# Patient Record
Sex: MALE | Race: WHITE | NOT HISPANIC OR LATINO | Employment: OTHER | URBAN - METROPOLITAN AREA
[De-identification: names, ages, dates, MRNs, and addresses within clinical notes are randomized per-mention and may not be internally consistent; named-entity substitution may affect disease eponyms.]

---

## 2017-01-09 ENCOUNTER — PATIENT MESSAGE (OUTPATIENT)
Dept: HEMATOLOGY/ONCOLOGY | Facility: CLINIC | Age: 52
End: 2017-01-09

## 2017-01-09 ENCOUNTER — INFUSION (OUTPATIENT)
Dept: INFUSION THERAPY | Facility: HOSPITAL | Age: 52
End: 2017-01-09
Attending: INTERNAL MEDICINE
Payer: COMMERCIAL

## 2017-01-09 VITALS
DIASTOLIC BLOOD PRESSURE: 75 MMHG | TEMPERATURE: 99 F | RESPIRATION RATE: 18 BRPM | SYSTOLIC BLOOD PRESSURE: 148 MMHG | HEART RATE: 83 BPM

## 2017-01-09 DIAGNOSIS — C85.89 OTHER MALIGNANT LYMPHOMAS, UNSPECIFIED SITE, EXTRANODAL AND SOLID ORGAN SITES: ICD-10-CM

## 2017-01-09 DIAGNOSIS — C83.00 SMALL CELL B-CELL LYMPHOMA: ICD-10-CM

## 2017-01-09 DIAGNOSIS — C85.90 LYMPHOMA: ICD-10-CM

## 2017-01-09 DIAGNOSIS — D49.2 NEOPLASM OF UNSPECIFIED NATURE OF BONE, SOFT TISSUE, AND SKIN: Primary | ICD-10-CM

## 2017-01-09 DIAGNOSIS — C83.08 SMALL B-CELL LYMPHOMA OF LYMPH NODES OF MULTIPLE REGIONS: Primary | ICD-10-CM

## 2017-01-09 LAB
ALBUMIN SERPL BCP-MCNC: 3.9 G/DL
ALP SERPL-CCNC: 78 U/L
ALT SERPL W/O P-5'-P-CCNC: 33 U/L
ANION GAP SERPL CALC-SCNC: 7 MMOL/L
AST SERPL-CCNC: 22 U/L
BILIRUB SERPL-MCNC: 0.5 MG/DL
BUN SERPL-MCNC: 15 MG/DL
CALCIUM SERPL-MCNC: 9.7 MG/DL
CHLORIDE SERPL-SCNC: 101 MMOL/L
CO2 SERPL-SCNC: 27 MMOL/L
CREAT SERPL-MCNC: 0.8 MG/DL
ERYTHROCYTE [DISTWIDTH] IN BLOOD BY AUTOMATED COUNT: 14.4 %
EST. GFR  (AFRICAN AMERICAN): >60 ML/MIN/1.73 M^2
EST. GFR  (NON AFRICAN AMERICAN): >60 ML/MIN/1.73 M^2
GLUCOSE SERPL-MCNC: 159 MG/DL
HCT VFR BLD AUTO: 40.6 %
HGB BLD-MCNC: 14.1 G/DL
MCH RBC QN AUTO: 29.2 PG
MCHC RBC AUTO-ENTMCNC: 34.7 %
MCV RBC AUTO: 84 FL
NEUTROPHILS # BLD AUTO: 6.5 K/UL
PLATELET # BLD AUTO: 243 K/UL
PMV BLD AUTO: 9.9 FL
POTASSIUM SERPL-SCNC: 4.5 MMOL/L
PROT SERPL-MCNC: 6.9 G/DL
RBC # BLD AUTO: 4.83 M/UL
SODIUM SERPL-SCNC: 135 MMOL/L
WBC # BLD AUTO: 8.44 K/UL

## 2017-01-09 PROCEDURE — 25000003 PHARM REV CODE 250: Performed by: INTERNAL MEDICINE

## 2017-01-09 PROCEDURE — 96417 CHEMO IV INFUS EACH ADDL SEQ: CPT

## 2017-01-09 PROCEDURE — 63600175 PHARM REV CODE 636 W HCPCS: Performed by: INTERNAL MEDICINE

## 2017-01-09 PROCEDURE — 96415 CHEMO IV INFUSION ADDL HR: CPT

## 2017-01-09 PROCEDURE — 96375 TX/PRO/DX INJ NEW DRUG ADDON: CPT

## 2017-01-09 PROCEDURE — 85027 COMPLETE CBC AUTOMATED: CPT

## 2017-01-09 PROCEDURE — 96413 CHEMO IV INFUSION 1 HR: CPT

## 2017-01-09 PROCEDURE — 80053 COMPREHEN METABOLIC PANEL: CPT

## 2017-01-09 PROCEDURE — 36591 DRAW BLOOD OFF VENOUS DEVICE: CPT

## 2017-01-09 PROCEDURE — 96367 TX/PROPH/DG ADDL SEQ IV INF: CPT

## 2017-01-09 RX ORDER — HEPARIN 100 UNIT/ML
500 SYRINGE INTRAVENOUS
Status: DISCONTINUED | OUTPATIENT
Start: 2017-01-09 | End: 2017-01-09 | Stop reason: HOSPADM

## 2017-01-09 RX ORDER — ACETAMINOPHEN 325 MG/1
650 TABLET ORAL
Status: COMPLETED | OUTPATIENT
Start: 2017-01-09 | End: 2017-01-09

## 2017-01-09 RX ORDER — SULFAMETHOXAZOLE AND TRIMETHOPRIM 400; 80 MG/1; MG/1
1 TABLET ORAL DAILY
COMMUNITY
End: 2017-07-19

## 2017-01-09 RX ORDER — MEPERIDINE HYDROCHLORIDE 50 MG/ML
25 INJECTION INTRAMUSCULAR; INTRAVENOUS; SUBCUTANEOUS
Status: DISCONTINUED | OUTPATIENT
Start: 2017-01-09 | End: 2017-01-09 | Stop reason: HOSPADM

## 2017-01-09 RX ORDER — HEPARIN 100 UNIT/ML
500 SYRINGE INTRAVENOUS
Status: CANCELLED | OUTPATIENT
Start: 2017-01-09

## 2017-01-09 RX ORDER — FAMOTIDINE 10 MG/ML
20 INJECTION INTRAVENOUS
Status: COMPLETED | OUTPATIENT
Start: 2017-01-09 | End: 2017-01-09

## 2017-01-09 RX ORDER — SODIUM CHLORIDE 0.9 % (FLUSH) 0.9 %
10 SYRINGE (ML) INJECTION
Status: COMPLETED | OUTPATIENT
Start: 2017-01-09 | End: 2017-01-09

## 2017-01-09 RX ORDER — SODIUM CHLORIDE 0.9 % (FLUSH) 0.9 %
10 SYRINGE (ML) INJECTION
Status: DISCONTINUED | OUTPATIENT
Start: 2017-01-09 | End: 2017-01-09 | Stop reason: HOSPADM

## 2017-01-09 RX ORDER — SODIUM CHLORIDE 0.9 % (FLUSH) 0.9 %
10 SYRINGE (ML) INJECTION
Status: CANCELLED | OUTPATIENT
Start: 2017-01-09

## 2017-01-09 RX ORDER — HEPARIN 100 UNIT/ML
500 SYRINGE INTRAVENOUS
Status: COMPLETED | OUTPATIENT
Start: 2017-01-09 | End: 2017-01-09

## 2017-01-09 RX ORDER — ONDANSETRON HCL IN 0.9 % NACL 8 MG/50 ML
8 INTRAVENOUS SOLUTION, PIGGYBACK (ML) INTRAVENOUS
Status: COMPLETED | OUTPATIENT
Start: 2017-01-09 | End: 2017-01-09

## 2017-01-09 RX ADMIN — ACETAMINOPHEN 650 MG: 325 TABLET ORAL at 10:01

## 2017-01-09 RX ADMIN — DIPHENHYDRAMINE HYDROCHLORIDE 50 MG: 50 INJECTION, SOLUTION INTRAMUSCULAR; INTRAVENOUS at 11:01

## 2017-01-09 RX ADMIN — RITUXIMAB 960 MG: 10 INJECTION, SOLUTION INTRAVENOUS at 11:01

## 2017-01-09 RX ADMIN — HEPARIN 500 UNITS: 100 SYRINGE at 03:01

## 2017-01-09 RX ADMIN — SODIUM CHLORIDE, PRESERVATIVE FREE 10 ML: 5 INJECTION INTRAVENOUS at 09:01

## 2017-01-09 RX ADMIN — FAMOTIDINE 20 MG: 10 INJECTION INTRAVENOUS at 11:01

## 2017-01-09 RX ADMIN — HEPARIN 500 UNITS: 100 SYRINGE at 09:01

## 2017-01-09 RX ADMIN — SODIUM CHLORIDE 8 MG: 9 INJECTION, SOLUTION INTRAVENOUS at 10:01

## 2017-01-09 RX ADMIN — DEXAMETHASONE SODIUM PHOSPHATE 10 MG: 4 INJECTION, SOLUTION INTRAMUSCULAR; INTRAVENOUS at 10:01

## 2017-01-09 RX ADMIN — SODIUM CHLORIDE: 0.9 INJECTION, SOLUTION INTRAVENOUS at 10:01

## 2017-01-09 RX ADMIN — CYCLOPHOSPHAMIDE 640 MG: 1 INJECTION, POWDER, FOR SOLUTION INTRAVENOUS; ORAL at 03:01

## 2017-01-09 RX ADMIN — SODIUM CHLORIDE, PRESERVATIVE FREE 10 ML: 5 INJECTION INTRAVENOUS at 03:01

## 2017-01-09 NOTE — PLAN OF CARE
Problem: Patient Care Overview (Adult)  Goal: Plan of Care Review  Outcome: Ongoing (interventions implemented as appropriate)  1547-Rituxan started at rate of 100 cc/hr and increased by 100 cc/hr every 30 minutes to a max rate of 400 cc/hr. Patient tolerated treatment well.  Per Pharmacy- national shortage of Fludara- Dr. Jara aware and patient will not receive.  Also, no new meds added to treatment plan.  Patient discharged without complaints or S/S of adverse event. AVS given.  Instructed to call provider for any questions or concerns.

## 2017-01-09 NOTE — MR AVS SNAPSHOT
"Patient Information     Patient Name Sex     Wild Holliday Male 1965      Visit Information        Provider Department Dept Phone Center    2017 9:00 AM NOM, CHEMO [facility] [phone] Manuel Gill      Patient Instructions     None      Your Current Medications Are     sulfamethoxazole-trimethoprim 400-80mg (BACTRIM,SEPTRA) 400-80 mg per tablet    ACETAMINOPHEN ORAL    BD ULTRA-FINE BERT PEN NEEDLES 32 gauge x 5/32" Ndle    CONTOUR NEXT STRIPS Strp    docusate sodium (COLACE) 100 MG capsule    fluticasone (FLONASE) 50 mcg/actuation nasal spray    hydrocodone-acetaminophen 10-325mg (NORCO)  mg Tab    JUBLIA 10 % Nataliya    lidocaine-prilocaine (EMLA) cream    metformin (GLUCOPHAGE-XR) 500 MG 24 hr tablet    multivitamin (ONE DAILY MULTIVITAMIN) per tablet    ondansetron (ZOFRAN, AS HYDROCHLORIDE,) 4 MG tablet    pantoprazole (PROTONIX) 40 MG tablet    sildenafil (VIAGRA) 50 MG tablet      Facility-Administered Medications     acetaminophen tablet 650 mg    cyclophosphamide (CYTOXAN) 250 mg/m2 = 640 mg in sodium chloride 0.9% 250 mL chemo infusion    dexamethasone IVPB 10 mg    diphenhydramine IVPB 50 mg    famotidine (PF) 20 mg/2 mL injection 20 mg    heparin, porcine (PF) 100 unit/mL injection flush 500 Units    heparin, porcine (PF) 100 unit/mL injection flush 500 Units    meperidine 50 mg/mL injection 25 mg    ondansetron IVPB 8 mg    rituximab (RITUXAN) 375 mg/m2 = 960 mg in sodium chloride 0.9% 960 mL infusion    sodium chloride 0.9% 500 mL flush bag    sodium chloride 0.9% flush 10 mL    sodium chloride 0.9% flush 10 mL    fludarabine (FLUDARA) 65 mg in sodium chloride 0.9% 100 mL chemo infusion (Discontinued)      Appointments for Next Year     1/10/2017  9:00 AM INFUSION 480 MIN (480 min.) [facility], CHEMO    Arrive at check-in approximately 15 minutes before your scheduled appointment time. Bring all outside medical records and imaging, along " with a list of your current medications and insurance card.    [facility], 5th Floor    1/11/2017  9:00 AM INFUSION 480 MIN (480 min.) [facility], CHEMO    Arrive at check-in approximately 15 minutes before your scheduled appointment time. Bring all outside medical records and imaging, along with a list of your current medications and insurance card.    [facility], 5th Floor    1/12/2017  9:30 AM INJECTION (15 min.) [facility] IN[facility], [facility]    Arrive at check-in approximately 15 minutes before your scheduled appointment time. Bring all outside medical records and imaging, along with a list of your current medications and insurance card.    [facility], 5th Floor    1/18/2017 10:15 AM ESTABLISHED PATIENT (15 min.) [facility] Yue Jara MD    Arrive at check-in approximately 15 minutes before your scheduled appointment time. Bring all outside medical records and imaging, along with a list of your current medications and insurance card.    [facility]         Default Flowsheet Data (last 24 hours)      Amb Complex Vitals Randy        01/09/17 1455 01/09/17 1347 01/09/17 1317 01/09/17 1245 01/09/17 1214    Measurements    BP (!)  148/75   rituxan stop 133/63   Rituxan remains at 400ml/hr until completion. (!)  159/75   rate increased to 400 ml/hr 132/63   rituxan increased to 300 ml/hr (!)  146/75   rituxan rate increased to 200ml/hr    Temp 98.8 °F (37.1 °C) 98.7 °F (37.1 °C)       Pulse 83 77 73 74 78    Resp 18 18 18 18 18    Pain Assessment    Pain Score  Zero          01/09/17 1141 01/09/17 1015             Measurements    BP (!)  150/73   rituxan start at 100 ml/hr (!)  148/87       Temp 98.9 °F (37.2 °C)        Pulse 71 71       Resp 18 18               Allergies     No Known Allergies      Medications You Received from 01/08/2017 1525 to 01/09/2017 1525        Date/Time Order Dose Route Action      01/09/2017 1029 acetaminophen tablet 650 mg 650 mg Oral Given     01/09/2017 1500 cyclophosphamide (CYTOXAN) 250 mg/m2 = 640 mg in sodium chloride 0.9% 250 mL chemo infusion 640 mg Intravenous New Bag     01/09/2017 1026 dexamethasone IVPB 10 mg 10 mg Intravenous New Bag     01/09/2017 1107 diphenhydramine IVPB 50 mg 50 mg Intravenous New Bag     01/09/2017 1107 famotidine (PF) 20 mg/2 mL injection 20 mg 20 mg Intravenous Given     01/09/2017 1046 ondansetron IVPB 8 mg 8 mg Intravenous New Bag     01/09/2017 1139 rituximab (RITUXAN) 375 mg/m2 = 960 mg in sodium chloride 0.9% 960 mL infusion 960 mg Intravenous New Bag     01/09/2017 1022 sodium chloride 0.9% 500 mL flush bag   Intravenous New Bag      Current Discharge Medication List     Cannot display discharge medications since this is not an admission.

## 2017-01-09 NOTE — PLAN OF CARE
Problem: Patient Care Overview (Adult)  Goal: Individualization & Mutuality  Outcome: Ongoing (interventions implemented as appropriate)  1015-Labs , hx, and medications reviewed, labs look goof for treatment today and orders are signed. Assessment completed. Discussed plan of care with patient. Patient in agreement. Chair reclined and warm blanket and snack offered.

## 2017-01-10 ENCOUNTER — INFUSION (OUTPATIENT)
Dept: INFUSION THERAPY | Facility: HOSPITAL | Age: 52
End: 2017-01-10
Attending: INTERNAL MEDICINE
Payer: COMMERCIAL

## 2017-01-10 VITALS
SYSTOLIC BLOOD PRESSURE: 182 MMHG | DIASTOLIC BLOOD PRESSURE: 94 MMHG | RESPIRATION RATE: 72 BRPM | TEMPERATURE: 98 F | HEART RATE: 94 BPM

## 2017-01-10 DIAGNOSIS — C83.08 SMALL B-CELL LYMPHOMA OF LYMPH NODES OF MULTIPLE REGIONS: Primary | ICD-10-CM

## 2017-01-10 DIAGNOSIS — C83.00 SMALL CELL B-CELL LYMPHOMA: ICD-10-CM

## 2017-01-10 DIAGNOSIS — C85.90 LYMPHOMA: ICD-10-CM

## 2017-01-10 DIAGNOSIS — C85.89 OTHER MALIGNANT LYMPHOMAS, UNSPECIFIED SITE, EXTRANODAL AND SOLID ORGAN SITES: ICD-10-CM

## 2017-01-10 PROCEDURE — 96413 CHEMO IV INFUSION 1 HR: CPT

## 2017-01-10 PROCEDURE — 25000003 PHARM REV CODE 250: Performed by: INTERNAL MEDICINE

## 2017-01-10 PROCEDURE — 96367 TX/PROPH/DG ADDL SEQ IV INF: CPT

## 2017-01-10 PROCEDURE — 63600175 PHARM REV CODE 636 W HCPCS: Performed by: INTERNAL MEDICINE

## 2017-01-10 RX ORDER — ONDANSETRON HCL IN 0.9 % NACL 8 MG/50 ML
8 INTRAVENOUS SOLUTION, PIGGYBACK (ML) INTRAVENOUS
Status: COMPLETED | OUTPATIENT
Start: 2017-01-10 | End: 2017-01-10

## 2017-01-10 RX ORDER — SODIUM CHLORIDE 0.9 % (FLUSH) 0.9 %
10 SYRINGE (ML) INJECTION
Status: DISCONTINUED | OUTPATIENT
Start: 2017-01-10 | End: 2017-01-10 | Stop reason: HOSPADM

## 2017-01-10 RX ORDER — HEPARIN 100 UNIT/ML
500 SYRINGE INTRAVENOUS
Status: CANCELLED | OUTPATIENT
Start: 2017-01-10

## 2017-01-10 RX ORDER — SODIUM CHLORIDE 0.9 % (FLUSH) 0.9 %
10 SYRINGE (ML) INJECTION
Status: CANCELLED | OUTPATIENT
Start: 2017-01-10

## 2017-01-10 RX ORDER — HEPARIN 100 UNIT/ML
500 SYRINGE INTRAVENOUS
Status: COMPLETED | OUTPATIENT
Start: 2017-01-10 | End: 2017-01-10

## 2017-01-10 RX ADMIN — CYCLOPHOSPHAMIDE 640 MG: 1 INJECTION, POWDER, FOR SOLUTION INTRAVENOUS; ORAL at 09:01

## 2017-01-10 RX ADMIN — HEPARIN 500 UNITS: 100 SYRINGE at 10:01

## 2017-01-10 RX ADMIN — SODIUM CHLORIDE 8 MG: 9 INJECTION, SOLUTION INTRAVENOUS at 09:01

## 2017-01-10 RX ADMIN — DEXAMETHASONE SODIUM PHOSPHATE 10 MG: 4 INJECTION, SOLUTION INTRAMUSCULAR; INTRAVENOUS at 09:01

## 2017-01-10 NOTE — PLAN OF CARE
Problem: Patient Care Overview (Adult)  Goal: Discharge Needs Assessment  Outcome: Ongoing (interventions implemented as appropriate)  Tolerated well. No complaints. RTC tomorrow for Day 3.

## 2017-01-11 ENCOUNTER — INFUSION (OUTPATIENT)
Dept: INFUSION THERAPY | Facility: HOSPITAL | Age: 52
End: 2017-01-11
Attending: INTERNAL MEDICINE
Payer: COMMERCIAL

## 2017-01-11 VITALS
HEART RATE: 62 BPM | RESPIRATION RATE: 18 BRPM | TEMPERATURE: 98 F | DIASTOLIC BLOOD PRESSURE: 80 MMHG | SYSTOLIC BLOOD PRESSURE: 162 MMHG

## 2017-01-11 DIAGNOSIS — C83.08 SMALL B-CELL LYMPHOMA OF LYMPH NODES OF MULTIPLE REGIONS: Primary | ICD-10-CM

## 2017-01-11 DIAGNOSIS — C85.89 OTHER MALIGNANT LYMPHOMAS, UNSPECIFIED SITE, EXTRANODAL AND SOLID ORGAN SITES: ICD-10-CM

## 2017-01-11 PROCEDURE — 96413 CHEMO IV INFUSION 1 HR: CPT

## 2017-01-11 PROCEDURE — 63600175 PHARM REV CODE 636 W HCPCS: Performed by: INTERNAL MEDICINE

## 2017-01-11 PROCEDURE — 96367 TX/PROPH/DG ADDL SEQ IV INF: CPT

## 2017-01-11 PROCEDURE — 25000003 PHARM REV CODE 250: Performed by: INTERNAL MEDICINE

## 2017-01-11 RX ORDER — SODIUM CHLORIDE 0.9 % (FLUSH) 0.9 %
10 SYRINGE (ML) INJECTION
Status: DISCONTINUED | OUTPATIENT
Start: 2017-01-11 | End: 2017-01-11 | Stop reason: HOSPADM

## 2017-01-11 RX ORDER — HEPARIN 100 UNIT/ML
500 SYRINGE INTRAVENOUS
Status: DISCONTINUED | OUTPATIENT
Start: 2017-01-11 | End: 2017-01-11 | Stop reason: HOSPADM

## 2017-01-11 RX ADMIN — SODIUM CHLORIDE, PRESERVATIVE FREE 10 ML: 5 INJECTION INTRAVENOUS at 10:01

## 2017-01-11 RX ADMIN — CYCLOPHOSPHAMIDE 640 MG: 1 INJECTION, POWDER, FOR SOLUTION INTRAVENOUS; ORAL at 10:01

## 2017-01-11 RX ADMIN — HEPARIN 500 UNITS: 100 SYRINGE at 10:01

## 2017-01-11 RX ADMIN — DEXAMETHASONE SODIUM PHOSPHATE 0.25 MG: 4 INJECTION, SOLUTION INTRAMUSCULAR; INTRAVENOUS at 09:01

## 2017-01-11 NOTE — PLAN OF CARE
Problem: Patient Care Overview (Adult)  Goal: Individualization & Mutuality  Outcome: Ongoing (interventions implemented as appropriate)  0905-Labs , hx, and medications reviewed, patient returns today for last day of infusions. Assessment completed and no changes noted overnight. Discussed plan of care with patient. Patient in agreement. Chair reclined and warm blanket and snack offered.

## 2017-01-11 NOTE — PLAN OF CARE
Problem: Patient Care Overview (Adult)  Goal: Plan of Care Review  Outcome: Ongoing (interventions implemented as appropriate)  1050-Patient tolerated cytoxan well. Port was flushed and heparin locked and patient was de-accessed today.  Discharged without complaints or S/S of adverse event. AVS given.  Instructed to call provider for any questions or concerns.  Patient verbalized will return tomorrow for injection.

## 2017-01-12 ENCOUNTER — INFUSION (OUTPATIENT)
Dept: INFUSION THERAPY | Facility: HOSPITAL | Age: 52
End: 2017-01-12
Attending: INTERNAL MEDICINE
Payer: COMMERCIAL

## 2017-01-12 DIAGNOSIS — C85.89 OTHER MALIGNANT LYMPHOMAS, UNSPECIFIED SITE, EXTRANODAL AND SOLID ORGAN SITES: ICD-10-CM

## 2017-01-12 DIAGNOSIS — C83.08 SMALL B-CELL LYMPHOMA OF LYMPH NODES OF MULTIPLE REGIONS: Primary | ICD-10-CM

## 2017-01-12 PROCEDURE — 96372 THER/PROPH/DIAG INJ SC/IM: CPT

## 2017-01-12 PROCEDURE — 63600175 PHARM REV CODE 636 W HCPCS: Performed by: INTERNAL MEDICINE

## 2017-01-12 RX ADMIN — PEGFILGRASTIM 6 MG: 6 INJECTION SUBCUTANEOUS at 10:01

## 2017-01-12 NOTE — NURSING
Pt arrived for neulasta following chemo yesterday.  Pt tolerated injection to left arm.  Discharged to home.

## 2017-01-18 ENCOUNTER — INFUSION (OUTPATIENT)
Dept: INFUSION THERAPY | Facility: HOSPITAL | Age: 52
End: 2017-01-18
Attending: INTERNAL MEDICINE
Payer: COMMERCIAL

## 2017-01-18 ENCOUNTER — OFFICE VISIT (OUTPATIENT)
Dept: HEMATOLOGY/ONCOLOGY | Facility: CLINIC | Age: 52
End: 2017-01-18
Payer: COMMERCIAL

## 2017-01-18 VITALS
TEMPERATURE: 99 F | RESPIRATION RATE: 18 BRPM | WEIGHT: 134 LBS | HEART RATE: 66 BPM | BODY MASS INDEX: 21.53 KG/M2 | HEIGHT: 66 IN | DIASTOLIC BLOOD PRESSURE: 90 MMHG | SYSTOLIC BLOOD PRESSURE: 147 MMHG

## 2017-01-18 DIAGNOSIS — C85.89 OTHER MALIGNANT LYMPHOMAS, UNSPECIFIED SITE, EXTRANODAL AND SOLID ORGAN SITES: ICD-10-CM

## 2017-01-18 DIAGNOSIS — C83.00 SMALL CELL B-CELL LYMPHOMA: Primary | ICD-10-CM

## 2017-01-18 DIAGNOSIS — D49.2 NEOPLASM OF UNSPECIFIED NATURE OF BONE, SOFT TISSUE, AND SKIN: ICD-10-CM

## 2017-01-18 DIAGNOSIS — E08.00 DIABETES MELLITUS DUE TO UNDERLYING CONDITION WITH HYPEROSMOLARITY WITHOUT COMA, WITHOUT LONG-TERM CURRENT USE OF INSULIN: ICD-10-CM

## 2017-01-18 DIAGNOSIS — K21.9 GASTROESOPHAGEAL REFLUX DISEASE, ESOPHAGITIS PRESENCE NOT SPECIFIED: ICD-10-CM

## 2017-01-18 DIAGNOSIS — C85.90 LYMPHOMA: ICD-10-CM

## 2017-01-18 DIAGNOSIS — D49.2 THORACIC SPINE TUMOR: Primary | ICD-10-CM

## 2017-01-18 LAB
ALBUMIN SERPL BCP-MCNC: 3.8 G/DL
ALP SERPL-CCNC: 178 U/L
ALT SERPL W/O P-5'-P-CCNC: 32 U/L
ANION GAP SERPL CALC-SCNC: 8 MMOL/L
AST SERPL-CCNC: 19 U/L
BILIRUB SERPL-MCNC: 0.5 MG/DL
BUN SERPL-MCNC: 15 MG/DL
CALCIUM SERPL-MCNC: 9.2 MG/DL
CHLORIDE SERPL-SCNC: 106 MMOL/L
CO2 SERPL-SCNC: 23 MMOL/L
CREAT SERPL-MCNC: 0.8 MG/DL
ERYTHROCYTE [DISTWIDTH] IN BLOOD BY AUTOMATED COUNT: 14.6 %
EST. GFR  (AFRICAN AMERICAN): >60 ML/MIN/1.73 M^2
EST. GFR  (NON AFRICAN AMERICAN): >60 ML/MIN/1.73 M^2
GLUCOSE SERPL-MCNC: 155 MG/DL
HCT VFR BLD AUTO: 41.3 %
HGB BLD-MCNC: 14.1 G/DL
MCH RBC QN AUTO: 29.7 PG
MCHC RBC AUTO-ENTMCNC: 34.1 %
MCV RBC AUTO: 87 FL
NEUTROPHILS # BLD AUTO: 14.3 K/UL
PLATELET # BLD AUTO: 131 K/UL
PMV BLD AUTO: 9.8 FL
POTASSIUM SERPL-SCNC: 4.5 MMOL/L
PROT SERPL-MCNC: 6.6 G/DL
RBC # BLD AUTO: 4.75 M/UL
SODIUM SERPL-SCNC: 137 MMOL/L
WBC # BLD AUTO: 17.39 K/UL

## 2017-01-18 PROCEDURE — 99999 PR PBB SHADOW E&M-EST. PATIENT-LVL IV: CPT | Mod: PBBFAC,,, | Performed by: INTERNAL MEDICINE

## 2017-01-18 PROCEDURE — 36591 DRAW BLOOD OFF VENOUS DEVICE: CPT

## 2017-01-18 PROCEDURE — 80053 COMPREHEN METABOLIC PANEL: CPT

## 2017-01-18 PROCEDURE — 99214 OFFICE O/P EST MOD 30 MIN: CPT | Mod: S$GLB,,, | Performed by: INTERNAL MEDICINE

## 2017-01-18 PROCEDURE — 25000003 PHARM REV CODE 250: Performed by: INTERNAL MEDICINE

## 2017-01-18 PROCEDURE — 85027 COMPLETE CBC AUTOMATED: CPT

## 2017-01-18 PROCEDURE — 1159F MED LIST DOCD IN RCRD: CPT | Mod: S$GLB,,, | Performed by: INTERNAL MEDICINE

## 2017-01-18 RX ORDER — HEPARIN 100 UNIT/ML
500 SYRINGE INTRAVENOUS
Status: CANCELLED | OUTPATIENT
Start: 2017-02-06

## 2017-01-18 RX ORDER — FAMOTIDINE 10 MG/ML
20 INJECTION INTRAVENOUS
Status: CANCELLED | OUTPATIENT
Start: 2017-02-06

## 2017-01-18 RX ORDER — SODIUM CHLORIDE 0.9 % (FLUSH) 0.9 %
10 SYRINGE (ML) INJECTION
Status: CANCELLED | OUTPATIENT
Start: 2017-02-06

## 2017-01-18 RX ORDER — SODIUM CHLORIDE 0.9 % (FLUSH) 0.9 %
10 SYRINGE (ML) INJECTION
Status: CANCELLED | OUTPATIENT
Start: 2017-02-08

## 2017-01-18 RX ORDER — ACETAMINOPHEN 325 MG/1
650 TABLET ORAL
Status: CANCELLED | OUTPATIENT
Start: 2017-02-06

## 2017-01-18 RX ORDER — MEPERIDINE HYDROCHLORIDE 50 MG/ML
25 INJECTION INTRAMUSCULAR; INTRAVENOUS; SUBCUTANEOUS
Status: CANCELLED | OUTPATIENT
Start: 2017-02-06

## 2017-01-18 RX ORDER — HEPARIN 100 UNIT/ML
500 SYRINGE INTRAVENOUS
Status: CANCELLED | OUTPATIENT
Start: 2017-02-07

## 2017-01-18 RX ORDER — SODIUM CHLORIDE 0.9 % (FLUSH) 0.9 %
10 SYRINGE (ML) INJECTION
Status: CANCELLED | OUTPATIENT
Start: 2017-01-18

## 2017-01-18 RX ORDER — HEPARIN 100 UNIT/ML
500 SYRINGE INTRAVENOUS
Status: COMPLETED | OUTPATIENT
Start: 2017-01-18 | End: 2017-01-18

## 2017-01-18 RX ORDER — HEPARIN 100 UNIT/ML
500 SYRINGE INTRAVENOUS
Status: CANCELLED | OUTPATIENT
Start: 2017-01-18

## 2017-01-18 RX ORDER — SODIUM CHLORIDE 0.9 % (FLUSH) 0.9 %
10 SYRINGE (ML) INJECTION
Status: CANCELLED | OUTPATIENT
Start: 2017-02-07

## 2017-01-18 RX ORDER — HEPARIN 100 UNIT/ML
500 SYRINGE INTRAVENOUS
Status: CANCELLED | OUTPATIENT
Start: 2017-02-08

## 2017-01-18 RX ORDER — SODIUM CHLORIDE 0.9 % (FLUSH) 0.9 %
10 SYRINGE (ML) INJECTION
Status: COMPLETED | OUTPATIENT
Start: 2017-01-18 | End: 2017-01-18

## 2017-01-18 RX ADMIN — SODIUM CHLORIDE, PRESERVATIVE FREE 10 ML: 5 INJECTION INTRAVENOUS at 09:01

## 2017-01-18 RX ADMIN — HEPARIN 500 UNITS: 100 SYRINGE at 09:01
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
## 2017-01-18 NOTE — PATIENT INSTRUCTIONS
No change in meds    Get labs done 2/5 in [place]    Come in for chemo when scheduled    Get PET / CT done when scheduled    See me in 1st week of march

## 2017-01-18 NOTE — Clinical Note
No change in meds  Get labs done 2/5 in [place]--cbc/cmp  Come in for chemo when scheduled--2/6  Get PET / CT done when scheduled--in 5 weeks  See me in 1st week of march  Ruth--Can you find out if we will have Fludara available by 2/6  Otherwise can yo send me orders for David/Rituxin

## 2017-01-18 NOTE — MR AVS SNAPSHOT
"    [facility]  [address]  [place] [address]  Phone: [phone]                  Wild Holliday   2017 10:15 AM   Office Visit    Description:  Male : 1965   Provider:  Yue Jara MD   Department:  [facility]           Reason for Visit     thoracic spine tumor           Diagnoses this Visit        Comments    Thoracic spine tumor    -  Primary     Other malignant lymphomas, unspecified site, extranodal and solid organ sites         Gastroesophageal reflux disease, esophagitis presence not specified         Diabetes mellitus due to underlying condition with hyperosmolarity without coma, without long-term current use of insulin                To Do List           Future Appointments        Provider Department Dept Phone    2017 1:00 PM LAB, [facility] [phone]    2017 10:30 AM [facility] PET CT LIMIT 400 LBS [facility] [phone]      Goals (5 Years of Data)     None      [facility] On Call     [facility] On [facility] Nurse Care Line -  Assistance  Registered nurses in the [facility] provide clinical advisement, health education, appointment booking, and other advisory services.  Call for this free service at [phone].             Medications                Verify that the below list of medications is an accurate representation of the medications you are currently taking.  If none reported, the list may be blank. If incorrect, please contact your healthcare provider. Carry this list with you in case of emergency.           Current Medications     ACETAMINOPHEN ORAL Take by mouth.    BD ULTRA-FINE BERT PEN NEEDLES 32 gauge x 5/32" Ndle USE DAILY WITH VICTOZA PEN UTD    CONTOUR NEXT STRIPS Strp     docusate sodium (COLACE) 100 MG capsule Take 1 capsule (100 mg total) by mouth 2 (two) times daily.    fluticasone (FLONASE) 50 mcg/actuation nasal spray 1 spray by Each Nare " "route once daily.    hydrocodone-acetaminophen 10-325mg (NORCO)  mg Tab Take 1 tablet by mouth every 6 (six) hours as needed.    JUBLIA 10 % Nataliya APPLY TO NAILS EVERY DAY    lidocaine-prilocaine (EMLA) cream Apply topically as needed.    metformin (GLUCOPHAGE-XR) 500 MG 24 hr tablet TK 1 T PO BID    multivitamin (ONE DAILY MULTIVITAMIN) per tablet Take 1 tablet by mouth once daily.    ondansetron (ZOFRAN, AS HYDROCHLORIDE,) 4 MG tablet Take 1 tablet (4 mg total) by mouth every 6 (six) hours as needed for Nausea.    pantoprazole (PROTONIX) 40 MG tablet Take 1 tablet (40 mg total) by mouth once daily.    sildenafil (VIAGRA) 50 MG tablet Take 1 tablet (50 mg total) by mouth as needed for Erectile Dysfunction.    sulfamethoxazole-trimethoprim 400-80mg (BACTRIM,SEPTRA) 400-80 mg per tablet Take 1 tablet by mouth once daily. Monday Wednesday friday           Clinical Reference Information           Vital Signs - Last Recorded  Most recent update: 1/18/2017 10:23 AM by Blane Mckeon, MA    BP Pulse Temp Resp Ht Wt    (!) 147/90 66 99.1 °F (37.3 °C) 18 5' 6" (1.676 m) 60.8 kg (134 lb)    BMI                21.63 kg/m2          Blood Pressure          Most Recent Value    BP  (!)  147/90      Allergies as of 1/18/2017     No Known Allergies      Immunizations Administered on Date of Encounter - 1/18/2017     None      Orders Placed During Today's Visit     Future Labs/Procedures Expected by Expires    CT Abdomen Without Contrast  1/18/2017 1/18/2018    CT Chest With Contrast  1/18/2017 1/18/2018    CT Soft Tissue Neck With Contrast  1/18/2017 1/19/2018    NM PET CT Routine Skull to Mid Thigh  1/18/2017 1/18/2018    CBC Oncology  As directed 1/18/2018    Comprehensive metabolic panel  As directed 1/18/2018    Lactate dehydrogenase  As directed 1/18/2018      Instructions    No change in meds    Get labs done 2/5 in [place]    Come in for chemo when scheduled    Get PET / CT done when scheduled    See me in 1st " week of march

## 2017-01-18 NOTE — NURSING
Patient here for blood draw from left chest port-port accesses easily with blood return after much flushing and changing positions-blood to lab-line flushed-needle removed-patient tolerated well.

## 2017-01-19 RX ORDER — ACETAMINOPHEN 325 MG/1
650 TABLET ORAL
Status: CANCELLED | OUTPATIENT
Start: 2017-02-06

## 2017-01-19 RX ORDER — HEPARIN 100 UNIT/ML
500 SYRINGE INTRAVENOUS
Status: CANCELLED | OUTPATIENT
Start: 2017-02-07

## 2017-01-19 RX ORDER — SODIUM CHLORIDE 0.9 % (FLUSH) 0.9 %
10 SYRINGE (ML) INJECTION
Status: CANCELLED | OUTPATIENT
Start: 2017-02-07

## 2017-01-19 RX ORDER — FAMOTIDINE 10 MG/ML
20 INJECTION INTRAVENOUS
Status: CANCELLED | OUTPATIENT
Start: 2017-02-06

## 2017-01-19 RX ORDER — MEPERIDINE HYDROCHLORIDE 50 MG/ML
25 INJECTION INTRAMUSCULAR; INTRAVENOUS; SUBCUTANEOUS
Status: CANCELLED | OUTPATIENT
Start: 2017-02-06

## 2017-01-19 RX ORDER — SODIUM CHLORIDE 0.9 % (FLUSH) 0.9 %
10 SYRINGE (ML) INJECTION
Status: CANCELLED | OUTPATIENT
Start: 2017-02-06

## 2017-01-19 RX ORDER — HEPARIN 100 UNIT/ML
500 SYRINGE INTRAVENOUS
Status: CANCELLED | OUTPATIENT
Start: 2017-02-06

## 2017-01-23 ENCOUNTER — PATIENT MESSAGE (OUTPATIENT)
Dept: HEMATOLOGY/ONCOLOGY | Facility: CLINIC | Age: 52
End: 2017-01-23

## 2017-01-25 ENCOUNTER — PATIENT MESSAGE (OUTPATIENT)
Dept: HEMATOLOGY/ONCOLOGY | Facility: CLINIC | Age: 52
End: 2017-01-25

## 2017-01-26 ENCOUNTER — TELEPHONE (OUTPATIENT)
Dept: HEMATOLOGY/ONCOLOGY | Facility: CLINIC | Age: 52
End: 2017-01-26

## 2017-01-26 ENCOUNTER — PATIENT MESSAGE (OUTPATIENT)
Dept: HEMATOLOGY/ONCOLOGY | Facility: CLINIC | Age: 52
End: 2017-01-26

## 2017-01-26 NOTE — TELEPHONE ENCOUNTER
Called and spoke with Mr Holliday this morning. Both ELIZABETH and Mariza, our , spoke with Mr. Holliday. All appointments scheduled and the information discussed with patient.

## 2017-01-26 NOTE — TELEPHONE ENCOUNTER
Returned call and spoke with . Patient calling inquiring about his future appointments. Patient currently prescribed Fludara which has not been available, due to  a shortage on the drug supply. Dr Jara has ordered Rituximab  Bendamustine. Informed patient the authorization was still pending. After reviewing the auth request today, patient has been approved. We will still have to verify with Dr Jara the date he is requesting patient to have his labs drawn. In his orders, it list a Sunday labs are to be drawn. The  is asking if we can have his labs drawn on Friday 1/3 or patient to have his blood work drawn on the morning of his chemotherapy. Please advise.

## 2017-01-26 NOTE — TELEPHONE ENCOUNTER
----- Message from Lazaro Lara sent at 1/25/2017  1:55 PM CST -----  Contact: self  Pt states at last visit  mention starting chemo on 02/06/17, pt doesn't have any appointments scheduled, pt has some concerns and would like to speak with nurse ASAP, pt mention leaving messages but no response from nurse.  Contact number [phone]

## 2017-01-27 DIAGNOSIS — C85.89 OTHER MALIGNANT LYMPHOMAS, UNSPECIFIED SITE, EXTRANODAL AND SOLID ORGAN SITES: ICD-10-CM

## 2017-01-27 DIAGNOSIS — E08.00 DIABETES MELLITUS DUE TO UNDERLYING CONDITION WITH HYPEROSMOLARITY WITHOUT COMA, WITHOUT LONG-TERM CURRENT USE OF INSULIN: ICD-10-CM

## 2017-01-27 DIAGNOSIS — K21.9 GASTROESOPHAGEAL REFLUX DISEASE, ESOPHAGITIS PRESENCE NOT SPECIFIED: ICD-10-CM

## 2017-01-27 DIAGNOSIS — D49.2 THORACIC SPINE TUMOR: ICD-10-CM

## 2017-01-29 RX ORDER — PANTOPRAZOLE SODIUM 40 MG/1
TABLET, DELAYED RELEASE ORAL
Qty: 30 TABLET | Refills: 0 | Status: SHIPPED | OUTPATIENT
Start: 2017-01-29 | End: 2017-07-19

## 2017-02-06 ENCOUNTER — INFUSION (OUTPATIENT)
Dept: INFUSION THERAPY | Facility: HOSPITAL | Age: 52
End: 2017-02-06
Attending: INTERNAL MEDICINE
Payer: COMMERCIAL

## 2017-02-06 ENCOUNTER — LAB VISIT (OUTPATIENT)
Dept: LAB | Facility: HOSPITAL | Age: 52
End: 2017-02-06
Attending: INTERNAL MEDICINE
Payer: COMMERCIAL

## 2017-02-06 VITALS
DIASTOLIC BLOOD PRESSURE: 63 MMHG | HEIGHT: 66 IN | SYSTOLIC BLOOD PRESSURE: 133 MMHG | TEMPERATURE: 98 F | RESPIRATION RATE: 20 BRPM | BODY MASS INDEX: 48.15 KG/M2 | WEIGHT: 299.63 LBS | HEART RATE: 73 BPM

## 2017-02-06 DIAGNOSIS — K21.9 GASTROESOPHAGEAL REFLUX DISEASE, ESOPHAGITIS PRESENCE NOT SPECIFIED: ICD-10-CM

## 2017-02-06 DIAGNOSIS — C85.89 OTHER MALIGNANT LYMPHOMAS, UNSPECIFIED SITE, EXTRANODAL AND SOLID ORGAN SITES: ICD-10-CM

## 2017-02-06 DIAGNOSIS — C83.00 SMALL B-CELL LYMPHOMA, UNSPECIFIED BODY REGION: Primary | ICD-10-CM

## 2017-02-06 DIAGNOSIS — D49.2 THORACIC SPINE TUMOR: ICD-10-CM

## 2017-02-06 DIAGNOSIS — E08.00 DIABETES MELLITUS DUE TO UNDERLYING CONDITION WITH HYPEROSMOLARITY WITHOUT COMA, WITHOUT LONG-TERM CURRENT USE OF INSULIN: ICD-10-CM

## 2017-02-06 LAB
ALBUMIN SERPL BCP-MCNC: 3.9 G/DL
ALP SERPL-CCNC: 77 U/L
ALT SERPL W/O P-5'-P-CCNC: 39 U/L
ANION GAP SERPL CALC-SCNC: 10 MMOL/L
AST SERPL-CCNC: 25 U/L
BILIRUB SERPL-MCNC: 0.6 MG/DL
BUN SERPL-MCNC: 18 MG/DL
CALCIUM SERPL-MCNC: 9.4 MG/DL
CHLORIDE SERPL-SCNC: 101 MMOL/L
CO2 SERPL-SCNC: 26 MMOL/L
CREAT SERPL-MCNC: 0.8 MG/DL
ERYTHROCYTE [DISTWIDTH] IN BLOOD BY AUTOMATED COUNT: 15 %
EST. GFR  (AFRICAN AMERICAN): >60 ML/MIN/1.73 M^2
EST. GFR  (NON AFRICAN AMERICAN): >60 ML/MIN/1.73 M^2
GLUCOSE SERPL-MCNC: 189 MG/DL
HCT VFR BLD AUTO: 41 %
HGB BLD-MCNC: 14.2 G/DL
LDH SERPL L TO P-CCNC: 174 U/L
MCH RBC QN AUTO: 30.7 PG
MCHC RBC AUTO-ENTMCNC: 34.6 %
MCV RBC AUTO: 89 FL
NEUTROPHILS # BLD AUTO: 4.6 K/UL
PLATELET # BLD AUTO: 256 K/UL
PMV BLD AUTO: 9.4 FL
POTASSIUM SERPL-SCNC: 4.6 MMOL/L
PROT SERPL-MCNC: 6.8 G/DL
RBC # BLD AUTO: 4.63 M/UL
SODIUM SERPL-SCNC: 137 MMOL/L
WBC # BLD AUTO: 6.41 K/UL

## 2017-02-06 PROCEDURE — 96415 CHEMO IV INFUSION ADDL HR: CPT

## 2017-02-06 PROCEDURE — 63600175 PHARM REV CODE 636 W HCPCS: Performed by: INTERNAL MEDICINE

## 2017-02-06 PROCEDURE — 83615 LACTATE (LD) (LDH) ENZYME: CPT

## 2017-02-06 PROCEDURE — 96413 CHEMO IV INFUSION 1 HR: CPT

## 2017-02-06 PROCEDURE — 96411 CHEMO IV PUSH ADDL DRUG: CPT

## 2017-02-06 PROCEDURE — 36593 DECLOT VASCULAR DEVICE: CPT

## 2017-02-06 PROCEDURE — 36415 COLL VENOUS BLD VENIPUNCTURE: CPT

## 2017-02-06 PROCEDURE — 85027 COMPLETE CBC AUTOMATED: CPT

## 2017-02-06 PROCEDURE — 25000003 PHARM REV CODE 250: Performed by: INTERNAL MEDICINE

## 2017-02-06 PROCEDURE — 96375 TX/PRO/DX INJ NEW DRUG ADDON: CPT

## 2017-02-06 PROCEDURE — 80053 COMPREHEN METABOLIC PANEL: CPT

## 2017-02-06 PROCEDURE — 96367 TX/PROPH/DG ADDL SEQ IV INF: CPT

## 2017-02-06 RX ORDER — HEPARIN 100 UNIT/ML
500 SYRINGE INTRAVENOUS
Status: DISCONTINUED | OUTPATIENT
Start: 2017-02-06 | End: 2017-02-06 | Stop reason: HOSPADM

## 2017-02-06 RX ORDER — SODIUM CHLORIDE 0.9 % (FLUSH) 0.9 %
10 SYRINGE (ML) INJECTION
Status: DISCONTINUED | OUTPATIENT
Start: 2017-02-06 | End: 2017-02-06 | Stop reason: HOSPADM

## 2017-02-06 RX ORDER — FAMOTIDINE 10 MG/ML
20 INJECTION INTRAVENOUS
Status: COMPLETED | OUTPATIENT
Start: 2017-02-06 | End: 2017-02-06

## 2017-02-06 RX ORDER — MEPERIDINE HYDROCHLORIDE 50 MG/ML
25 INJECTION INTRAMUSCULAR; INTRAVENOUS; SUBCUTANEOUS
Status: DISCONTINUED | OUTPATIENT
Start: 2017-02-06 | End: 2017-02-06 | Stop reason: HOSPADM

## 2017-02-06 RX ORDER — ACETAMINOPHEN 325 MG/1
650 TABLET ORAL
Status: COMPLETED | OUTPATIENT
Start: 2017-02-06 | End: 2017-02-06

## 2017-02-06 RX ADMIN — RITUXIMAB 945 MG: 10 INJECTION, SOLUTION INTRAVENOUS at 11:02

## 2017-02-06 RX ADMIN — SODIUM CHLORIDE, PRESERVATIVE FREE 10 ML: 5 INJECTION INTRAVENOUS at 02:02

## 2017-02-06 RX ADMIN — HEPARIN 500 UNITS: 100 SYRINGE at 02:02

## 2017-02-06 RX ADMIN — FAMOTIDINE 20 MG: 10 INJECTION INTRAVENOUS at 10:02

## 2017-02-06 RX ADMIN — BENDAMUSTINE HYDROCHLORIDE 225 MG: 25 INJECTION, SOLUTION INTRAVENOUS at 02:02

## 2017-02-06 RX ADMIN — ALTEPLASE 2 MG: 2.2 INJECTION, POWDER, LYOPHILIZED, FOR SOLUTION INTRAVENOUS at 09:02

## 2017-02-06 RX ADMIN — PALONOSETRON HYDROCHLORIDE 0.25 MG: 0.25 INJECTION INTRAVENOUS at 10:02

## 2017-02-06 RX ADMIN — ACETAMINOPHEN 650 MG: 325 TABLET ORAL at 10:02

## 2017-02-06 RX ADMIN — DIPHENHYDRAMINE HYDROCHLORIDE 50 MG: 50 INJECTION, SOLUTION INTRAMUSCULAR; INTRAVENOUS at 10:02

## 2017-02-06 RX ADMIN — SODIUM CHLORIDE: 0.9 INJECTION, SOLUTION INTRAVENOUS at 10:02

## 2017-02-06 NOTE — MR AVS SNAPSHOT
"Patient Information     Patient Name Sex Wild Montes Male 1965      Visit Information        Provider Department Dept Phone Center    2017 8:30 AM [facility], CHEMO [facility] Chemotherapy Infusion [phone] Manuel Gill      Patient Instructions     None      Your Current Medications Are     ACETAMINOPHEN ORAL    BD ULTRA-FINE BERT PEN NEEDLES 32 gauge x 5/32" Ndle    CONTOUR NEXT STRIPS Strp    docusate sodium (COLACE) 100 MG capsule    fluticasone (FLONASE) 50 mcg/actuation nasal spray    hydrocodone-acetaminophen 10-325mg (NORCO)  mg Tab    JUBLIA 10 % Nataliya    lidocaine-prilocaine (EMLA) cream    metformin (GLUCOPHAGE-XR) 500 MG 24 hr tablet    multivitamin (ONE DAILY MULTIVITAMIN) per tablet    ondansetron (ZOFRAN, AS HYDROCHLORIDE,) 4 MG tablet    pantoprazole (PROTONIX) 40 MG tablet    sildenafil (VIAGRA) 50 MG tablet    sulfamethoxazole-trimethoprim 400-80mg (BACTRIM,SEPTRA) 400-80 mg per tablet      Facility-Administered Medications     acetaminophen tablet 650 mg    alteplase injection 2 mg    bendamustine (BENDEKA) 225 mg in sodium chloride 0.9% 50 mL chemo infusion    diphenhydramine IVPB 50 mg    famotidine (PF) 20 mg/2 mL injection 20 mg    heparin, porcine (PF) 100 unit/mL injection flush 500 Units    meperidine 50 mg/mL injection 25 mg    palonosetron 0.25mg/dexamethasone 20mg IVPB    rituximab (RITUXAN) 375 mg/m2 = 945 mg in sodium chloride 0.9% 945 mL infusion    sodium chloride 0.9% 100 mL flush bag    sodium chloride 0.9% flush 10 mL    rituximab (RITUXAN) 375 mg/m2 = 630 mg in sodium chloride 0.9% 630 mL infusion (Discontinued)      Appointments for Next Year     2017  1:30 PM INFUSION 060 MIN (60 min.) [facility], CHEMO    Arrive at check-in approximately 15 minutes before your scheduled appointment time. Bring all outside medical records and imaging, along with a list of your current medications and insurance card.    [facility], 5th " Floor    2/8/2017  9:30 AM INJECTION (15 min.) [facility] INJECTION, [facility] INFUSION    Arrive at check-in approximately 15 minutes before your scheduled appointment time. Bring all outside medical records and imaging, along with a list of your current medications and insurance card.    [facility], 5th Floor    2/22/2017  9:00 AM CT CHEST W CONTRAST (15 min.) [facility] CT1 64- LIMIT 450 LBS    Please fast for 4 hours prior to appointment. Arrive at check-in approximately 30 minutes before your scheduled appointment time. Bring all outside medical records and imaging, along with a list of your current medications and insurance card.    2nd floor    2/22/2017  9:15 AM CT ABD NON CONTRAST (15 min.) [facility] CT1 64- LIMIT 450 LBS    Arrive 1 hour prior to appointmnet to drink exam prep.    2nd floor    2/22/2017  9:30 AM CT IV CONTRAST (15 min.) [facility] CT2 64- LIMIT 600 LBS    Please fast for 4 hours prior to appointment. Arrive at check-in approximately 30 minutes before your scheduled appointment time. Bring all outside medical records and imaging, along with a list of your current medications and insurance card.    2nd floor    2/22/2017 10:30 AM PET CT  (30 min.) [facility] PET CT LIMIT 400 LBS    You have been scheduled for a PET scan. Do Not eat or drink anything 6 hours before your test, except for water. If you are scheduled at [facility], report to the Radiology / Laboratory check in on the second floor.  Upon arrival to the PET department we will have to do a finger stick to check your glucose level. An intravenous line (IV) will be started, and you will be given a PET tracer. This injection needs to circulate for about 60 minutes while you are sitting in a quiet area. You will lie on a table and the camera will take pictures,  "this takes about 30-45 minutes.  A special radiopharmaceutical must be ordered for your test so if you have any questions or need to reschedule your appointment please call nuclear medicine ([phone]).    Report to Radiology/Laboratory  - 2nd Floor         Default Flowsheet Data (last 24 hours)      Amb Complex Vitals Randy        02/06/17 1425 02/06/17 1315 02/06/17 1245 02/06/17 1215 02/06/17 1145    Measurements    /63   Rituxan infusion completed (!)  151/73   Rituxan infusion maintained at 400 mg/hr (!)  143/67   Rituxan infusion increased to 400 mg/hr 130/69   Rituxan infusion increased to 300 mg/hr (!)  144/67   Rituxan infusion increased to 200 mg/hr    Pulse 73 69 66 61 70       02/06/17 1115 02/06/17 0847             Measurements    Weight  135.9 kg (299 lb 9.7 oz)       Height  5' 5.98" (1.676 m)       BSA (Calculated - sq m)  2.52 sq meters       BMI (Calculated)  48.5       BP --   Rituxan infusion started at 100 mg/hr (!)  158/88       Temp  98.2 °F (36.8 °C)       Pulse  65       Resp  20       Pain Assessment    Pain Score  Four       Pain Loc  SHOULDER               Allergies     No Known Allergies      Medications You Received from 02/05/2017 1451 to 02/06/2017 1451        Date/Time Order Dose Route Action     02/06/2017 1002 acetaminophen tablet 650 mg 650 mg Oral Given     02/06/2017 0930 alteplase injection 2 mg 2 mg Intra-Catheter Given     02/06/2017 1432 bendamustine (BENDEKA) 225 mg in sodium chloride 0.9% 50 mL chemo infusion 225 mg Intravenous New Bag     02/06/2017 1003 diphenhydramine IVPB 50 mg 50 mg Intravenous New Bag     02/06/2017 1003 famotidine (PF) 20 mg/2 mL injection 20 mg 20 mg Intravenous Given     02/06/2017 1451 heparin, porcine (PF) 100 unit/mL injection flush 500 Units 500 Units Intravenous Given     02/06/2017 1051 palonosetron 0.25mg/dexamethasone 20mg IVPB 0.25 mg Intravenous New Bag     02/06/2017 1112 rituximab (RITUXAN) 375 mg/m2 = 945 mg in sodium " chloride 0.9% 945 mL infusion 945 mg Intravenous New Bag     02/06/2017 1003 sodium chloride 0.9% 100 mL flush bag   Intravenous New Bag     02/06/2017 1451 sodium chloride 0.9% flush 10 mL 10 mL Intravenous Given      Current Discharge Medication List     Cannot display discharge medications since this is not an admission.

## 2017-02-06 NOTE — PLAN OF CARE
Problem: Patient Care Overview (Adult)  Goal: Plan of Care Review  Outcome: Ongoing (interventions implemented as appropriate)  Pt here for Rituxan and first time Bendeka infusion. Pt stated that he was told that Fludarabine is on back order and his chemo regimen might be changed. He stated that he was not informed of the name of the new chemo and did not sign a consent for the new chemo. Dr. Jara notified. Pt informed of possible drug side effects and chemo consent obtained. Pt tolerated Rituxan and Bendeka treatment with VSS. Pt instructed to RTC tomorrow and call MD with any problems. Pt AVS given and Pt discharged home.

## 2017-02-07 ENCOUNTER — INFUSION (OUTPATIENT)
Dept: INFUSION THERAPY | Facility: HOSPITAL | Age: 52
End: 2017-02-07
Attending: INTERNAL MEDICINE
Payer: COMMERCIAL

## 2017-02-07 VITALS — DIASTOLIC BLOOD PRESSURE: 81 MMHG | SYSTOLIC BLOOD PRESSURE: 170 MMHG | RESPIRATION RATE: 18 BRPM | HEART RATE: 71 BPM

## 2017-02-07 DIAGNOSIS — C83.00 SMALL B-CELL LYMPHOMA, UNSPECIFIED BODY REGION: Primary | ICD-10-CM

## 2017-02-07 DIAGNOSIS — C85.89 OTHER MALIGNANT LYMPHOMAS, UNSPECIFIED SITE, EXTRANODAL AND SOLID ORGAN SITES: ICD-10-CM

## 2017-02-07 PROCEDURE — 25000003 PHARM REV CODE 250: Performed by: INTERNAL MEDICINE

## 2017-02-07 PROCEDURE — 63600175 PHARM REV CODE 636 W HCPCS: Performed by: INTERNAL MEDICINE

## 2017-02-07 PROCEDURE — 96409 CHEMO IV PUSH SNGL DRUG: CPT

## 2017-02-07 RX ORDER — HEPARIN 100 UNIT/ML
500 SYRINGE INTRAVENOUS
Status: DISCONTINUED | OUTPATIENT
Start: 2017-02-07 | End: 2017-02-07 | Stop reason: HOSPADM

## 2017-02-07 RX ORDER — SODIUM CHLORIDE 0.9 % (FLUSH) 0.9 %
10 SYRINGE (ML) INJECTION
Status: DISCONTINUED | OUTPATIENT
Start: 2017-02-07 | End: 2017-02-07 | Stop reason: HOSPADM

## 2017-02-07 RX ADMIN — HEPARIN 500 UNITS: 100 SYRINGE at 02:02

## 2017-02-07 RX ADMIN — BENDAMUSTINE HYDROCHLORIDE 225 MG: 25 INJECTION, SOLUTION INTRAVENOUS at 02:02

## 2017-02-07 NOTE — PLAN OF CARE
Problem: Patient Care Overview (Adult)  Goal: Plan of Care Review  Outcome: Ongoing (interventions implemented as appropriate)  Pt tolerated treatment well.  No s/s of reaction. Vitals stable, NAD.

## 2017-02-07 NOTE — MR AVS SNAPSHOT
"Patient Information     Patient Name Sex Wild Montes Male 1965      Visit Information        Provider Department Dept Phone Center    2017 1:30 PM [facility], [facility]MO [facility] [phone] Manuel Gill      Patient Instructions     None      Your Current Medications Are     ACETAMINOPHEN ORAL    BD ULTRA-FINE BERT PEN NEEDLES 32 gauge x 5/32" Ndle    CONTOUR NEXT STRIPS Strp    docusate sodium (COLACE) 100 MG capsule    fluticasone (FLONASE) 50 mcg/actuation nasal spray    hydrocodone-acetaminophen 10-325mg (NORCO)  mg Tab    JUBLIA 10 % Nataliya    lidocaine-prilocaine (EMLA) cream    metformin (GLUCOPHAGE-XR) 500 MG 24 hr tablet    multivitamin (ONE DAILY MULTIVITAMIN) per tablet    ondansetron (ZOFRAN, AS HYDROCHLORIDE,) 4 MG tablet    pantoprazole (PROTONIX) 40 MG tablet    sildenafil (VIAGRA) 50 MG tablet    sulfamethoxazole-trimethoprim 400-80mg (BACTRIM,SEPTRA) 400-80 mg per tablet      Facility-Administered Medications     alteplase injection 2 mg    bendamustine (BENDEKA) 225 mg in sodium chloride 0.9% 50 mL chemo infusion    bendamustine (BENDEKA) 225 mg in sodium chloride 0.9% 50 mL chemo infusion    heparin, porcine (PF) 100 unit/mL injection flush 500 Units    sodium chloride 0.9% 100 mL flush bag    sodium chloride 0.9% flush 10 mL    alteplase injection 2 mg (Discontinued)    heparin, porcine (PF) 100 unit/mL injection flush 500 Units (Discontinued)    meperidine 50 mg/mL injection 25 mg (Discontinued)    sodium chloride 0.9% flush 10 mL (Discontinued)      Appointments for Next Year     2017  9:30 AM INJECTION (15 min.) [facility] INJECTION, [facility] IN[facility]USION    Arrive at check-in approximately 15 minutes before your scheduled appointment time. Bring all outside medical records and imaging, along with a list of your current medications and insurance card.    [facility], 5th Floor    2017  9:00 AM CT CHEST W CONTRAST (15 min.) [facility] CT1 64- LIMIT 450 LBS    Please fast for 4 hours prior to appointment. Arrive at check-in approximately 30 minutes before your scheduled appointment time. Bring all outside medical records and imaging, along with a list of your current medications and insurance card.    [address] [place] [address]    2/22/2017  9:15 AM CT ABD NON CONTRAST (15 min.) [facility] CT1 64- LIMIT 450 LBS    Arrive 1 hour prior to appointmnet to drink exam prep.    [address] [place] [address]    2/22/2017  9:30 AM CT IV CONTRAST (15 min.) [facility] CT2 64- LIMIT 600 LBS    Please fast for 4 hours prior to appointment. Arrive at check-in approximately 30 minutes before your scheduled appointment time. Bring all outside medical records and imaging, along with a list of your current medications and insurance card.    [address] [place] [address]    2/22/2017 10:30 AM PET CT  (30 min.) [facility] PET CT LIMIT 400 LBS    You have been scheduled for a PET scan. Do Not eat or drink anything 6 hours before your test, except for water. If you are scheduled at [facility], report to the Radiology / Laboratory check in on the second floor.  Upon arrival to the PET department we will have to do a finger stick to check your glucose level. An intravenous line (IV) will be started, and you will be given a PET tracer. This injection needs to circulate for about 60 minutes while you are sitting in a quiet area. You will lie on a table and the camera will take pictures, this takes about 30-45 minutes.  A special radiopharmaceutical must be ordered for your test so if you have any questions or need to reschedule your appointment please call nuclear medicine ([phone]).    Report to Radiology/Laboratory  - [address] [place] [address]    3/1/2017  9:30 AM MYCHART FOLLOWUP/OFFICE VISIT (30 min.) [facility] Yue Jara MD    Arrive at check-in  approximately 15 minutes before your scheduled appointment time. Bring all outside medical records and imaging, along with a list of your current medications and insurance card.    [facility]         Default Flowsheet Data (last 24 hours)      Amb Complex Vitals Randy        02/07/17 1404                Measurements    BP (!)  170/81        Pulse 71        Resp 18                Allergies     No Known Allergies      Medications You Received from 02/06/2017 1433 to 02/07/2017 1433        Date/Time Order Dose Route Action     02/07/2017 1412 bendamustine (BENDEKA) 225 mg in sodium chloride 0.9% 50 mL chemo infusion 225 mg Intravenous New Bag     02/07/2017 1430 heparin, porcine (PF) 100 unit/mL injection flush 500 Units 500 Units Intravenous Given      Current Discharge Medication List     Cannot display discharge medications since this is not an admission.

## 2017-02-08 ENCOUNTER — INFUSION (OUTPATIENT)
Dept: INFUSION THERAPY | Facility: HOSPITAL | Age: 52
End: 2017-02-08
Attending: INTERNAL MEDICINE
Payer: COMMERCIAL

## 2017-02-08 DIAGNOSIS — C83.00 SMALL B-CELL LYMPHOMA, UNSPECIFIED BODY REGION: Primary | ICD-10-CM

## 2017-02-08 DIAGNOSIS — C85.89 OTHER MALIGNANT LYMPHOMAS, UNSPECIFIED SITE, EXTRANODAL AND SOLID ORGAN SITES: ICD-10-CM

## 2017-02-08 PROCEDURE — 96372 THER/PROPH/DIAG INJ SC/IM: CPT

## 2017-02-08 PROCEDURE — 63600175 PHARM REV CODE 636 W HCPCS: Performed by: INTERNAL MEDICINE

## 2017-02-08 RX ADMIN — PEGFILGRASTIM 6 MG: 6 INJECTION SUBCUTANEOUS at 01:02

## 2017-02-08 NOTE — NURSING
Patient here for injection-complains of nausea-taking medication with good results-reviewed side effects of neulasta-tolerated injection well.

## 2017-02-22 ENCOUNTER — HOSPITAL ENCOUNTER (OUTPATIENT)
Dept: RADIOLOGY | Facility: HOSPITAL | Age: 52
Discharge: HOME | End: 2017-02-22
Attending: INTERNAL MEDICINE
Payer: COMMERCIAL

## 2017-02-22 ENCOUNTER — TELEPHONE (OUTPATIENT)
Dept: HEMATOLOGY/ONCOLOGY | Facility: CLINIC | Age: 52
End: 2017-02-22

## 2017-02-22 DIAGNOSIS — E08.00 DIABETES MELLITUS DUE TO UNDERLYING CONDITION WITH HYPEROSMOLARITY WITHOUT COMA, WITHOUT LONG-TERM CURRENT USE OF INSULIN: ICD-10-CM

## 2017-02-22 DIAGNOSIS — K21.9 GASTROESOPHAGEAL REFLUX DISEASE, ESOPHAGITIS PRESENCE NOT SPECIFIED: ICD-10-CM

## 2017-02-22 DIAGNOSIS — D49.2 THORACIC SPINE TUMOR: ICD-10-CM

## 2017-02-22 DIAGNOSIS — C85.89 OTHER MALIGNANT LYMPHOMAS, UNSPECIFIED SITE, EXTRANODAL AND SOLID ORGAN SITES: ICD-10-CM

## 2017-02-22 PROCEDURE — A9552 F18 FDG: HCPCS

## 2017-02-22 PROCEDURE — 25500020 PHARM REV CODE 255: Performed by: INTERNAL MEDICINE

## 2017-02-22 PROCEDURE — 71260 CT THORAX DX C+: CPT | Mod: 26,,, | Performed by: RADIOLOGY

## 2017-02-22 PROCEDURE — 71260 CT THORAX DX C+: CPT | Mod: TC

## 2017-02-22 PROCEDURE — 78815 PET IMAGE W/CT SKULL-THIGH: CPT | Mod: 26,PS,, | Performed by: RADIOLOGY

## 2017-02-22 PROCEDURE — 74177 CT ABD & PELVIS W/CONTRAST: CPT | Mod: 26,,, | Performed by: RADIOLOGY

## 2017-02-22 PROCEDURE — 70491 CT SOFT TISSUE NECK W/DYE: CPT | Mod: TC

## 2017-02-22 PROCEDURE — 70491 CT SOFT TISSUE NECK W/DYE: CPT | Mod: 26,,, | Performed by: RADIOLOGY

## 2017-02-22 PROCEDURE — 74177 CT ABD & PELVIS W/CONTRAST: CPT | Mod: TC

## 2017-02-22 RX ADMIN — IOHEXOL 15 ML: 350 INJECTION, SOLUTION INTRAVENOUS at 11:02

## 2017-02-22 RX ADMIN — IOHEXOL 15 ML: 350 INJECTION, SOLUTION INTRAVENOUS at 10:02

## 2017-02-22 RX ADMIN — IOHEXOL 100 ML: 350 INJECTION, SOLUTION INTRAVENOUS at 12:02

## 2017-02-22 NOTE — TELEPHONE ENCOUNTER
----- Message from Samanta Samuels sent at 2/22/2017  3:29 PM CST -----  Contact: Self  Pt has a CT and PET Scans today and would like to know the results.    He can be reached at [phone], or [phone].    Thank you.

## 2017-02-23 ENCOUNTER — PATIENT MESSAGE (OUTPATIENT)
Dept: HEMATOLOGY/ONCOLOGY | Facility: CLINIC | Age: 52
End: 2017-02-23

## 2017-02-24 NOTE — TELEPHONE ENCOUNTER
Called and spoke with Mr Holliday. Per Dr Jara, Ct Scan results reviewed and patient has had great response and he will discuss further when he come into the clinic for his clinic visit. Patient received the released results via [facility].

## 2017-03-01 ENCOUNTER — OFFICE VISIT (OUTPATIENT)
Dept: HEMATOLOGY/ONCOLOGY | Facility: CLINIC | Age: 52
End: 2017-03-01
Payer: COMMERCIAL

## 2017-03-01 VITALS
SYSTOLIC BLOOD PRESSURE: 159 MMHG | WEIGHT: 298.06 LBS | RESPIRATION RATE: 18 BRPM | BODY MASS INDEX: 44.15 KG/M2 | DIASTOLIC BLOOD PRESSURE: 87 MMHG | HEIGHT: 69 IN | TEMPERATURE: 100 F | HEART RATE: 67 BPM

## 2017-03-01 DIAGNOSIS — C82.93 NODULAR LYMPHOMA OF INTRA-ABDOMINAL LYMPH NODES: Primary | ICD-10-CM

## 2017-03-01 PROCEDURE — 1160F RVW MEDS BY RX/DR IN RCRD: CPT | Mod: S$GLB,,, | Performed by: INTERNAL MEDICINE

## 2017-03-01 PROCEDURE — 99215 OFFICE O/P EST HI 40 MIN: CPT | Mod: S$GLB,,, | Performed by: INTERNAL MEDICINE

## 2017-03-01 PROCEDURE — 99999 PR PBB SHADOW E&M-EST. PATIENT-LVL III: CPT | Mod: PBBFAC,,, | Performed by: INTERNAL MEDICINE

## 2017-03-01 RX ORDER — HEPARIN 100 UNIT/ML
500 SYRINGE INTRAVENOUS
Status: CANCELLED | OUTPATIENT
Start: 2017-03-07

## 2017-03-01 RX ORDER — FAMOTIDINE 10 MG/ML
20 INJECTION INTRAVENOUS
Status: CANCELLED | OUTPATIENT
Start: 2017-03-06

## 2017-03-01 RX ORDER — SODIUM CHLORIDE 0.9 % (FLUSH) 0.9 %
10 SYRINGE (ML) INJECTION
Status: CANCELLED | OUTPATIENT
Start: 2017-03-06

## 2017-03-01 RX ORDER — MEPERIDINE HYDROCHLORIDE 50 MG/ML
25 INJECTION INTRAMUSCULAR; INTRAVENOUS; SUBCUTANEOUS
Status: CANCELLED | OUTPATIENT
Start: 2017-03-06

## 2017-03-01 RX ORDER — HEPARIN 100 UNIT/ML
500 SYRINGE INTRAVENOUS
Status: CANCELLED | OUTPATIENT
Start: 2017-03-06

## 2017-03-01 RX ORDER — ACETAMINOPHEN 325 MG/1
650 TABLET ORAL
Status: CANCELLED | OUTPATIENT
Start: 2017-03-06

## 2017-03-01 RX ORDER — SODIUM CHLORIDE 0.9 % (FLUSH) 0.9 %
10 SYRINGE (ML) INJECTION
Status: CANCELLED | OUTPATIENT
Start: 2017-03-07

## 2017-03-01 NOTE — PROGRESS NOTES
"PATIENT: Wild Holliday  MRN: [id number]  DATE: 3/1/2017    Subjective:     Chief complaint:  Chief Complaint   Patient presents with    Thoracic Spine tumor       Oncologic History:  Chemo changed to BR if Fludara shortage persists  Interval History: Mr. Holliday returns for follow up ***  She denies any nausea, vomiting, diarrhea, constipation, abdominal pain, weight loss or loss of appetite, chest pain, shortness of breath, leg swelling, fatigue, pain, headache, dizziness, or mood changes. She is accompanied by her     ECOG Performance Status:   ECOG SCORE           PMFSH: all information reviewed and updated as relevant to today's visit    Review of Systems:   Review of Systems      Objective:      Vitals:   Vitals:    03/01/17 1002   BP: (!) 159/87   BP Location: Right arm   Patient Position: Sitting   BP Method: Automatic   Pulse: 67   Resp: 18   Temp: 100.3 °F (37.9 °C)   Weight: 135.2 kg (298 lb 1 oz)   Height: 5' 9" (1.753 m)     BMI: Body mass index is 44.02 kg/(m^2).      Physical Exam:   Physical Exam      Laboratory Data:  WBC   Date Value Ref Range Status   02/06/2017 6.41 3.90 - 12.70 K/uL Final     Hemoglobin   Date Value Ref Range Status   02/06/2017 14.2 14.0 - 18.0 g/dL Final     Hematocrit   Date Value Ref Range Status   02/06/2017 41.0 40.0 - 54.0 % Final     Platelets   Date Value Ref Range Status   02/06/2017 256 150 - 350 K/uL Final     Gran #   Date Value Ref Range Status   02/06/2017 4.6 1.8 - 7.7 K/uL Final     Comment:     The ANC is based on a white cell differential from an   automated cell counter. It has not been microscopically   reviewed for the presence of abnormal cells. Clinical   correlation is required.         Chemistry        Component Value Date/Time     02/06/2017 0800    K 4.6 02/06/2017 0800     02/06/2017 0800    CO2 26 02/06/2017 0800    BUN 18 02/06/2017 0800    CREATININE 0.8 02/06/2017 0800     (H) 02/06/2017 0800     (H) 10/19/2016 1158      "   Component Value Date/Time    CALCIUM 9.4 02/06/2017 0800    ALKPHOS 77 02/06/2017 0800    AST 25 02/06/2017 0800    ALT 39 02/06/2017 0800    BILITOT 0.6 02/06/2017 0800              Assessment/Plan:     No diagnosis found.    Plan:      Med and Orders:       Follow Up:  No Follow-up on file.    Patient instructions:   There are no Patient Instructions on file for this visit.      More than 25 mins were spent during this encounter, greater than 50% was spent in direct counseling and/or coordination of care.   Patient was also seen and examined by Dr. Ruggiero who agrees with above plan. Patient is in agreement with the proposed treatment plan. All questions were answered to the patient's satisfaction. Pt knows to call clinic if anything is needed before the next clinic visit.    ABRAM Holder  Hematology and Medical Oncology

## 2017-03-01 NOTE — MR AVS SNAPSHOT
"    [facility]  [address]  [place] [address]  Phone: [phone]                  Wild Holliday   3/1/2017 9:30 AM   Office Visit    Description:  Male : 1965   Provider:  Yue Jara MD   Department:  [facility]           Reason for Visit     Thoracic Spine tumor           Diagnoses this Visit        Comments    Nodular lymphoma of intra-abdominal lymph nodes    -  Primary            To Do List           Goals (5 Years of Data)     None      Follow-Up and Disposition     Return in about 4 weeks (around 3/29/2017).      [facility]     [facility] On Call Nurse Care Line -  Assistance  Registered nurses in the [facility] On Call Center provide clinical advisement, health education, appointment booking, and other advisory services.  Call for this free service at [phone].             Medications                Verify that the below list of medications is an accurate representation of the medications you are currently taking.  If none reported, the list may be blank. If incorrect, please contact your healthcare provider. Carry this list with you in case of emergency.           Current Medications     ACETAMINOPHEN ORAL Take by mouth.    BD ULTRA-FINE BERT PEN NEEDLES 32 gauge x 5/32" Ndle USE DAILY WITH VICTOZA PEN UTD    CONTOUR NEXT STRIPS Strp     fluticasone (FLONASE) 50 mcg/actuation nasal spray 1 spray by Each Nare route once daily.    lidocaine-prilocaine (EMLA) cream Apply topically as needed.    metformin (GLUCOPHAGE-XR) 500 MG 24 hr tablet TK 1 T PO BID    multivitamin (ONE DAILY MULTIVITAMIN) per tablet Take 1 tablet by mouth once daily.    ondansetron (ZOFRAN, AS HYDROCHLORIDE,) 4 MG tablet Take 1 tablet (4 mg total) by mouth every 6 (six) hours as needed for Nausea.    pantoprazole (PROTONIX) 40 MG tablet TAKE 1 TABLET(40 MG) BY MOUTH EVERY DAY    sildenafil (VIAGRA) 50 MG tablet Take 1 tablet (50 mg total) by mouth as needed for " Erectile Dysfunction.    sulfamethoxazole-trimethoprim 400-80mg (BACTRIM,SEPTRA) 400-80 mg per tablet Take 1 tablet by mouth once daily. Monday Wednesday friday    docusate sodium (COLACE) 100 MG capsule Take 1 capsule (100 mg total) by mouth 2 (two) times daily.    hydrocodone-acetaminophen 10-325mg (NORCO)  mg Tab Take 1 tablet by mouth every 6 (six) hours as needed.    JUBLIA 10 % Nataliya APPLY TO NAILS EVERY DAY           Clinical Reference Information           Your Vitals Were     BP                   159/87 (BP Location: Right arm, Patient Position: Sitting, BP Method: Automatic)           Blood Pressure          Most Recent Value    BP  (!)  159/87      Allergies as of 3/1/2017     No Known Allergies      Immunizations Administered on Date of Encounter - 3/1/2017     None      Orders Placed During Today's Visit     Future Labs/Procedures Expected by Expires    CBC Oncology  As directed 3/1/2018    Comprehensive metabolic panel  As directed 3/1/2018    Lactate dehydrogenase  As directed 3/1/2018      Instructions    No change in meds    Get chemo in 1 week    Get labs in 3 weeks    See me in 4 weeks       Language Assistance Services     ATTENTION: Language assistance services are available, free of charge. Please call [phone].      ATENCIÓN: Si eliseola lena, tiene a tovar disposición servicios gratuitos de asistencia lingüística. Llame al [phone].     [facility] Ý: N?u b?n nói Ti?ng Vi?t, có các d?ch v? h? tr? ngôn ng? mi?n phí dành cho b?n. G?i s? [phone].         [facility] complies with applicable Federal civil rights laws and does not discriminate on the basis of race, color, national origin, age, disability, or sex.
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
## 2017-03-06 ENCOUNTER — INFUSION (OUTPATIENT)
Dept: INFUSION THERAPY | Facility: HOSPITAL | Age: 52
End: 2017-03-06
Attending: INTERNAL MEDICINE
Payer: COMMERCIAL

## 2017-03-06 VITALS
HEART RATE: 73 BPM | DIASTOLIC BLOOD PRESSURE: 67 MMHG | RESPIRATION RATE: 20 BRPM | TEMPERATURE: 99 F | SYSTOLIC BLOOD PRESSURE: 154 MMHG

## 2017-03-06 DIAGNOSIS — C85.89 OTHER MALIGNANT LYMPHOMAS, UNSPECIFIED SITE, EXTRANODAL AND SOLID ORGAN SITES: ICD-10-CM

## 2017-03-06 DIAGNOSIS — C83.00 SMALL B-CELL LYMPHOMA, UNSPECIFIED BODY REGION: Primary | ICD-10-CM

## 2017-03-06 LAB
ALBUMIN SERPL BCP-MCNC: 4.1 G/DL
ALP SERPL-CCNC: 84 U/L
ALT SERPL W/O P-5'-P-CCNC: 36 U/L
ANION GAP SERPL CALC-SCNC: 7 MMOL/L
AST SERPL-CCNC: 25 U/L
BILIRUB SERPL-MCNC: 0.4 MG/DL
BUN SERPL-MCNC: 20 MG/DL
CALCIUM SERPL-MCNC: 9.5 MG/DL
CHLORIDE SERPL-SCNC: 104 MMOL/L
CO2 SERPL-SCNC: 26 MMOL/L
CREAT SERPL-MCNC: 0.8 MG/DL
ERYTHROCYTE [DISTWIDTH] IN BLOOD BY AUTOMATED COUNT: 13.7 %
EST. GFR  (AFRICAN AMERICAN): >60 ML/MIN/1.73 M^2
EST. GFR  (NON AFRICAN AMERICAN): >60 ML/MIN/1.73 M^2
GLUCOSE SERPL-MCNC: 180 MG/DL
HCT VFR BLD AUTO: 40 %
HGB BLD-MCNC: 13.9 G/DL
MCH RBC QN AUTO: 30 PG
MCHC RBC AUTO-ENTMCNC: 34.8 %
MCV RBC AUTO: 86 FL
NEUTROPHILS # BLD AUTO: 3.6 K/UL
PLATELET # BLD AUTO: 208 K/UL
PMV BLD AUTO: 9.3 FL
POTASSIUM SERPL-SCNC: 4.4 MMOL/L
PROT SERPL-MCNC: 7 G/DL
RBC # BLD AUTO: 4.63 M/UL
SODIUM SERPL-SCNC: 137 MMOL/L
WBC # BLD AUTO: 4.94 K/UL

## 2017-03-06 PROCEDURE — 63600175 PHARM REV CODE 636 W HCPCS: Performed by: INTERNAL MEDICINE

## 2017-03-06 PROCEDURE — 96415 CHEMO IV INFUSION ADDL HR: CPT

## 2017-03-06 PROCEDURE — 96411 CHEMO IV PUSH ADDL DRUG: CPT

## 2017-03-06 PROCEDURE — 25000003 PHARM REV CODE 250: Performed by: INTERNAL MEDICINE

## 2017-03-06 PROCEDURE — 80053 COMPREHEN METABOLIC PANEL: CPT

## 2017-03-06 PROCEDURE — 96413 CHEMO IV INFUSION 1 HR: CPT

## 2017-03-06 PROCEDURE — 96375 TX/PRO/DX INJ NEW DRUG ADDON: CPT

## 2017-03-06 PROCEDURE — 96367 TX/PROPH/DG ADDL SEQ IV INF: CPT

## 2017-03-06 PROCEDURE — 85027 COMPLETE CBC AUTOMATED: CPT

## 2017-03-06 RX ORDER — ACETAMINOPHEN 325 MG/1
650 TABLET ORAL
Status: COMPLETED | OUTPATIENT
Start: 2017-03-06 | End: 2017-03-06

## 2017-03-06 RX ORDER — HEPARIN 100 UNIT/ML
500 SYRINGE INTRAVENOUS
Status: DISCONTINUED | OUTPATIENT
Start: 2017-03-06 | End: 2017-03-06 | Stop reason: HOSPADM

## 2017-03-06 RX ORDER — MEPERIDINE HYDROCHLORIDE 50 MG/ML
25 INJECTION INTRAMUSCULAR; INTRAVENOUS; SUBCUTANEOUS
Status: DISCONTINUED | OUTPATIENT
Start: 2017-03-06 | End: 2017-03-06 | Stop reason: HOSPADM

## 2017-03-06 RX ORDER — SODIUM CHLORIDE 0.9 % (FLUSH) 0.9 %
10 SYRINGE (ML) INJECTION
Status: DISCONTINUED | OUTPATIENT
Start: 2017-03-06 | End: 2017-03-06 | Stop reason: HOSPADM

## 2017-03-06 RX ORDER — FAMOTIDINE 10 MG/ML
20 INJECTION INTRAVENOUS
Status: COMPLETED | OUTPATIENT
Start: 2017-03-06 | End: 2017-03-06

## 2017-03-06 RX ADMIN — DIPHENHYDRAMINE HYDROCHLORIDE 50 MG: 50 INJECTION, SOLUTION INTRAMUSCULAR; INTRAVENOUS at 09:03

## 2017-03-06 RX ADMIN — SODIUM CHLORIDE, PRESERVATIVE FREE 10 ML: 5 INJECTION INTRAVENOUS at 02:03

## 2017-03-06 RX ADMIN — PALONOSETRON HYDROCHLORIDE 0.25 MG: 0.25 INJECTION INTRAVENOUS at 09:03

## 2017-03-06 RX ADMIN — HEPARIN 500 UNITS: 100 SYRINGE at 02:03

## 2017-03-06 RX ADMIN — FAMOTIDINE 20 MG: 10 INJECTION INTRAVENOUS at 10:03

## 2017-03-06 RX ADMIN — ACETAMINOPHEN 650 MG: 325 TABLET ORAL at 09:03

## 2017-03-06 RX ADMIN — BENDAMUSTINE HYDROCHLORIDE 225 MG: 25 INJECTION, SOLUTION INTRAVENOUS at 02:03

## 2017-03-06 RX ADMIN — RITUXIMAB 945 MG: 10 INJECTION, SOLUTION INTRAVENOUS at 10:03

## 2017-03-06 NOTE — PLAN OF CARE
Problem: Patient Care Overview (Adult)  Goal: Plan of Care Review  Outcome: Ongoing (interventions implemented as appropriate)  Tolerated chemo infusions without difficulty. NO complaints voiced. AVS given to pt. Instructed to notify MD with any concerns or problems. Pt verbalized understanding.

## 2017-03-06 NOTE — MR AVS SNAPSHOT
"Patient Information     Patient Name Sex Wild Thibodeaux Male 1965      Visit Information        Provider Department Dept Phone Center    3/6/2017 8:30 AM [facility], CHEMO [facility] [phone] Manuel Gill      Patient Instructions     None      Your Current Medications Are     ACETAMINOPHEN ORAL    BD ULTRA-FINE BERT PEN NEEDLES 32 gauge x 5/32" Ndle    CONTOUR NEXT STRIPS Strp    docusate sodium (COLACE) 100 MG capsule    fluticasone (FLONASE) 50 mcg/actuation nasal spray    hydrocodone-acetaminophen 10-325mg (NORCO)  mg Tab    JUBLIA 10 % Nataliya    lidocaine-prilocaine (EMLA) cream    metformin (GLUCOPHAGE-XR) 500 MG 24 hr tablet    multivitamin (ONE DAILY MULTIVITAMIN) per tablet    ondansetron (ZOFRAN, AS HYDROCHLORIDE,) 4 MG tablet    pantoprazole (PROTONIX) 40 MG tablet    sildenafil (VIAGRA) 50 MG tablet    sulfamethoxazole-trimethoprim 400-80mg (BACTRIM,SEPTRA) 400-80 mg per tablet      Facility-Administered Medications     acetaminophen tablet 650 mg    bendamustine (BENDEKA) 225 mg in sodium chloride 0.9% 50 mL chemo infusion    diphenhydramine IVPB 50 mg    famotidine (PF) 20 mg/2 mL injection 20 mg    heparin, porcine (PF) 100 unit/mL injection flush 500 Units    meperidine 50 mg/mL injection 25 mg    palonosetron 0.25mg/dexamethasone 20mg IVPB    rituximab (RITUXAN) 375 mg/m2 = 945 mg in sodium chloride 0.9% 945 mL infusion    sodium chloride 0.9% 250 mL flush bag    sodium chloride 0.9% flush 10 mL      Appointments for Next Year     3/7/2017  1:00 PM INFUSION 060 MIN (60 min.) [facility], CHE[facility]    Arrive at check-in approximately 15 minutes before your scheduled appointment time. Bring all outside medical records and imaging, along with a list of your current medications and insurance card.    [facility], 5th Floor    3/8/2017 10:00 AM INJECTION (15 min.) [facility] INJECTION, [facility] IN[facility]USION    Arrive at check-in " approximately 15 minutes before your scheduled appointment time. Bring all outside medical records and imaging, along with a list of your current medications and insurance card.    [facility], 5th Floor    3/22/2017 11:30 AM NON FASTING LAB (10 min.) [facility]    Arrive at check-in approximately 15 minutes before your scheduled appointment time. Bring all outside medical records and imaging, along with a list of your current medications and insurance card.    [facility] 3rd Floor    3/29/2017 10:30 AM ESTABLISHED PATIENT (15 min.) [facility] Yue Jara MD    Arrive at check-in approximately 15 minutes before your scheduled appointment time. Bring all outside medical records and imaging, along with a list of your current medications and insurance card.    [facility]         Default Flowsheet Data (last 24 hours)      Amb Complex Vitals Randy        03/06/17 1330 03/06/17 1145 03/06/17 1115 03/06/17 1045 03/06/17 1014    Measurements    BP (!)  154/67   Rituxan complete (!)  153/73   Titrated Rituxan to 400cchr 138/65   Titrated Rituxan to 300cchr (!)  133/58   Titrated Rituxan to 200cchr (!)  153/80   start Rituxan at 100cchr    Temp 98.5 °F (36.9 °C)        Pulse 73 73 64 66 62    Resp 20 18 20 20 20       03/06/17 0936 03/06/17 0935 03/06/17 0923          Measurements    BP  (!)  149/77       Temp 98 °F (36.7 °C) 98 °F (36.7 °C)       Pulse  64       Resp  16       Pain Assessment    Pain Score  Zero Four      Pain Loc   BACK              Allergies     No Known Allergies      Medications You Received from 03/05/2017 1502 to 03/06/2017 1502        Date/Time Order Dose Route Action     03/06/2017 0925 acetaminophen tablet 650 mg 650 mg Oral Given     03/06/2017 1422 bendamustine (BENDEKA) 225 mg in sodium chloride 0.9% 50 mL chemo infusion 225 mg Intravenous New Bag     03/06/2017 0948 diphenhydramine IVPB 50 mg 50 mg  Intravenous New Bag     03/06/2017 1009 famotidine (PF) 20 mg/2 mL injection 20 mg 20 mg Intravenous Given     03/06/2017 1459 heparin, porcine (PF) 100 unit/mL injection flush 500 Units 500 Units Intravenous Given     03/06/2017 0927 palonosetron 0.25mg/dexamethasone 20mg IVPB 0.25 mg Intravenous New Bag     03/06/2017 1011 rituximab (RITUXAN) 375 mg/m2 = 945 mg in sodium chloride 0.9% 945 mL infusion 945 mg Intravenous New Bag     03/06/2017 1459 sodium chloride 0.9% flush 10 mL 10 mL Intravenous Given      Current Discharge Medication List     Cannot display discharge medications since this is not an admission.

## 2017-03-07 ENCOUNTER — INFUSION (OUTPATIENT)
Dept: INFUSION THERAPY | Facility: HOSPITAL | Age: 52
End: 2017-03-07
Attending: INTERNAL MEDICINE
Payer: COMMERCIAL

## 2017-03-07 VITALS
RESPIRATION RATE: 16 BRPM | TEMPERATURE: 99 F | DIASTOLIC BLOOD PRESSURE: 71 MMHG | HEART RATE: 68 BPM | SYSTOLIC BLOOD PRESSURE: 146 MMHG

## 2017-03-07 DIAGNOSIS — C83.00 SMALL B-CELL LYMPHOMA, UNSPECIFIED BODY REGION: Primary | ICD-10-CM

## 2017-03-07 DIAGNOSIS — C85.89 OTHER MALIGNANT LYMPHOMAS, UNSPECIFIED SITE, EXTRANODAL AND SOLID ORGAN SITES: ICD-10-CM

## 2017-03-07 PROCEDURE — 96409 CHEMO IV PUSH SNGL DRUG: CPT

## 2017-03-07 PROCEDURE — 63600175 PHARM REV CODE 636 W HCPCS: Performed by: INTERNAL MEDICINE

## 2017-03-07 PROCEDURE — 25000003 PHARM REV CODE 250: Performed by: INTERNAL MEDICINE

## 2017-03-07 RX ORDER — SODIUM CHLORIDE 0.9 % (FLUSH) 0.9 %
10 SYRINGE (ML) INJECTION
Status: DISCONTINUED | OUTPATIENT
Start: 2017-03-07 | End: 2017-03-07 | Stop reason: HOSPADM

## 2017-03-07 RX ORDER — HEPARIN 100 UNIT/ML
500 SYRINGE INTRAVENOUS
Status: DISCONTINUED | OUTPATIENT
Start: 2017-03-07 | End: 2017-03-07 | Stop reason: HOSPADM

## 2017-03-07 RX ADMIN — BENDAMUSTINE HYDROCHLORIDE 225 MG: 25 INJECTION, SOLUTION INTRAVENOUS at 01:03

## 2017-03-07 RX ADMIN — SODIUM CHLORIDE, PRESERVATIVE FREE 10 ML: 5 INJECTION INTRAVENOUS at 02:03

## 2017-03-07 RX ADMIN — HEPARIN 500 UNITS: 100 SYRINGE at 02:03

## 2017-03-07 NOTE — PLAN OF CARE
Problem: Patient Care Overview (Adult)  Goal: Plan of Care Review  Outcome: Ongoing (interventions implemented as appropriate)  Patient received Bendamustin without any issues. Notified to call MD with any further concerns . Vss. No apparent distress noted.

## 2017-03-08 ENCOUNTER — INFUSION (OUTPATIENT)
Dept: INFUSION THERAPY | Facility: HOSPITAL | Age: 52
End: 2017-03-08
Attending: INTERNAL MEDICINE
Payer: COMMERCIAL

## 2017-03-08 DIAGNOSIS — C85.89 OTHER MALIGNANT LYMPHOMAS, UNSPECIFIED SITE, EXTRANODAL AND SOLID ORGAN SITES: ICD-10-CM

## 2017-03-08 DIAGNOSIS — C83.00 SMALL B-CELL LYMPHOMA, UNSPECIFIED BODY REGION: Primary | ICD-10-CM

## 2017-03-08 PROCEDURE — 96372 THER/PROPH/DIAG INJ SC/IM: CPT

## 2017-03-08 PROCEDURE — 63600175 PHARM REV CODE 636 W HCPCS: Performed by: INTERNAL MEDICINE

## 2017-03-08 RX ADMIN — PEGFILGRASTIM 6 MG: 6 INJECTION SUBCUTANEOUS at 01:03

## 2017-03-08 NOTE — NURSING
Patient here for injection-complains of nausea-taking Zofran with good results-reviewed side effects of neulasta-tolerated injection well.

## 2017-03-24 ENCOUNTER — TELEPHONE (OUTPATIENT)
Dept: HEMATOLOGY/ONCOLOGY | Facility: CLINIC | Age: 52
End: 2017-03-24

## 2017-03-28 ENCOUNTER — OFFICE VISIT (OUTPATIENT)
Dept: HEMATOLOGY/ONCOLOGY | Facility: CLINIC | Age: 52
End: 2017-03-28
Payer: COMMERCIAL

## 2017-03-28 ENCOUNTER — LAB VISIT (OUTPATIENT)
Dept: LAB | Facility: HOSPITAL | Age: 52
End: 2017-03-28
Attending: INTERNAL MEDICINE
Payer: COMMERCIAL

## 2017-03-28 VITALS
HEART RATE: 74 BPM | HEIGHT: 69 IN | BODY MASS INDEX: 44.51 KG/M2 | TEMPERATURE: 99 F | DIASTOLIC BLOOD PRESSURE: 81 MMHG | RESPIRATION RATE: 15 BRPM | SYSTOLIC BLOOD PRESSURE: 167 MMHG | WEIGHT: 300.5 LBS

## 2017-03-28 DIAGNOSIS — E08.00 DIABETES MELLITUS DUE TO UNDERLYING CONDITION WITH HYPEROSMOLARITY WITHOUT COMA, WITHOUT LONG-TERM CURRENT USE OF INSULIN: ICD-10-CM

## 2017-03-28 DIAGNOSIS — C85.89 OTHER MALIGNANT LYMPHOMAS, UNSPECIFIED SITE, EXTRANODAL AND SOLID ORGAN SITES: ICD-10-CM

## 2017-03-28 DIAGNOSIS — C83.00 SMALL B-CELL LYMPHOMA, UNSPECIFIED BODY REGION: Primary | ICD-10-CM

## 2017-03-28 DIAGNOSIS — F43.22 ADJUSTMENT DISORDER WITH ANXIETY: ICD-10-CM

## 2017-03-28 DIAGNOSIS — F51.01 PRIMARY INSOMNIA: ICD-10-CM

## 2017-03-28 DIAGNOSIS — C82.93 NODULAR LYMPHOMA OF INTRA-ABDOMINAL LYMPH NODES: ICD-10-CM

## 2017-03-28 DIAGNOSIS — K21.9 GASTROESOPHAGEAL REFLUX DISEASE WITHOUT ESOPHAGITIS: ICD-10-CM

## 2017-03-28 LAB
ALBUMIN SERPL BCP-MCNC: 3.7 G/DL
ALP SERPL-CCNC: 83 U/L
ALT SERPL W/O P-5'-P-CCNC: 41 U/L
ANION GAP SERPL CALC-SCNC: 9 MMOL/L
AST SERPL-CCNC: 30 U/L
BILIRUB SERPL-MCNC: 0.5 MG/DL
BUN SERPL-MCNC: 16 MG/DL
CALCIUM SERPL-MCNC: 9.1 MG/DL
CHLORIDE SERPL-SCNC: 105 MMOL/L
CO2 SERPL-SCNC: 23 MMOL/L
CREAT SERPL-MCNC: 0.9 MG/DL
ERYTHROCYTE [DISTWIDTH] IN BLOOD BY AUTOMATED COUNT: 13.3 %
EST. GFR  (AFRICAN AMERICAN): >60 ML/MIN/1.73 M^2
EST. GFR  (NON AFRICAN AMERICAN): >60 ML/MIN/1.73 M^2
GLUCOSE SERPL-MCNC: 292 MG/DL
HCT VFR BLD AUTO: 38.5 %
HGB BLD-MCNC: 13.3 G/DL
LDH SERPL L TO P-CCNC: 175 U/L
MCH RBC QN AUTO: 30.4 PG
MCHC RBC AUTO-ENTMCNC: 34.5 %
MCV RBC AUTO: 88 FL
NEUTROPHILS # BLD AUTO: 4.9 K/UL
PLATELET # BLD AUTO: 216 K/UL
PMV BLD AUTO: 8.8 FL
POTASSIUM SERPL-SCNC: 4.2 MMOL/L
PROT SERPL-MCNC: 6.7 G/DL
RBC # BLD AUTO: 4.38 M/UL
SODIUM SERPL-SCNC: 137 MMOL/L
WBC # BLD AUTO: 6.27 K/UL

## 2017-03-28 PROCEDURE — 99999 PR PBB SHADOW E&M-EST. PATIENT-LVL III: CPT | Mod: PBBFAC,,, | Performed by: INTERNAL MEDICINE

## 2017-03-28 PROCEDURE — 36415 COLL VENOUS BLD VENIPUNCTURE: CPT

## 2017-03-28 PROCEDURE — 83615 LACTATE (LD) (LDH) ENZYME: CPT

## 2017-03-28 PROCEDURE — 1160F RVW MEDS BY RX/DR IN RCRD: CPT | Mod: S$GLB,,, | Performed by: INTERNAL MEDICINE

## 2017-03-28 PROCEDURE — 99215 OFFICE O/P EST HI 40 MIN: CPT | Mod: S$GLB,,, | Performed by: INTERNAL MEDICINE

## 2017-03-28 PROCEDURE — 85027 COMPLETE CBC AUTOMATED: CPT

## 2017-03-28 PROCEDURE — 80053 COMPREHEN METABOLIC PANEL: CPT

## 2017-03-28 RX ORDER — SODIUM CHLORIDE 0.9 % (FLUSH) 0.9 %
10 SYRINGE (ML) INJECTION
Status: CANCELLED | OUTPATIENT
Start: 2017-04-04

## 2017-03-28 RX ORDER — HEPARIN 100 UNIT/ML
500 SYRINGE INTRAVENOUS
Status: CANCELLED | OUTPATIENT
Start: 2017-04-03

## 2017-03-28 RX ORDER — SODIUM CHLORIDE 0.9 % (FLUSH) 0.9 %
10 SYRINGE (ML) INJECTION
Status: CANCELLED | OUTPATIENT
Start: 2017-04-03

## 2017-03-28 RX ORDER — ACETAMINOPHEN 325 MG/1
650 TABLET ORAL
Status: CANCELLED | OUTPATIENT
Start: 2017-04-03

## 2017-03-28 RX ORDER — MEPERIDINE HYDROCHLORIDE 50 MG/ML
25 INJECTION INTRAMUSCULAR; INTRAVENOUS; SUBCUTANEOUS
Status: CANCELLED | OUTPATIENT
Start: 2017-04-03

## 2017-03-28 RX ORDER — FAMOTIDINE 10 MG/ML
20 INJECTION INTRAVENOUS
Status: CANCELLED | OUTPATIENT
Start: 2017-04-03

## 2017-03-28 RX ORDER — HEPARIN 100 UNIT/ML
500 SYRINGE INTRAVENOUS
Status: CANCELLED | OUTPATIENT
Start: 2017-04-04

## 2017-03-28 NOTE — MR AVS SNAPSHOT
"    [facility]  [address]  [facility] [address]  Phone: [phone]                  Wild Holliday   3/28/2017 3:15 PM   Office Visit    Description:  Male : 1965   Provider:  Yue Jara MD   Department:  [facility]           Diagnoses this Visit        Comments    Small B-cell lymphoma, unspecified body region    -  Primary     Diabetes mellitus due to underlying condition with hyperosmolarity without coma, without long-term current use of insulin         Gastroesophageal reflux disease without esophagitis         Adjustment disorder with anxiety         Primary insomnia                To Do List           Goals (5 Years of Data)     None      [facility]     [facility] Care [facility] -  Assistance  Registered nurses in the [facility] provide clinical advisement, health education, appointment booking, and other advisory services.  Call for this free service at [phone].             Medications                Verify that the below list of medications is an accurate representation of the medications you are currently taking.  If none reported, the list may be blank. If incorrect, please contact your healthcare provider. Carry this list with you in case of emergency.           Current Medications     ACETAMINOPHEN ORAL Take by mouth.    BD ULTRA-FINE BERT PEN NEEDLES 32 gauge x 5/32" Ndle USE DAILY WITH VICTOZA PEN UTD    CONTOUR NEXT STRIPS Strp     docusate sodium (COLACE) 100 MG capsule Take 1 capsule (100 mg total) by mouth 2 (two) times daily.    fluticasone (FLONASE) 50 mcg/actuation nasal spray 1 spray by Each Nare route once daily.    hydrocodone-acetaminophen 10-325mg (NORCO)  mg Tab Take 1 tablet by mouth every 6 (six) hours as needed.    JUBLIA 10 % Nataliya APPLY TO NAILS EVERY DAY    lidocaine-prilocaine (EMLA) cream Apply topically as needed.    metformin (GLUCOPHAGE-XR) 500 MG 24 hr tablet TK 1 T PO BID    " "multivitamin (ONE DAILY MULTIVITAMIN) per tablet Take 1 tablet by mouth once daily.    ondansetron (ZOFRAN, AS HYDROCHLORIDE,) 4 MG tablet Take 1 tablet (4 mg total) by mouth every 6 (six) hours as needed for Nausea.    pantoprazole (PROTONIX) 40 MG tablet TAKE 1 TABLET(40 MG) BY MOUTH EVERY DAY    sildenafil (VIAGRA) 50 MG tablet Take 1 tablet (50 mg total) by mouth as needed for Erectile Dysfunction.    sulfamethoxazole-trimethoprim 400-80mg (BACTRIM,SEPTRA) 400-80 mg per tablet Take 1 tablet by mouth once daily. Monday Wednesday friday           Clinical Reference Information           Your Vitals Were     BP Pulse Temp Resp Height Weight    167/81 (BP Location: Right arm, Patient Position: Sitting, BP Method: Automatic) 74 98.8 °F (37.1 °C) 15 5' 9" (1.753 m) 136.3 kg (300 lb 7.8 oz)    BMI                44.37 kg/m2          Blood Pressure          Most Recent Value    BP  (!)  167/81      Allergies as of 3/28/2017     No Known Allergies      Immunizations Administered on Date of Encounter - 3/28/2017     None      Orders Placed During Today's Visit     Future Labs/Procedures Expected by Expires    CBC Oncology  As directed 3/28/2018    Comprehensive metabolic panel  As directed 3/28/2018    Lactate dehydrogenase  As directed 3/28/2018      Instructions    No change in meds    Chemo on 4/3    Get neulasta at  [facility]    See me in 4 weeks with cbc/cmp/ldh       Language Assistance Services     ATTENTION: Language assistance services are available, free of charge. Please call [phone].      ATENCIÓN: Si deon lena, tiene a tovar disposición servicios gratuitos de asistencia lingüística. Llame al [phone].     [facility] Ý: N?u b?n nói Ti?ng Vi?t, có các d?ch v? h? tr? ngôn ng? mi?n phí dành cho b?n. G?i s? [phone].         [facility] complies with applicable Federal civil rights laws and does not discriminate on the basis of race, color, national origin, age, disability, or sex.      "

## 2017-04-02 ENCOUNTER — PATIENT MESSAGE (OUTPATIENT)
Dept: HEMATOLOGY/ONCOLOGY | Facility: CLINIC | Age: 52
End: 2017-04-02

## 2017-04-03 ENCOUNTER — INFUSION (OUTPATIENT)
Dept: INFUSION THERAPY | Facility: HOSPITAL | Age: 52
End: 2017-04-03
Attending: INTERNAL MEDICINE
Payer: COMMERCIAL

## 2017-04-03 VITALS — DIASTOLIC BLOOD PRESSURE: 65 MMHG | RESPIRATION RATE: 18 BRPM | SYSTOLIC BLOOD PRESSURE: 142 MMHG | HEART RATE: 60 BPM

## 2017-04-03 DIAGNOSIS — C83.00 SMALL B-CELL LYMPHOMA, UNSPECIFIED BODY REGION: Primary | ICD-10-CM

## 2017-04-03 DIAGNOSIS — C85.89 OTHER MALIGNANT LYMPHOMAS, UNSPECIFIED SITE, EXTRANODAL AND SOLID ORGAN SITES: ICD-10-CM

## 2017-04-03 PROCEDURE — 96375 TX/PRO/DX INJ NEW DRUG ADDON: CPT

## 2017-04-03 PROCEDURE — 96415 CHEMO IV INFUSION ADDL HR: CPT

## 2017-04-03 PROCEDURE — 63600175 PHARM REV CODE 636 W HCPCS: Performed by: INTERNAL MEDICINE

## 2017-04-03 PROCEDURE — 25000003 PHARM REV CODE 250: Performed by: INTERNAL MEDICINE

## 2017-04-03 PROCEDURE — 96411 CHEMO IV PUSH ADDL DRUG: CPT

## 2017-04-03 PROCEDURE — 96367 TX/PROPH/DG ADDL SEQ IV INF: CPT

## 2017-04-03 PROCEDURE — 96413 CHEMO IV INFUSION 1 HR: CPT

## 2017-04-03 RX ORDER — FAMOTIDINE 10 MG/ML
20 INJECTION INTRAVENOUS
Status: COMPLETED | OUTPATIENT
Start: 2017-04-03 | End: 2017-04-03

## 2017-04-03 RX ORDER — ACETAMINOPHEN 325 MG/1
650 TABLET ORAL
Status: COMPLETED | OUTPATIENT
Start: 2017-04-03 | End: 2017-04-03

## 2017-04-03 RX ORDER — SODIUM CHLORIDE 0.9 % (FLUSH) 0.9 %
10 SYRINGE (ML) INJECTION
Status: DISCONTINUED | OUTPATIENT
Start: 2017-04-03 | End: 2017-04-03 | Stop reason: HOSPADM

## 2017-04-03 RX ORDER — MEPERIDINE HYDROCHLORIDE 50 MG/ML
25 INJECTION INTRAMUSCULAR; INTRAVENOUS; SUBCUTANEOUS
Status: DISCONTINUED | OUTPATIENT
Start: 2017-04-03 | End: 2017-04-03 | Stop reason: HOSPADM

## 2017-04-03 RX ORDER — HEPARIN 100 UNIT/ML
500 SYRINGE INTRAVENOUS
Status: DISCONTINUED | OUTPATIENT
Start: 2017-04-03 | End: 2017-04-03 | Stop reason: HOSPADM

## 2017-04-03 RX ADMIN — RITUXIMAB 945 MG: 10 INJECTION, SOLUTION INTRAVENOUS at 10:04

## 2017-04-03 RX ADMIN — BENDAMUSTINE HYDROCHLORIDE 225 MG: 25 INJECTION, SOLUTION INTRAVENOUS at 01:04

## 2017-04-03 RX ADMIN — SODIUM CHLORIDE, PRESERVATIVE FREE 500 UNITS: 5 INJECTION INTRAVENOUS at 01:04

## 2017-04-03 RX ADMIN — DEXAMETHASONE SODIUM PHOSPHATE 0.25 MG: 4 INJECTION, SOLUTION INTRAMUSCULAR; INTRAVENOUS at 10:04

## 2017-04-03 RX ADMIN — ACETAMINOPHEN 650 MG: 325 TABLET ORAL at 09:04

## 2017-04-03 RX ADMIN — FAMOTIDINE 20 MG: 10 INJECTION INTRAVENOUS at 09:04

## 2017-04-03 RX ADMIN — DIPHENHYDRAMINE HYDROCHLORIDE 50 MG: 50 INJECTION, SOLUTION INTRAMUSCULAR; INTRAVENOUS at 09:04
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
## 2017-04-04 ENCOUNTER — INFUSION (OUTPATIENT)
Dept: INFUSION THERAPY | Facility: HOSPITAL | Age: 52
End: 2017-04-04
Attending: INTERNAL MEDICINE
Payer: COMMERCIAL

## 2017-04-04 VITALS
RESPIRATION RATE: 18 BRPM | HEART RATE: 68 BPM | TEMPERATURE: 98 F | DIASTOLIC BLOOD PRESSURE: 83 MMHG | SYSTOLIC BLOOD PRESSURE: 157 MMHG

## 2017-04-04 DIAGNOSIS — C85.89 OTHER MALIGNANT LYMPHOMAS, UNSPECIFIED SITE, EXTRANODAL AND SOLID ORGAN SITES: ICD-10-CM

## 2017-04-04 DIAGNOSIS — C83.00 SMALL B-CELL LYMPHOMA, UNSPECIFIED BODY REGION: Primary | ICD-10-CM

## 2017-04-04 PROCEDURE — 63600175 PHARM REV CODE 636 W HCPCS: Performed by: INTERNAL MEDICINE

## 2017-04-04 PROCEDURE — 96409 CHEMO IV PUSH SNGL DRUG: CPT

## 2017-04-04 PROCEDURE — 25000003 PHARM REV CODE 250: Performed by: INTERNAL MEDICINE

## 2017-04-04 RX ORDER — SODIUM CHLORIDE 0.9 % (FLUSH) 0.9 %
10 SYRINGE (ML) INJECTION
Status: DISCONTINUED | OUTPATIENT
Start: 2017-04-04 | End: 2017-04-04 | Stop reason: HOSPADM

## 2017-04-04 RX ORDER — HEPARIN 100 UNIT/ML
500 SYRINGE INTRAVENOUS
Status: DISCONTINUED | OUTPATIENT
Start: 2017-04-04 | End: 2017-04-04 | Stop reason: HOSPADM

## 2017-04-04 RX ADMIN — SODIUM CHLORIDE, PRESERVATIVE FREE 10 ML: 5 INJECTION INTRAVENOUS at 02:04

## 2017-04-04 RX ADMIN — BENDAMUSTINE HYDROCHLORIDE 225 MG: 25 INJECTION, SOLUTION INTRAVENOUS at 01:04

## 2017-04-04 RX ADMIN — SODIUM CHLORIDE, PRESERVATIVE FREE 500 UNITS: 5 INJECTION INTRAVENOUS at 02:04

## 2017-04-04 NOTE — PLAN OF CARE
Problem: Patient Care Overview (Adult)  Goal: Plan of Care Review  Outcome: Ongoing (interventions implemented as appropriate)  1410-Patient tolerated treatment well. Discharged without complaints or S/S of adverse event. AVS given.  Instructed to call provider for any questions or concerns. Patient to receive injection at [place] tomorrow.

## 2017-04-04 NOTE — MR AVS SNAPSHOT
"Patient Information     Patient Name Sex     Wild Holliday Male 1965      Visit Information        Provider Department Dept Phone Center    2017 1:00 PM [facility] [phone] Manuel Gill      Patient Instructions     None      Your Current Medications Are     ACETAMINOPHEN ORAL    BD ULTRA-FINE BERT PEN NEEDLES 32 gauge x 5/32" Ndle    CONTOUR NEXT STRIPS Strp    docusate sodium (COLACE) 100 MG capsule    fluticasone (FLONASE) 50 mcg/actuation nasal spray    hydrocodone-acetaminophen 10-325mg (NORCO)  mg Tab    JUBLIA 10 % Nataliya    lidocaine-prilocaine (EMLA) cream    metformin (GLUCOPHAGE-XR) 500 MG 24 hr tablet    multivitamin (ONE DAILY MULTIVITAMIN) per tablet    ondansetron (ZOFRAN, AS HYDROCHLORIDE,) 4 MG tablet    pantoprazole (PROTONIX) 40 MG tablet    sildenafil (VIAGRA) 50 MG tablet    sulfamethoxazole-trimethoprim 400-80mg (BACTRIM,SEPTRA) 400-80 mg per tablet      Facility-Administered Medications     bendamustine (BENDEKA) 225 mg in sodium chloride 0.9% 50 mL chemo infusion    heparin, porcine (PF) 100 unit/mL injection flush 500 Units    sodium chloride 0.9% flush 10 mL    alteplase injection 2 mg (Discontinued)    heparin, porcine (PF) 100 unit/mL injection flush 500 Units (Discontinued)    meperidine 50 mg/mL injection 25 mg (Discontinued)    sodium chloride 0.9% flush 10 mL (Discontinued)      Appointments for Next Year     2017  9:00 AM INJECTION (15 min.) [facility]    Arrive at check-in approximately 15 minutes before your scheduled appointment time. Bring all outside medical records and imaging, along with a list of your current medications and insurance card.    2017 11:50 AM NON FASTING LAB (10 min.) [facility], [facility] CANCER DG    Arrive at check-in approximately 15 minutes before your scheduled appointment time. Bring all outside medical records and imaging, along with a " list of your current medications and insurance card.    [facility] 3rd Floor    4/26/2017  1:00 PM ESTABLISHED PATIENT (15 min.) [facility] Yue Jara MD    Arrive at check-in approximately 15 minutes before your scheduled appointment time. Bring all outside medical records and imaging, along with a list of your current medications and insurance card.    [facility]      Allergies     No Known Allergies      Current Discharge Medication List     Cannot display discharge medications since this is not an admission.

## 2017-04-05 ENCOUNTER — INFUSION (OUTPATIENT)
Dept: INFUSION THERAPY | Facility: HOSPITAL | Age: 52
End: 2017-04-05
Attending: INTERNAL MEDICINE
Payer: COMMERCIAL

## 2017-04-05 VITALS
TEMPERATURE: 96 F | RESPIRATION RATE: 18 BRPM | SYSTOLIC BLOOD PRESSURE: 165 MMHG | HEART RATE: 59 BPM | DIASTOLIC BLOOD PRESSURE: 83 MMHG

## 2017-04-05 DIAGNOSIS — C83.00 SMALL B-CELL LYMPHOMA, UNSPECIFIED BODY REGION: Primary | ICD-10-CM

## 2017-04-05 DIAGNOSIS — C85.89 OTHER MALIGNANT LYMPHOMAS, UNSPECIFIED SITE, EXTRANODAL AND SOLID ORGAN SITES: ICD-10-CM

## 2017-04-05 PROCEDURE — 96372 THER/PROPH/DIAG INJ SC/IM: CPT

## 2017-04-05 PROCEDURE — 63600175 PHARM REV CODE 636 W HCPCS: Performed by: INTERNAL MEDICINE

## 2017-04-05 RX ADMIN — PEGFILGRASTIM 6 MG: 6 INJECTION SUBCUTANEOUS at 09:04

## 2017-04-05 NOTE — PATIENT INSTRUCTIONS
Pegfilgrastim injection  What is this medicine?  PEGFILGRASTIM (PEG gian gra stim) is a long-acting granulocyte colony-stimulating factor that stimulates the growth of neutrophils, a type of white blood cell important in the body's fight against infection. It is used to reduce the incidence of fever and infection in patients with certain types of cancer who are receiving chemotherapy that affects the bone marrow, and to increase survival after being exposed to high doses of radiation.  How should I use this medicine?  This medicine is for injection under the skin. If you get this medicine at home, you will be taught how to prepare and give the pre-filled syringe or how to use the On-body Injector. Refer to the patient Instructions for Use for detailed instructions. Use exactly as directed. Take your medicine at regular intervals. Do not take your medicine more often than directed.  It is important that you put your used needles and syringes in a special sharps container. Do not put them in a trash can. If you do not have a sharps container, call your pharmacist or healthcare provider to get one.  Talk to your pediatrician regarding the use of this medicine in children. While this drug may be prescribed for selected conditions, precautions do apply.  What side effects may I notice from receiving this medicine?  Side effects that you should report to your doctor or health care professional as soon as possible:  · allergic reactions like skin rash, itching or hives, swelling of the face, lips, or tongue  · dizziness  · fever  · pain, redness, or irritation at site where injected  · pinpoint red spots on the skin  · red or dark-brown urine  · shortness of breath or breathing problems  · stomach or side pain, or pain at the shoulder  · swelling  · tiredness  · trouble passing urine or change in the amount of urine  Side effects that usually do not require medical attention (report to your doctor or health care  professional if they continue or are bothersome):  · bone pain  · muscle pain  What may interact with this medicine?  Interactions have not been studied.  Give your health care provider a list of all the medicines, herbs, non-prescription drugs, or dietary supplements you use. Also tell them if you smoke, drink alcohol, or use illegal drugs. Some items may interact with your medicine.  What if I miss a dose?  It is important not to miss your dose. Call your doctor or health care professional if you miss your dose. If you miss a dose due to an On-body Injector failure or leakage, a new dose should be administered as soon as possible using a single prefilled syringe for manual use.  Where should I keep my medicine?  Keep out of the reach of children.  Store pre-filled syringes in a refrigerator between 2 and 8 degrees C (36 and 46 degrees F). Do not freeze. Keep in carton to protect from light. Throw away this medicine if it is left out of the refrigerator for more than 48 hours. Throw away any unused medicine after the expiration date.  What should I tell my health care provider before I take this medicine?  They need to know if you have any of these conditions:  · kidney disease  · latex allergy  · ongoing radiation therapy  · sickle cell disease  · skin reactions to acrylic adhesives (On-Body Injector only)  · an unusual or allergic reaction to pegfilgrastim, filgrastim, other medicines, foods, dyes, or preservatives  · pregnant or trying to get pregnant  · breast-feeding  What should I watch for while using this medicine?  You may need blood work done while you are taking this medicine.  If you are going to need a MRI, CT scan, or other procedure, tell your doctor that you are using this medicine (On-Body Injector only).  Date Last Reviewed:   NOTE:This sheet is a summary. It may not cover all possible information. If you have questions about this medicine, talk to your doctor, pharmacist, or health care provider.  Copyright© 2016 [employer]

## 2017-04-05 NOTE — MR AVS SNAPSHOT
"    [facility]  [address] [address]  Phone: [phone]                  Wild Holliday   2017 9:00 AM   Infusion    Description:  Male : 1965   Provider:  CHAIR Deborah LOBO   Department:  [facility]           Diagnoses this Visit        Comments    Small B-cell lymphoma, unspecified body region    -  Primary     Other malignant lymphomas, unspecified site, extranodal and solid organ sites                To Do List           Future Appointments        Provider Department Dept Phone    2017 11:50 AM LAB, HEMONC CANCER BLDG [facility] [phone]    2017 1:00 PM MD [facility] [phone]      Goals (5 Years of Data)     None      [facility]     [facility] Care [facility] -  Assistance  Unless otherwise directed by your provider, please contact [facility], our nurse care line that is available for  assistance.     Registered nurses in the [facility] provide: appointment scheduling, clinical advisement, health education, and other advisory services.  Call: [phone] (toll free)               Medications           These medications were administered today        Dose Freq    pegfilgrastim injection 6 mg 6 mg Clinic/HOD 1 time    Sig: Inject 0.6 mLs (6 mg total) into the skin one time.    Class: Normal    Route: Subcutaneous           Verify that the below list of medications is an accurate representation of the medications you are currently taking.  If none reported, the list may be blank. If incorrect, please contact your healthcare provider. Carry this list with you in case of emergency.           Current Medications     ACETAMINOPHEN ORAL Take by mouth.    BD ULTRA-FINE BERT PEN NEEDLES 32 gauge x 5/32" Ndle USE DAILY WITH VICTOZA PEN UTD    CONTOUR NEXT STRIPS Strp     docusate sodium (COLACE) 100 MG capsule Take 1 capsule (100 mg total) by " mouth 2 (two) times daily.    fluticasone (FLONASE) 50 mcg/actuation nasal spray 1 spray by Each Nare route once daily.    hydrocodone-acetaminophen 10-325mg (NORCO)  mg Tab Take 1 tablet by mouth every 6 (six) hours as needed.    JUBLIA 10 % Nataliya APPLY TO NAILS EVERY DAY    lidocaine-prilocaine (EMLA) cream Apply topically as needed.    metformin (GLUCOPHAGE-XR) 500 MG 24 hr tablet TK 1 T PO BID    multivitamin (ONE DAILY MULTIVITAMIN) per tablet Take 1 tablet by mouth once daily.    ondansetron (ZOFRAN, AS HYDROCHLORIDE,) 4 MG tablet Take 1 tablet (4 mg total) by mouth every 6 (six) hours as needed for Nausea.    pantoprazole (PROTONIX) 40 MG tablet TAKE 1 TABLET(40 MG) BY MOUTH EVERY DAY    sildenafil (VIAGRA) 50 MG tablet Take 1 tablet (50 mg total) by mouth as needed for Erectile Dysfunction.    sulfamethoxazole-trimethoprim 400-80mg (BACTRIM,SEPTRA) 400-80 mg per tablet Take 1 tablet by mouth once daily. Monday Wednesday friday           Clinical Reference Information           Your Vitals Were     BP Pulse Temp Resp          165/83 (BP Location: Left arm, Patient Position: Sitting, BP Method: Automatic) 59 96.3 °F (35.7 °C) (Oral) 18        Allergies as of 4/5/2017     No Known Allergies      Immunizations Administered on Date of Encounter - 4/5/2017     None      Administrations This Visit     pegfilgrastim injection 6 mg     Admin Date Action Dose Route Administered By             04/05/2017 Given 6 mg Subcutaneous Claudette D Baye, RN                      Instructions      Pegfilgrastim injection  What is this medicine?  PEGFILGRASTIM (PEG gian gra stim) is a long-acting granulocyte colony-stimulating factor that stimulates the growth of neutrophils, a type of white blood cell important in the body's fight against infection. It is used to reduce the incidence of fever and infection in patients with certain types of cancer who are receiving chemotherapy that affects the bone marrow, and to increase  survival after being exposed to high doses of radiation.  How should I use this medicine?  This medicine is for injection under the skin. If you get this medicine at home, you will be taught how to prepare and give the pre-filled syringe or how to use the On-body Injector. Refer to the patient Instructions for Use for detailed instructions. Use exactly as directed. Take your medicine at regular intervals. Do not take your medicine more often than directed.  It is important that you put your used needles and syringes in a special sharps container. Do not put them in a trash can. If you do not have a sharps container, call your pharmacist or healthcare provider to get one.  Talk to your pediatrician regarding the use of this medicine in children. While this drug may be prescribed for selected conditions, precautions do apply.  What side effects may I notice from receiving this medicine?  Side effects that you should report to your doctor or health care professional as soon as possible:  · allergic reactions like skin rash, itching or hives, swelling of the face, lips, or tongue  · dizziness  · fever  · pain, redness, or irritation at site where injected  · pinpoint red spots on the skin  · red or dark-brown urine  · shortness of breath or breathing problems  · stomach or side pain, or pain at the shoulder  · swelling  · tiredness  · trouble passing urine or change in the amount of urine  Side effects that usually do not require medical attention (report to your doctor or health care professional if they continue or are bothersome):  · bone pain  · muscle pain  What may interact with this medicine?  Interactions have not been studied.  Give your health care provider a list of all the medicines, herbs, non-prescription drugs, or dietary supplements you use. Also tell them if you smoke, drink alcohol, or use illegal drugs. Some items may interact with your medicine.  What if I miss a dose?  It is important not to miss  your dose. Call your doctor or health care professional if you miss your dose. If you miss a dose due to an On-body Injector failure or leakage, a new dose should be administered as soon as possible using a single prefilled syringe for manual use.  Where should I keep my medicine?  Keep out of the reach of children.  Store pre-filled syringes in a refrigerator between 2 and 8 degrees C (36 and 46 degrees F). Do not freeze. Keep in carton to protect from light. Throw away this medicine if it is left out of the refrigerator for more than 48 hours. Throw away any unused medicine after the expiration date.  What should I tell my health care provider before I take this medicine?  They need to know if you have any of these conditions:  · kidney disease  · latex allergy  · ongoing radiation therapy  · sickle cell disease  · skin reactions to acrylic adhesives (On-Body Injector only)  · an unusual or allergic reaction to pegfilgrastim, filgrastim, other medicines, foods, dyes, or preservatives  · pregnant or trying to get pregnant  · breast-feeding  What should I watch for while using this medicine?  You may need blood work done while you are taking this medicine.  If you are going to need a MRI, CT scan, or other procedure, tell your doctor that you are using this medicine (On-Body Injector only).  Date Last Reviewed:   NOTE:This sheet is a summary. It may not cover all possible information. If you have questions about this medicine, talk to your doctor, pharmacist, or health care provider. Copyright© 2016 [employer]             Language Assistance Services     ATTENTION: Language assistance services are available, free of charge. Please call [phone].      ATENCIÓN: Si habla español, tiene a tovar disposición servicios gratuitos de asistencia lingüística. Llame al [phone].     [facility] Ý: N?u b?n nói Ti?ng Vi?t, có các d?ch v? h? tr? ngôn ng? mi?n phí dành cho b?n. G?i s? [phone].         [facility] complies with applicable Federal civil rights laws and does not discriminate on the basis of race, color, national origin, age, disability, or sex.

## 2017-04-13 DIAGNOSIS — D49.2 THORACIC SPINE TUMOR: ICD-10-CM

## 2017-04-13 DIAGNOSIS — E08.00 DIABETES MELLITUS DUE TO UNDERLYING CONDITION WITH HYPEROSMOLARITY WITHOUT COMA, WITHOUT LONG-TERM CURRENT USE OF INSULIN: ICD-10-CM

## 2017-04-13 DIAGNOSIS — K21.9 GASTROESOPHAGEAL REFLUX DISEASE, ESOPHAGITIS PRESENCE NOT SPECIFIED: ICD-10-CM

## 2017-04-13 RX ORDER — PANTOPRAZOLE SODIUM 40 MG/1
TABLET, DELAYED RELEASE ORAL
Qty: 30 TABLET | Refills: 0 | Status: SHIPPED | OUTPATIENT
Start: 2017-04-13 | End: 2017-07-19

## 2017-04-26 ENCOUNTER — OFFICE VISIT (OUTPATIENT)
Dept: HEMATOLOGY/ONCOLOGY | Facility: CLINIC | Age: 52
End: 2017-04-26
Payer: COMMERCIAL

## 2017-04-26 ENCOUNTER — LAB VISIT (OUTPATIENT)
Dept: LAB | Facility: HOSPITAL | Age: 52
End: 2017-04-26
Attending: INTERNAL MEDICINE
Payer: COMMERCIAL

## 2017-04-26 VITALS
BODY MASS INDEX: 43.96 KG/M2 | SYSTOLIC BLOOD PRESSURE: 134 MMHG | HEIGHT: 69 IN | DIASTOLIC BLOOD PRESSURE: 70 MMHG | WEIGHT: 296.81 LBS | RESPIRATION RATE: 16 BRPM | HEART RATE: 79 BPM

## 2017-04-26 DIAGNOSIS — C83.00 SMALL B-CELL LYMPHOMA, UNSPECIFIED BODY REGION: Primary | ICD-10-CM

## 2017-04-26 DIAGNOSIS — F51.01 PRIMARY INSOMNIA: ICD-10-CM

## 2017-04-26 DIAGNOSIS — C83.00 SMALL B-CELL LYMPHOMA, UNSPECIFIED BODY REGION: ICD-10-CM

## 2017-04-26 DIAGNOSIS — E08.00 DIABETES MELLITUS DUE TO UNDERLYING CONDITION WITH HYPEROSMOLARITY WITHOUT COMA, WITHOUT LONG-TERM CURRENT USE OF INSULIN: ICD-10-CM

## 2017-04-26 DIAGNOSIS — K21.9 GASTROESOPHAGEAL REFLUX DISEASE WITHOUT ESOPHAGITIS: ICD-10-CM

## 2017-04-26 DIAGNOSIS — F43.22 ADJUSTMENT DISORDER WITH ANXIETY: ICD-10-CM

## 2017-04-26 DIAGNOSIS — C85.89 OTHER MALIGNANT LYMPHOMAS, UNSPECIFIED SITE, EXTRANODAL AND SOLID ORGAN SITES: ICD-10-CM

## 2017-04-26 LAB
ALBUMIN SERPL BCP-MCNC: 4 G/DL
ALP SERPL-CCNC: 83 U/L
ALT SERPL W/O P-5'-P-CCNC: 46 U/L
ANION GAP SERPL CALC-SCNC: 11 MMOL/L
AST SERPL-CCNC: 37 U/L
BILIRUB SERPL-MCNC: 0.5 MG/DL
BUN SERPL-MCNC: 14 MG/DL
CALCIUM SERPL-MCNC: 9.8 MG/DL
CHLORIDE SERPL-SCNC: 105 MMOL/L
CO2 SERPL-SCNC: 23 MMOL/L
CREAT SERPL-MCNC: 0.8 MG/DL
ERYTHROCYTE [DISTWIDTH] IN BLOOD BY AUTOMATED COUNT: 12.6 %
EST. GFR  (AFRICAN AMERICAN): >60 ML/MIN/1.73 M^2
EST. GFR  (NON AFRICAN AMERICAN): >60 ML/MIN/1.73 M^2
GLUCOSE SERPL-MCNC: 169 MG/DL
HCT VFR BLD AUTO: 39.1 %
HGB BLD-MCNC: 13.6 G/DL
LDH SERPL L TO P-CCNC: 177 U/L
MCH RBC QN AUTO: 30.2 PG
MCHC RBC AUTO-ENTMCNC: 34.8 %
MCV RBC AUTO: 87 FL
NEUTROPHILS # BLD AUTO: 3.8 K/UL
PLATELET # BLD AUTO: 216 K/UL
PMV BLD AUTO: 8.9 FL
POTASSIUM SERPL-SCNC: 4.5 MMOL/L
PROT SERPL-MCNC: 6.9 G/DL
RBC # BLD AUTO: 4.51 M/UL
SODIUM SERPL-SCNC: 139 MMOL/L
WBC # BLD AUTO: 5.08 K/UL

## 2017-04-26 PROCEDURE — 80053 COMPREHEN METABOLIC PANEL: CPT

## 2017-04-26 PROCEDURE — 99999 PR PBB SHADOW E&M-EST. PATIENT-LVL III: CPT | Mod: PBBFAC,,, | Performed by: INTERNAL MEDICINE

## 2017-04-26 PROCEDURE — 83615 LACTATE (LD) (LDH) ENZYME: CPT

## 2017-04-26 PROCEDURE — 99215 OFFICE O/P EST HI 40 MIN: CPT | Mod: S$GLB,,, | Performed by: INTERNAL MEDICINE

## 2017-04-26 PROCEDURE — 85027 COMPLETE CBC AUTOMATED: CPT

## 2017-04-26 PROCEDURE — 36415 COLL VENOUS BLD VENIPUNCTURE: CPT

## 2017-04-26 RX ORDER — HEPARIN 100 UNIT/ML
500 SYRINGE INTRAVENOUS
Status: CANCELLED | OUTPATIENT
Start: 2017-05-02

## 2017-04-26 RX ORDER — MEPERIDINE HYDROCHLORIDE 50 MG/ML
25 INJECTION INTRAMUSCULAR; INTRAVENOUS; SUBCUTANEOUS
Status: CANCELLED | OUTPATIENT
Start: 2017-05-01

## 2017-04-26 RX ORDER — HEPARIN 100 UNIT/ML
500 SYRINGE INTRAVENOUS
Status: CANCELLED | OUTPATIENT
Start: 2017-05-01

## 2017-04-26 RX ORDER — SODIUM CHLORIDE 0.9 % (FLUSH) 0.9 %
10 SYRINGE (ML) INJECTION
Status: CANCELLED | OUTPATIENT
Start: 2017-05-01

## 2017-04-26 RX ORDER — SODIUM CHLORIDE 0.9 % (FLUSH) 0.9 %
10 SYRINGE (ML) INJECTION
Status: CANCELLED | OUTPATIENT
Start: 2017-05-02

## 2017-04-26 RX ORDER — ACETAMINOPHEN 325 MG/1
650 TABLET ORAL
Status: CANCELLED | OUTPATIENT
Start: 2017-05-01

## 2017-04-26 RX ORDER — FAMOTIDINE 10 MG/ML
20 INJECTION INTRAVENOUS
Status: CANCELLED | OUTPATIENT
Start: 2017-05-01

## 2017-04-26 NOTE — MR AVS SNAPSHOT
"    [facility]  [address]  [place] [address]  Phone: [phone]                  Wild Holliday   2017 1:00 PM   Office Visit    Description:  Male : 1965   Provider:  Yue Jara MD   Department:  [facility]                To Do List           Goals (5 Years of Data)     None      [facility]     [facility] On Call Nurse Care Line -  Assistance  Unless otherwise directed by your provider, please contact [facility], our nurse care line that is available for  assistance.     Registered nurses in the [facility] Call Center provide: appointment scheduling, clinical advisement, health education, and other advisory services.  Call: [phone] (toll free)               Medications                Verify that the below list of medications is an accurate representation of the medications you are currently taking.  If none reported, the list may be blank. If incorrect, please contact your healthcare provider. Carry this list with you in case of emergency.           Current Medications     ACETAMINOPHEN ORAL Take by mouth.    BD ULTRA-FINE BERT PEN NEEDLES 32 gauge x 5/32" Ndle USE DAILY WITH VICTOZA PEN UTD    CONTOUR NEXT STRIPS Strp     docusate sodium (COLACE) 100 MG capsule Take 1 capsule (100 mg total) by mouth 2 (two) times daily.    fluticasone (FLONASE) 50 mcg/actuation nasal spray 1 spray by Each Nare route once daily.    hydrocodone-acetaminophen 10-325mg (NORCO)  mg Tab Take 1 tablet by mouth every 6 (six) hours as needed.    JUBLIA 10 % Nataliya APPLY TO NAILS EVERY DAY    lidocaine-prilocaine (EMLA) cream Apply topically as needed.    metformin (GLUCOPHAGE-XR) 500 MG 24 hr tablet TK 1 T PO BID    multivitamin (ONE DAILY MULTIVITAMIN) per tablet Take 1 tablet by mouth once daily.    ondansetron (ZOFRAN, AS HYDROCHLORIDE,) 4 MG tablet Take 1 tablet (4 mg total) by mouth every 6 (six) hours as needed for Nausea.    " "pantoprazole (PROTONIX) 40 MG tablet TAKE 1 TABLET(40 MG) BY MOUTH EVERY DAY    pantoprazole (PROTONIX) 40 MG tablet TAKE 1 TABLET(40 MG) BY MOUTH EVERY DAY    sildenafil (VIAGRA) 50 MG tablet Take 1 tablet (50 mg total) by mouth as needed for Erectile Dysfunction.    sulfamethoxazole-trimethoprim 400-80mg (BACTRIM,SEPTRA) 400-80 mg per tablet Take 1 tablet by mouth once daily. Monday Wednesday friday           Clinical Reference Information           Your Vitals Were     BP Pulse Resp Height Weight BMI    134/70 (BP Location: Right arm, Patient Position: Sitting, BP Method: Automatic) 79 16 5' 9" (1.753 m) 134.6 kg (296 lb 12.9 oz) 43.83 kg/m2      Blood Pressure          Most Recent Value    BP  134/70      Allergies as of 4/26/2017     No Known Allergies      Immunizations Administered on Date of Encounter - 4/26/2017     None      Instructions    No change in meds    Come in for chemo 5/1    See me in 4 weeks with PET and CT N/C/A/P with IV       Language Assistance Services     ATTENTION: Language assistance services are available, free of charge. Please call [phone].      ATENCIÓN: Si eliseola lena, tiene a tovar disposición servicios gratuitos de asistencia lingüística. Llame al [phone].     [facility] Ý: N?u b?n nói Ti?ng Vi?t, có các d?ch v? h? tr? ngôn ng? mi?n phí dành cho b?n. G?i s? [phone].         [facility] complies with applicable Federal civil rights laws and does not discriminate on the basis of race, color, national origin, age, disability, or sex.        "

## 2017-04-26 NOTE — Clinical Note
No change in meds  Come in for chemo 5/1  See me in 4 weeks with PET and CT N/C/A/P with IV  Lamar--Please orders

## 2017-05-01 ENCOUNTER — INFUSION (OUTPATIENT)
Dept: INFUSION THERAPY | Facility: HOSPITAL | Age: 52
End: 2017-05-01
Attending: INTERNAL MEDICINE
Payer: COMMERCIAL

## 2017-05-01 VITALS
HEART RATE: 65 BPM | TEMPERATURE: 97 F | SYSTOLIC BLOOD PRESSURE: 156 MMHG | RESPIRATION RATE: 18 BRPM | DIASTOLIC BLOOD PRESSURE: 86 MMHG

## 2017-05-01 DIAGNOSIS — C83.00 SMALL B-CELL LYMPHOMA, UNSPECIFIED BODY REGION: Primary | ICD-10-CM

## 2017-05-01 DIAGNOSIS — C85.89 OTHER MALIGNANT LYMPHOMAS, UNSPECIFIED SITE, EXTRANODAL AND SOLID ORGAN SITES: ICD-10-CM

## 2017-05-01 PROCEDURE — 25000003 PHARM REV CODE 250: Performed by: INTERNAL MEDICINE

## 2017-05-01 PROCEDURE — 63600175 PHARM REV CODE 636 W HCPCS: Performed by: INTERNAL MEDICINE

## 2017-05-01 PROCEDURE — 96411 CHEMO IV PUSH ADDL DRUG: CPT

## 2017-05-01 PROCEDURE — 96375 TX/PRO/DX INJ NEW DRUG ADDON: CPT

## 2017-05-01 PROCEDURE — 96413 CHEMO IV INFUSION 1 HR: CPT

## 2017-05-01 PROCEDURE — 96367 TX/PROPH/DG ADDL SEQ IV INF: CPT

## 2017-05-01 PROCEDURE — 96415 CHEMO IV INFUSION ADDL HR: CPT

## 2017-05-01 RX ORDER — SODIUM CHLORIDE 0.9 % (FLUSH) 0.9 %
10 SYRINGE (ML) INJECTION
Status: DISCONTINUED | OUTPATIENT
Start: 2017-05-01 | End: 2017-05-01 | Stop reason: HOSPADM

## 2017-05-01 RX ORDER — FAMOTIDINE 10 MG/ML
20 INJECTION INTRAVENOUS
Status: COMPLETED | OUTPATIENT
Start: 2017-05-01 | End: 2017-05-01

## 2017-05-01 RX ORDER — MEPERIDINE HYDROCHLORIDE 50 MG/ML
25 INJECTION INTRAMUSCULAR; INTRAVENOUS; SUBCUTANEOUS
Status: DISCONTINUED | OUTPATIENT
Start: 2017-05-01 | End: 2017-05-01 | Stop reason: HOSPADM

## 2017-05-01 RX ORDER — HEPARIN 100 UNIT/ML
500 SYRINGE INTRAVENOUS
Status: DISCONTINUED | OUTPATIENT
Start: 2017-05-01 | End: 2017-05-01 | Stop reason: HOSPADM

## 2017-05-01 RX ORDER — ACETAMINOPHEN 325 MG/1
650 TABLET ORAL
Status: COMPLETED | OUTPATIENT
Start: 2017-05-01 | End: 2017-05-01

## 2017-05-01 RX ADMIN — HEPARIN 500 UNITS: 100 SYRINGE at 12:05

## 2017-05-01 RX ADMIN — BENDAMUSTINE HYDROCHLORIDE 225 MG: 25 INJECTION, SOLUTION INTRAVENOUS at 01:05

## 2017-05-01 RX ADMIN — DIPHENHYDRAMINE HYDROCHLORIDE 50 MG: 50 INJECTION, SOLUTION INTRAMUSCULAR; INTRAVENOUS at 08:05

## 2017-05-01 RX ADMIN — SODIUM CHLORIDE, PRESERVATIVE FREE 10 ML: 5 INJECTION INTRAVENOUS at 12:05

## 2017-05-01 RX ADMIN — DEXAMETHASONE SODIUM PHOSPHATE 0.25 MG: 4 INJECTION, SOLUTION INTRAMUSCULAR; INTRAVENOUS at 08:05

## 2017-05-01 RX ADMIN — RITUXIMAB 945 MG: 10 INJECTION, SOLUTION INTRAVENOUS at 09:05

## 2017-05-01 RX ADMIN — ACETAMINOPHEN 650 MG: 325 TABLET ORAL at 08:05

## 2017-05-01 RX ADMIN — FAMOTIDINE 20 MG: 10 INJECTION INTRAVENOUS at 08:05

## 2017-05-01 NOTE — PLAN OF CARE
Problem: Patient Care Overview (Adult)  Goal: Individualization & Mutuality  Outcome: Ongoing (interventions implemented as appropriate)  7472 --  Patient's labs, history, allergies, and medication reviewed.  Patient to receive Rituxan and Bendeka.  Discussed plan of care with patient.  Patient in agreement.  PAC accessed. Good blood return noted.  Will monitor.

## 2017-05-01 NOTE — PLAN OF CARE
Problem: Patient Care Overview (Adult)  Goal: Plan of Care Review  Outcome: Ongoing (interventions implemented as appropriate)  1340 -- Patient tolerated treatment well.  Discharged without S/S of adverse effects.  AVS given.  Patient instructed to call provider with any questions or concerns.  RTC tomorrow for Bendeka D2.
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
## 2017-05-01 NOTE — MR AVS SNAPSHOT
"Patient Information     Patient Name Sex Wild Thibodeaux Male 1965      Visit Information        Provider Department Dept Phone Center    2017 8:00 AM [facility], [facility] [phone] Manuel Gill      Patient Instructions     None      Your Current Medications Are     ACETAMINOPHEN ORAL    BD ULTRA-FINE BERT PEN NEEDLES 32 gauge x 5/32" Ndle    CONTOUR NEXT STRIPS Strp    docusate sodium (COLACE) 100 MG capsule    fluticasone (FLONASE) 50 mcg/actuation nasal spray    hydrocodone-acetaminophen 10-325mg (NORCO)  mg Tab    JUBLIA 10 % Nataliya    lidocaine-prilocaine (EMLA) cream    metformin (GLUCOPHAGE-XR) 500 MG 24 hr tablet    multivitamin (ONE DAILY MULTIVITAMIN) per tablet    ondansetron (ZOFRAN, AS HYDROCHLORIDE,) 4 MG tablet    pantoprazole (PROTONIX) 40 MG tablet    pantoprazole (PROTONIX) 40 MG tablet    sildenafil (VIAGRA) 50 MG tablet    sulfamethoxazole-trimethoprim 400-80mg (BACTRIM,SEPTRA) 400-80 mg per tablet      Facility-Administered Medications     acetaminophen tablet 650 mg    bendamustine (BENDEKA) 225 mg in sodium chloride 0.9% 50 mL chemo infusion    diphenhydramine IVPB 50 mg    famotidine (PF) 20 mg/2 mL injection 20 mg    heparin, porcine (PF) 100 unit/mL injection flush 500 Units    meperidine 50 mg/mL injection 25 mg    palonosetron 0.25mg/dexamethasone 20mg IVPB    rituximab (RITUXAN) 375 mg/m2 = 945 mg in sodium chloride 0.9% 945 mL infusion    sodium chloride 0.9% flush 10 mL      Appointments for Next Year     2017  2:30 PM INFUSION 060 MIN (60 min.) [facility], [facility]    Arrive at check-in approximately 15 minutes before your scheduled appointment time. Bring all outside medical records and imaging, along with a list of your current medications and insurance card.    [facility], 5th Floor    5/3/2017  8:30 AM INJECTION (15 min.) [facility]    Arrive at check-in approximately 15 " minutes before your scheduled appointment time. Bring all outside medical records and imaging, along with a list of your current medications and insurance card.    5/18/2017  9:00 AM PET CT  (30 min.) [facility] PET CT LIMIT 500 LBS    You have been scheduled for a PET scan. Do Not eat or drink anything 6 hours before your test, except for water. If you are scheduled at [facility], report to the Radiology / Laboratory check in on the second floor.  Upon arrival to the PET department we will have to do a finger stick to check your glucose level. An intravenous line (IV) will be started, and you will be given a PET tracer. This injection needs to circulate for about 60 minutes while you are sitting in a quiet area. You will lie on a table and the camera will take pictures, this takes about 30-45 minutes.  A special radiopharmaceutical must be ordered for your test so if you have any questions or need to reschedule your appointment please call nuclear medicine ([phone]).    Report to Radiology/Laboratory  - [facility] Floor    5/18/2017 10:00 AM CT IV CONTRAST (15 min.) [facility] CT2 64- LIMIT 600 LBS    Please fast for 4 hours prior to appointment. Arrive at check-in approximately 30 minutes before your scheduled appointment time. Bring all outside medical records and imaging, along with a list of your current medications and insurance card.    [facility] floor    5/18/2017 10:15 AM CT CHEST W CONTRAST (15 min.) [facility] CT1 64- LIMIT 450 LBS    Please fast for 4 hours prior to appointment. Arrive at check-in approximately 30 minutes before your scheduled appointment time. Bring all outside medical records and imaging, along with a list of your current medications and insurance card.    [facility] floor    5/18/2017 10:30 AM CT ABD PEL W CONTRAST (15 min.) [facility] CT1 64-  LIMIT 450 LBS    You must  fast four (4) hours prior to your appointment. Arrive 2 hours early for your appointment to drink the exam prep.    2nd floor    5/24/2017 11:45 AM ESTABLISHED PATIENT (15 min.) [facility] Yue Jara MD    Arrive at check-in approximately 15 minutes before your scheduled appointment time. Bring all outside medical records and imaging, along with a list of your current medications and insurance card.    [facility]         Default Flowsheet Data (last 24 hours)      Amb Complex Vitals Randy        05/01/17 1101 05/01/17 1031 05/01/17 1000 05/01/17 0928 05/01/17 0814    Measurements    /64   rate 400cc/hr (!)  165/81   rate 300cc/hr 136/73   rate 200cc/hr (!)  149/74   Rituxan initiated 100cc/hr (!)  150/85    Temp     97 °F (36.1 °C)    Pulse 62 61 61 (!)  56 60    Resp 18 18 18 18 18    Pain Assessment    Pain Score     Zero            Allergies     No Known Allergies      Medications You Received from 04/30/2017 1258 to 05/01/2017 1258        Date/Time Order Dose Route Action     05/01/2017 0835 acetaminophen tablet 650 mg 650 mg Oral Given     05/01/2017 0854 diphenhydramine IVPB 50 mg 50 mg Intravenous New Bag     05/01/2017 0854 famotidine (PF) 20 mg/2 mL injection 20 mg 20 mg Intravenous Given     05/01/2017 0835 palonosetron 0.25mg/dexamethasone 20mg IVPB 0.25 mg Intravenous New Bag     05/01/2017 0926 rituximab (RITUXAN) 375 mg/m2 = 945 mg in sodium chloride 0.9% 945 mL infusion 945 mg Intravenous New Bag      Current Discharge Medication List     Cannot display discharge medications since this is not an admission.

## 2017-05-02 ENCOUNTER — INFUSION (OUTPATIENT)
Dept: INFUSION THERAPY | Facility: HOSPITAL | Age: 52
End: 2017-05-02
Attending: INTERNAL MEDICINE
Payer: COMMERCIAL

## 2017-05-02 VITALS
SYSTOLIC BLOOD PRESSURE: 174 MMHG | DIASTOLIC BLOOD PRESSURE: 94 MMHG | HEART RATE: 65 BPM | RESPIRATION RATE: 16 BRPM | TEMPERATURE: 98 F

## 2017-05-02 DIAGNOSIS — C85.89 OTHER MALIGNANT LYMPHOMAS, UNSPECIFIED SITE, EXTRANODAL AND SOLID ORGAN SITES: ICD-10-CM

## 2017-05-02 DIAGNOSIS — C83.00 SMALL B-CELL LYMPHOMA, UNSPECIFIED BODY REGION: Primary | ICD-10-CM

## 2017-05-02 PROCEDURE — 25000003 PHARM REV CODE 250: Performed by: INTERNAL MEDICINE

## 2017-05-02 PROCEDURE — 63600175 PHARM REV CODE 636 W HCPCS: Performed by: INTERNAL MEDICINE

## 2017-05-02 PROCEDURE — 96409 CHEMO IV PUSH SNGL DRUG: CPT

## 2017-05-02 RX ORDER — SODIUM CHLORIDE 0.9 % (FLUSH) 0.9 %
10 SYRINGE (ML) INJECTION
Status: DISCONTINUED | OUTPATIENT
Start: 2017-05-02 | End: 2017-05-02 | Stop reason: HOSPADM

## 2017-05-02 RX ORDER — HEPARIN 100 UNIT/ML
500 SYRINGE INTRAVENOUS
Status: DISCONTINUED | OUTPATIENT
Start: 2017-05-02 | End: 2017-05-02 | Stop reason: HOSPADM

## 2017-05-02 RX ADMIN — HEPARIN 500 UNITS: 100 SYRINGE at 04:05

## 2017-05-02 RX ADMIN — SODIUM CHLORIDE, PRESERVATIVE FREE 10 ML: 5 INJECTION INTRAVENOUS at 04:05

## 2017-05-02 RX ADMIN — BENDAMUSTINE HYDROCHLORIDE 225 MG: 25 INJECTION, SOLUTION INTRAVENOUS at 03:05

## 2017-05-02 NOTE — PLAN OF CARE
Problem: Patient Care Overview (Adult)  Goal: Plan of Care Review  Outcome: Ongoing (interventions implemented as appropriate)  Patient received Bendeka  without any issues. Notified to call MD with any further concerns .  No apparent distress noted.

## 2017-05-03 ENCOUNTER — TELEPHONE (OUTPATIENT)
Dept: HEMATOLOGY/ONCOLOGY | Facility: CLINIC | Age: 52
End: 2017-05-03

## 2017-05-03 ENCOUNTER — INFUSION (OUTPATIENT)
Dept: INFUSION THERAPY | Facility: HOSPITAL | Age: 52
End: 2017-05-03
Attending: INTERNAL MEDICINE
Payer: COMMERCIAL

## 2017-05-03 VITALS
RESPIRATION RATE: 20 BRPM | DIASTOLIC BLOOD PRESSURE: 80 MMHG | TEMPERATURE: 97 F | SYSTOLIC BLOOD PRESSURE: 161 MMHG | HEART RATE: 54 BPM

## 2017-05-03 DIAGNOSIS — C83.00 SMALL B-CELL LYMPHOMA, UNSPECIFIED BODY REGION: Primary | ICD-10-CM

## 2017-05-03 DIAGNOSIS — C85.89 OTHER MALIGNANT LYMPHOMAS, UNSPECIFIED SITE, EXTRANODAL AND SOLID ORGAN SITES: ICD-10-CM

## 2017-05-03 PROCEDURE — 96372 THER/PROPH/DIAG INJ SC/IM: CPT

## 2017-05-03 PROCEDURE — 63600175 PHARM REV CODE 636 W HCPCS: Performed by: INTERNAL MEDICINE

## 2017-05-03 RX ADMIN — PEGFILGRASTIM 6 MG: 6 INJECTION SUBCUTANEOUS at 08:05

## 2017-05-03 NOTE — TELEPHONE ENCOUNTER
----- Message from Daniel Brand sent at 5/3/2017  8:08 AM CDT -----  Contact: Spouse  Has a few questions regarding chemo, call: [phone]

## 2017-05-03 NOTE — MR AVS SNAPSHOT
[facility]  [address] [address]  Phone: [phone]                  Wild Holliday   5/3/2017 8:30 AM   Infusion    Description:  Male : 1965   Provider:  CHAIR Deborah LOBO   Department:  [facility]           Diagnoses this Visit        Comments    Small B-cell lymphoma, unspecified body region    -  Primary     Other malignant lymphomas, unspecified site, extranodal and solid organ sites                To Do List           Future Appointments        Provider Department Dept Phone    2017 9:00 AM [facility] PET CT LIMIT 500 LBS [facility] [phone]    2017 10:00 AM [facility] CT2 64- LIMIT 600 LBS [facility] [phone]    2017 10:15 AM [facility] CT1 64- LIMIT 450 LBS [facility] [phone]    2017 10:30 AM [facility] CT1 64- LIMIT 450 LBS [facility] [phone]    2017 11:45 AM Yue Jara MD [facility] [phone]      Goals (5 Years of Data)     None      [facility]     [facility] Nurse Care Line -  Assistance  Unless otherwise directed by your provider, please contact [facility], our nurse care line that is available for  assistance.     Registered nurses in the [facility] provide: appointment scheduling, clinical advisement, health education, and other advisory services.  Call: [phone] (toll free)               Medications           These medications were administered today        Dose Freq    pegfilgrastim injection 6 mg 6 mg Clinic/HOD 1 time    Sig: Inject 0.6 mLs (6 mg total) into the skin one time.    Class: Normal    Route: Subcutaneous           Verify that the below list of medications is an accurate representation of the medications you are currently taking.  If none reported, the list may be blank. If incorrect, please contact your healthcare provider. Carry this  "list with you in case of emergency.           Current Medications     ACETAMINOPHEN ORAL Take by mouth.    BD ULTRA-FINE BERT PEN NEEDLES 32 gauge x 5/32" Ndle USE DAILY WITH VICTOZA PEN UTD    CONTOUR NEXT STRIPS Strp     docusate sodium (COLACE) 100 MG capsule Take 1 capsule (100 mg total) by mouth 2 (two) times daily.    fluticasone (FLONASE) 50 mcg/actuation nasal spray 1 spray by Each Nare route once daily.    hydrocodone-acetaminophen 10-325mg (NORCO)  mg Tab Take 1 tablet by mouth every 6 (six) hours as needed.    JUBLIA 10 % Nataliya APPLY TO NAILS EVERY DAY    lidocaine-prilocaine (EMLA) cream Apply topically as needed.    metformin (GLUCOPHAGE-XR) 500 MG 24 hr tablet TK 1 T PO BID    multivitamin (ONE DAILY MULTIVITAMIN) per tablet Take 1 tablet by mouth once daily.    ondansetron (ZOFRAN, AS HYDROCHLORIDE,) 4 MG tablet Take 1 tablet (4 mg total) by mouth every 6 (six) hours as needed for Nausea.    pantoprazole (PROTONIX) 40 MG tablet TAKE 1 TABLET(40 MG) BY MOUTH EVERY DAY    pantoprazole (PROTONIX) 40 MG tablet TAKE 1 TABLET(40 MG) BY MOUTH EVERY DAY    sildenafil (VIAGRA) 50 MG tablet Take 1 tablet (50 mg total) by mouth as needed for Erectile Dysfunction.    sulfamethoxazole-trimethoprim 400-80mg (BACTRIM,SEPTRA) 400-80 mg per tablet Take 1 tablet by mouth once daily. Monday Wednesday friday           Clinical Reference Information           Your Vitals Were     BP Pulse Temp Resp          161/80 (BP Location: Left arm, Patient Position: Sitting, BP Method: Automatic) 54 96.5 °F (35.8 °C) (Oral) 20        Allergies as of 5/3/2017     No Known Allergies      Immunizations Administered on Date of Encounter - 5/3/2017     None      Administrations This Visit     pegfilgrastim injection 6 mg     Admin Date Action Dose Route Administered By             05/03/2017 Given 6 mg Subcutaneous Claudette D Baye, RN                      Instructions      Pegfilgrastim injection  What is this " medicine?  PEGFILGRASTIM (PEG gian gra stim) is a long-acting granulocyte colony-stimulating factor that stimulates the growth of neutrophils, a type of white blood cell important in the body's fight against infection. It is used to reduce the incidence of fever and infection in patients with certain types of cancer who are receiving chemotherapy that affects the bone marrow, and to increase survival after being exposed to high doses of radiation.  How should I use this medicine?  This medicine is for injection under the skin. If you get this medicine at home, you will be taught how to prepare and give the pre-filled syringe or how to use the On-body Injector. Refer to the patient Instructions for Use for detailed instructions. Use exactly as directed. Take your medicine at regular intervals. Do not take your medicine more often than directed.  It is important that you put your used needles and syringes in a special sharps container. Do not put them in a trash can. If you do not have a sharps container, call your pharmacist or healthcare provider to get one.  Talk to your pediatrician regarding the use of this medicine in children. While this drug may be prescribed for selected conditions, precautions do apply.  What side effects may I notice from receiving this medicine?  Side effects that you should report to your doctor or health care professional as soon as possible:  · allergic reactions like skin rash, itching or hives, swelling of the face, lips, or tongue  · dizziness  · fever  · pain, redness, or irritation at site where injected  · pinpoint red spots on the skin  · red or dark-brown urine  · shortness of breath or breathing problems  · stomach or side pain, or pain at the shoulder  · swelling  · tiredness  · trouble passing urine or change in the amount of urine  Side effects that usually do not require medical attention (report to your doctor or health care professional if they continue or are  bothersome):  · bone pain  · muscle pain  What may interact with this medicine?  Interactions have not been studied.  Give your health care provider a list of all the medicines, herbs, non-prescription drugs, or dietary supplements you use. Also tell them if you smoke, drink alcohol, or use illegal drugs. Some items may interact with your medicine.  What if I miss a dose?  It is important not to miss your dose. Call your doctor or health care professional if you miss your dose. If you miss a dose due to an On-body Injector failure or leakage, a new dose should be administered as soon as possible using a single prefilled syringe for manual use.  Where should I keep my medicine?  Keep out of the reach of children.  Store pre-filled syringes in a refrigerator between 2 and 8 degrees C (36 and 46 degrees F). Do not freeze. Keep in carton to protect from light. Throw away this medicine if it is left out of the refrigerator for more than 48 hours. Throw away any unused medicine after the expiration date.  What should I tell my health care provider before I take this medicine?  They need to know if you have any of these conditions:  · kidney disease  · latex allergy  · ongoing radiation therapy  · sickle cell disease  · skin reactions to acrylic adhesives (On-Body Injector only)  · an unusual or allergic reaction to pegfilgrastim, filgrastim, other medicines, foods, dyes, or preservatives  · pregnant or trying to get pregnant  · breast-feeding  What should I watch for while using this medicine?  You may need blood work done while you are taking this medicine.  If you are going to need a MRI, CT scan, or other procedure, tell your doctor that you are using this medicine (On-Body Injector only).  Date Last Reviewed:   NOTE:This sheet is a summary. It may not cover all possible information. If you have questions about this medicine, talk to your doctor, pharmacist, or health care provider. Copyright© 2016 [employer]             Language Assistance Services     ATTENTION: Language assistance services are available, free of charge. Please call [phone].      ATENCIÓN: Si habla lena, tiene a tovar disposición servicios gratuitos de asistencia lingüística. Llame al [phone].     CHÚ Ý: N?u b?n nói Ti?ng Vi?t, có các d?ch v? h? tr? ngôn ng? mi?n phí dành cho b?n. G?i s? [phone].         [facility] complies with applicable Federal civil rights laws and does not discriminate on the basis of race, color, national origin, age, disability, or sex.

## 2017-05-16 ENCOUNTER — TELEPHONE (OUTPATIENT)
Dept: HEMATOLOGY/ONCOLOGY | Facility: CLINIC | Age: 52
End: 2017-05-16

## 2017-05-16 DIAGNOSIS — K21.9 GASTROESOPHAGEAL REFLUX DISEASE, ESOPHAGITIS PRESENCE NOT SPECIFIED: ICD-10-CM

## 2017-05-16 DIAGNOSIS — E08.00 DIABETES MELLITUS DUE TO UNDERLYING CONDITION WITH HYPEROSMOLARITY WITHOUT COMA, WITHOUT LONG-TERM CURRENT USE OF INSULIN: ICD-10-CM

## 2017-05-16 DIAGNOSIS — D49.2 THORACIC SPINE TUMOR: ICD-10-CM

## 2017-05-16 RX ORDER — PANTOPRAZOLE SODIUM 40 MG/1
TABLET, DELAYED RELEASE ORAL
Qty: 30 TABLET | Refills: 0 | Status: SHIPPED | OUTPATIENT
Start: 2017-05-16 | End: 2017-07-19

## 2017-05-17 ENCOUNTER — TELEPHONE (OUTPATIENT)
Dept: RADIOLOGY | Facility: HOSPITAL | Age: 52
End: 2017-05-17

## 2017-05-17 ENCOUNTER — PATIENT MESSAGE (OUTPATIENT)
Dept: HEMATOLOGY/ONCOLOGY | Facility: CLINIC | Age: 52
End: 2017-05-17

## 2017-05-17 NOTE — TELEPHONE ENCOUNTER
----- Message from Jill Purdy sent at 5/17/2017  1:58 PM CDT -----  Contact: self  Patient states received a call that he will need  to reschedule Pet scan , machine is down.   Patient need to reschedule all appointments.   Please call pt at [phone]

## 2017-05-18 ENCOUNTER — PATIENT MESSAGE (OUTPATIENT)
Dept: HEMATOLOGY/ONCOLOGY | Facility: CLINIC | Age: 52
End: 2017-05-18

## 2017-05-24 ENCOUNTER — TELEPHONE (OUTPATIENT)
Dept: HEMATOLOGY/ONCOLOGY | Facility: CLINIC | Age: 52
End: 2017-05-24

## 2017-05-24 ENCOUNTER — PATIENT MESSAGE (OUTPATIENT)
Dept: HEMATOLOGY/ONCOLOGY | Facility: CLINIC | Age: 52
End: 2017-05-24

## 2017-05-24 NOTE — TELEPHONE ENCOUNTER
Returned call and spoke with Mr Holliday. Patient having a persistent (+) productive cough and nasal congestion. Patient states he has had this cough x 2-3 weeks. Mucus is clear. Patient voiced no fever or any other sx. Patient has taken Musulex and nasal spray. Patient has received previous sinus treatment from his local MD in [place]. Patient not sure if he should discuss further with Dr Jara or contact his PCP to discuss further. Please advise.

## 2017-05-24 NOTE — TELEPHONE ENCOUNTER
----- Message from Lazaro Lara sent at 5/24/2017 11:49 AM CDT -----  Contact: self  Pt would like to speak with nurse to see if a medication can be called in for a sinus infection and a constant cough.  Contact number [phone]

## 2017-05-25 DIAGNOSIS — J01.40 ACUTE NON-RECURRENT PANSINUSITIS: ICD-10-CM

## 2017-05-25 RX ORDER — AZITHROMYCIN 250 MG/1
TABLET, FILM COATED ORAL
Qty: 2 TABLET | Refills: 0 | Status: SHIPPED | OUTPATIENT
Start: 2017-05-25 | End: 2017-05-30

## 2017-05-26 ENCOUNTER — TELEPHONE (OUTPATIENT)
Dept: RADIOLOGY | Facility: HOSPITAL | Age: 52
End: 2017-05-26

## 2017-05-29 ENCOUNTER — HOSPITAL ENCOUNTER (OUTPATIENT)
Dept: RADIOLOGY | Facility: HOSPITAL | Age: 52
Discharge: HOME | End: 2017-05-29
Attending: NURSE PRACTITIONER
Payer: COMMERCIAL

## 2017-05-29 DIAGNOSIS — C83.00 SMALL B-CELL LYMPHOMA, UNSPECIFIED BODY REGION: ICD-10-CM

## 2017-05-29 LAB
CREAT SERPL-MCNC: 0.8 MG/DL (ref 0.5–1.4)
SAMPLE: NORMAL

## 2017-05-29 PROCEDURE — 78815 PET IMAGE W/CT SKULL-THIGH: CPT | Mod: 26,PS,, | Performed by: RADIOLOGY

## 2017-05-29 PROCEDURE — 74177 CT ABD & PELVIS W/CONTRAST: CPT | Mod: TC

## 2017-05-29 PROCEDURE — A9552 F18 FDG: HCPCS

## 2017-05-29 PROCEDURE — 71260 CT THORAX DX C+: CPT | Mod: TC

## 2017-05-29 PROCEDURE — 25500020 PHARM REV CODE 255: Performed by: NURSE PRACTITIONER

## 2017-05-29 PROCEDURE — 70491 CT SOFT TISSUE NECK W/DYE: CPT | Mod: TC

## 2017-05-29 PROCEDURE — 74177 CT ABD & PELVIS W/CONTRAST: CPT | Mod: 26,,, | Performed by: RADIOLOGY

## 2017-05-29 RX ADMIN — IOHEXOL 15 ML: 350 INJECTION, SOLUTION INTRAVENOUS at 02:05

## 2017-05-29 RX ADMIN — IOHEXOL 100 ML: 350 INJECTION, SOLUTION INTRAVENOUS at 02:05

## 2017-05-31 ENCOUNTER — OFFICE VISIT (OUTPATIENT)
Dept: HEMATOLOGY/ONCOLOGY | Facility: CLINIC | Age: 52
End: 2017-05-31
Payer: COMMERCIAL

## 2017-05-31 VITALS
BODY MASS INDEX: 42.67 KG/M2 | DIASTOLIC BLOOD PRESSURE: 72 MMHG | WEIGHT: 288.13 LBS | HEART RATE: 74 BPM | TEMPERATURE: 99 F | OXYGEN SATURATION: 97 % | HEIGHT: 69 IN | RESPIRATION RATE: 12 BRPM | SYSTOLIC BLOOD PRESSURE: 143 MMHG

## 2017-05-31 DIAGNOSIS — D49.2 THORACIC SPINE TUMOR: ICD-10-CM

## 2017-05-31 DIAGNOSIS — K21.9 GASTROESOPHAGEAL REFLUX DISEASE WITHOUT ESOPHAGITIS: ICD-10-CM

## 2017-05-31 DIAGNOSIS — E08.00 DIABETES MELLITUS DUE TO UNDERLYING CONDITION WITH HYPEROSMOLARITY WITHOUT COMA, WITHOUT LONG-TERM CURRENT USE OF INSULIN: ICD-10-CM

## 2017-05-31 DIAGNOSIS — C82.28 GRADE 3 FOLLICULAR LYMPHOMA OF LYMPH NODES OF MULTIPLE REGIONS: Primary | ICD-10-CM

## 2017-05-31 DIAGNOSIS — G47.00 INSOMNIA, UNSPECIFIED TYPE: ICD-10-CM

## 2017-05-31 DIAGNOSIS — F43.22 ADJUSTMENT DISORDER WITH ANXIETY: ICD-10-CM

## 2017-05-31 PROCEDURE — 99215 OFFICE O/P EST HI 40 MIN: CPT | Mod: S$GLB,,, | Performed by: INTERNAL MEDICINE

## 2017-05-31 PROCEDURE — 99999 PR PBB SHADOW E&M-EST. PATIENT-LVL IV: CPT | Mod: PBBFAC,,, | Performed by: INTERNAL MEDICINE

## 2017-05-31 RX ORDER — BENZONATATE 100 MG/1
100 CAPSULE ORAL EVERY 6 HOURS PRN
Qty: 30 CAPSULE | Refills: 1 | Status: SHIPPED | OUTPATIENT
Start: 2017-05-31 | End: 2017-07-19

## 2017-05-31 NOTE — PATIENT INSTRUCTIONS
No change in meds    Start Maintenance Rituxin on 8/1    See me in 3 months with cbc/cmp/ldh and PET

## 2017-05-31 NOTE — Clinical Note
No change in meds  Start Maintenance Rituxin on 8/1  See me in 3 months with cbc/cmp/ldh and PET  Ruth, can you fix the rituxin order

## 2017-06-01 RX ORDER — FAMOTIDINE 10 MG/ML
20 INJECTION INTRAVENOUS
Status: CANCELLED | OUTPATIENT
Start: 2017-09-06

## 2017-06-01 RX ORDER — FAMOTIDINE 10 MG/ML
20 INJECTION INTRAVENOUS
Status: CANCELLED | OUTPATIENT
Start: 2017-08-01

## 2017-06-01 RX ORDER — HEPARIN 100 UNIT/ML
500 SYRINGE INTRAVENOUS
Status: CANCELLED | OUTPATIENT
Start: 2017-08-01

## 2017-06-01 RX ORDER — HEPARIN 100 UNIT/ML
500 SYRINGE INTRAVENOUS
Status: CANCELLED | OUTPATIENT
Start: 2017-09-13

## 2017-06-01 RX ORDER — ACETAMINOPHEN 325 MG/1
650 TABLET ORAL
Status: CANCELLED | OUTPATIENT
Start: 2017-09-06

## 2017-06-01 RX ORDER — SODIUM CHLORIDE 0.9 % (FLUSH) 0.9 %
10 SYRINGE (ML) INJECTION
Status: CANCELLED | OUTPATIENT
Start: 2017-09-06

## 2017-06-01 RX ORDER — HEPARIN 100 UNIT/ML
500 SYRINGE INTRAVENOUS
Status: CANCELLED | OUTPATIENT
Start: 2017-09-06

## 2017-06-01 RX ORDER — FAMOTIDINE 10 MG/ML
20 INJECTION INTRAVENOUS
Status: CANCELLED | OUTPATIENT
Start: 2017-09-20

## 2017-06-01 RX ORDER — SODIUM CHLORIDE 0.9 % (FLUSH) 0.9 %
10 SYRINGE (ML) INJECTION
Status: CANCELLED | OUTPATIENT
Start: 2017-09-20

## 2017-06-01 RX ORDER — HEPARIN 100 UNIT/ML
500 SYRINGE INTRAVENOUS
Status: CANCELLED | OUTPATIENT
Start: 2017-09-20

## 2017-06-01 RX ORDER — SODIUM CHLORIDE 0.9 % (FLUSH) 0.9 %
10 SYRINGE (ML) INJECTION
Status: CANCELLED | OUTPATIENT
Start: 2017-09-13

## 2017-06-01 RX ORDER — SODIUM CHLORIDE 0.9 % (FLUSH) 0.9 %
10 SYRINGE (ML) INJECTION
Status: CANCELLED | OUTPATIENT
Start: 2017-08-01

## 2017-06-01 RX ORDER — ACETAMINOPHEN 325 MG/1
650 TABLET ORAL
Status: CANCELLED | OUTPATIENT
Start: 2017-09-13

## 2017-06-01 RX ORDER — ACETAMINOPHEN 325 MG/1
650 TABLET ORAL
Status: CANCELLED | OUTPATIENT
Start: 2017-08-01

## 2017-06-01 RX ORDER — FAMOTIDINE 10 MG/ML
20 INJECTION INTRAVENOUS
Status: CANCELLED | OUTPATIENT
Start: 2017-09-13

## 2017-06-01 RX ORDER — ACETAMINOPHEN 325 MG/1
650 TABLET ORAL
Status: CANCELLED | OUTPATIENT
Start: 2017-09-20
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
## 2017-06-27 ENCOUNTER — PATIENT MESSAGE (OUTPATIENT)
Dept: HEMATOLOGY/ONCOLOGY | Facility: CLINIC | Age: 52
End: 2017-06-27

## 2017-07-19 ENCOUNTER — OFFICE VISIT (OUTPATIENT)
Dept: INTERNAL MEDICINE | Facility: CLINIC | Age: 52
End: 2017-07-19
Payer: COMMERCIAL

## 2017-07-19 VITALS
HEART RATE: 62 BPM | TEMPERATURE: 98 F | DIASTOLIC BLOOD PRESSURE: 86 MMHG | SYSTOLIC BLOOD PRESSURE: 140 MMHG | WEIGHT: 283.06 LBS | BODY MASS INDEX: 41.92 KG/M2 | RESPIRATION RATE: 18 BRPM | HEIGHT: 69 IN

## 2017-07-19 DIAGNOSIS — E11.9 CONTROLLED TYPE 2 DIABETES MELLITUS WITHOUT COMPLICATION, WITHOUT LONG-TERM CURRENT USE OF INSULIN: ICD-10-CM

## 2017-07-19 DIAGNOSIS — R07.9 CHEST PAIN, UNSPECIFIED: ICD-10-CM

## 2017-07-19 DIAGNOSIS — Z00.00 WELLNESS EXAMINATION: Primary | ICD-10-CM

## 2017-07-19 DIAGNOSIS — Z11.59 NEED FOR HEPATITIS C SCREENING TEST: ICD-10-CM

## 2017-07-19 DIAGNOSIS — Z12.11 SPECIAL SCREENING FOR MALIGNANT NEOPLASMS, COLON: ICD-10-CM

## 2017-07-19 DIAGNOSIS — Z12.5 SCREENING PSA (PROSTATE SPECIFIC ANTIGEN): ICD-10-CM

## 2017-07-19 PROCEDURE — 99204 OFFICE O/P NEW MOD 45 MIN: CPT | Mod: S$GLB,,, | Performed by: INTERNAL MEDICINE

## 2017-07-19 PROCEDURE — 99999 PR PBB SHADOW E&M-EST. PATIENT-LVL III: CPT | Mod: PBBFAC,,, | Performed by: INTERNAL MEDICINE

## 2017-07-19 NOTE — PROGRESS NOTES
Subjective:       Patient ID: Wild Holliday is a 51 y.o. male.    Chief Complaint: Establish Care; Annual Exam; Chest Pain; and Diabetes    Wild Holliday is a 51 y.o. male  Here to establish as a new patient .  His main issues are lymphoma, DM-2  And GERD .          Chest Pain    This is a new problem. The current episode started more than 1 month ago. The onset quality is gradual. The problem has been waxing and waning. The pain is present in the substernal region. The pain is at a severity of 2/10. The pain does not radiate. Pertinent negatives include no abdominal pain, back pain, dizziness, fever, headaches, numbness, palpitations, shortness of breath, vomiting or weakness. Associated symptoms comments: Chest pain after eating . The pain is aggravated by nothing. He has tried antacids for the symptoms.   Pertinent negatives for past medical history include no CAD and no seizures.   His family medical history is significant for CAD.   Diabetes   He presents for his follow-up diabetic visit. He has type 2 diabetes mellitus. His disease course has been stable. Pertinent negatives for hypoglycemia include no confusion, dizziness, headaches, nervousness/anxiousness, pallor, seizures or speech difficulty. Associated symptoms include chest pain. Pertinent negatives for diabetes include no polydipsia, no polyuria and no weakness. Pertinent negatives for hypoglycemia complications include no blackouts. Current diabetic treatment includes oral agent (monotherapy).     Review of Systems   Constitutional: Negative for activity change, chills, fever and unexpected weight change.   HENT: Negative for congestion, hearing loss, postnasal drip, rhinorrhea, sore throat and trouble swallowing.    Eyes: Negative for photophobia, discharge and visual disturbance.   Respiratory: Negative for chest tightness, shortness of breath and wheezing.    Cardiovascular: Positive for chest pain. Negative for palpitations.   Gastrointestinal:  Negative for abdominal distention, abdominal pain, blood in stool, constipation, diarrhea and vomiting.   Endocrine: Negative for polydipsia and polyuria.   Genitourinary: Negative for difficulty urinating, dysuria, flank pain, hematuria and urgency.   Musculoskeletal: Positive for arthralgias and joint swelling. Negative for back pain and neck pain.   Skin: Negative for pallor.   Neurological: Negative for dizziness, seizures, facial asymmetry, speech difficulty, weakness, numbness and headaches.   Hematological: Does not bruise/bleed easily.   Psychiatric/Behavioral: Negative for agitation, confusion, dysphoric mood and suicidal ideas. The patient is not nervous/anxious.        Objective:      Physical Exam   Constitutional: He is oriented to person, place, and time. He appears well-developed and well-nourished.   HENT:   Head: Normocephalic and atraumatic.   Neck: No JVD present.   Cardiovascular: Normal rate, regular rhythm and normal heart sounds.    Pulmonary/Chest: Effort normal and breath sounds normal.   Abdominal: Soft. Bowel sounds are normal. There is no tenderness.   Musculoskeletal: He exhibits no edema.   Neurological: He is alert and oriented to person, place, and time.   Skin: Skin is warm and dry.   Psychiatric: He has a normal mood and affect. His behavior is normal. Judgment and thought content normal.   Nursing note and vitals reviewed.      Assessment:       1. Wellness examination    2. Controlled type 2 diabetes mellitus without complication, without long-term current use of insulin    3. Chest pain, unspecified    4. Need for hepatitis C screening test    5. Screening PSA (prostate specific antigen)    6. Special screening for malignant neoplasms, colon        Plan:   Wild was seen today for establish care, annual exam, chest pain and diabetes.    Diagnoses and all orders for this visit:    Wellness examination  -     CBC auto differential; Future  -     Comprehensive metabolic panel;  Future  -     TSH; Future  -     Lipid panel; Future    Controlled type 2 diabetes mellitus without complication, without long-term current use of insulin  -     Lipid panel; Future  -     Microalbumin/creatinine urine ratio; Future  -     Hemoglobin A1c; Future    Chest pain, unspecified  -     EKG 12-lead; Future  -     X-Ray Chest PA And Lateral; Future  -     Ambulatory Referral to Cardiology  Very likely non cardiac   Need for hepatitis C screening test  -     Hepatitis C antibody; Future    Screening PSA (prostate specific antigen)  -     PSA, Screening; Future    Special screening for malignant neoplasms, colon  -     Case request GI: COLONOSCOPY

## 2017-07-20 ENCOUNTER — HOSPITAL ENCOUNTER (OUTPATIENT)
Dept: PULMONOLOGY | Facility: HOSPITAL | Age: 52
Discharge: HOME | End: 2017-07-20
Attending: INTERNAL MEDICINE
Payer: COMMERCIAL

## 2017-07-20 ENCOUNTER — PATIENT MESSAGE (OUTPATIENT)
Dept: INTERNAL MEDICINE | Facility: CLINIC | Age: 52
End: 2017-07-20

## 2017-07-20 ENCOUNTER — HOSPITAL ENCOUNTER (OUTPATIENT)
Dept: RADIOLOGY | Facility: HOSPITAL | Age: 52
Discharge: HOME | End: 2017-07-20
Attending: INTERNAL MEDICINE
Payer: COMMERCIAL

## 2017-07-20 DIAGNOSIS — R07.9 CHEST PAIN, UNSPECIFIED: ICD-10-CM

## 2017-07-20 PROCEDURE — 93010 ELECTROCARDIOGRAM REPORT: CPT | Mod: ,,, | Performed by: INTERNAL MEDICINE

## 2017-07-20 PROCEDURE — 93005 ELECTROCARDIOGRAM TRACING: CPT

## 2017-07-20 PROCEDURE — 71020 XR CHEST PA AND LATERAL: CPT | Mod: 26,,, | Performed by: RADIOLOGY

## 2017-07-20 PROCEDURE — 71020 XR CHEST PA AND LATERAL: CPT | Mod: TC

## 2017-07-25 ENCOUNTER — OFFICE VISIT (OUTPATIENT)
Dept: INTERNAL MEDICINE | Facility: CLINIC | Age: 52
End: 2017-07-25
Payer: COMMERCIAL

## 2017-07-25 VITALS
HEIGHT: 69 IN | HEART RATE: 68 BPM | WEIGHT: 277.56 LBS | SYSTOLIC BLOOD PRESSURE: 132 MMHG | BODY MASS INDEX: 41.11 KG/M2 | DIASTOLIC BLOOD PRESSURE: 78 MMHG | OXYGEN SATURATION: 98 % | RESPIRATION RATE: 16 BRPM

## 2017-07-25 DIAGNOSIS — K21.9 GASTROESOPHAGEAL REFLUX DISEASE WITHOUT ESOPHAGITIS: ICD-10-CM

## 2017-07-25 DIAGNOSIS — E78.1 HYPERTRIGLYCERIDEMIA: ICD-10-CM

## 2017-07-25 DIAGNOSIS — E11.9 CONTROLLED TYPE 2 DIABETES MELLITUS WITHOUT COMPLICATION, WITHOUT LONG-TERM CURRENT USE OF INSULIN: Primary | ICD-10-CM

## 2017-07-25 PROCEDURE — 99999 PR PBB SHADOW E&M-EST. PATIENT-LVL III: CPT | Mod: PBBFAC,,, | Performed by: INTERNAL MEDICINE

## 2017-07-25 PROCEDURE — 99213 OFFICE O/P EST LOW 20 MIN: CPT | Mod: S$GLB,,, | Performed by: INTERNAL MEDICINE

## 2017-07-25 PROCEDURE — 3044F HG A1C LEVEL LT 7.0%: CPT | Mod: S$GLB,,, | Performed by: INTERNAL MEDICINE

## 2017-07-25 RX ORDER — PANTOPRAZOLE SODIUM 40 MG/1
40 TABLET, DELAYED RELEASE ORAL DAILY
Qty: 30 TABLET | Refills: 11 | Status: SHIPPED | OUTPATIENT
Start: 2017-07-25 | End: 2017-11-30

## 2017-07-25 RX ORDER — FENOFIBRATE 160 MG/1
160 TABLET ORAL DAILY
Qty: 90 TABLET | Refills: 3 | Status: SHIPPED | OUTPATIENT
Start: 2017-07-25 | End: 2018-07-02 | Stop reason: SDUPTHER

## 2017-07-25 NOTE — PROGRESS NOTES
Subjective:       Patient ID: Wild Holliday is a 51 y.o. male.    Chief Complaint: Chest Pain (1 week follow up with lab review) and Diabetes    Wild Holliday is a 51 y.o. male  Here with follow up .  Labs are reviewed .  He will be seeing cardiology on 30th.  His CP seems likely reflux related.  a1c 6.9 %.        Chest Pain    Pertinent negatives include no abdominal pain, back pain, dizziness, fever, headaches, numbness, palpitations, shortness of breath, vomiting or weakness.   Pertinent negatives for past medical history include no seizures.   Diabetes   Pertinent negatives for hypoglycemia include no confusion, dizziness, headaches, nervousness/anxiousness, pallor, seizures or speech difficulty. Associated symptoms include chest pain. Pertinent negatives for diabetes include no polydipsia, no polyuria and no weakness.     Review of Systems   Constitutional: Negative for activity change, chills, fever and unexpected weight change.   HENT: Negative for congestion, hearing loss, postnasal drip, rhinorrhea, sore throat and trouble swallowing.    Eyes: Negative for photophobia, discharge and visual disturbance.   Respiratory: Negative for chest tightness, shortness of breath and wheezing.    Cardiovascular: Positive for chest pain. Negative for palpitations.   Gastrointestinal: Negative for abdominal distention, abdominal pain, blood in stool, constipation, diarrhea and vomiting.   Endocrine: Negative for polydipsia and polyuria.   Genitourinary: Negative for difficulty urinating, dysuria, flank pain, hematuria and urgency.   Musculoskeletal: Positive for arthralgias. Negative for back pain, joint swelling and neck pain.   Skin: Negative for pallor.   Neurological: Negative for dizziness, seizures, facial asymmetry, speech difficulty, weakness, numbness and headaches.   Hematological: Does not bruise/bleed easily.   Psychiatric/Behavioral: Negative for agitation, confusion, dysphoric mood and suicidal ideas. The  patient is not nervous/anxious.        Objective:      Physical Exam   Constitutional: He is oriented to person, place, and time. He appears well-developed and well-nourished.   HENT:   Head: Normocephalic and atraumatic.   Neck: No JVD present.   Cardiovascular: Normal rate, regular rhythm and normal heart sounds.    Pulmonary/Chest: Effort normal and breath sounds normal.   Abdominal: Soft. Bowel sounds are normal. There is no tenderness.   Musculoskeletal: He exhibits no edema.   Neurological: He is alert and oriented to person, place, and time.   Skin: Skin is warm and dry.   Psychiatric: He has a normal mood and affect. His behavior is normal. Judgment and thought content normal.   Nursing note and vitals reviewed.      Assessment:       1. Controlled type 2 diabetes mellitus without complication, without long-term current use of insulin    2. Hypertriglyceridemia    3. Gastroesophageal reflux disease without esophagitis        Plan:   Wild was seen today for chest pain and diabetes.    Diagnoses and all orders for this visit:    Controlled type 2 diabetes mellitus without complication, without long-term current use of insulin  Patient has controlled Diabetes .  We discussed about diet ;low in calories. Avoid sweats, sodas.  Also increasing activity;walking 2-3 miles a day.   gave patient  instructions about adherence to plan.  Goal of  A1c  less than 7 % stressed.  Also goal of LDL less than 70 highlighted to patient.  Hypertriglyceridemia  -     fenofibrate 160 MG Tab; Take 1 tablet (160 mg total) by mouth once daily.  Limit the cholesterol in your diet to less than 300 mg per day.   Fats should contribute no more than 20 to 35% of your daily calories.   Less than 7 to 10% of your calories should come from saturated fat.   Avoid saturated fat products e.g., Butter, some oils, meat, and poultry fat contain a lot of saturated fat.   Check food labels for fat and cholesterol content. Choose the foods with  "less fat per serving.   Limit the amount of butter and margarine you eat.   Use salad dressings and margarine made with polyunsaturated and monounsaturated fats.   Use egg whites or egg substitutes rather than whole eggs.   Replace whole-milk dairy products with nonfat or low-fat milk, cheese, spreads, and yogurt.   Eat skinless chicken, turkey, fish, and meatless entrees more often than red meat.   Choose lean cuts of meat and trim off all visible fat. Keep portion sizes moderate.   Avoid fatty desserts such as ice cream, cream-filled cakes, and cheesecakes. Choose fresh fruits, nonfat frozen yogurt, Popsicles, etc.   Reduce the amount of fried foods, vending machine food, and fast food you eat.   Eat fruits and vegetables (especially fresh fruits and leafy vegetables), beans, and whole grains daily. The fiber in these foods helps lower cholesterol.   Look for low-fat or nonfat varieties of the foods you like to eat, or look for substitutes.   You may need to exercise 60 minutes a day to prevent weight gain and 90 minutes a day to lose weight.  Gastroesophageal reflux disease without esophagitis  -     pantoprazole (PROTONIX) 40 MG tablet; Take 1 tablet (40 mg total) by mouth once daily.  Tips to Control Acid Reflux  To control acid reflux, youll need to make some basic diet and lifestyle changes. The simple steps outlined below may be all youll need to relieve discomfort.  · Avoid fatty foods and spicy foods.  · Eat fewer acidic foods, such as citrus and tomato-based foods. These can increase symptoms.  · Limit drinking alcohol, caffeine, and fizzy beverages. All increase acid reflux.  · Try limiting chocolate, peppermint, and spearmint. These can worsen acid reflux in some people.  Watch When You Eat  · Avoid lying down for 3 hours after eating.  · Do not snack before going to bed.  Raise Your Head    Raising your head and upper body by 4" to 6" helps limit reflux when youre lying down. Put blocks under the " head of the bed frame to raise it.    Check labs in 3 m

## 2017-08-01 ENCOUNTER — INFUSION (OUTPATIENT)
Dept: INFUSION THERAPY | Facility: HOSPITAL | Age: 52
End: 2017-08-01
Attending: INTERNAL MEDICINE
Payer: COMMERCIAL

## 2017-08-01 VITALS
HEIGHT: 69 IN | DIASTOLIC BLOOD PRESSURE: 73 MMHG | BODY MASS INDEX: 41.11 KG/M2 | WEIGHT: 277.56 LBS | RESPIRATION RATE: 18 BRPM | HEART RATE: 58 BPM | TEMPERATURE: 98 F | SYSTOLIC BLOOD PRESSURE: 147 MMHG

## 2017-08-01 DIAGNOSIS — C83.00 SMALL B-CELL LYMPHOMA, UNSPECIFIED BODY REGION: Primary | ICD-10-CM

## 2017-08-01 PROCEDURE — 63600175 PHARM REV CODE 636 W HCPCS: Performed by: INTERNAL MEDICINE

## 2017-08-01 PROCEDURE — 96375 TX/PRO/DX INJ NEW DRUG ADDON: CPT

## 2017-08-01 PROCEDURE — 96415 CHEMO IV INFUSION ADDL HR: CPT

## 2017-08-01 PROCEDURE — A4216 STERILE WATER/SALINE, 10 ML: HCPCS | Performed by: INTERNAL MEDICINE

## 2017-08-01 PROCEDURE — 96367 TX/PROPH/DG ADDL SEQ IV INF: CPT

## 2017-08-01 PROCEDURE — 96413 CHEMO IV INFUSION 1 HR: CPT

## 2017-08-01 PROCEDURE — S0028 INJECTION, FAMOTIDINE, 20 MG: HCPCS | Performed by: INTERNAL MEDICINE

## 2017-08-01 PROCEDURE — 25000003 PHARM REV CODE 250: Performed by: INTERNAL MEDICINE

## 2017-08-01 RX ORDER — SODIUM CHLORIDE 0.9 % (FLUSH) 0.9 %
10 SYRINGE (ML) INJECTION
Status: DISCONTINUED | OUTPATIENT
Start: 2017-08-01 | End: 2017-08-01 | Stop reason: HOSPADM

## 2017-08-01 RX ORDER — HEPARIN 100 UNIT/ML
500 SYRINGE INTRAVENOUS
Status: DISCONTINUED | OUTPATIENT
Start: 2017-08-01 | End: 2017-08-01 | Stop reason: HOSPADM

## 2017-08-01 RX ORDER — FAMOTIDINE 10 MG/ML
20 INJECTION INTRAVENOUS
Status: COMPLETED | OUTPATIENT
Start: 2017-08-01 | End: 2017-08-01

## 2017-08-01 RX ORDER — ACETAMINOPHEN 325 MG/1
650 TABLET ORAL
Status: COMPLETED | OUTPATIENT
Start: 2017-08-01 | End: 2017-08-01

## 2017-08-01 RX ADMIN — SODIUM CHLORIDE, PRESERVATIVE FREE 10 ML: 5 INJECTION INTRAVENOUS at 01:08

## 2017-08-01 RX ADMIN — ACETAMINOPHEN 650 MG: 325 TABLET ORAL at 09:08

## 2017-08-01 RX ADMIN — FAMOTIDINE 20 MG: 10 INJECTION INTRAVENOUS at 09:08

## 2017-08-01 RX ADMIN — HEPARIN 500 UNITS: 100 SYRINGE at 01:08

## 2017-08-01 RX ADMIN — DIPHENHYDRAMINE HYDROCHLORIDE 50 MG: 50 INJECTION, SOLUTION INTRAMUSCULAR; INTRAVENOUS at 09:08

## 2017-08-01 RX ADMIN — RITUXIMAB 930 MG: 10 INJECTION, SOLUTION INTRAVENOUS at 09:08

## 2017-08-01 RX ADMIN — SODIUM CHLORIDE: 0.9 INJECTION, SOLUTION INTRAVENOUS at 09:08

## 2017-08-01 NOTE — PLAN OF CARE
Problem: Patient Care Overview  Goal: Plan of Care Review  Outcome: Ongoing (interventions implemented as appropriate)  1404 -- Patient tolerated treatment well.  Rituxan started at 100 CC/ HR and increased by rate of 100CC/ HR to a max rate of 400 CC/ HR.  IV had excellent blood return throughout and post treatment. Discharged without S/S of adverse effects.  AVS given. Patient instructed to call provider with any questions or concerns.

## 2017-08-01 NOTE — PLAN OF CARE
Problem: Patient Care Overview  Goal: Individualization & Mutuality  Outcome: Ongoing (interventions implemented as appropriate)  0912 --  Patient's labs, history, allergies, and medication reviewed.  Patient to receive Rituxan.  Discussed plan of care with patient.  Patient in agreement.

## 2017-08-15 ENCOUNTER — OFFICE VISIT (OUTPATIENT)
Dept: PSYCHIATRY | Facility: CLINIC | Age: 52
End: 2017-08-15
Payer: COMMERCIAL

## 2017-08-15 DIAGNOSIS — F43.23 ADJUSTMENT DISORDER WITH MIXED ANXIETY AND DEPRESSED MOOD: Primary | ICD-10-CM

## 2017-08-15 PROCEDURE — 99999 PR PBB SHADOW E&M-EST. PATIENT-LVL II: CPT | Mod: PBBFAC,,, | Performed by: PSYCHOLOGIST

## 2017-08-15 PROCEDURE — 90834 PSYTX W PT 45 MINUTES: CPT | Mod: S$GLB,,, | Performed by: PSYCHOLOGIST

## 2017-08-28 PROBLEM — J01.40 ACUTE NON-RECURRENT PANSINUSITIS: Status: RESOLVED | Noted: 2017-05-25 | Resolved: 2017-08-28

## 2017-08-30 ENCOUNTER — HOSPITAL ENCOUNTER (OUTPATIENT)
Dept: RADIOLOGY | Facility: HOSPITAL | Age: 52
Discharge: HOME | End: 2017-08-30
Attending: INTERNAL MEDICINE
Payer: COMMERCIAL

## 2017-08-30 ENCOUNTER — OFFICE VISIT (OUTPATIENT)
Dept: HEMATOLOGY/ONCOLOGY | Facility: CLINIC | Age: 52
End: 2017-08-30
Payer: COMMERCIAL

## 2017-08-30 ENCOUNTER — TELEPHONE (OUTPATIENT)
Dept: HEMATOLOGY/ONCOLOGY | Facility: CLINIC | Age: 52
End: 2017-08-30

## 2017-08-30 ENCOUNTER — OFFICE VISIT (OUTPATIENT)
Dept: CARDIOLOGY | Facility: CLINIC | Age: 52
End: 2017-08-30
Payer: COMMERCIAL

## 2017-08-30 ENCOUNTER — PATIENT MESSAGE (OUTPATIENT)
Dept: HEMATOLOGY/ONCOLOGY | Facility: CLINIC | Age: 52
End: 2017-08-30

## 2017-08-30 VITALS
TEMPERATURE: 98 F | HEIGHT: 69 IN | DIASTOLIC BLOOD PRESSURE: 82 MMHG | SYSTOLIC BLOOD PRESSURE: 136 MMHG | BODY MASS INDEX: 41.54 KG/M2 | WEIGHT: 280.44 LBS | HEART RATE: 67 BPM

## 2017-08-30 VITALS
WEIGHT: 284.38 LBS | HEIGHT: 69 IN | HEART RATE: 71 BPM | BODY MASS INDEX: 42.12 KG/M2 | SYSTOLIC BLOOD PRESSURE: 129 MMHG | DIASTOLIC BLOOD PRESSURE: 76 MMHG

## 2017-08-30 DIAGNOSIS — I25.10 ASCVD (ARTERIOSCLEROTIC CARDIOVASCULAR DISEASE): Primary | ICD-10-CM

## 2017-08-30 DIAGNOSIS — C83.00 SMALL B-CELL LYMPHOMA, UNSPECIFIED BODY REGION: Primary | ICD-10-CM

## 2017-08-30 DIAGNOSIS — E78.1 HYPERTRIGLYCERIDEMIA: ICD-10-CM

## 2017-08-30 DIAGNOSIS — E08.00 DIABETES MELLITUS DUE TO UNDERLYING CONDITION WITH HYPEROSMOLARITY WITHOUT COMA, WITHOUT LONG-TERM CURRENT USE OF INSULIN: ICD-10-CM

## 2017-08-30 DIAGNOSIS — C82.28 GRADE 3 FOLLICULAR LYMPHOMA OF LYMPH NODES OF MULTIPLE REGIONS: ICD-10-CM

## 2017-08-30 DIAGNOSIS — K21.9 GASTROESOPHAGEAL REFLUX DISEASE WITHOUT ESOPHAGITIS: ICD-10-CM

## 2017-08-30 DIAGNOSIS — F43.23 ADJUSTMENT DISORDER WITH MIXED ANXIETY AND DEPRESSED MOOD: ICD-10-CM

## 2017-08-30 DIAGNOSIS — E11.9 CONTROLLED TYPE 2 DIABETES MELLITUS WITHOUT COMPLICATION, WITHOUT LONG-TERM CURRENT USE OF INSULIN: ICD-10-CM

## 2017-08-30 PROCEDURE — 99999 PR PBB SHADOW E&M-EST. PATIENT-LVL III: CPT | Mod: PBBFAC,,, | Performed by: INTERNAL MEDICINE

## 2017-08-30 PROCEDURE — 99215 OFFICE O/P EST HI 40 MIN: CPT | Mod: S$GLB,,, | Performed by: INTERNAL MEDICINE

## 2017-08-30 PROCEDURE — A9552 F18 FDG: HCPCS

## 2017-08-30 PROCEDURE — 99244 OFF/OP CNSLTJ NEW/EST MOD 40: CPT | Mod: S$GLB,,, | Performed by: INTERNAL MEDICINE

## 2017-08-30 PROCEDURE — 78815 PET IMAGE W/CT SKULL-THIGH: CPT | Mod: 26,PS,, | Performed by: RADIOLOGY

## 2017-08-30 PROCEDURE — 3008F BODY MASS INDEX DOCD: CPT | Mod: S$GLB,,, | Performed by: INTERNAL MEDICINE

## 2017-08-30 RX ORDER — ATORVASTATIN CALCIUM 40 MG/1
40 TABLET, FILM COATED ORAL DAILY
Qty: 90 TABLET | Refills: 3 | Status: SHIPPED | OUTPATIENT
Start: 2017-08-30 | End: 2017-10-18 | Stop reason: SDUPTHER

## 2017-08-30 NOTE — LETTER
August 30, 2017      Joshua Noonan MD  [address] [address]           [facility] - Cardiology  [address]  [place] [address]  Phone: [phone]          Patient: Wild Holliday   MR Number: [id number]   YOB: 1965   Date of Visit: 8/30/2017       Dear Dr. Joshua Noonan:    Thank you for referring Wild Holliday to me for evaluation. Attached you will find relevant portions of my assessment and plan of care.    If you have questions, please do not hesitate to call me. I look forward to following Wild Holliday along with you.    Sincerely,    Gregorio Car MD    Enclosure  CC:  No Recipients    If you would like to receive this communication electronically, please contact [email or web address] or [phone] to request more information on [facility] Link access.    For providers and/or their staff who would like to refer a patient to [facility], please contact us through our one-stop-shop provider referral line, [facility], at [phone].    If you feel you have received this communication in error or would no longer like to receive these types of communications, please e-mail [email or web address]

## 2017-08-30 NOTE — PATIENT INSTRUCTIONS
No change in meds    We will call you about your Rituxin infusion    See me in 3 months with cbc/cmp/ldh

## 2017-08-30 NOTE — PROGRESS NOTES
Subjective:    Patient ID:  Wild Holliday is a 51 y.o. male who presents for evaluation of Chest pain, unspecified      HPI   The patient is a 51 year old male referred by Dr Sampson for evaluation chest pain. He is also under the care of Dr Jara for grade 3 follicular lymphoma. He reported left parasternal pain after eating. An EKG was done which was normal. He was placed on prilosec and has had no recurrence. He has no exertional chest pain or FISHMAN. He is active at work but no exercise. He is a non smoker, His bother  of heart disease at age 52. Prior chest CT reported major vessel atherosclerosis   Lab Results   Component Value Date     2017    K 4.5 2017     2017    CO2 22 (L) 2017    BUN 20 2017    CREATININE 0.9 2017     (H) 2017     (H) 10/19/2016    HGBA1C 6.9 (H) 2017    AST 20 2017    ALT 33 2017    ALBUMIN 4.0 2017    ALBUMIN 4.7 10/19/2016    PROT 7.1 2017    BILITOT 0.5 2017    WBC 6.10 2017    HGB 14.6 2017    HCT 40.9 2017    MCV 83 2017     2017    INR 1.1 11/15/2016    PSA 0.74 2017    TSH 1.777 2017         Lab Results   Component Value Date    CHOL 160 2017    HDL 25 (L) 2017    TRIG 450 (H) 2017       Lab Results   Component Value Date    LDLCALC Invalid, Trig>400.0 2017       Past Medical History:   Diagnosis Date    Arthritis      (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 , Disp: , Rfl:     EFINACONAZOLE (JUBLIA TOP), Apply topically., Disp: , Rfl:     fenofibrate 160 MG Tab, Take 1 tablet (160 mg total) by mouth once daily., Disp: 90 tablet, Rfl: 3     lidocaine-prilocaine (EMLA) cream, Apply topically as needed., Disp: 1 g, Rfl: 3    metformin (GLUCOPHAGE-XR) 500 MG 24 hr tablet, TK 1 T PO BID, Disp: , Rfl: 5    multivitamin (ONE DAILY MULTIVITAMIN) per tablet, Take 1 tablet by mouth once daily., Disp: , Rfl:     pantoprazole (PROTONIX) 40 MG tablet, Take 1 tablet (40 mg total) by mouth once daily. (Patient taking differently: Take 40 mg by mouth every other day. ), Disp: 30 tablet, Rfl: 11    atorvastatin (LIPITOR) 40 MG tablet, Take 1 tablet (40 mg total) by mouth once daily., Disp: 90 tablet, Rfl: 3        Review of Systems   Constitution: Negative for decreased appetite, diaphoresis, fever, weakness, malaise/fatigue, weight gain and weight loss.   HENT: Negative for congestion, ear discharge, ear pain, headaches and nosebleeds.    Eyes: Negative for blurred vision, double vision and visual disturbance.   Cardiovascular: Negative for chest pain, claudication, cyanosis, dyspnea on exertion, irregular heartbeat, leg swelling, near-syncope, orthopnea, palpitations, paroxysmal nocturnal dyspnea and syncope.   Respiratory: Negative for cough, hemoptysis, shortness of breath, sleep disturbances due to breathing, snoring, sputum production and wheezing.    Endocrine: Negative for polydipsia, polyphagia and polyuria.   Hematologic/Lymphatic: Negative for adenopathy and bleeding problem. Does not bruise/bleed easily.   Skin: Negative for color change, nail changes, poor wound healing and rash.   Musculoskeletal: Negative for muscle cramps and muscle weakness.   Gastrointestinal: Negative for abdominal pain, anorexia, change in bowel habit, hematochezia, nausea and vomiting.   Genitourinary: Negative for dysuria, frequency and hematuria.   Neurological: Negative for brief paralysis, difficulty with concentration, excessive daytime sleepiness, dizziness, focal weakness, light-headedness, seizures and vertigo.   Psychiatric/Behavioral: Negative for altered mental  "status and depression.   Allergic/Immunologic: Negative for persistent infections.        Objective:/76 (BP Location: Left arm, Patient Position: Sitting, BP Method: X-Large (Automatic))   Pulse 71   Ht 5' 9" (1.753 m)   Wt 129 kg (284 lb 6.3 oz)   BMI 42.00 kg/m²           Physical Exam   Constitutional: He is oriented to person, place, and time. He appears well-developed and well-nourished.   Morbid obese   HENT:   Head: Normocephalic.   Right Ear: External ear normal.   Left Ear: External ear normal.   Nose: Nose normal.   Inspection of lips, teeth and gums normal   Eyes: EOM are normal. Pupils are equal, round, and reactive to light. No scleral icterus.   Neck: Normal range of motion. Neck supple. No JVD present. No tracheal deviation present. No thyromegaly present.   Cardiovascular: Normal rate, regular rhythm and intact distal pulses.  Exam reveals no gallop and no friction rub.    No murmur heard.  Pulses:       Carotid pulses are 2+ on the right side, and 2+ on the left side.       Dorsalis pedis pulses are 2+ on the right side, and 2+ on the left side.        Posterior tibial pulses are 2+ on the right side, and 2+ on the left side.   Pulmonary/Chest: Effort normal and breath sounds normal.   Abdominal: Bowel sounds are normal. He exhibits no distension. There is no hepatosplenomegaly. There is no tenderness. There is no guarding.   Musculoskeletal: Normal range of motion. He exhibits no edema or tenderness.   Lymphadenopathy:   Palpation of neck and groin lymph nodes normal   Neurological: He is alert and oriented to person, place, and time. No cranial nerve deficit. He exhibits normal muscle tone. Coordination normal.   Skin: Skin is dry.   Palpation of skin normal   Psychiatric: His behavior is normal. Judgment and thought content normal.         Assessment:       1. ASCVD (arteriosclerotic cardiovascular disease)    2. Hypertriglyceridemia    3. Controlled type 2 diabetes mellitus without " complication, without long-term current use of insulin    4. Gastroesophageal reflux disease without esophagitis    5. Grade 3 follicular lymphoma of lymph nodes of multiple regions         Plan:       Wild was seen today for chest pain, unspecified.    Diagnoses and all orders for this visit:    ASCVD (arteriosclerotic cardiovascular disease)  -     atorvastatin (LIPITOR) 40 MG tablet; Take 1 tablet (40 mg total) by mouth once daily.  -     CAR CT Cardiac Scoring; Future; Expected date: 08/30/2017  -     Lipid panel; Future; Expected date: 11/28/2017    Hypertriglyceridemia  -     Lipid panel; Future; Expected date: 11/28/2017    Controlled type 2 diabetes mellitus without complication, without long-term current use of insulin    Gastroesophageal reflux disease without esophagitis    Grade 3 follicular lymphoma of lymph nodes of multiple regions

## 2017-08-30 NOTE — PROGRESS NOTES
"Subjective:    Patient ID:  Wild Holliday is a 51 y.o. male who presents for {Misc; evaluation/follow-up:36552::"follow-up"} of Chest pain, unspecified      HPI  Lab Results   Component Value Date     08/30/2017    K 4.5 08/30/2017     08/30/2017    CO2 22 (L) 08/30/2017    BUN 20 08/30/2017    CREATININE 0.9 08/30/2017     (H) 08/30/2017     (H) 10/19/2016    HGBA1C 6.9 (H) 07/20/2017    AST 20 08/30/2017    ALT 33 08/30/2017    ALBUMIN 4.0 08/30/2017    ALBUMIN 4.7 10/19/2016    PROT 7.1 08/30/2017    BILITOT 0.5 08/30/2017    WBC 6.10 08/30/2017    HGB 14.6 08/30/2017    HCT 40.9 08/30/2017    MCV 83 08/30/2017     08/30/2017    INR 1.1 11/15/2016    PSA 0.74 07/20/2017    TSH 1.777 07/20/2017         Lab Results   Component Value Date    CHOL 160 07/20/2017    HDL 25 (L) 07/20/2017    TRIG 450 (H) 07/20/2017       Lab Results   Component Value Date    LDLCALC Invalid, Trig>400.0 07/20/2017       Past Medical History:   Diagnosis Date    Arthritis      (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 , Disp: , Rfl:     EFINACONAZOLE (JUBLIA TOP), Apply topically., Disp: , Rfl:     fenofibrate 160 MG Tab, Take 1 tablet (160 mg total) by mouth once daily., Disp: 90 tablet, Rfl: 3    lidocaine-prilocaine (EMLA) cream, Apply topically as needed., Disp: 1 g, Rfl: 3    metformin (GLUCOPHAGE-XR) 500 MG 24 hr tablet, TK 1 T PO BID, Disp: , Rfl: 5    multivitamin (ONE DAILY MULTIVITAMIN) per tablet, Take 1 tablet by mouth once daily., Disp: , Rfl:     pantoprazole (PROTONIX) 40 MG tablet, Take 1 tablet (40 mg total) by mouth once daily. (Patient taking differently: Take 40 mg by mouth every other day. ), Disp: 30 tablet, Rfl: " "11        ROS     Objective:/76 (BP Location: Left arm, Patient Position: Sitting, BP Method: X-Large (Automatic))   Pulse 71   Ht 5' 9" (1.753 m)   Wt 129 kg (284 lb 6.3 oz)   BMI 42.00 kg/m²           Physical Exam      Assessment:       1. Hypertriglyceridemia         Plan:       Wild was seen today for chest pain, unspecified.    Diagnoses and all orders for this visit:    Hypertriglyceridemia                "

## 2017-08-30 NOTE — Clinical Note
No change in meds  We will call you about your Rituxin infusion--Ruth, Can we review. It appears he only got 1 out his 4 doses of Rituxin? That is how it is ordered.  See me in 3 months with cbc/cmp/ldh

## 2017-09-04 NOTE — PROGRESS NOTES
PATIENT: Wild Holliday  MRN: [id number]  DATE: 9/4/2017    Subjective:     Oncologic History:    Interval History: Mr. Holliday returns for follow up    Past Medical History:   Diagnosis Date    Arthritis      (central serous retinopathy)     right eye on cornia    Depression     Diabetes mellitus 2015    Farsightedness     wears glasses    Psychiatric problem     Therapy        Past Surgical History:   Procedure Laterality Date    lipoma removal      right rib; below right navel    PORTACATH PLACEMENT      left upper chest    TONSILLECTOMY      tricep tendon repair Right 2014       Family History   Problem Relation Age of Onset    Valvular heart disease Mother     Hypertension Mother     Cancer Father     Diabetes Father     Hypertension Father     Drug abuse Brother     Heart attack Brother     ALS Maternal Aunt     Cancer Maternal Uncle     Cancer Maternal Grandfather     Breast cancer Paternal Grandmother     Diabetes Paternal Grandfather     Cancer Maternal Uncle         Social History:  reports that he has never smoked. He has never used smokeless tobacco. He reports that he drinks alcohol. He reports that he does not use drugs.    Allergies:  Review of patient's allergies indicates:  No Known Allergies    Medications:  Current Outpatient Prescriptions   Medication Sig Dispense Refill    CONTOUR NEXT STRIPS Strp 1 strip by Misc.(Non-Drug; Combo Route) route once daily at 6am.       EFINACONAZOLE (JUBLIA TOP) Apply topically.      fenofibrate 160 MG Tab Take 1 tablet (160 mg total) by mouth once daily. 90 tablet 3    lidocaine-prilocaine (EMLA) cream Apply topically as needed. 1 g 3    metformin (GLUCOPHAGE-XR) 500 MG 24 hr tablet TK 1 T PO BID  5    multivitamin (ONE DAILY MULTIVITAMIN) per tablet Take 1 tablet by mouth once daily.      pantoprazole (PROTONIX) 40 MG tablet Take 1 tablet (40 mg total) by mouth once daily. (Patient taking differently: Take 40 mg by mouth every other  "day. ) 30 tablet 11    atorvastatin (LIPITOR) 40 MG tablet Take 1 tablet (40 mg total) by mouth once daily. 90 tablet 3     No current facility-administered medications for this visit.          Review of Systems   HENT: Negative.    Eyes: Negative.    Respiratory: Negative.    Cardiovascular: Negative.    Gastrointestinal: Negative.    Endocrine: Negative.    Genitourinary: Negative.    Musculoskeletal: Negative.    Skin: Negative.    Neurological: Negative.    Hematological: Negative.    Psychiatric/Behavioral: Negative.        ECOG Performance Status: 0  Objective:      Vitals:   Vitals:    08/30/17 1136   BP: 136/82   BP Location: Left arm   Patient Position: Sitting   BP Method: Large (Automatic)   Pulse: 67   Temp: 98.3 °F (36.8 °C)   Weight: 127.2 kg (280 lb 6.8 oz)   Height: 5' 9" (1.753 m)     BMI: Body mass index is 41.41 kg/m².      Physical Exam   Constitutional: he is oriented to person, place, and time. He appears well-developed and well-nourished.   HENT: NC AT   Head: Normocephalic.   Right Ear: External ear normal.   Left Ear: External ear normal.   Nose: Nose normal.   Mouth/Throat: Oropharynx is clear and moist.   Eyes: Conjunctivae and EOM are normal. Pupils are equal, round, and reactive to light.   Neck: Normal range of motion. Neck supple.   Cardiovascular: Normal rate, regular rhythm, normal heart sounds and intact distal pulses.    Pulmonary/Chest: Effort normal and breath sounds normal.   Abdominal: Soft. Bowel sounds are normal.   Musculoskeletal: Normal range of motion.   Neurological: he is alert and oriented to person, place, and time. He has normal reflexes.   Skin: Skin is warm and dry.   Psychiatric: he has a normal mood and affect. His behavior is normal. Judgment and thought content normal.       Laboratory Data:  Lab Visit on 08/30/2017   Component Date Value Ref Range Status    WBC 08/30/2017 6.10  3.90 - 12.70 K/uL Final    RBC 08/30/2017 4.91  4.60 - 6.20 M/uL Final    " Hemoglobin 08/30/2017 14.6  14.0 - 18.0 g/dL Final    Hematocrit 08/30/2017 40.9  40.0 - 54.0 % Final    MCV 08/30/2017 83  82 - 98 fL Final    MCH 08/30/2017 29.7  27.0 - 31.0 pg Final    MCHC 08/30/2017 35.7  32.0 - 36.0 g/dL Final    RDW 08/30/2017 12.3  11.5 - 14.5 % Final    Platelets 08/30/2017 247  150 - 350 K/uL Final    MPV 08/30/2017 8.9* 9.2 - 12.9 fL Final    Gran # 08/30/2017 4.6  1.8 - 7.7 K/uL Final    Comment: The ANC is based on a white cell differential from an   automated cell counter. It has not been microscopically   reviewed for the presence of abnormal cells. Clinical   correlation is required.      Sodium 08/30/2017 136  136 - 145 mmol/L Final    Potassium 08/30/2017 4.5  3.5 - 5.1 mmol/L Final    Chloride 08/30/2017 105  95 - 110 mmol/L Final    CO2 08/30/2017 22* 23 - 29 mmol/L Final    Glucose 08/30/2017 143* 70 - 110 mg/dL Final    BUN, Bld 08/30/2017 20  6 - 20 mg/dL Final    Creatinine 08/30/2017 0.9  0.5 - 1.4 mg/dL Final    Calcium 08/30/2017 9.8  8.7 - 10.5 mg/dL Final    Total Protein 08/30/2017 7.1  6.0 - 8.4 g/dL Final    Albumin 08/30/2017 4.0  3.5 - 5.2 g/dL Final    Total Bilirubin 08/30/2017 0.5  0.1 - 1.0 mg/dL Final    Comment: For infants and newborns, interpretation of results should be based  on gestational age, weight and in agreement with clinical  observations.  Premature Infant recommended reference ranges:  Up to 24 hours.............<8.0 mg/dL  Up to 48 hours............<12.0 mg/dL  3-5 days..................<15.0 mg/dL  6-29 days.................<15.0 mg/dL      Alkaline Phosphatase 08/30/2017 60  55 - 135 U/L Final    AST 08/30/2017 20  10 - 40 U/L Final    ALT 08/30/2017 33  10 - 44 U/L Final    Anion Gap 08/30/2017 9  8 - 16 mmol/L Final    eGFR if African American 08/30/2017 >60.0  >60 mL/min/1.73 m^2 Final    eGFR if non African American 08/30/2017 >60.0  >60 mL/min/1.73 m^2 Final    Comment: Calculation used to obtain the estimated  glomerular filtration  rate (eGFR) is the CKD-EPI equation. Since race is unknown   in our information system, the eGFR values for   -American and Non--American patients are given   for each creatinine result.      LD 08/30/2017 158  110 - 260 U/L Final       Assessment/Plan:     1. Small B-cell lymphoma, unspecified body region    2. Hypertriglyceridemia    3. Diabetes mellitus due to underlying condition with hyperosmolarity without coma, without long-term current use of insulin    4. Gastroesophageal reflux disease without esophagitis    5. Adjustment disorder with mixed anxiety and depressed mood      No change in meds    We will call you about your Rituxin infusion    See me in 3 months with cbc/cmp/ldh  Med and Order  Orders Placed This Encounter    CBC Oncology    Comprehensive metabolic panel    Lactate dehydrogenase       Follow Up  Return in about 3 months (around 11/30/2017).

## 2017-09-05 ENCOUNTER — TELEPHONE (OUTPATIENT)
Dept: HEMATOLOGY/ONCOLOGY | Facility: CLINIC | Age: 52
End: 2017-09-05

## 2017-09-05 DIAGNOSIS — B35.1 FUNGAL NAIL INFECTION: Primary | ICD-10-CM

## 2017-09-05 NOTE — TELEPHONE ENCOUNTER
Called and spoke with patient and informed him that Dr Jara is no longer with our organization and that he was partnering with the providers in our department to transition the care of his patients. Discussed with him that Dr Jara recommended he be seen by Dr. Cami Bradley and he would be due to see her in November with labs prior  Reviewed upcoming appts for weekly Rituxan , for 4 weeks, starting tomorrow. Patient informed he was to only receive 3 weeks as he received 1 week in august(August 1) . Informed him I would discuss his schedule with Dr Bradley and come see him in infusion tomorrow. Pt agreeable to the above.  Message routed to Dr Bradley.

## 2017-09-06 ENCOUNTER — PATIENT MESSAGE (OUTPATIENT)
Dept: HEMATOLOGY/ONCOLOGY | Facility: CLINIC | Age: 52
End: 2017-09-06

## 2017-09-06 ENCOUNTER — HOSPITAL ENCOUNTER (OUTPATIENT)
Dept: RADIOLOGY | Facility: HOSPITAL | Age: 52
Discharge: HOME | End: 2017-09-06
Attending: INTERNAL MEDICINE
Payer: COMMERCIAL

## 2017-09-06 ENCOUNTER — INFUSION (OUTPATIENT)
Dept: INFUSION THERAPY | Facility: HOSPITAL | Age: 52
End: 2017-09-06
Attending: INTERNAL MEDICINE
Payer: COMMERCIAL

## 2017-09-06 VITALS
BODY MASS INDEX: 41.9 KG/M2 | DIASTOLIC BLOOD PRESSURE: 68 MMHG | HEART RATE: 60 BPM | RESPIRATION RATE: 16 BRPM | TEMPERATURE: 99 F | HEIGHT: 69 IN | WEIGHT: 282.88 LBS | SYSTOLIC BLOOD PRESSURE: 139 MMHG

## 2017-09-06 DIAGNOSIS — C83.00 SMALL B-CELL LYMPHOMA, UNSPECIFIED BODY REGION: Primary | ICD-10-CM

## 2017-09-06 DIAGNOSIS — I25.10 ASCVD (ARTERIOSCLEROTIC CARDIOVASCULAR DISEASE): ICD-10-CM

## 2017-09-06 PROCEDURE — A4216 STERILE WATER/SALINE, 10 ML: HCPCS | Performed by: INTERNAL MEDICINE

## 2017-09-06 PROCEDURE — 75571 CT HRT W/O DYE W/CA TEST: CPT | Mod: TC

## 2017-09-06 PROCEDURE — 25000003 PHARM REV CODE 250: Performed by: INTERNAL MEDICINE

## 2017-09-06 PROCEDURE — 96413 CHEMO IV INFUSION 1 HR: CPT

## 2017-09-06 PROCEDURE — S0028 INJECTION, FAMOTIDINE, 20 MG: HCPCS | Performed by: INTERNAL MEDICINE

## 2017-09-06 PROCEDURE — 96367 TX/PROPH/DG ADDL SEQ IV INF: CPT

## 2017-09-06 PROCEDURE — 63600175 PHARM REV CODE 636 W HCPCS: Performed by: INTERNAL MEDICINE

## 2017-09-06 PROCEDURE — 96415 CHEMO IV INFUSION ADDL HR: CPT

## 2017-09-06 PROCEDURE — 96375 TX/PRO/DX INJ NEW DRUG ADDON: CPT

## 2017-09-06 PROCEDURE — 75571 CT HRT W/O DYE W/CA TEST: CPT | Mod: 26,,, | Performed by: RADIOLOGY

## 2017-09-06 RX ORDER — HEPARIN 100 UNIT/ML
500 SYRINGE INTRAVENOUS
Status: DISCONTINUED | OUTPATIENT
Start: 2017-09-06 | End: 2017-09-06 | Stop reason: HOSPADM

## 2017-09-06 RX ORDER — FAMOTIDINE 10 MG/ML
20 INJECTION INTRAVENOUS
Status: COMPLETED | OUTPATIENT
Start: 2017-09-06 | End: 2017-09-06

## 2017-09-06 RX ORDER — SODIUM CHLORIDE 0.9 % (FLUSH) 0.9 %
10 SYRINGE (ML) INJECTION
Status: DISCONTINUED | OUTPATIENT
Start: 2017-09-06 | End: 2017-09-06 | Stop reason: HOSPADM

## 2017-09-06 RX ORDER — ACETAMINOPHEN 325 MG/1
650 TABLET ORAL
Status: COMPLETED | OUTPATIENT
Start: 2017-09-06 | End: 2017-09-06

## 2017-09-06 RX ADMIN — DIPHENHYDRAMINE HYDROCHLORIDE 50 MG: 50 INJECTION, SOLUTION INTRAMUSCULAR; INTRAVENOUS at 08:09

## 2017-09-06 RX ADMIN — SODIUM CHLORIDE, PRESERVATIVE FREE 10 ML: 5 INJECTION INTRAVENOUS at 12:09

## 2017-09-06 RX ADMIN — FAMOTIDINE 20 MG: 10 INJECTION INTRAVENOUS at 08:09

## 2017-09-06 RX ADMIN — RITUXIMAB 938 MG: 10 INJECTION, SOLUTION INTRAVENOUS at 09:09

## 2017-09-06 RX ADMIN — ACETAMINOPHEN 650 MG: 325 TABLET ORAL at 08:09

## 2017-09-06 RX ADMIN — HEPARIN 500 UNITS: 100 SYRINGE at 12:09

## 2017-09-06 RX ADMIN — SODIUM CHLORIDE: 0.9 INJECTION, SOLUTION INTRAVENOUS at 08:09

## 2017-09-06 NOTE — PLAN OF CARE
Problem: Chemotherapy Effects (Adult)  Goal: Signs and Symptoms of Listed Potential Problems Will be Absent, Minimized or Managed (Chemotherapy Effects)  Signs and symptoms of listed potential problems will be absent, minimized or managed by discharge/transition of care (reference Chemotherapy Effects (Adult) CPG).   Outcome: Ongoing (interventions implemented as appropriate)  Pt arrived via ambulatory with mother at side.  Reviewed labs, hx, medication and allergies, treatment plan discussed.   All questions asked were answered. Will monitor while in unit.

## 2017-09-06 NOTE — PLAN OF CARE
Problem: Patient Care Overview  Goal: Plan of Care Review  Outcome: Ongoing (interventions implemented as appropriate)  Patient received Rituxan without any issues. Notified to call MD with any further concerns . Vss. No apparent distress noted.

## 2017-09-13 ENCOUNTER — INFUSION (OUTPATIENT)
Dept: INFUSION THERAPY | Facility: HOSPITAL | Age: 52
End: 2017-09-13
Attending: INTERNAL MEDICINE
Payer: COMMERCIAL

## 2017-09-13 VITALS
HEIGHT: 69 IN | SYSTOLIC BLOOD PRESSURE: 134 MMHG | HEART RATE: 54 BPM | TEMPERATURE: 98 F | DIASTOLIC BLOOD PRESSURE: 67 MMHG | WEIGHT: 282 LBS | BODY MASS INDEX: 41.77 KG/M2 | RESPIRATION RATE: 17 BRPM

## 2017-09-13 DIAGNOSIS — C83.00 SMALL B-CELL LYMPHOMA, UNSPECIFIED BODY REGION: Primary | ICD-10-CM

## 2017-09-13 PROCEDURE — 63600175 PHARM REV CODE 636 W HCPCS: Performed by: INTERNAL MEDICINE

## 2017-09-13 PROCEDURE — S0028 INJECTION, FAMOTIDINE, 20 MG: HCPCS | Performed by: INTERNAL MEDICINE

## 2017-09-13 PROCEDURE — 96367 TX/PROPH/DG ADDL SEQ IV INF: CPT

## 2017-09-13 PROCEDURE — 25000003 PHARM REV CODE 250: Performed by: INTERNAL MEDICINE

## 2017-09-13 PROCEDURE — A4216 STERILE WATER/SALINE, 10 ML: HCPCS | Performed by: INTERNAL MEDICINE

## 2017-09-13 PROCEDURE — 96413 CHEMO IV INFUSION 1 HR: CPT

## 2017-09-13 PROCEDURE — 96415 CHEMO IV INFUSION ADDL HR: CPT

## 2017-09-13 PROCEDURE — 96375 TX/PRO/DX INJ NEW DRUG ADDON: CPT

## 2017-09-13 RX ORDER — FAMOTIDINE 10 MG/ML
20 INJECTION INTRAVENOUS
Status: COMPLETED | OUTPATIENT
Start: 2017-09-13 | End: 2017-09-13

## 2017-09-13 RX ORDER — SODIUM CHLORIDE 0.9 % (FLUSH) 0.9 %
10 SYRINGE (ML) INJECTION
Status: DISCONTINUED | OUTPATIENT
Start: 2017-09-13 | End: 2017-09-13 | Stop reason: HOSPADM

## 2017-09-13 RX ORDER — HEPARIN 100 UNIT/ML
500 SYRINGE INTRAVENOUS
Status: DISCONTINUED | OUTPATIENT
Start: 2017-09-13 | End: 2017-09-13 | Stop reason: HOSPADM

## 2017-09-13 RX ORDER — MEPERIDINE HYDROCHLORIDE 50 MG/ML
25 INJECTION INTRAMUSCULAR; INTRAVENOUS; SUBCUTANEOUS
Status: DISCONTINUED | OUTPATIENT
Start: 2017-09-13 | End: 2017-09-13 | Stop reason: HOSPADM

## 2017-09-13 RX ORDER — ACETAMINOPHEN 325 MG/1
650 TABLET ORAL
Status: COMPLETED | OUTPATIENT
Start: 2017-09-13 | End: 2017-09-13

## 2017-09-13 RX ADMIN — HEPARIN 500 UNITS: 100 SYRINGE at 12:09

## 2017-09-13 RX ADMIN — DIPHENHYDRAMINE HYDROCHLORIDE 50 MG: 50 INJECTION, SOLUTION INTRAMUSCULAR; INTRAVENOUS at 09:09

## 2017-09-13 RX ADMIN — SODIUM CHLORIDE, PRESERVATIVE FREE 10 ML: 5 INJECTION INTRAVENOUS at 12:09

## 2017-09-13 RX ADMIN — RITUXIMAB 938 MG: 10 INJECTION, SOLUTION INTRAVENOUS at 09:09

## 2017-09-13 RX ADMIN — ACETAMINOPHEN 650 MG: 325 TABLET ORAL at 08:09

## 2017-09-13 RX ADMIN — SODIUM CHLORIDE: 0.9 INJECTION, SOLUTION INTRAVENOUS at 08:09

## 2017-09-13 RX ADMIN — FAMOTIDINE 20 MG: 10 INJECTION INTRAVENOUS at 09:09

## 2017-09-13 NOTE — PLAN OF CARE
Problem: Patient Care Overview  Goal: Plan of Care Review  Outcome: Ongoing (interventions implemented as appropriate)  Pt tolerated rituxan infusion without issue, avs given, rtc 9/20/17, no distress noted upon d/c to home with spouse

## 2017-09-13 NOTE — PLAN OF CARE
Problem: Chemotherapy Effects (Adult)  Goal: Signs and Symptoms of Listed Potential Problems Will be Absent, Minimized or Managed (Chemotherapy Effects)  Signs and symptoms of listed potential problems will be absent, minimized or managed by discharge/transition of care (reference Chemotherapy Effects (Adult) CPG).   Outcome: Ongoing (interventions implemented as appropriate)  Pt here for maintenance rituxan infusion, labs, hx, meds, allergies reviewed, pt with spouse, reclined in chair, continue to monitor

## 2017-09-15 ENCOUNTER — PATIENT MESSAGE (OUTPATIENT)
Dept: HEMATOLOGY/ONCOLOGY | Facility: CLINIC | Age: 52
End: 2017-09-15

## 2017-09-20 ENCOUNTER — INFUSION (OUTPATIENT)
Dept: INFUSION THERAPY | Facility: HOSPITAL | Age: 52
End: 2017-09-20
Attending: INTERNAL MEDICINE
Payer: COMMERCIAL

## 2017-09-20 ENCOUNTER — PATIENT MESSAGE (OUTPATIENT)
Dept: HEMATOLOGY/ONCOLOGY | Facility: CLINIC | Age: 52
End: 2017-09-20

## 2017-09-20 VITALS — HEART RATE: 52 BPM | SYSTOLIC BLOOD PRESSURE: 136 MMHG | DIASTOLIC BLOOD PRESSURE: 64 MMHG | RESPIRATION RATE: 18 BRPM

## 2017-09-20 DIAGNOSIS — C83.00 SMALL B-CELL LYMPHOMA, UNSPECIFIED BODY REGION: Primary | ICD-10-CM

## 2017-09-20 PROCEDURE — 63600175 PHARM REV CODE 636 W HCPCS: Performed by: INTERNAL MEDICINE

## 2017-09-20 PROCEDURE — 96413 CHEMO IV INFUSION 1 HR: CPT

## 2017-09-20 PROCEDURE — 96375 TX/PRO/DX INJ NEW DRUG ADDON: CPT

## 2017-09-20 PROCEDURE — 96415 CHEMO IV INFUSION ADDL HR: CPT

## 2017-09-20 PROCEDURE — S0028 INJECTION, FAMOTIDINE, 20 MG: HCPCS | Performed by: INTERNAL MEDICINE

## 2017-09-20 PROCEDURE — 25000003 PHARM REV CODE 250: Performed by: INTERNAL MEDICINE

## 2017-09-20 PROCEDURE — 96367 TX/PROPH/DG ADDL SEQ IV INF: CPT

## 2017-09-20 RX ORDER — SODIUM CHLORIDE 0.9 % (FLUSH) 0.9 %
10 SYRINGE (ML) INJECTION
Status: CANCELLED | OUTPATIENT
Start: 2017-10-11

## 2017-09-20 RX ORDER — SODIUM CHLORIDE 0.9 % (FLUSH) 0.9 %
10 SYRINGE (ML) INJECTION
Status: DISCONTINUED | OUTPATIENT
Start: 2017-09-20 | End: 2017-09-20 | Stop reason: HOSPADM

## 2017-09-20 RX ORDER — FAMOTIDINE 10 MG/ML
20 INJECTION INTRAVENOUS
Status: CANCELLED | OUTPATIENT
Start: 2017-10-04

## 2017-09-20 RX ORDER — ACETAMINOPHEN 325 MG/1
650 TABLET ORAL
Status: COMPLETED | OUTPATIENT
Start: 2017-09-20 | End: 2017-09-20

## 2017-09-20 RX ORDER — HEPARIN 100 UNIT/ML
500 SYRINGE INTRAVENOUS
Status: CANCELLED | OUTPATIENT
Start: 2017-10-11

## 2017-09-20 RX ORDER — FAMOTIDINE 10 MG/ML
20 INJECTION INTRAVENOUS
Status: CANCELLED | OUTPATIENT
Start: 2017-09-20

## 2017-09-20 RX ORDER — SODIUM CHLORIDE 0.9 % (FLUSH) 0.9 %
10 SYRINGE (ML) INJECTION
Status: CANCELLED | OUTPATIENT
Start: 2017-09-27

## 2017-09-20 RX ORDER — ACETAMINOPHEN 325 MG/1
650 TABLET ORAL
Status: CANCELLED | OUTPATIENT
Start: 2017-10-04

## 2017-09-20 RX ORDER — HEPARIN 100 UNIT/ML
500 SYRINGE INTRAVENOUS
Status: CANCELLED | OUTPATIENT
Start: 2017-09-27

## 2017-09-20 RX ORDER — FAMOTIDINE 10 MG/ML
20 INJECTION INTRAVENOUS
Status: COMPLETED | OUTPATIENT
Start: 2017-09-20 | End: 2017-09-20

## 2017-09-20 RX ORDER — SODIUM CHLORIDE 0.9 % (FLUSH) 0.9 %
10 SYRINGE (ML) INJECTION
Status: CANCELLED | OUTPATIENT
Start: 2017-09-20

## 2017-09-20 RX ORDER — HEPARIN 100 UNIT/ML
500 SYRINGE INTRAVENOUS
Status: DISCONTINUED | OUTPATIENT
Start: 2017-09-20 | End: 2017-09-20 | Stop reason: HOSPADM

## 2017-09-20 RX ORDER — ACETAMINOPHEN 325 MG/1
650 TABLET ORAL
Status: CANCELLED | OUTPATIENT
Start: 2017-09-27

## 2017-09-20 RX ORDER — ACETAMINOPHEN 325 MG/1
650 TABLET ORAL
Status: CANCELLED | OUTPATIENT
Start: 2017-09-20

## 2017-09-20 RX ORDER — FAMOTIDINE 10 MG/ML
20 INJECTION INTRAVENOUS
Status: CANCELLED | OUTPATIENT
Start: 2017-09-27

## 2017-09-20 RX ORDER — ACETAMINOPHEN 325 MG/1
650 TABLET ORAL
Status: CANCELLED | OUTPATIENT
Start: 2017-10-11

## 2017-09-20 RX ORDER — FAMOTIDINE 10 MG/ML
20 INJECTION INTRAVENOUS
Status: CANCELLED | OUTPATIENT
Start: 2017-10-11

## 2017-09-20 RX ORDER — HEPARIN 100 UNIT/ML
500 SYRINGE INTRAVENOUS
Status: CANCELLED | OUTPATIENT
Start: 2017-10-04

## 2017-09-20 RX ORDER — HEPARIN 100 UNIT/ML
500 SYRINGE INTRAVENOUS
Status: CANCELLED | OUTPATIENT
Start: 2017-09-20

## 2017-09-20 RX ORDER — SODIUM CHLORIDE 0.9 % (FLUSH) 0.9 %
10 SYRINGE (ML) INJECTION
Status: CANCELLED | OUTPATIENT
Start: 2017-10-04

## 2017-09-20 RX ADMIN — HEPARIN SODIUM (PORCINE) LOCK FLUSH IV SOLN 100 UNIT/ML 500 UNITS: 100 SOLUTION at 01:09

## 2017-09-20 RX ADMIN — DIPHENHYDRAMINE HYDROCHLORIDE 50 MG: 50 INJECTION, SOLUTION INTRAMUSCULAR; INTRAVENOUS at 09:09

## 2017-09-20 RX ADMIN — ACETAMINOPHEN 650 MG: 325 TABLET ORAL at 09:09

## 2017-09-20 RX ADMIN — RITUXIMAB 938 MG: 10 INJECTION, SOLUTION INTRAVENOUS at 10:09

## 2017-09-20 RX ADMIN — FAMOTIDINE 20 MG: 10 INJECTION INTRAVENOUS at 09:09

## 2017-09-20 RX ADMIN — SODIUM CHLORIDE: 0.9 INJECTION, SOLUTION INTRAVENOUS at 09:09

## 2017-09-20 NOTE — PLAN OF CARE
Problem: Patient Care Overview  Goal: Plan of Care Review  Outcome: Ongoing (interventions implemented as appropriate)  Pt tolerated Rituxan treatment well.  No s/s of reaction. Vitals stable, NAD.

## 2017-09-26 ENCOUNTER — PATIENT MESSAGE (OUTPATIENT)
Dept: HEMATOLOGY/ONCOLOGY | Facility: CLINIC | Age: 52
End: 2017-09-26

## 2017-09-28 ENCOUNTER — PATIENT MESSAGE (OUTPATIENT)
Dept: HEMATOLOGY/ONCOLOGY | Facility: CLINIC | Age: 52
End: 2017-09-28

## 2017-10-05 ENCOUNTER — OFFICE VISIT (OUTPATIENT)
Dept: HEMATOLOGY/ONCOLOGY | Facility: CLINIC | Age: 52
End: 2017-10-05
Payer: COMMERCIAL

## 2017-10-05 ENCOUNTER — LAB VISIT (OUTPATIENT)
Dept: LAB | Facility: HOSPITAL | Age: 52
End: 2017-10-05
Attending: INTERNAL MEDICINE
Payer: COMMERCIAL

## 2017-10-05 ENCOUNTER — PATIENT MESSAGE (OUTPATIENT)
Dept: HEMATOLOGY/ONCOLOGY | Facility: CLINIC | Age: 52
End: 2017-10-05

## 2017-10-05 VITALS
WEIGHT: 280.88 LBS | SYSTOLIC BLOOD PRESSURE: 143 MMHG | BODY MASS INDEX: 41.6 KG/M2 | DIASTOLIC BLOOD PRESSURE: 84 MMHG | HEIGHT: 69 IN | HEART RATE: 66 BPM | TEMPERATURE: 98 F

## 2017-10-05 DIAGNOSIS — C83.00 SMALL B-CELL LYMPHOMA, UNSPECIFIED BODY REGION: Primary | ICD-10-CM

## 2017-10-05 DIAGNOSIS — E08.00 DIABETES MELLITUS DUE TO UNDERLYING CONDITION WITH HYPEROSMOLARITY WITHOUT COMA, WITHOUT LONG-TERM CURRENT USE OF INSULIN: ICD-10-CM

## 2017-10-05 DIAGNOSIS — C83.00 SMALL B-CELL LYMPHOMA, UNSPECIFIED BODY REGION: ICD-10-CM

## 2017-10-05 LAB
ALBUMIN SERPL BCP-MCNC: 4 G/DL
ALP SERPL-CCNC: 65 U/L
ALT SERPL W/O P-5'-P-CCNC: 36 U/L
ANION GAP SERPL CALC-SCNC: 7 MMOL/L
AST SERPL-CCNC: 24 U/L
BILIRUB SERPL-MCNC: 0.6 MG/DL
BUN SERPL-MCNC: 18 MG/DL
CALCIUM SERPL-MCNC: 9.8 MG/DL
CHLORIDE SERPL-SCNC: 107 MMOL/L
CO2 SERPL-SCNC: 25 MMOL/L
CREAT SERPL-MCNC: 0.9 MG/DL
ERYTHROCYTE [DISTWIDTH] IN BLOOD BY AUTOMATED COUNT: 12.5 %
EST. GFR  (AFRICAN AMERICAN): >60 ML/MIN/1.73 M^2
EST. GFR  (NON AFRICAN AMERICAN): >60 ML/MIN/1.73 M^2
GLUCOSE SERPL-MCNC: 156 MG/DL
HCT VFR BLD AUTO: 41.7 %
HGB BLD-MCNC: 14.3 G/DL
LDH SERPL L TO P-CCNC: 183 U/L
MCH RBC QN AUTO: 29.9 PG
MCHC RBC AUTO-ENTMCNC: 34.3 G/DL
MCV RBC AUTO: 87 FL
NEUTROPHILS # BLD AUTO: 4.8 K/UL
PLATELET # BLD AUTO: 226 K/UL
PMV BLD AUTO: 8.7 FL
POTASSIUM SERPL-SCNC: 4.6 MMOL/L
PROT SERPL-MCNC: 7.2 G/DL
RBC # BLD AUTO: 4.78 M/UL
SODIUM SERPL-SCNC: 139 MMOL/L
WBC # BLD AUTO: 6.55 K/UL

## 2017-10-05 PROCEDURE — 83615 LACTATE (LD) (LDH) ENZYME: CPT

## 2017-10-05 PROCEDURE — 99215 OFFICE O/P EST HI 40 MIN: CPT | Mod: S$GLB,,, | Performed by: INTERNAL MEDICINE

## 2017-10-05 PROCEDURE — 85027 COMPLETE CBC AUTOMATED: CPT

## 2017-10-05 PROCEDURE — 99999 PR PBB SHADOW E&M-EST. PATIENT-LVL III: CPT | Mod: PBBFAC,,, | Performed by: INTERNAL MEDICINE

## 2017-10-05 PROCEDURE — 36415 COLL VENOUS BLD VENIPUNCTURE: CPT

## 2017-10-05 PROCEDURE — 80053 COMPREHEN METABOLIC PANEL: CPT
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
## 2017-10-05 NOTE — Clinical Note
Patient needs return visit with general surgery for port removal Return visit to me in 3 months with CBC, CMP, and LDH

## 2017-10-12 ENCOUNTER — PATIENT MESSAGE (OUTPATIENT)
Dept: SURGERY | Facility: CLINIC | Age: 52
End: 2017-10-12

## 2017-10-13 DIAGNOSIS — C83.00 SMALL B-CELL LYMPHOMA, UNSPECIFIED BODY REGION: Primary | ICD-10-CM

## 2017-10-16 ENCOUNTER — PATIENT MESSAGE (OUTPATIENT)
Dept: SURGERY | Facility: HOSPITAL | Age: 52
End: 2017-10-16

## 2017-10-16 ENCOUNTER — LAB VISIT (OUTPATIENT)
Dept: LAB | Facility: HOSPITAL | Age: 52
End: 2017-10-16
Attending: INTERNAL MEDICINE
Payer: COMMERCIAL

## 2017-10-16 ENCOUNTER — PATIENT MESSAGE (OUTPATIENT)
Dept: HEMATOLOGY/ONCOLOGY | Facility: CLINIC | Age: 52
End: 2017-10-16

## 2017-10-16 DIAGNOSIS — E78.1 HYPERTRIGLYCERIDEMIA: ICD-10-CM

## 2017-10-16 LAB
ALBUMIN SERPL BCP-MCNC: 3.9 G/DL
ALP SERPL-CCNC: 57 U/L
ALT SERPL W/O P-5'-P-CCNC: 33 U/L
AST SERPL-CCNC: 19 U/L
BILIRUB DIRECT SERPL-MCNC: 0.3 MG/DL
BILIRUB SERPL-MCNC: 0.5 MG/DL
CHOLEST SERPL-MCNC: 84 MG/DL
CHOLEST/HDLC SERPL: 3 {RATIO}
HDLC SERPL-MCNC: 28 MG/DL
HDLC SERPL: 33.3 %
LDLC SERPL CALC-MCNC: 26.2 MG/DL
NONHDLC SERPL-MCNC: 56 MG/DL
PROT SERPL-MCNC: 6.9 G/DL
TRIGL SERPL-MCNC: 149 MG/DL

## 2017-10-16 PROCEDURE — 36415 COLL VENOUS BLD VENIPUNCTURE: CPT

## 2017-10-16 PROCEDURE — 80076 HEPATIC FUNCTION PANEL: CPT

## 2017-10-16 PROCEDURE — 80061 LIPID PANEL: CPT

## 2017-10-18 ENCOUNTER — PATIENT MESSAGE (OUTPATIENT)
Dept: CARDIOLOGY | Facility: CLINIC | Age: 52
End: 2017-10-18

## 2017-10-18 ENCOUNTER — OFFICE VISIT (OUTPATIENT)
Dept: INTERNAL MEDICINE | Facility: CLINIC | Age: 52
End: 2017-10-18
Payer: COMMERCIAL

## 2017-10-18 VITALS
OXYGEN SATURATION: 98 % | RESPIRATION RATE: 14 BRPM | HEART RATE: 58 BPM | SYSTOLIC BLOOD PRESSURE: 128 MMHG | HEIGHT: 69 IN | DIASTOLIC BLOOD PRESSURE: 76 MMHG | WEIGHT: 284.63 LBS | BODY MASS INDEX: 42.16 KG/M2

## 2017-10-18 DIAGNOSIS — C83.00 SMALL B-CELL LYMPHOMA, UNSPECIFIED BODY REGION: ICD-10-CM

## 2017-10-18 DIAGNOSIS — Z01.818 PREOP GENERAL PHYSICAL EXAM: ICD-10-CM

## 2017-10-18 DIAGNOSIS — E11.9 CONTROLLED TYPE 2 DIABETES MELLITUS WITHOUT COMPLICATION, WITHOUT LONG-TERM CURRENT USE OF INSULIN: Primary | ICD-10-CM

## 2017-10-18 DIAGNOSIS — E78.5 HYPERLIPIDEMIA, UNSPECIFIED HYPERLIPIDEMIA TYPE: ICD-10-CM

## 2017-10-18 DIAGNOSIS — I25.10 ASCVD (ARTERIOSCLEROTIC CARDIOVASCULAR DISEASE): ICD-10-CM

## 2017-10-18 PROCEDURE — 99999 PR PBB SHADOW E&M-EST. PATIENT-LVL III: CPT | Mod: PBBFAC,,, | Performed by: INTERNAL MEDICINE

## 2017-10-18 PROCEDURE — 90688 IIV4 VACCINE SPLT 0.5 ML IM: CPT | Mod: S$GLB,,, | Performed by: INTERNAL MEDICINE

## 2017-10-18 PROCEDURE — 99214 OFFICE O/P EST MOD 30 MIN: CPT | Mod: 25,S$GLB,, | Performed by: INTERNAL MEDICINE

## 2017-10-18 PROCEDURE — 90471 IMMUNIZATION ADMIN: CPT | Mod: S$GLB,,, | Performed by: INTERNAL MEDICINE

## 2017-10-18 RX ORDER — METFORMIN HYDROCHLORIDE 500 MG/1
TABLET, EXTENDED RELEASE ORAL
Qty: 180 TABLET | Refills: 1 | Status: SHIPPED | OUTPATIENT
Start: 2017-10-18 | End: 2018-04-12 | Stop reason: SDUPTHER

## 2017-10-18 RX ORDER — ATORVASTATIN CALCIUM 40 MG/1
20 TABLET, FILM COATED ORAL DAILY
Qty: 90 TABLET | Refills: 3 | Status: SHIPPED | OUTPATIENT
Start: 2017-10-18 | End: 2018-07-25 | Stop reason: CLARIF

## 2017-10-18 RX ORDER — AMOXICILLIN 500 MG
2 CAPSULE ORAL DAILY
Status: ON HOLD | COMMUNITY
End: 2022-11-16 | Stop reason: HOSPADM

## 2017-10-18 NOTE — PROGRESS NOTES
Subjective:       Patient ID: Wild Holliday is a 52 y.o. male.    Chief Complaint: Hyperlipidemia (3 month follow up with lab review) and Pre-op Exam (Right eye cataract surgery on 11/8/2017 by Dr. David Medrano)    Wild Holliday is a 51 y.o. male  Here with follow up .  Labs are reviewed .  a1c 6.9 %.    Lipids are down and lfts   Are going great .      Wild Holliday is a 52 y.o. male here for preoperative clearance for R eye cataract  surgery under MAC anesthesia by Dr. Medrano     .          Hyperlipidemia   This is a chronic problem. The current episode started more than 1 year ago. The problem is controlled. Recent lipid tests were reviewed and are low. Exacerbating diseases include obesity. Pertinent negatives include no shortness of breath. Current antihyperlipidemic treatment includes statins. The current treatment provides significant improvement of lipids.   Diabetes   He has type 2 diabetes mellitus. His disease course has been improving. Pertinent negatives for hypoglycemia include no confusion, dizziness, headaches, nervousness/anxiousness, pallor, seizures or speech difficulty. Pertinent negatives for diabetes include no polydipsia, no polyuria and no weakness. Risk factors for coronary artery disease include sedentary lifestyle and stress. Current diabetic treatment includes oral agent (monotherapy).     Review of Systems   Constitutional: Negative for activity change, chills, fever and unexpected weight change.   HENT: Negative for congestion, hearing loss, postnasal drip, rhinorrhea, sore throat and trouble swallowing.    Eyes: Positive for visual disturbance. Negative for photophobia and discharge.   Respiratory: Negative for chest tightness, shortness of breath and wheezing.    Cardiovascular: Negative for palpitations.   Gastrointestinal: Negative for abdominal distention, abdominal pain, blood in stool, constipation, diarrhea and vomiting.   Endocrine: Negative for polydipsia and polyuria.    Genitourinary: Negative for difficulty urinating, dysuria, flank pain, hematuria and urgency.   Musculoskeletal: Positive for arthralgias. Negative for back pain, joint swelling and neck pain.   Skin: Negative for pallor.   Neurological: Negative for dizziness, seizures, facial asymmetry, speech difficulty, weakness, numbness and headaches.   Hematological: Does not bruise/bleed easily.   Psychiatric/Behavioral: Negative for agitation, confusion, dysphoric mood and suicidal ideas. The patient is not nervous/anxious.        Objective:      Physical Exam   Constitutional: He is oriented to person, place, and time. He appears well-developed and well-nourished.   HENT:   Head: Normocephalic and atraumatic.   Eyes: Right eye exhibits no chemosis. Left eye exhibits no chemosis. Right conjunctiva is not injected. Left conjunctiva is not injected.   R eye cataract changes in lens.     Neck: No JVD present.   Cardiovascular: Normal rate, regular rhythm and normal heart sounds.    Pulses:       Dorsalis pedis pulses are 2+ on the right side, and 2+ on the left side.        Posterior tibial pulses are 2+ on the right side, and 2+ on the left side.   Pulmonary/Chest: Effort normal and breath sounds normal.   Abdominal: Soft. Bowel sounds are normal. There is no tenderness.   Musculoskeletal: He exhibits no edema.   Feet:   Right Foot:   Protective Sensation: 7 sites tested. 7 sites sensed.   Skin Integrity: Negative for ulcer, erythema or dry skin.   Left Foot:   Protective Sensation: 7 sites tested. 7 sites sensed.   Skin Integrity: Negative for ulcer, erythema or dry skin.   Neurological: He is alert and oriented to person, place, and time.   Skin: Skin is warm and dry.   Psychiatric: He has a normal mood and affect. His behavior is normal. Judgment and thought content normal.   Nursing note and vitals reviewed.      Assessment:       1. Controlled type 2 diabetes mellitus without complication, without long-term current use  of insulin    2. ASCVD (arteriosclerotic cardiovascular disease)    3. Small B-cell lymphoma, unspecified body region    4. Hyperlipidemia, unspecified hyperlipidemia type    5. Preop general physical exam        Plan:   Wild was seen today for hyperlipidemia and pre-op exam.    Diagnoses and all orders for this visit:    Controlled type 2 diabetes mellitus without complication, without long-term current use of insulin  -     metformin (GLUCOPHAGE-XR) 500 MG 24 hr tablet; TK 2 T PO  In am  -     Hemoglobin A1c; Future  -     TSH; Future  -     Comprehensive metabolic panel; Future    ASCVD (arteriosclerotic cardiovascular disease)  -     atorvastatin (LIPITOR) 40 MG tablet; Take 0.5 tablets (20 mg total) by mouth once daily.  -     Lipid panel; Future    Small B-cell lymphoma, unspecified body region  -     CBC auto differential; Future    Hyperlipidemia, unspecified hyperlipidemia type  -     Lipid panel; Future  -     CBC auto differential; Future    Preop general physical exam      Dear Dr. Jimenez    I reviewed his history/labs    Please proceed with surgery.    Patient appears in stable Cardiovascular  Status    Patient needs to stop taking aspirin or any NSAIDS 5 days before the surgery.     Other recommendations include:  Telemetry     Patient to hold the oral hypoglycemic dose  in am of surgery      Thank you for allowing me to participate in this patient's care. Please consult me if post-operative medical care assistance is needed.           Other orders  -     Influenza - Quadrivalent

## 2017-10-19 ENCOUNTER — ANESTHESIA EVENT (OUTPATIENT)
Dept: SURGERY | Facility: HOSPITAL | Age: 52
End: 2017-10-19
Payer: COMMERCIAL

## 2017-10-19 ENCOUNTER — TELEPHONE (OUTPATIENT)
Dept: SURGERY | Facility: CLINIC | Age: 52
End: 2017-10-19

## 2017-10-19 DIAGNOSIS — C83.00 SMALL B-CELL LYMPHOMA, UNSPECIFIED BODY REGION: Primary | ICD-10-CM

## 2017-10-19 DIAGNOSIS — C83.00 SMALL CELL B-CELL LYMPHOMA: ICD-10-CM

## 2017-10-19 RX ORDER — SODIUM CHLORIDE 9 MG/ML
INJECTION, SOLUTION INTRAVENOUS CONTINUOUS
Status: CANCELLED | OUTPATIENT
Start: 2017-10-19

## 2017-10-19 NOTE — TELEPHONE ENCOUNTER
Pt returns call. States that Dr Johnson is removing his port tomorrow. Was unaware that this was taking place as message was sent last week for general surgery to set up for port removal. Pt to proceed with port removal tomorrow w/Dr Johnson.

## 2017-10-19 NOTE — TELEPHONE ENCOUNTER
Call to pt to set up a date for port removal in the [facility]. Pt did not answer, message left for pt to call back.

## 2017-10-19 NOTE — PRE-PROCEDURE INSTRUCTIONS
PreOp Instructions given:     - Verbal medication information (what to hold and what to take)   - NPO guidelines   - Arrival place directions given;  - Bathing with antibacterial soap   - Don't wear any jewelry or bring any valuables AM of surgery   - No makeup or moisturizer to face   - No perfume/cologne, powder, lotions or aftershave     Pt. verbalized understanding.    Denies any family history of side effects or issues with anesthesia or sedation.

## 2017-10-19 NOTE — TELEPHONE ENCOUNTER
----- Message from Hawa Guaman RN sent at 10/10/2017  9:20 AM CDT -----  Needs port removal.  Patient has an appt in [place] on 10/16 and 10/18 so those two days he is not available. Also explained to pat that Dr. Pierre broke his wrist.    His hem/onc doc is Dr. Bradley who replaced Lumaloan.    Hawa HARRINGTON

## 2017-10-19 NOTE — ANESTHESIA PREPROCEDURE EVALUATION
Pre-operative evaluation for Procedure(s) (LRB):  REMOVAL-PORT-A-CATH (N/A)    Wild Holliday is a 52 y.o. male PMH DM2, HLD, GERD, Arthritis and lymphoma who underwent chemo via portacath but has now had several clear CT scans and no longer needs chemo. He presents for the above procedure.    LDA:   L chest Port a cath    Prev airway:   Present Prior to Hospital Arrival?: No; Placement Date: 12/01/16; Placement Time: 0837; Inserted by: CRNA; Airway Device: LMA; Mask Ventilation: Not Attempted; Intubated: Postinduction; Airway Device Size: 4.0; Style: Cuffed; Cuff Inflation: Minimal occlusive pressure; Placement Verified By: Capnometry, ETT Condensation; Complicating Factors: None; Intubation Findings: Positive EtCO2, Bilateral breath sounds, Atraumatic/Condition of teeth unchanged; Securment: Lips; Complications: None; Breath Sounds: Equal Bilateral; Insertion Attempts: 1; Removal Date: 12/01/16;  Removal Time: 0945    Drips: none    Patient Active Problem List   Diagnosis    Thoracic spine tumor    Other malignant lymphomas, unspecified site, extranodal and solid organ sites    Diabetes    Lymphoma    Small cell B-cell lymphoma    GERD (gastroesophageal reflux disease)    Insomnia    Adjustment disorder with mixed anxiety and depressed mood    Controlled type 2 diabetes mellitus without complication, without long-term current use of insulin    Hypertriglyceridemia    ASCVD (arteriosclerotic cardiovascular disease)       Review of patient's allergies indicates:  No Known Allergies     No current facility-administered medications on file prior to encounter.      Current Outpatient Prescriptions on File Prior to Encounter   Medication Sig Dispense Refill    CONTOUR NEXT STRIPS Strp 1 strip by Misc.(Non-Drug; Combo Route) route once daily at 6am.       fenofibrate 160 MG Tab Take 1 tablet (160 mg total) by mouth once daily. 90 tablet 3    lidocaine-prilocaine (EMLA) cream Apply topically as needed. 1 g 3     multivitamin (ONE DAILY MULTIVITAMIN) per tablet Take 1 tablet by mouth once daily.      pantoprazole (PROTONIX) 40 MG tablet Take 1 tablet (40 mg total) by mouth once daily. (Patient taking differently: Take 40 mg by mouth every other day. ) 30 tablet 11       Past Surgical History:   Procedure Laterality Date    lipoma removal      right rib; below right navel    PORTACATH PLACEMENT      left upper chest    TONSILLECTOMY      tricep tendon repair Right 2014       Social History     Social History    Marital status:      Spouse name: N/A    Number of children: 3    Years of education: N/A     Occupational History    Not on file.     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    CMP: No results for input(s): NA, K, CL, CO2, BUN, CREATININE, GLU, MG, PHOS, CALCIUM, ALBUMIN, PROT, ALKPHOS, ALT, AST, BILITOT in the last 72 hours.    INR  No results for input(s): INR, PROTIME, APTT in the last 72 hours.    Invalid input(s): PT        Diagnostic Studies: CXR 17  Findings: A left subclavian Port-A-Cath has its tip at the cavoatrial junction.  The heart size is normal.  Lung lungs are moderately low.  There is no consolidation or pleural effusion.  Mild atelectasis is seen at the right lung base.    EK17  Sinus bradycardia  Otherwise normal ECG  No previous ECGs available    2D Echo: none          Anesthesia Evaluation    I have reviewed the Patient Summary Reports.    I have reviewed the Nursing Notes.   I have reviewed the Medications.      Review of Systems  Anesthesia Hx:  No problems with previous Anesthesia  History of prior surgery of interest to airway management or planning: Previous anesthesia: General 12/1/16 Port a cath with general anesthesia.  Procedure performed at an [facility]. Airway issues documented on chart review include laryngeal mask airway used  Denies Family Hx of Anesthesia complications.   Denies Personal Hx of Anesthesia complications.   Social:  Non-Smoker    Hematology/Oncology:  Hematology Normal   Oncology Normal     EENT/Dental:EENT/Dental Normal   Cardiovascular:   hyperlipidemia ECG has been reviewed.    Pulmonary:  Pulmonary Normal    Renal/:  Renal/ Normal     Hepatic/GI:   GERD    Musculoskeletal:   Arthritis     Neurological:  Neurology Normal    Endocrine:   Diabetes, type 2    Psych:   Psychiatric History          Physical Exam  General:  Well nourished    Airway/Jaw/Neck:  Airway Findings: Mouth Opening: Normal Tongue: Normal  General Airway Assessment: Adult, Average  Mallampati: III  Improves to II with phonation.  TM Distance: Normal, at least 6 cm      Dental:  Dental Findings: In tact   Chest/Lungs:  Chest/Lungs Findings: Clear to auscultation, Normal Respiratory Rate     Heart/Vascular:  Heart Findings: Rate: Normal  Rhythm: Regular Rhythm  Vascular Findings:  Existing Vascular Access  Vascular Access: Port-a-Cath     Abdomen:  Abdomen Findings: Normal    Musculoskeletal:  Musculoskeletal Findings: Normal   Skin:  Skin Findings: Normal    Mental Status:  Mental Status Findings:  Cooperative, Alert and Oriented         Anesthesia Plan  Type of Anesthesia, risks & benefits discussed:  Anesthesia Type:  general, MAC  Patient's Preference:   Intra-op Monitoring Plan: standard ASA monitors  Intra-op Monitoring Plan Comments:   Post Op Pain Control Plan: per primary service following discharge from PACU, IV/PO Opioids PRN and multimodal analgesia  Post Op Pain Control Plan Comments:   Induction:    IV  Beta Blocker:  Patient is not currently on a Beta-Blocker (No further documentation required).       Informed Consent: Patient understands risks and agrees with Anesthesia plan.  Questions answered. Anesthesia consent signed with patient.  ASA Score: 2     Day of Surgery Review of History & Physical:  There are no significant changes.          Ready For Surgery From Anesthesia Perspective.

## 2017-10-19 NOTE — TELEPHONE ENCOUNTER
----- Message from Hawa Guaman RN sent at 10/10/2017  9:20 AM CDT -----  Needs port removal.  Patient has an appt in [place] on 10/16 and 10/18 so those two days he is not available. Also explained to pat that Dr. Pierre broke his wrist.    His hem/onc doc is Dr. Bradley who replaced Lumaloan.    Hawa HARRINGTON

## 2017-10-20 ENCOUNTER — SURGERY (OUTPATIENT)
Age: 52
End: 2017-10-20

## 2017-10-20 ENCOUNTER — HOSPITAL ENCOUNTER (OUTPATIENT)
Facility: HOSPITAL | Age: 52
Discharge: HOME | End: 2017-10-20
Attending: SURGERY | Admitting: SURGERY
Payer: COMMERCIAL

## 2017-10-20 ENCOUNTER — ANESTHESIA (OUTPATIENT)
Dept: SURGERY | Facility: HOSPITAL | Age: 52
End: 2017-10-20
Payer: COMMERCIAL

## 2017-10-20 VITALS
HEART RATE: 51 BPM | DIASTOLIC BLOOD PRESSURE: 76 MMHG | BODY MASS INDEX: 41.18 KG/M2 | WEIGHT: 278 LBS | HEIGHT: 69 IN | RESPIRATION RATE: 16 BRPM | OXYGEN SATURATION: 99 % | SYSTOLIC BLOOD PRESSURE: 143 MMHG | TEMPERATURE: 98 F

## 2017-10-20 DIAGNOSIS — C83.00 SMALL B-CELL LYMPHOMA, UNSPECIFIED BODY REGION: ICD-10-CM

## 2017-10-20 DIAGNOSIS — C83.00 SMALL CELL B-CELL LYMPHOMA: ICD-10-CM

## 2017-10-20 LAB
POCT GLUCOSE: 174 MG/DL (ref 70–110)
POCT GLUCOSE: 179 MG/DL (ref 70–110)

## 2017-10-20 PROCEDURE — D9220A PRA ANESTHESIA: Mod: ,,, | Performed by: ANESTHESIOLOGY

## 2017-10-20 PROCEDURE — 36000706: Performed by: SURGERY

## 2017-10-20 PROCEDURE — 36000707: Performed by: SURGERY

## 2017-10-20 PROCEDURE — 25000003 PHARM REV CODE 250: Performed by: SURGERY

## 2017-10-20 PROCEDURE — 82962 GLUCOSE BLOOD TEST: CPT | Performed by: SURGERY

## 2017-10-20 PROCEDURE — 36590 REMOVAL TUNNELED CV CATH: CPT | Mod: ,,, | Performed by: SURGERY

## 2017-10-20 PROCEDURE — 37000009 HC ANESTHESIA EA ADD 15 MINS: Performed by: SURGERY

## 2017-10-20 PROCEDURE — 63600175 PHARM REV CODE 636 W HCPCS: Performed by: STUDENT IN AN ORGANIZED HEALTH CARE EDUCATION/TRAINING PROGRAM

## 2017-10-20 PROCEDURE — 88300 SURGICAL PATH GROSS: CPT | Performed by: PATHOLOGY

## 2017-10-20 PROCEDURE — 37000008 HC ANESTHESIA 1ST 15 MINUTES: Performed by: SURGERY

## 2017-10-20 PROCEDURE — 88300 SURGICAL PATH GROSS: CPT | Mod: 26,,, | Performed by: PATHOLOGY

## 2017-10-20 PROCEDURE — 71000015 HC POSTOP RECOV 1ST HR: Performed by: SURGERY

## 2017-10-20 PROCEDURE — 25000003 PHARM REV CODE 250: Performed by: STUDENT IN AN ORGANIZED HEALTH CARE EDUCATION/TRAINING PROGRAM

## 2017-10-20 RX ORDER — LIDOCAINE HYDROCHLORIDE 10 MG/ML
INJECTION, SOLUTION EPIDURAL; INFILTRATION; INTRACAUDAL; PERINEURAL
Status: DISCONTINUED | OUTPATIENT
Start: 2017-10-20 | End: 2017-10-20 | Stop reason: HOSPADM

## 2017-10-20 RX ORDER — SODIUM CHLORIDE 0.9 % (FLUSH) 0.9 %
3 SYRINGE (ML) INJECTION
Status: DISCONTINUED | OUTPATIENT
Start: 2017-10-20 | End: 2017-10-20 | Stop reason: HOSPADM

## 2017-10-20 RX ORDER — MIDAZOLAM HYDROCHLORIDE 1 MG/ML
INJECTION, SOLUTION INTRAMUSCULAR; INTRAVENOUS
Status: DISCONTINUED | OUTPATIENT
Start: 2017-10-20 | End: 2017-10-20

## 2017-10-20 RX ORDER — PROPOFOL 10 MG/ML
VIAL (ML) INTRAVENOUS
Status: DISCONTINUED | OUTPATIENT
Start: 2017-10-20 | End: 2017-10-20

## 2017-10-20 RX ORDER — ONDANSETRON 2 MG/ML
INJECTION INTRAMUSCULAR; INTRAVENOUS
Status: DISCONTINUED | OUTPATIENT
Start: 2017-10-20 | End: 2017-10-20

## 2017-10-20 RX ORDER — SODIUM CHLORIDE 9 MG/ML
INJECTION, SOLUTION INTRAVENOUS CONTINUOUS
Status: DISCONTINUED | OUTPATIENT
Start: 2017-10-20 | End: 2017-10-20 | Stop reason: HOSPADM

## 2017-10-20 RX ORDER — PROPOFOL 10 MG/ML
VIAL (ML) INTRAVENOUS CONTINUOUS PRN
Status: DISCONTINUED | OUTPATIENT
Start: 2017-10-20 | End: 2017-10-20

## 2017-10-20 RX ORDER — CEFAZOLIN SODIUM 1 G/3ML
INJECTION, POWDER, FOR SOLUTION INTRAMUSCULAR; INTRAVENOUS
Status: DISCONTINUED | OUTPATIENT
Start: 2017-10-20 | End: 2017-10-20

## 2017-10-20 RX ORDER — LIDOCAINE HCL/PF 100 MG/5ML
SYRINGE (ML) INTRAVENOUS
Status: DISCONTINUED | OUTPATIENT
Start: 2017-10-20 | End: 2017-10-20

## 2017-10-20 RX ORDER — FENTANYL CITRATE 50 UG/ML
INJECTION, SOLUTION INTRAMUSCULAR; INTRAVENOUS
Status: DISCONTINUED | OUTPATIENT
Start: 2017-10-20 | End: 2017-10-20

## 2017-10-20 RX ORDER — ONDANSETRON 2 MG/ML
4 INJECTION INTRAMUSCULAR; INTRAVENOUS DAILY PRN
Status: DISCONTINUED | OUTPATIENT
Start: 2017-10-20 | End: 2017-10-20 | Stop reason: HOSPADM

## 2017-10-20 RX ADMIN — LIDOCAINE HYDROCHLORIDE 2 ML: 10 INJECTION, SOLUTION EPIDURAL; INFILTRATION; INTRACAUDAL; PERINEURAL at 07:10

## 2017-10-20 RX ADMIN — PROPOFOL 75 MCG/KG/MIN: 10 INJECTION, EMULSION INTRAVENOUS at 07:10

## 2017-10-20 RX ADMIN — FENTANYL CITRATE 25 MCG: 50 INJECTION, SOLUTION INTRAMUSCULAR; INTRAVENOUS at 07:10

## 2017-10-20 RX ADMIN — LIDOCAINE HYDROCHLORIDE 50 MG: 20 INJECTION, SOLUTION INTRAVENOUS at 07:10

## 2017-10-20 RX ADMIN — PROPOFOL 50 MCG/KG/MIN: 10 INJECTION, EMULSION INTRAVENOUS at 07:10

## 2017-10-20 RX ADMIN — PROPOFOL 10 MG: 10 INJECTION, EMULSION INTRAVENOUS at 07:10

## 2017-10-20 RX ADMIN — ONDANSETRON 4 MG: 2 INJECTION INTRAMUSCULAR; INTRAVENOUS at 07:10

## 2017-10-20 RX ADMIN — PROPOFOL 20 MG: 10 INJECTION, EMULSION INTRAVENOUS at 07:10

## 2017-10-20 RX ADMIN — SODIUM CHLORIDE: 0.9 INJECTION, SOLUTION INTRAVENOUS at 06:10

## 2017-10-20 RX ADMIN — MIDAZOLAM HYDROCHLORIDE 2 MG: 1 INJECTION, SOLUTION INTRAMUSCULAR; INTRAVENOUS at 07:10

## 2017-10-20 RX ADMIN — CEFAZOLIN 2 G: 1 INJECTION, POWDER, FOR SOLUTION INTRAVENOUS at 07:10

## 2017-10-20 RX ADMIN — SODIUM CHLORIDE, SODIUM GLUCONATE, SODIUM ACETATE, POTASSIUM CHLORIDE, MAGNESIUM CHLORIDE, SODIUM PHOSPHATE, DIBASIC, AND POTASSIUM PHOSPHATE: .53; .5; .37; .037; .03; .012; .00082 INJECTION, SOLUTION INTRAVENOUS at 07:10

## 2017-10-20 NOTE — OP NOTE
[facility]  Surgery Department  Operative Note    SUMMARY     Date of Procedure: 10/20/2017     Procedure: Procedure(s) (LRB):  REMOVAL-PORT-A-CATH (Left)     Surgeon(s) and Role:     * Trey Johnson MD - Primary     * Guille Saeed MD - Resident - Assisting    Pre-Operative Diagnosis: Small B-cell lymphoma, unspecified body region [C83.00]    Post-Operative Diagnosis: Post-Op Diagnosis Codes:     * Small B-cell lymphoma, unspecified body region [C83.00]    Anesthesia: General/MAC    Technical Procedures Used: Removal of left subclavian port - a - cath    Description of the Findings of the Procedure:   The patient was placed in the supine position on the   operating table.  The left anterior chest was prepped and draped in sterile fashion, 1% Xylocaine plain was used as local anesthesia supplemented with intravenous sedation under the direction of the anesthesiologist. The old port   pocket incision was reopened.  The port was delivered and released from the pocket.  The port and catheter are removed intact.  The catheter tract was closed with 3-0 Vicryl.  Subcutaneous tissue and skin are closed in the usual fashion and a sterile dressing was applied.The patient tolerates the procedure well and was brought to the Recovery Room in stable condition.    Complications: No    Estimated Blood Loss (EBL):  <5cc    Implants: * No implants in log *    Specimens:   Specimen (12h ago through future)    Start     Ordered    10/20/17 8546  Specimen to Pathology - Surgery  Once     Comments:  Specimen to Pathology: 1)  Port a cath - GROSS , no preservatives , to refrigerator      10/20/17 4123                  Condition: Good    Disposition: PACU - hemodynamically stable.     Followup: prn    Attestation: I was present and scrubbed for the entire procedure.

## 2017-10-20 NOTE — H&P
[facility]  Surgical Oncology  History & Physical    Patient Name: Wild Holilday  MRN: [id number]  Admission Date: 10/20/2017  Attending Physician: Trey Johnson MD   Primary Care Provider: WALTER Price MD    Patient information was obtained from patient and past medical records.     Subjective:     Chief Complaint/Reason for Admission: port removal    History of Present Illness:  Patient is a 52 y.o. male with a history of chronic lymphocytic lymphoma who is s/p chemotherapy. He presents today for L subclavian port-a-cath removal. He denies any interval changes in his history since he was last seen in clinic by his oncologist.    No current facility-administered medications on file prior to encounter.      Current Outpatient Prescriptions on File Prior to Encounter   Medication Sig    fenofibrate 160 MG Tab Take 1 tablet (160 mg total) by mouth once daily.    multivitamin (ONE DAILY MULTIVITAMIN) per tablet Take 1 tablet by mouth once daily.    pantoprazole (PROTONIX) 40 MG tablet Take 1 tablet (40 mg total) by mouth once daily. (Patient taking differently: Take 40 mg by mouth every other day. )    CONTOUR NEXT STRIPS Strp 1 strip by Misc.(Non-Drug; Combo Route) route once daily at 6am.     lidocaine-prilocaine (EMLA) cream Apply topically as needed.       Review of patient's allergies indicates:  No Known Allergies    Past Medical History:   Diagnosis Date    Arthritis      (central serous retinopathy)     right eye on cornia    Depression     Diabetes mellitus 2015    Farsightedness     wears glasses    Psychiatric problem     Therapy      Past Surgical History:   Procedure Laterality Date    lipoma removal      right rib; below right navel    PORTACATH PLACEMENT      left upper chest    TONSILLECTOMY      tricep tendon repair Right 2014     Family History     Problem Relation (Age of Onset)    ALS Maternal Aunt    Breast cancer Paternal Grandmother    Cancer Father,  Maternal Uncle, Maternal Grandfather, Maternal Uncle    Diabetes Father, Paternal Grandfather    Drug abuse Brother    Heart attack Brother    Hypertension Mother, Father    Valvular heart disease Mother        Social History Main Topics    Smoking status: Never Smoker    Smokeless tobacco: Never Used    Alcohol use Yes      Comment: socially    Drug use: No    Sexual activity: Yes     Review of Systems   Constitutional: Negative for activity change, appetite change, fever and unexpected weight change.   HENT: Negative for congestion and dental problem.    Eyes: Negative for discharge and itching.   Respiratory: Negative.    Cardiovascular: Negative.    Gastrointestinal: Negative.    Genitourinary: Negative.    Musculoskeletal: Negative.    Neurological: Negative.    Hematological: Negative.  Negative for adenopathy. Does not bruise/bleed easily.     Objective:     Vital Signs (Most Recent):  Temp: 98.1 °F (36.7 °C) (10/20/17 0552)  Pulse: (!) 59 (10/20/17 0552)  Resp: 18 (10/20/17 0552)  BP: (!) 145/83 (10/20/17 0552)  SpO2: 97 % (10/20/17 0552) Vital Signs (24h Range):  Temp:  [98.1 °F (36.7 °C)] 98.1 °F (36.7 °C)  Pulse:  [59] 59  Resp:  [18] 18  SpO2:  [97 %] 97 %  BP: (145)/(83) 145/83     Weight: 126.1 kg (278 lb)  Body mass index is 41.05 kg/m².    Physical Exam   Constitutional: He is oriented to person, place, and time. He appears well-developed and well-nourished. No distress.   HENT:   Head: Normocephalic and atraumatic.   Eyes: EOM are normal.   Neck: Normal range of motion. Neck supple.   Cardiovascular: Normal rate and regular rhythm.    Pulmonary/Chest: Effort normal and breath sounds normal. No respiratory distress. He has no wheezes.       Abdominal: Soft. Bowel sounds are normal. He exhibits no distension. There is no tenderness.   Musculoskeletal: Normal range of motion.   Neurological: He is alert and oriented to person, place, and time.   Skin: Skin is warm and dry.   Psychiatric: He has a  normal mood and affect. His behavior is normal.       Significant Labs:  None    Significant Diagnostics:  Reviewed    Assessment/Plan:     Active Diagnoses:    Diagnosis Date Noted POA    Small cell B-cell lymphoma [C83.00] 12/12/2016 Yes      Problems Resolved During this Admission:    Diagnosis Date Noted Date Resolved POA     VTE Risk Mitigation         Ordered     Medium Risk of VTE  Once      10/20/17 0540     Place sequential compression device  Until discontinued      10/20/17 0540        - To OR for port placement  - Consent obtained this morning  - All risks and benefits have been reviewed.    Tigist Petersen MD  General Surgery  [facility]

## 2017-10-20 NOTE — TRANSFER OF CARE
"Anesthesia Transfer of Care Note    Patient: Wild Holliday    Procedure(s) Performed: Procedure(s) (LRB):  REMOVAL-PORT-A-CATH (Left)    Patient location: [facility]    Anesthesia Type: MAC and general    Transport from OR: Transported from OR on 6-10 L/min O2 by face mask with adequate spontaneous ventilation    Post pain: adequate analgesia    Post assessment: no apparent anesthetic complications    Post vital signs: stable    Level of consciousness: awake and alert    Nausea/Vomiting: no nausea/vomiting    Complications: none    Transfer of care protocol was followed      Last vitals:   Visit Vitals  BP (!) 145/83 (BP Location: Left arm, Patient Position: Lying)   Pulse (!) 59   Temp 36.7 °C (98.1 °F) (Temporal)   Resp 18   Ht 5' 9" (1.753 m)   Wt 126.1 kg (278 lb)   SpO2 97%   BMI 41.05 kg/m²     "

## 2017-10-20 NOTE — PLAN OF CARE
Discharge instructions given to patient and spouse. Educated pt on diagnosis, medications and when to follow up within designated time frame. Pt verbalized understanding. Pt denies pain and nausea at this time.  Pt tolerated PO fluids.

## 2017-10-21 NOTE — ANESTHESIA POSTPROCEDURE EVALUATION
"Anesthesia Post Evaluation    Patient: Wild Holliday    Procedure(s) Performed: Procedure(s) (LRB):  REMOVAL-PORT-A-CATH (Left)    Final Anesthesia Type: general  Patient location during evaluation: PACU  Patient participation: Yes- Able to Participate  Level of consciousness: awake and alert  Post-procedure vital signs: reviewed and stable  Pain management: adequate  Airway patency: patent  PONV status at discharge: No PONV  Anesthetic complications: no      Cardiovascular status: blood pressure returned to baseline and hemodynamically stable  Respiratory status: unassisted, spontaneous ventilation and room air  Hydration status: euvolemic  Follow-up not needed.        Visit Vitals  BP (!) 143/76 (BP Location: Right arm)   Pulse (!) 51   Temp 36.7 °C (98.1 °F) (Temporal)   Resp 16   Ht 5' 9" (1.753 m)   Wt 126.1 kg (278 lb)   SpO2 99%   BMI 41.05 kg/m²       Pain/Roger Score: Pain Assessment Performed: Yes (10/20/2017  8:50 AM)  Presence of Pain: denies (10/20/2017  8:50 AM)  Pain Rating Post Med Admin: 0 (10/20/2017  8:50 AM)  Roger Score: 9 (10/20/2017  8:10 AM)      "

## 2017-11-14 ENCOUNTER — PATIENT MESSAGE (OUTPATIENT)
Dept: HEMATOLOGY/ONCOLOGY | Facility: CLINIC | Age: 52
End: 2017-11-14

## 2017-11-30 ENCOUNTER — LAB VISIT (OUTPATIENT)
Dept: LAB | Facility: HOSPITAL | Age: 52
End: 2017-11-30
Attending: INTERNAL MEDICINE
Payer: COMMERCIAL

## 2017-11-30 ENCOUNTER — OFFICE VISIT (OUTPATIENT)
Dept: HEMATOLOGY/ONCOLOGY | Facility: CLINIC | Age: 52
End: 2017-11-30
Payer: COMMERCIAL

## 2017-11-30 VITALS
WEIGHT: 282.63 LBS | RESPIRATION RATE: 18 BRPM | HEART RATE: 60 BPM | TEMPERATURE: 99 F | HEIGHT: 69 IN | DIASTOLIC BLOOD PRESSURE: 88 MMHG | SYSTOLIC BLOOD PRESSURE: 169 MMHG | BODY MASS INDEX: 41.86 KG/M2 | OXYGEN SATURATION: 96 %

## 2017-11-30 DIAGNOSIS — C83.00 SMALL B-CELL LYMPHOMA, UNSPECIFIED BODY REGION: ICD-10-CM

## 2017-11-30 DIAGNOSIS — C83.00 SMALL B-CELL LYMPHOMA, UNSPECIFIED BODY REGION: Primary | ICD-10-CM

## 2017-11-30 DIAGNOSIS — E08.00 DIABETES MELLITUS DUE TO UNDERLYING CONDITION WITH HYPEROSMOLARITY WITHOUT COMA, WITHOUT LONG-TERM CURRENT USE OF INSULIN: ICD-10-CM

## 2017-11-30 LAB
ALBUMIN SERPL BCP-MCNC: 3.9 G/DL
ALP SERPL-CCNC: 66 U/L
ALT SERPL W/O P-5'-P-CCNC: 33 U/L
ANION GAP SERPL CALC-SCNC: 7 MMOL/L
AST SERPL-CCNC: 22 U/L
BASOPHILS # BLD AUTO: 0.03 K/UL
BASOPHILS NFR BLD: 0.5 %
BILIRUB SERPL-MCNC: 0.3 MG/DL
BUN SERPL-MCNC: 20 MG/DL
CALCIUM SERPL-MCNC: 9.7 MG/DL
CHLORIDE SERPL-SCNC: 105 MMOL/L
CO2 SERPL-SCNC: 26 MMOL/L
CREAT SERPL-MCNC: 1 MG/DL
DIFFERENTIAL METHOD: ABNORMAL
EOSINOPHIL # BLD AUTO: 0.2 K/UL
EOSINOPHIL NFR BLD: 2.9 %
ERYTHROCYTE [DISTWIDTH] IN BLOOD BY AUTOMATED COUNT: 12.3 %
EST. GFR  (AFRICAN AMERICAN): >60 ML/MIN/1.73 M^2
EST. GFR  (NON AFRICAN AMERICAN): >60 ML/MIN/1.73 M^2
GLUCOSE SERPL-MCNC: 172 MG/DL
HCT VFR BLD AUTO: 40.2 %
HGB BLD-MCNC: 13.8 G/DL
IMM GRANULOCYTES # BLD AUTO: 0.03 K/UL
IMM GRANULOCYTES NFR BLD AUTO: 0.5 %
LDH SERPL L TO P-CCNC: 156 U/L
LYMPHOCYTES # BLD AUTO: 0.7 K/UL
LYMPHOCYTES NFR BLD: 11 %
MCH RBC QN AUTO: 29.4 PG
MCHC RBC AUTO-ENTMCNC: 34.3 G/DL
MCV RBC AUTO: 86 FL
MONOCYTES # BLD AUTO: 0.7 K/UL
MONOCYTES NFR BLD: 10.3 %
NEUTROPHILS # BLD AUTO: 4.7 K/UL
NEUTROPHILS NFR BLD: 74.8 %
NRBC BLD-RTO: 0 /100 WBC
PLATELET # BLD AUTO: 260 K/UL
PMV BLD AUTO: 9.1 FL
POTASSIUM SERPL-SCNC: 4.9 MMOL/L
PROT SERPL-MCNC: 7.1 G/DL
RBC # BLD AUTO: 4.7 M/UL
SODIUM SERPL-SCNC: 138 MMOL/L
WBC # BLD AUTO: 6.3 K/UL

## 2017-11-30 PROCEDURE — 85025 COMPLETE CBC W/AUTO DIFF WBC: CPT

## 2017-11-30 PROCEDURE — 99999 PR PBB SHADOW E&M-EST. PATIENT-LVL III: CPT | Mod: PBBFAC,,, | Performed by: INTERNAL MEDICINE

## 2017-11-30 PROCEDURE — 36415 COLL VENOUS BLD VENIPUNCTURE: CPT

## 2017-11-30 PROCEDURE — 99215 OFFICE O/P EST HI 40 MIN: CPT | Mod: S$GLB,,, | Performed by: INTERNAL MEDICINE

## 2017-11-30 PROCEDURE — 83615 LACTATE (LD) (LDH) ENZYME: CPT

## 2017-11-30 PROCEDURE — 80053 COMPREHEN METABOLIC PANEL: CPT

## 2017-11-30 RX ORDER — NEPAFENAC 3 MG/ML
SUSPENSION/ DROPS OPHTHALMIC
COMMUNITY
Start: 2017-10-18 | End: 2017-11-30

## 2017-11-30 RX ORDER — DUREZOL 0.5 MG/ML
EMULSION OPHTHALMIC
COMMUNITY
Start: 2017-10-18 | End: 2017-11-30

## 2017-11-30 RX ORDER — BESIFLOXACIN 6 MG/ML
SUSPENSION OPHTHALMIC
COMMUNITY
Start: 2017-10-18 | End: 2017-11-30
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
## 2017-12-21 ENCOUNTER — OFFICE VISIT (OUTPATIENT)
Dept: ORTHOPEDICS | Facility: CLINIC | Age: 52
End: 2017-12-21
Payer: COMMERCIAL

## 2017-12-21 ENCOUNTER — HOSPITAL ENCOUNTER (OUTPATIENT)
Dept: RADIOLOGY | Facility: HOSPITAL | Age: 52
Discharge: HOME | End: 2017-12-21
Attending: PHYSICIAN ASSISTANT
Payer: COMMERCIAL

## 2017-12-21 VITALS — BODY MASS INDEX: 42.31 KG/M2 | HEIGHT: 69 IN | WEIGHT: 285.63 LBS

## 2017-12-21 DIAGNOSIS — M25.562 PAIN IN BOTH KNEES, UNSPECIFIED CHRONICITY: Primary | ICD-10-CM

## 2017-12-21 DIAGNOSIS — M25.562 PAIN IN BOTH KNEES, UNSPECIFIED CHRONICITY: ICD-10-CM

## 2017-12-21 DIAGNOSIS — R22.32 MASS OF FINGER OF LEFT HAND: ICD-10-CM

## 2017-12-21 DIAGNOSIS — M17.0 PRIMARY OSTEOARTHRITIS OF BOTH KNEES: Primary | ICD-10-CM

## 2017-12-21 DIAGNOSIS — M25.561 PAIN IN BOTH KNEES, UNSPECIFIED CHRONICITY: ICD-10-CM

## 2017-12-21 DIAGNOSIS — M25.561 PAIN IN BOTH KNEES, UNSPECIFIED CHRONICITY: Primary | ICD-10-CM

## 2017-12-21 PROCEDURE — 73562 X-RAY EXAM OF KNEE 3: CPT | Mod: 26,50,, | Performed by: RADIOLOGY

## 2017-12-21 PROCEDURE — 99203 OFFICE O/P NEW LOW 30 MIN: CPT | Mod: S$GLB,,, | Performed by: PHYSICIAN ASSISTANT

## 2017-12-21 PROCEDURE — 99999 PR PBB SHADOW E&M-EST. PATIENT-LVL III: CPT | Mod: PBBFAC,,, | Performed by: PHYSICIAN ASSISTANT

## 2017-12-21 PROCEDURE — 73562 X-RAY EXAM OF KNEE 3: CPT | Mod: TC,50

## 2017-12-21 RX ORDER — HYALURONATE SODIUM 20 MG/2 ML
40 SYRINGE (ML) INTRAARTICULAR WEEKLY
Status: COMPLETED | OUTPATIENT
Start: 2017-12-21 | End: 2018-01-11

## 2017-12-21 RX ORDER — PANTOPRAZOLE SODIUM 40 MG/1
TABLET, DELAYED RELEASE ORAL
COMMUNITY
Start: 2017-12-02 | End: 2018-01-18

## 2017-12-21 NOTE — PROGRESS NOTES
SUBJECTIVE:     Chief Complaint & History of Present Illness:  Wild Holliday is a New patient 52 y.o. male who is seen here today with a complaint of    Chief Complaint   Patient presents with    Left Hand - Pain    Left Knee - Pain    Right Knee - Pain    .  Since here for evaluation of a mass on the dorsal aspect of his left hand and bilateral knee pain for several years he has a long history of a arthritic problems in both knees and has been treated in the past with the NSAIDs physical therapy activity modification and cortisone injections.  All these treatments and we discontinued secondary to his cancer treatments which she has now completed and been cleared to resume active treatment for his knee problems.  The mass on his hand appeared several months ago after grabbing a falling piece of machinery on it is fluctuant in nature, increases with increased activity and goes down with rest it is mildly painful  On a scale of 1-10, with 10 being worst pain imaginable, he rates this pain as 4 on good days and 7 on bad days.  he describes the pain as tender and sore.    Review of patient's allergies indicates:  No Known Allergies      Current Outpatient Prescriptions   Medication Sig Dispense Refill    atorvastatin (LIPITOR) 40 MG tablet Take 0.5 tablets (20 mg total) by mouth once daily. 90 tablet 3    fenofibrate 160 MG Tab Take 1 tablet (160 mg total) by mouth once daily. 90 tablet 3    fish oil-omega-3 fatty acids 300-1,000 mg capsule Take 2 g by mouth once daily.      metformin (GLUCOPHAGE-XR) 500 MG 24 hr tablet TK 2 T PO  In am 180 tablet 1    multivitamin (ONE DAILY MULTIVITAMIN) per tablet Take 1 tablet by mouth once daily.      pantoprazole (PROTONIX) 40 MG tablet        Current Facility-Administered Medications   Medication Dose Route Frequency Provider Last Rate Last Dose    EUFLEXXA 10 mg/mL(mw 2.4 -3.6 million) injection Syrg 40 mg  40 mg Intra-articular Weekly Usama Snowden PA-C      "      Past Medical History:   Diagnosis Date    Arthritis      (central serous retinopathy)     right eye on cornia    Depression     Diabetes mellitus 2015    Farsightedness     wears glasses    Psychiatric problem     Therapy        Past Surgical History:   Procedure Laterality Date    lipoma removal      right rib; below right navel    PORTACATH PLACEMENT      left upper chest    TONSILLECTOMY      tricep tendon repair Right 2014       Vital Signs (Most Recent)  There were no vitals filed for this visit.        Review of Systems:  ROS:  Constitutional: no fever or chills  Eyes: no visual changes  ENT: no nasal congestion or sore throat  Respiratory: no cough or shortness of breath  Cardiovascular: no chest pain or palpitations, ASCVD  Gastrointestinal: no nausea or vomiting, tolerating diet  Genitourinary: no hematuria or dysuria  Integument/Breast: no rash or pruritis  Hematologic/Lymphatic: no easy bruising or lymphadenopathy  Musculoskeletal: no arthralgias or myalgias, As of history of thoracic spinal tumor  Neurological: no seizures or tremors  Behavioral/Psych: no auditory or visual hallucinations, Adjustment disorder with mixed anxiety and depressive mood  Endocrine: no heat or cold intolerance, Mckinley for diabetes type 2                OBJECTIVE:     PHYSICAL EXAM:  Height: 5' 9" (175.3 cm) Weight: 129.5 kg (285 lb 9.7 oz), General Appearance: Well nourished, well developed, in no acute distress.  Neurological: Mood & affect are normal.  left  Knee Exam:  Knee Range of Motion:0-100 degrees flexion   Effusion: Mild  Condition of skin:intact  Location of tenderness:Medial joint line   Strength:limited by pain and 5 of 5  Stability:  Lachman: stable, LCL: stable, MCL: stable, PCL: stable and posteromedial (dial): stable  Varus /Valgus stress:   Mild varus  Geoff:   negative/negative    right  Knee Exam:  Knee Range of Motion:0-120 degrees flexion   Effusion:none  Condition of " skin:intact  Location of tenderness:Medial joint line   Strength:limited by pain and 5 of 5  Stability:  Lachman: stable, LCL: stable, MCL: stable, PCL: stable and posteromedial (dial): stable  Varus /Valgus stress:   Mild varus  Geoff:   negative/negative      Hip Examination:  normal    left hand   History of injury: direct trauma  Pain: Description: mild  Night pain: no  Rest pain: no  Quality: Tender  Location: dorsal hand  Exacerbating factors: gripping, lifting and repetitive activity  Condition of skin:intact  Hand Exam: soft tissue tenderness and swelling at the Extensor tendon of the fourth digit on the dorsal surface, sensation normal, negative Phalen, negative Tinel and negative Cozen  ROM:fingers flex to palm and thumb flexes to palm    Wrist Exam: palmar flexion 90/90, dorsiflexion 90/90, pronation 90/90, supination 90/90, flexion contracture 0/0, extension contracture 0/0, radial deviation 25/25, ulnar deviation 25/25      RADIOGRAPHS:  X-rays the knees taken today films reviewed by demonstrate moderate arthritic changes about both knees with him as complete loss of medial joint space bilaterally left more so than right early sclerotic changes noted no marked osteophytic spurring fracture dislocation or other bony abnormalities    ASSESSMENT/PLAN:     Plan: We discussed with the patient at length all the different treatment options available for  the knee including anti-inflammatories, acetaminophen, rest, ice, knee strengthening exercise, occasional cortisone injections for temporary relief, Viscosupplimentation injections, arthroscopic debridement osteotomy, and finally knee arthroplasty.   With regards to knees we'll scheduled to begin bilateral Euflexxa injections  With regards to the hand MRI of the left hand follow-up after MRI to discuss treatment options

## 2017-12-28 ENCOUNTER — HOSPITAL ENCOUNTER (OUTPATIENT)
Dept: RADIOLOGY | Facility: HOSPITAL | Age: 52
Discharge: HOME | End: 2017-12-28
Attending: PHYSICIAN ASSISTANT
Payer: COMMERCIAL

## 2017-12-28 ENCOUNTER — OFFICE VISIT (OUTPATIENT)
Dept: ORTHOPEDICS | Facility: CLINIC | Age: 52
End: 2017-12-28
Payer: COMMERCIAL

## 2017-12-28 VITALS
BODY MASS INDEX: 42.29 KG/M2 | HEIGHT: 69 IN | SYSTOLIC BLOOD PRESSURE: 120 MMHG | DIASTOLIC BLOOD PRESSURE: 80 MMHG | HEART RATE: 76 BPM | WEIGHT: 285.5 LBS

## 2017-12-28 DIAGNOSIS — M17.0 PRIMARY OSTEOARTHRITIS OF BOTH KNEES: Primary | ICD-10-CM

## 2017-12-28 DIAGNOSIS — R22.32 MASS OF FINGER OF LEFT HAND: ICD-10-CM

## 2017-12-28 PROCEDURE — 25500020 PHARM REV CODE 255: Performed by: PHYSICIAN ASSISTANT

## 2017-12-28 PROCEDURE — 99499 UNLISTED E&M SERVICE: CPT | Mod: S$GLB,,, | Performed by: PHYSICIAN ASSISTANT

## 2017-12-28 PROCEDURE — A9585 GADOBUTROL INJECTION: HCPCS | Performed by: PHYSICIAN ASSISTANT

## 2017-12-28 PROCEDURE — 73220 MRI UPPR EXTREMITY W/O&W/DYE: CPT | Mod: TC,LT

## 2017-12-28 PROCEDURE — 99999 PR PBB SHADOW E&M-EST. PATIENT-LVL III: CPT | Mod: PBBFAC,,, | Performed by: PHYSICIAN ASSISTANT

## 2017-12-28 PROCEDURE — 73220 MRI UPPR EXTREMITY W/O&W/DYE: CPT | Mod: 26,LT,, | Performed by: RADIOLOGY

## 2017-12-28 PROCEDURE — 20610 DRAIN/INJ JOINT/BURSA W/O US: CPT | Mod: 50,S$GLB,, | Performed by: PHYSICIAN ASSISTANT

## 2017-12-28 RX ORDER — GADOBUTROL 604.72 MG/ML
10 INJECTION INTRAVENOUS
Status: COMPLETED | OUTPATIENT
Start: 2017-12-28 | End: 2017-12-28

## 2017-12-28 RX ADMIN — Medication 40 MG: at 04:12

## 2017-12-28 RX ADMIN — GADOBUTROL 10 ML: 604.72 INJECTION INTRAVENOUS at 04:12

## 2017-12-28 NOTE — PROGRESS NOTES
Wild Holliday is a 52 y.o. year old his here today for his 1st Euflexxa injection for degenerative changes of his bilateral knee . he was last seen and treated in the clinic on 12/21/2017. There has been no significant change in his medical status since his last visit. No Fever, chills, malaise, or unexplained weight change.      Allergies, Medications, past medical and surgical history were reviewed .    Examination of the knee demonstrates  No evidence of edema, erythema , echymosis strength and range of motion are unchanged from previous visit.    The injection site was identified and the skin was prepared with a betadine solution. The  bilateral knee  joint was injected with 2 ml of Euflexxa solution under sterile technique. Wild Holliday tolerated the procedure well, he was advised to rest the knee today, ice and elevation. I may take 3 -6 weeks following the last injection to get the full benefit of the medication.  I will see him back in 1 week. Sooner if he has any problems or concerns.           .

## 2018-01-04 ENCOUNTER — OFFICE VISIT (OUTPATIENT)
Dept: ORTHOPEDICS | Facility: CLINIC | Age: 53
End: 2018-01-04
Payer: COMMERCIAL

## 2018-01-04 DIAGNOSIS — M17.0 PRIMARY OSTEOARTHRITIS OF BOTH KNEES: Primary | ICD-10-CM

## 2018-01-04 PROCEDURE — 99499 UNLISTED E&M SERVICE: CPT | Mod: S$GLB,,, | Performed by: PHYSICIAN ASSISTANT

## 2018-01-04 PROCEDURE — 20610 DRAIN/INJ JOINT/BURSA W/O US: CPT | Mod: 50,S$GLB,, | Performed by: PHYSICIAN ASSISTANT

## 2018-01-04 PROCEDURE — 99999 PR PBB SHADOW E&M-EST. PATIENT-LVL II: CPT | Mod: PBBFAC,,, | Performed by: PHYSICIAN ASSISTANT

## 2018-01-04 RX ADMIN — Medication 40 MG: at 04:01

## 2018-01-04 NOTE — PROGRESS NOTES
Wild Holliday is a 52 y.o. year old his here today for his 2nd Euflexxa injection for degenerative changes of his bilateral knee . he was last seen and treated in the clinic on 12/28/2017. There has been no significant change in his medical status since his last visit. No Fever, chills, malaise, or unexplained weight change.      Allergies, Medications, past medical and surgical history were reviewed .    Examination of the knee demonstrates  No evidence of edema, erythema , echymosis strength and range of motion are unchanged from previous visit.    The injection site was identified and the skin was prepared with a betadine solution. The  bilateral knee  joint was injected with 2 ml of Euflexxa solution under sterile technique. Wild Holliday tolerated the procedure well, he was advised to rest the knee today, ice and elevation. I may take 3 -6 weeks following the last injection to get the full benefit of the medication.  I will see him back in 1 week. Sooner if he has any problems or concerns.           .

## 2018-01-04 NOTE — LETTER
January 4, 2018      Cami Bradley MD  [address]  [facility] [address]           [facility] - Orthopedics  [address]  [facility] [address]  Phone: [phone]          Patient: Wild Holliday   MR Number: [id number]   YOB: 1965   Date of Visit: 1/4/2018       Dear Dr. Cami Bradley:    Thank you for referring Wild Holliday to me for evaluation. Attached you will find relevant portions of my assessment and plan of care.    If you have questions, please do not hesitate to call me. I look forward to following Wild Holliday along with you.    Sincerely,    Usama Snowden PA-C    Enclosure  CC:  No Recipients    If you would like to receive this communication electronically, please contact [email or web address] or [phone] to request more information on [employer] Link access.    For providers and/or their staff who would like to refer a patient to [facility], please contact us through our one-stop-shop provider referral line, [facility]ak[facility] Black, at [phone].    If you feel you have received this communication in error or would no longer like to receive these types of communications, please e-mail [email or web address]

## 2018-01-11 ENCOUNTER — LAB VISIT (OUTPATIENT)
Dept: LAB | Facility: HOSPITAL | Age: 53
End: 2018-01-11
Attending: INTERNAL MEDICINE
Payer: COMMERCIAL

## 2018-01-11 ENCOUNTER — OFFICE VISIT (OUTPATIENT)
Dept: ORTHOPEDICS | Facility: CLINIC | Age: 53
End: 2018-01-11
Payer: COMMERCIAL

## 2018-01-11 VITALS
DIASTOLIC BLOOD PRESSURE: 87 MMHG | WEIGHT: 286.5 LBS | HEIGHT: 69 IN | SYSTOLIC BLOOD PRESSURE: 136 MMHG | BODY MASS INDEX: 42.43 KG/M2 | HEART RATE: 72 BPM

## 2018-01-11 DIAGNOSIS — I25.10 ASCVD (ARTERIOSCLEROTIC CARDIOVASCULAR DISEASE): ICD-10-CM

## 2018-01-11 DIAGNOSIS — C83.00 SMALL B-CELL LYMPHOMA, UNSPECIFIED BODY REGION: ICD-10-CM

## 2018-01-11 DIAGNOSIS — E78.5 HYPERLIPIDEMIA, UNSPECIFIED HYPERLIPIDEMIA TYPE: ICD-10-CM

## 2018-01-11 DIAGNOSIS — M17.0 PRIMARY OSTEOARTHRITIS OF BOTH KNEES: Primary | ICD-10-CM

## 2018-01-11 DIAGNOSIS — E11.9 CONTROLLED TYPE 2 DIABETES MELLITUS WITHOUT COMPLICATION, WITHOUT LONG-TERM CURRENT USE OF INSULIN: ICD-10-CM

## 2018-01-11 LAB
ALBUMIN SERPL BCP-MCNC: 4.1 G/DL
ALP SERPL-CCNC: 55 U/L
ALT SERPL W/O P-5'-P-CCNC: 43 U/L
ANION GAP SERPL CALC-SCNC: 11 MMOL/L
AST SERPL-CCNC: 30 U/L
BASOPHILS # BLD AUTO: 0.02 K/UL
BASOPHILS NFR BLD: 0.4 %
BILIRUB SERPL-MCNC: 0.7 MG/DL
BUN SERPL-MCNC: 17 MG/DL
CALCIUM SERPL-MCNC: 9.8 MG/DL
CHLORIDE SERPL-SCNC: 104 MMOL/L
CHOLEST SERPL-MCNC: 108 MG/DL
CHOLEST/HDLC SERPL: 3.6 {RATIO}
CO2 SERPL-SCNC: 25 MMOL/L
CREAT SERPL-MCNC: 0.9 MG/DL
DIFFERENTIAL METHOD: ABNORMAL
EOSINOPHIL # BLD AUTO: 0.1 K/UL
EOSINOPHIL NFR BLD: 2.5 %
ERYTHROCYTE [DISTWIDTH] IN BLOOD BY AUTOMATED COUNT: 12.6 %
EST. GFR  (AFRICAN AMERICAN): >60 ML/MIN/1.73 M^2
EST. GFR  (NON AFRICAN AMERICAN): >60 ML/MIN/1.73 M^2
ESTIMATED AVG GLUCOSE: 169 MG/DL
GLUCOSE SERPL-MCNC: 174 MG/DL
HBA1C MFR BLD HPLC: 7.5 %
HCT VFR BLD AUTO: 40.5 %
HDLC SERPL-MCNC: 30 MG/DL
HDLC SERPL: 27.8 %
HGB BLD-MCNC: 14.1 G/DL
LDLC SERPL CALC-MCNC: 36.6 MG/DL
LYMPHOCYTES # BLD AUTO: 0.7 K/UL
LYMPHOCYTES NFR BLD: 12 %
MCH RBC QN AUTO: 30.3 PG
MCHC RBC AUTO-ENTMCNC: 34.8 G/DL
MCV RBC AUTO: 87 FL
MONOCYTES # BLD AUTO: 0.5 K/UL
MONOCYTES NFR BLD: 8.6 %
NEUTROPHILS # BLD AUTO: 4.3 K/UL
NEUTROPHILS NFR BLD: 76.5 %
NONHDLC SERPL-MCNC: 78 MG/DL
PLATELET # BLD AUTO: 258 K/UL
PMV BLD AUTO: 8.6 FL
POTASSIUM SERPL-SCNC: 4.5 MMOL/L
PROT SERPL-MCNC: 6.8 G/DL
RBC # BLD AUTO: 4.66 M/UL
SODIUM SERPL-SCNC: 140 MMOL/L
TRIGL SERPL-MCNC: 207 MG/DL
TSH SERPL DL<=0.005 MIU/L-ACNC: 1.95 UIU/ML
WBC # BLD AUTO: 5.67 K/UL

## 2018-01-11 PROCEDURE — 20610 DRAIN/INJ JOINT/BURSA W/O US: CPT | Mod: 50,S$GLB,, | Performed by: PHYSICIAN ASSISTANT

## 2018-01-11 PROCEDURE — 80061 LIPID PANEL: CPT

## 2018-01-11 PROCEDURE — 99999 PR PBB SHADOW E&M-EST. PATIENT-LVL III: CPT | Mod: PBBFAC,,, | Performed by: PHYSICIAN ASSISTANT

## 2018-01-11 PROCEDURE — 80053 COMPREHEN METABOLIC PANEL: CPT

## 2018-01-11 PROCEDURE — 85025 COMPLETE CBC W/AUTO DIFF WBC: CPT

## 2018-01-11 PROCEDURE — 99499 UNLISTED E&M SERVICE: CPT | Mod: S$GLB,,, | Performed by: PHYSICIAN ASSISTANT

## 2018-01-11 PROCEDURE — 36415 COLL VENOUS BLD VENIPUNCTURE: CPT

## 2018-01-11 PROCEDURE — 83036 HEMOGLOBIN GLYCOSYLATED A1C: CPT

## 2018-01-11 PROCEDURE — 84443 ASSAY THYROID STIM HORMONE: CPT

## 2018-01-11 RX ADMIN — Medication 40 MG: at 01:01

## 2018-01-11 NOTE — LETTER
January 11, 2018      Gregorio Car MD  [address]  [facility] [address]           [facility] - Orthopedics  [address]  [facility] [address]  Phone: [phone]          Patient: Wild Holliday   MR Number: [id number]   YOB: 1965   Date of Visit: 1/11/2018       Dear Dr. Gregorio Car:    Thank you for referring Wild Holliday to me for evaluation. Attached you will find relevant portions of my assessment and plan of care.    If you have questions, please do not hesitate to call me. I look forward to following Wild Holliday along with you.    Sincerely,    Usama Snowden PA-C    Enclosure  CC:  No Recipients    If you would like to receive this communication electronically, please contact [email or web address] or [phone] to request more information on [employer] Link access.    For providers and/or their staff who would like to refer a patient to [facility], please contact us through our one-stop-shop provider referral line, [facility], at [phone].    If you feel you have received this communication in error or would no longer like to receive these types of communications, please e-mail [email or web address]

## 2018-01-11 NOTE — PROGRESS NOTES
Wild Holliday is a 52 y.o. year old his here today for his 3rd Euflexxa injection for degenerative changes of his bilateral knee . he was last seen and treated in the clinic on 1/4/2018. There has been no significant change in his medical status since his last visit. No Fever, chills, malaise, or unexplained weight change.      Allergies, Medications, past medical and surgical history were reviewed .    Examination of the knee demonstrates  No evidence of edema, erythema , echymosis strength and range of motion are unchanged from previous visit.    The injection site was identified and the skin was prepared with a betadine solution. The  bilateral knee  joint was injected with 2 ml of Euflexxa solution under sterile technique. Wild Holliday tolerated the procedure well, he was advised to rest the knee today, ice and elevation. I may take 3 -6 weeks following the last injection to get the full benefit of the medication.  I will see him back in 4-6 months. Sooner if he has any problems or concerns.           .

## 2018-01-18 ENCOUNTER — OFFICE VISIT (OUTPATIENT)
Dept: INTERNAL MEDICINE | Facility: CLINIC | Age: 53
End: 2018-01-18
Payer: COMMERCIAL

## 2018-01-18 VITALS
OXYGEN SATURATION: 97 % | BODY MASS INDEX: 42.18 KG/M2 | WEIGHT: 284.81 LBS | DIASTOLIC BLOOD PRESSURE: 78 MMHG | RESPIRATION RATE: 16 BRPM | HEART RATE: 62 BPM | HEIGHT: 69 IN | SYSTOLIC BLOOD PRESSURE: 138 MMHG

## 2018-01-18 DIAGNOSIS — E78.1 HYPERTRIGLYCERIDEMIA: ICD-10-CM

## 2018-01-18 PROCEDURE — 99999 PR PBB SHADOW E&M-EST. PATIENT-LVL III: CPT | Mod: PBBFAC,,, | Performed by: INTERNAL MEDICINE

## 2018-01-18 PROCEDURE — 99214 OFFICE O/P EST MOD 30 MIN: CPT | Mod: S$GLB,,, | Performed by: INTERNAL MEDICINE

## 2018-01-18 NOTE — PROGRESS NOTES
Subjective:       Patient ID: Wild Holliday is a 52 y.o. male.    Chief Complaint: Hyperlipidemia (follow up with lab review); Depression; and Diabetes    Wild Holliday is a 51 y.o. male  Here with follow up .  Labs are reviewed .  He will be seeing cardiology on 30th.  His CP seems likely reflux related.  a1c 7.5 %.        Hyperlipidemia   Pertinent negatives include no chest pain or shortness of breath.   Diabetes   He has type 2 diabetes mellitus. No MedicAlert identification noted. The initial diagnosis of diabetes was made 2 years ago. Hypoglycemia symptoms include hunger. Pertinent negatives for hypoglycemia include no confusion, dizziness, headaches, mood changes, nervousness/anxiousness, pallor, seizures, sleepiness, speech difficulty, sweats or tremors. Associated symptoms include fatigue, polyphagia, polyuria and visual change. Pertinent negatives for diabetes include no blurred vision, no chest pain, no foot paresthesias, no foot ulcerations, no polydipsia, no weakness and no weight loss. Pertinent negatives for hypoglycemia complications include no blackouts, no hospitalization, no nocturnal hypoglycemia, no required assistance and no required glucagon injection. Symptoms are stable. Pertinent negatives for diabetic complications include no autonomic neuropathy, CVA, heart disease, impotence, nephropathy, peripheral neuropathy, PVD or retinopathy. Risk factors for coronary artery disease include family history, stress, diabetes mellitus and male sex. Current diabetic treatment includes diet and oral agent (monotherapy). He is compliant with treatment most of the time. His weight is stable. He is following a generally healthy diet. Meal planning includes avoidance of concentrated sweets. He has not had a previous visit with a dietitian. He participates in exercise intermittently. He monitors blood glucose at home 3-4 x per week. He monitors urine at home <1 x per month. Blood glucose monitoring compliance  is good. There is no change in his home blood glucose trend. He sees a podiatrist.Eye exam is current.   Chest Pain    Pertinent negatives include no abdominal pain, back pain, dizziness, fever, headaches, numbness, palpitations, shortness of breath, vomiting or weakness.   His past medical history is significant for hyperlipidemia.   Pertinent negatives for past medical history include no heart disease, no PVD and no seizures.     Review of Systems   Constitutional: Positive for fatigue. Negative for activity change, chills, fever, unexpected weight change and weight loss.   HENT: Negative for congestion, hearing loss, postnasal drip, rhinorrhea, sore throat and trouble swallowing.    Eyes: Positive for visual disturbance. Negative for blurred vision, photophobia and discharge.   Respiratory: Negative for chest tightness, shortness of breath and wheezing.    Cardiovascular: Negative for chest pain and palpitations.   Gastrointestinal: Negative for abdominal distention, abdominal pain, blood in stool, constipation, diarrhea and vomiting.   Endocrine: Positive for polyphagia and polyuria. Negative for polydipsia.   Genitourinary: Negative for difficulty urinating, dysuria, flank pain, hematuria, impotence and urgency.   Musculoskeletal: Positive for arthralgias. Negative for back pain, joint swelling and neck pain.   Skin: Negative for pallor.   Neurological: Negative for dizziness, tremors, seizures, facial asymmetry, speech difficulty, weakness, numbness and headaches.   Hematological: Does not bruise/bleed easily.   Psychiatric/Behavioral: Negative for agitation, confusion, dysphoric mood and suicidal ideas. The patient is not nervous/anxious.        Objective:      Physical Exam   Constitutional: He is oriented to person, place, and time. He appears well-developed and well-nourished.   HENT:   Head: Normocephalic and atraumatic.   Eyes: Right eye exhibits no chemosis. Left eye exhibits no chemosis. Right  conjunctiva is not injected. Left conjunctiva is not injected.   R eye cataract surgery done    Neck: No JVD present.   Cardiovascular: Normal rate, regular rhythm and normal heart sounds.    Pulmonary/Chest: Effort normal and breath sounds normal.   Abdominal: Soft. Bowel sounds are normal. There is no tenderness.   Musculoskeletal: He exhibits no edema.   Feet:   Right Foot:   Skin Integrity: Negative for ulcer.   Neurological: He is alert and oriented to person, place, and time.   Skin: Skin is warm and dry.   Psychiatric: He has a normal mood and affect. His behavior is normal. Judgment and thought content normal.   Nursing note and vitals reviewed.      Assessment:       1. Uncontrolled type 2 diabetes mellitus without complication, without long-term current use of insulin    2. Hypertriglyceridemia        Plan:   Wild was seen today for hyperlipidemia, depression and diabetes.    Diagnoses and all orders for this visit:    Uncontrolled type 2 diabetes mellitus without complication, without long-term current use of insulin  -     Hemoglobin A1c; Future  -     Lipid panel; Future  -     Microalbumin/creatinine urine ratio; Future  -     TSH; Future  -     Hemoglobin A1c; Future  Patient has uncontrolled Diabetes .Patient  is unwilling to take more medicationss. Will try harder with present medications and diet   We discussed about diet ;low in calories. Avoid starches, rice, breads ,cookies and desserts    Also stressed  increasing activity; walking 2-3 miles a day.  I also adjusted patients  medications and gave  instructions about adherence to plan.  Goal of  A1c  less than 7 % stressed.  Also goal of LDL less than 70 highlighted to patient.  Hypertriglyceridemia  -     CBC auto differential; Future  -     Comprehensive metabolic panel; Future  Continue present meds.

## 2018-01-26 ENCOUNTER — TELEPHONE (OUTPATIENT)
Dept: ORTHOPEDICS | Facility: CLINIC | Age: 53
End: 2018-01-26

## 2018-01-26 DIAGNOSIS — M79.642 LEFT HAND PAIN: Primary | ICD-10-CM

## 2018-01-26 NOTE — TELEPHONE ENCOUNTER
Wild Holliday reminded of appointment on 2/6/18 with Dr. BEN Henley w/time and location. Notified of need for xray before OV w/date, time, and location of appts.

## 2018-02-06 ENCOUNTER — HOSPITAL ENCOUNTER (EMERGENCY)
Facility: HOSPITAL | Age: 53
Discharge: HOME | End: 2018-02-06
Attending: SURGERY
Payer: COMMERCIAL

## 2018-02-06 ENCOUNTER — OFFICE VISIT (OUTPATIENT)
Dept: ORTHOPEDICS | Facility: CLINIC | Age: 53
End: 2018-02-06
Payer: COMMERCIAL

## 2018-02-06 VITALS
BODY MASS INDEX: 42.06 KG/M2 | HEIGHT: 69 IN | WEIGHT: 284 LBS | DIASTOLIC BLOOD PRESSURE: 72 MMHG | SYSTOLIC BLOOD PRESSURE: 134 MMHG | HEART RATE: 71 BPM

## 2018-02-06 VITALS
WEIGHT: 284 LBS | BODY MASS INDEX: 41.94 KG/M2 | HEART RATE: 63 BPM | TEMPERATURE: 98 F | DIASTOLIC BLOOD PRESSURE: 91 MMHG | RESPIRATION RATE: 20 BRPM | SYSTOLIC BLOOD PRESSURE: 169 MMHG

## 2018-02-06 DIAGNOSIS — R22.32 MASS OF LEFT HAND: ICD-10-CM

## 2018-02-06 DIAGNOSIS — S61.412A LACERATION OF LEFT HAND WITHOUT FOREIGN BODY, INITIAL ENCOUNTER: Primary | ICD-10-CM

## 2018-02-06 DIAGNOSIS — M18.10 ARTHRITIS OF CARPOMETACARPAL (CMC) JOINT OF THUMB: Primary | ICD-10-CM

## 2018-02-06 PROCEDURE — 99283 EMERGENCY DEPT VISIT LOW MDM: CPT | Mod: 25

## 2018-02-06 PROCEDURE — 99204 OFFICE O/P NEW MOD 45 MIN: CPT | Mod: S$GLB,,, | Performed by: ORTHOPAEDIC SURGERY

## 2018-02-06 PROCEDURE — 3008F BODY MASS INDEX DOCD: CPT | Mod: S$GLB,,, | Performed by: ORTHOPAEDIC SURGERY

## 2018-02-06 PROCEDURE — 25000003 PHARM REV CODE 250: Performed by: SURGERY

## 2018-02-06 PROCEDURE — 12002 RPR S/N/AX/GEN/TRNK2.6-7.5CM: CPT

## 2018-02-06 PROCEDURE — 99999 PR PBB SHADOW E&M-EST. PATIENT-LVL III: CPT | Mod: PBBFAC,,, | Performed by: ORTHOPAEDIC SURGERY

## 2018-02-06 RX ORDER — MUPIROCIN 20 MG/G
OINTMENT TOPICAL 3 TIMES DAILY
Qty: 15 G | Refills: 0 | Status: SHIPPED | OUTPATIENT
Start: 2018-02-06 | End: 2018-02-16

## 2018-02-06 RX ORDER — LIDOCAINE HYDROCHLORIDE 10 MG/ML
10 INJECTION, SOLUTION EPIDURAL; INFILTRATION; INTRACAUDAL; PERINEURAL
Status: COMPLETED | OUTPATIENT
Start: 2018-02-06 | End: 2018-02-06

## 2018-02-06 RX ORDER — MUPIROCIN 20 MG/G
1 OINTMENT TOPICAL
Status: COMPLETED | OUTPATIENT
Start: 2018-02-06 | End: 2018-02-06

## 2018-02-06 RX ORDER — CEPHALEXIN 500 MG/1
500 CAPSULE ORAL EVERY 6 HOURS
Qty: 28 CAPSULE | Refills: 0 | Status: SHIPPED | OUTPATIENT
Start: 2018-02-06 | End: 2018-02-13

## 2018-02-06 RX ORDER — IBUPROFEN 800 MG/1
800 TABLET ORAL EVERY 6 HOURS PRN
Qty: 20 TABLET | Refills: 0 | Status: SHIPPED | OUTPATIENT
Start: 2018-02-06 | End: 2018-02-06

## 2018-02-06 RX ADMIN — LIDOCAINE HYDROCHLORIDE 100 MG: 10 INJECTION, SOLUTION EPIDURAL; INFILTRATION; INTRACAUDAL; PERINEURAL at 08:02

## 2018-02-06 RX ADMIN — MUPIROCIN 22 G: 20 OINTMENT TOPICAL at 08:02

## 2018-02-06 NOTE — ED TRIAGE NOTES
52 y.o. male presents to ER   Chief Complaint   Patient presents with    Laceration   Laceration to left hand between index and thumb. No acute distress noted.

## 2018-02-06 NOTE — ED NOTES
Four stitches placed to left hand by Dr. Arreola. Pt tolerated well. Bactriban ointment applied, hand wrapped with kerlix.

## 2018-02-06 NOTE — ED PROVIDER NOTES
[facility]                                                 February 6, 2018               Chief Complaint  52 y.o. male with Laceration    History of Present Illness  Wild Holliday presents to the emergency room with left hand laceration today  Patient accidentally cut his left hand while working this morning, no bleeding now  Patient on exam is a 4 cm laceration in the first interdigital webspace, superficial  Patient has good distal pulses and capillary refill is neurovascularly intact this a.m.  Patient states that his tetanus is up-to-date, afebrile with stable vital signs today    The history is provided by the patient    Past Medical History   -- Arthritis    -- Cancer    --  (central serous retinopathy)    -- Depression    -- Diabetes mellitus    -- Farsightedness    -- Psychiatric problem    -- Therapy       Surgeries: Lipoma removal, Port-A-Cath, tonsillectomy, tricep tendon repair  No Known Allergies     Review of Systems and Physical Exam     Review of Systems  -- Constitution - no fever, denies fatigue, no weakness, no chills  -- Eyes - no tearing or redness, no visual disturbance  -- Ear, Nose - no tinnitus or earache, no nasal congestion or discharge  -- Mouth,Throat - no sore throat, no toothache, normal voice, normal swallowing  -- Respiratory - denies cough and congestion, no shortness of breath, no FISHMAN  -- Cardiovascular - denies chest pain, no palpitations, denies claudication  -- Gastrointestinal - denies abdominal pain, nausea, vomiting, or diarrhea  -- Musculoskeletal - denies back pain, negative for myalgias and arthralgias   -- Neurological - no headache, denies weakness or seizure; no LOC  -- Skin - left hand laceration    Vital Signs  -- His oral temperature is 98 °F (36.7 °C).   -- His blood pressure is 169/91 and his pulse is 63.   -- His respiration is 20.      Physical Exam  -- Nursing note and vitals reviewed  -- Head: Atraumatic. Normocephalic. No obvious  abnormality  -- Eyes: Pupils are equal and reactive to light. Normal conjunctiva and lids  -- Cardiac: Normal rate, regular rhythm and normal heart sounds  -- Pulmonary: Normal respiratory effort, breath sounds clear to auscultation  -- Abdominal: Soft, no tenderness. Normal bowel sounds. Normal liver edge  -- Musculoskeletal: Normal range of motion, no effusions. Joints stable   -- Neurological: No focal deficits. Showed good interaction with staff  -- Vascular: Posterior tibial, dorsalis pedis and radial pulses 2+ bilaterally    -- Lymphatics: No cervical or peripheral lymphadenopathy. No edema noted  -- Skin: For similar laceration in the left first interdigit webspace    Emergency Room Course     Left hand, 3 views  -- There is widening of the scapholunate interval at 5.8 mm compatible   -- with scapholunate ligament disruption with rotary subluxation of the scaphoid.    -- No capitate migration is seen at this time.  The joint spaces are maintained.    -- No fracture or dislocation.    Laceration Repair  -- Performed by: ABIGAIL CHAWLA  -- Consent Done: Emergent Situation  -- Body area: Left thenar eminence  -- Laceration length: 4 cm  -- Tendon involvement: none  -- Nerve involvement: none  -- Vascular damage: no  -- Anesthesia: local infiltration  -- Local anesthetic: lidocaine 1%   -- Anesthetic total: 10 ml  -- Irrigation solution: saline  -- Amount of cleaning: standard  -- Skin closure: 4-0 nylon  -- Number of sutures: 4  -- Approximation difficulty: simple  -- Dressing: antibiotic ointment      Medications Given  -- lidocaine (PF) 10 mg/ml (1%) injection 100 mg (not administered)   -- mupirocin 2 % ointment 22 g (not administered)     Diagnosis  -- The encounter diagnosis was Laceration of left hand without foreign body, initial encounter.    Disposition and Plan  -- Disposition: home  -- Condition: stable  -- Follow-up: Patient to follow up with hand surgery today  -- I advised the patient that we  have found no life threatening condition today  -- At this time, I believe the patient is clinically stable for discharge.   -- The patient acknowledges that close follow up with a MD is required   -- Patient agrees to comply with all instruction and direction given in the ER    This note is dictated on Dragon Natural Speaking word recognition program.  There are word recognition mistakes that are occasionally missed on review.           Brayn Arreola MD  02/07/18 0836

## 2018-02-06 NOTE — PROGRESS NOTES
Subjective:      Patient ID: Wild Holliday is a 52 y.o. male.    Chief Complaint: Pain of the Left Hand      HPI  Wild Holliday is a 52 y.o. male presenting today for left hand mass. He previously had a mass over the joint line of the left dorsal wrist. He was seen by MAYCO Caballero who ordered MRI, results below. The mass as since gone away, it does fluctuate. It is mildly painful. He works as an  and uses his hands. He notes pain in the basal joints of bl thumbs. Denies numbness or tingling. Of note, pt also cut his hand between the first two web spaces this morning. He was seen in the ED where the laceration was sutured. Tetanus is UTD and his was placed on oral abx. Denies numbness or tingling.     Review of patient's allergies indicates:  No Known Allergies      Current Outpatient Prescriptions   Medication Sig Dispense Refill    atorvastatin (LIPITOR) 40 MG tablet Take 0.5 tablets (20 mg total) by mouth once daily. 90 tablet 3    fenofibrate 160 MG Tab Take 1 tablet (160 mg total) by mouth once daily. 90 tablet 3    fish oil-omega-3 fatty acids 300-1,000 mg capsule Take 2 g by mouth once daily.      metformin (GLUCOPHAGE-XR) 500 MG 24 hr tablet TK 2 T PO  In am 180 tablet 1    multivitamin (ONE DAILY MULTIVITAMIN) per tablet Take 1 tablet by mouth once daily.      cephALEXin (KEFLEX) 500 MG capsule Take 1 capsule (500 mg total) by mouth every 6 (six) hours. 28 capsule 0    diclofenac sodium (PENNSAID) 2 % SoPk Apply 40 mg topically 2 (two) times daily. 1 packet 2    mupirocin (BACTROBAN) 2 % ointment Apply topically 3 (three) times daily. 15 g 0     No current facility-administered medications for this visit.        Past Medical History:   Diagnosis Date    Arthritis     Cancer     CLL     (central serous retinopathy)     right eye on cornia    Depression     Diabetes mellitus 2015    Farsightedness     wears glasses    Psychiatric problem     Therapy        Past Surgical  "History:   Procedure Laterality Date    lipoma removal      right rib; below right navel    PORTACATH PLACEMENT      left upper chest    TONSILLECTOMY      tricep tendon repair Right 2014       Review of Systems:  Constitutional: Negative for chills and fever.   Respiratory: Negative for cough and shortness of breath.    Gastrointestinal: Negative for nausea and vomiting.   Skin: Negative for rash.   Neurological: Negative for dizziness and headaches.   Psychiatric/Behavioral: Negative for depression.   MSK as in HPI       OBJECTIVE:     PHYSICAL EXAM:  /72 (BP Location: Left arm, Patient Position: Sitting, BP Method: Medium (Automatic))   Pulse 71   Ht 5' 9" (1.753 m)   Wt 128.8 kg (284 lb)   BMI 41.94 kg/m²     GEN:  NAD, well-developed, well-groomed.  NEURO: Awake, alert, and oriented. Normal attention and concentration.    PSYCH: Normal mood and affect. Behavior is normal.  HEENT: No cervical lymphadenopathy noted.  CARDIOVASCULAR: Radial pulses 2+ bilaterally. No LE edema noted.  PULMONARY: Breath sounds normal. No respiratory distress.  SKIN: Intact, no rashes.      MSK:   LUE:  Good active ROM of the wrist and fingers. AIN/PIN/Radial/Median/Ulnar Nerves assessed in isolation without deficit. Radial & Ulnar arteries palpated 2+. Capillary Refill <3s. There is a dressed laceration between the first and second web space. The thumb is numb secondary to lidocaine used for laceration repair. He has good motion of the thumb. He is ttp over the basal joint of the thumb. There is no palpable mass present and no pain over the dorsal wrist currently.       RADIOGRAPHS:  Xray left hand 2/6/18  Narrative   Left hand, 3 views: There is widening of the scapholunate interval at 5.8 mm compatible with scapholunate ligament disruption with rotary subluxation of the scaphoid.  No capitate migration is seen at this time.  The joint spaces are maintained.  No fracture or dislocation.     MRI left hand " 12/28/17  Impression    1. Tenosynovitis of the extensor digitorum and extensor indicis tendons corresponding to the palpable mass.  Mild tenosynovitis of 4th digit flexor tendons.    2.  Scapholunate ligament tear.    3.  Degenerative changes throughout the wrist with mild synovitis.    4.  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     Comments: I have personally reviewed the imaging and I agree with the above radiologist's report.    ASSESSMENT/PLAN:       ICD-10-CM ICD-9-CM   1. Arthritis of carpometacarpal (CMC) joint of thumb M18.10 716.94   2. Mass of left hand R22.32 782.2        Plan:   -likely dorsal ganglion cyst, can monitor   -Pennsaid ordered   -RTC prn        The patient indicates understanding of these issues and agrees to the plan.    Purnima Guzman PA-C  [facility]   [facility]  [place], [place]

## 2018-02-06 NOTE — LETTER
February 7, 2018      Usama Snowden PA-C  [address]  [facility] [address]           [facility]  [address], [address]  [facility] [address]  Phone: [phone]          Patient: Wild Holliday   MR Number: [id number]   YOB: 1965   Date of Visit: 2/6/2018       Dear Usama Snowden:    Thank you for referring Wild Holliday to me for evaluation. Attached you will find relevant portions of my assessment and plan of care.    If you have questions, please do not hesitate to call me. I look forward to following Wild Holliday along with you.    Sincerely,    Lolis Henley MD    Enclosure  CC:  No Recipients    If you would like to receive this communication electronically, please contact [email or web address] or [phone] to request more information on [employer] Link access.    For providers and/or their staff who would like to refer a patient to [facility], please contact us through our one-stop-shop provider referral line, [facility] , at [phone].    If you feel you have received this communication in error or would no longer like to receive these types of communications, please e-mail [email or web address]

## 2018-02-07 NOTE — PROGRESS NOTES
I have personally taken the history and examined the patient. I agree with the Hand Surgery PA's note. The plan will be observation. Ganglion cyst has not decreased in size and is not painful. Pt has good ROM wrist. Laceration checked and it looks good, FPL and digital nerves appear intact

## 2018-02-28 ENCOUNTER — LAB VISIT (OUTPATIENT)
Dept: LAB | Facility: HOSPITAL | Age: 53
End: 2018-02-28
Attending: INTERNAL MEDICINE
Payer: COMMERCIAL

## 2018-02-28 ENCOUNTER — OFFICE VISIT (OUTPATIENT)
Dept: HEMATOLOGY/ONCOLOGY | Facility: CLINIC | Age: 53
End: 2018-02-28
Payer: COMMERCIAL

## 2018-02-28 VITALS — OXYGEN SATURATION: 97 % | HEIGHT: 69 IN | BODY MASS INDEX: 42.25 KG/M2 | RESPIRATION RATE: 16 BRPM | WEIGHT: 285.25 LBS

## 2018-02-28 DIAGNOSIS — C83.00 SMALL B-CELL LYMPHOMA, UNSPECIFIED BODY REGION: ICD-10-CM

## 2018-02-28 DIAGNOSIS — C83.00 SMALL B-CELL LYMPHOMA, UNSPECIFIED BODY REGION: Primary | ICD-10-CM

## 2018-02-28 DIAGNOSIS — E08.00 DIABETES MELLITUS DUE TO UNDERLYING CONDITION WITH HYPEROSMOLARITY WITHOUT COMA, WITHOUT LONG-TERM CURRENT USE OF INSULIN: ICD-10-CM

## 2018-02-28 LAB
ALBUMIN SERPL BCP-MCNC: 3.9 G/DL
ALP SERPL-CCNC: 56 U/L
ALT SERPL W/O P-5'-P-CCNC: 48 U/L
ANION GAP SERPL CALC-SCNC: 8 MMOL/L
AST SERPL-CCNC: 31 U/L
BASOPHILS # BLD AUTO: 0.02 K/UL
BASOPHILS NFR BLD: 0.3 %
BILIRUB SERPL-MCNC: 0.6 MG/DL
BUN SERPL-MCNC: 17 MG/DL
CALCIUM SERPL-MCNC: 9.8 MG/DL
CHLORIDE SERPL-SCNC: 105 MMOL/L
CO2 SERPL-SCNC: 26 MMOL/L
CREAT SERPL-MCNC: 1.4 MG/DL
DIFFERENTIAL METHOD: ABNORMAL
EOSINOPHIL # BLD AUTO: 0.2 K/UL
EOSINOPHIL NFR BLD: 2.1 %
ERYTHROCYTE [DISTWIDTH] IN BLOOD BY AUTOMATED COUNT: 12.6 %
EST. GFR  (AFRICAN AMERICAN): >60 ML/MIN/1.73 M^2
EST. GFR  (NON AFRICAN AMERICAN): 57 ML/MIN/1.73 M^2
GLUCOSE SERPL-MCNC: 199 MG/DL
HCT VFR BLD AUTO: 39 %
HGB BLD-MCNC: 13.5 G/DL
IMM GRANULOCYTES # BLD AUTO: 0.03 K/UL
IMM GRANULOCYTES NFR BLD AUTO: 0.4 %
LDH SERPL L TO P-CCNC: 198 U/L
LYMPHOCYTES # BLD AUTO: 0.7 K/UL
LYMPHOCYTES NFR BLD: 9.7 %
MCH RBC QN AUTO: 29.8 PG
MCHC RBC AUTO-ENTMCNC: 34.6 G/DL
MCV RBC AUTO: 86 FL
MONOCYTES # BLD AUTO: 0.6 K/UL
MONOCYTES NFR BLD: 7.6 %
NEUTROPHILS # BLD AUTO: 6.1 K/UL
NEUTROPHILS NFR BLD: 79.9 %
NRBC BLD-RTO: 0 /100 WBC
PLATELET # BLD AUTO: 248 K/UL
PMV BLD AUTO: 9.2 FL
POTASSIUM SERPL-SCNC: 4.2 MMOL/L
PROT SERPL-MCNC: 6.9 G/DL
RBC # BLD AUTO: 4.53 M/UL
SODIUM SERPL-SCNC: 139 MMOL/L
WBC # BLD AUTO: 7.63 K/UL

## 2018-02-28 PROCEDURE — 99215 OFFICE O/P EST HI 40 MIN: CPT | Mod: S$GLB,,, | Performed by: INTERNAL MEDICINE

## 2018-02-28 PROCEDURE — 99999 PR PBB SHADOW E&M-EST. PATIENT-LVL III: CPT | Mod: PBBFAC,,, | Performed by: INTERNAL MEDICINE

## 2018-02-28 PROCEDURE — 80053 COMPREHEN METABOLIC PANEL: CPT

## 2018-02-28 PROCEDURE — 83615 LACTATE (LD) (LDH) ENZYME: CPT

## 2018-02-28 PROCEDURE — 36415 COLL VENOUS BLD VENIPUNCTURE: CPT

## 2018-02-28 PROCEDURE — 85025 COMPLETE CBC W/AUTO DIFF WBC: CPT

## 2018-02-28 RX ORDER — DICLOFENAC SODIUM 20 MG/G
SOLUTION TOPICAL
Refills: 2 | COMMUNITY
Start: 2018-02-07 | End: 2018-02-28 | Stop reason: SDUPTHER

## 2018-02-28 RX ORDER — BENZONATATE 100 MG/1
CAPSULE ORAL
COMMUNITY
Start: 2018-01-11 | End: 2018-07-25 | Stop reason: CLARIF

## 2018-02-28 RX ORDER — BLOOD SUGAR DIAGNOSTIC
STRIP MISCELLANEOUS
COMMUNITY
Start: 2017-12-11 | End: 2022-05-31 | Stop reason: SDUPTHER

## 2018-02-28 RX ORDER — ALBUTEROL SULFATE 90 UG/1
AEROSOL, METERED RESPIRATORY (INHALATION)
COMMUNITY
Start: 2018-01-11 | End: 2018-07-26
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
## 2018-04-12 ENCOUNTER — PATIENT MESSAGE (OUTPATIENT)
Dept: INTERNAL MEDICINE | Facility: CLINIC | Age: 53
End: 2018-04-12

## 2018-04-12 DIAGNOSIS — E11.9 CONTROLLED TYPE 2 DIABETES MELLITUS WITHOUT COMPLICATION, WITHOUT LONG-TERM CURRENT USE OF INSULIN: ICD-10-CM

## 2018-04-12 RX ORDER — METFORMIN HYDROCHLORIDE 500 MG/1
TABLET, EXTENDED RELEASE ORAL
Qty: 180 TABLET | Refills: 1 | Status: SHIPPED | OUTPATIENT
Start: 2018-04-12 | End: 2018-09-27 | Stop reason: SDUPTHER

## 2018-04-12 NOTE — TELEPHONE ENCOUNTER
Requested Prescriptions     Pending Prescriptions Disp Refills    metFORMIN (GLUCOPHAGE-XR) 500 MG 24 hr tablet 180 tablet 1     Sig: Take 2 tablets by mouth every morning

## 2018-04-19 ENCOUNTER — LAB VISIT (OUTPATIENT)
Dept: LAB | Facility: HOSPITAL | Age: 53
End: 2018-04-19
Attending: INTERNAL MEDICINE
Payer: COMMERCIAL

## 2018-04-19 LAB
ESTIMATED AVG GLUCOSE: 174 MG/DL
HBA1C MFR BLD HPLC: 7.7 %

## 2018-04-19 PROCEDURE — 36415 COLL VENOUS BLD VENIPUNCTURE: CPT

## 2018-04-19 PROCEDURE — 83036 HEMOGLOBIN GLYCOSYLATED A1C: CPT

## 2018-04-25 ENCOUNTER — PATIENT MESSAGE (OUTPATIENT)
Dept: HEMATOLOGY/ONCOLOGY | Facility: CLINIC | Age: 53
End: 2018-04-25

## 2018-04-25 DIAGNOSIS — C83.00 SMALL B-CELL LYMPHOMA, UNSPECIFIED BODY REGION: Primary | ICD-10-CM

## 2018-04-27 ENCOUNTER — PATIENT MESSAGE (OUTPATIENT)
Dept: HEMATOLOGY/ONCOLOGY | Facility: CLINIC | Age: 53
End: 2018-04-27

## 2018-04-30 ENCOUNTER — HOSPITAL ENCOUNTER (OUTPATIENT)
Dept: RADIOLOGY | Facility: HOSPITAL | Age: 53
Discharge: HOME | End: 2018-04-30
Attending: INTERNAL MEDICINE
Payer: COMMERCIAL

## 2018-04-30 DIAGNOSIS — C83.00 SMALL B-CELL LYMPHOMA, UNSPECIFIED BODY REGION: ICD-10-CM

## 2018-04-30 PROCEDURE — 74177 CT ABD & PELVIS W/CONTRAST: CPT | Mod: 26,,, | Performed by: RADIOLOGY

## 2018-04-30 PROCEDURE — 25500020 PHARM REV CODE 255: Performed by: INTERNAL MEDICINE

## 2018-04-30 PROCEDURE — 70491 CT SOFT TISSUE NECK W/DYE: CPT | Mod: TC

## 2018-04-30 PROCEDURE — 71260 CT THORAX DX C+: CPT | Mod: TC

## 2018-04-30 PROCEDURE — 71260 CT THORAX DX C+: CPT | Mod: 26,,, | Performed by: RADIOLOGY

## 2018-04-30 PROCEDURE — 70491 CT SOFT TISSUE NECK W/DYE: CPT | Mod: 26,,, | Performed by: RADIOLOGY

## 2018-04-30 PROCEDURE — 74177 CT ABD & PELVIS W/CONTRAST: CPT | Mod: TC

## 2018-04-30 RX ADMIN — IOHEXOL 30 ML: 350 INJECTION, SOLUTION INTRAVENOUS at 01:04

## 2018-04-30 RX ADMIN — IOHEXOL 100 ML: 350 INJECTION, SOLUTION INTRAVENOUS at 03:04

## 2018-05-01 ENCOUNTER — OFFICE VISIT (OUTPATIENT)
Dept: HEMATOLOGY/ONCOLOGY | Facility: CLINIC | Age: 53
End: 2018-05-01
Payer: COMMERCIAL

## 2018-05-01 VITALS
OXYGEN SATURATION: 97 % | RESPIRATION RATE: 18 BRPM | HEART RATE: 63 BPM | BODY MASS INDEX: 42.35 KG/M2 | SYSTOLIC BLOOD PRESSURE: 149 MMHG | DIASTOLIC BLOOD PRESSURE: 73 MMHG | WEIGHT: 285.94 LBS | HEIGHT: 69 IN

## 2018-05-01 DIAGNOSIS — C83.00 SMALL B-CELL LYMPHOMA, UNSPECIFIED BODY REGION: Primary | ICD-10-CM

## 2018-05-01 PROCEDURE — 99215 OFFICE O/P EST HI 40 MIN: CPT | Mod: S$GLB,,, | Performed by: INTERNAL MEDICINE

## 2018-05-01 PROCEDURE — 99999 PR PBB SHADOW E&M-EST. PATIENT-LVL III: CPT | Mod: PBBFAC,,, | Performed by: INTERNAL MEDICINE
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
## 2018-06-13 DIAGNOSIS — C83.00 SMALL B-CELL LYMPHOMA, UNSPECIFIED BODY REGION: Primary | ICD-10-CM

## 2018-06-29 DIAGNOSIS — E11.9 TYPE 2 DIABETES MELLITUS WITHOUT COMPLICATION, UNSPECIFIED WHETHER LONG TERM INSULIN USE: ICD-10-CM

## 2018-07-02 DIAGNOSIS — E78.1 HYPERTRIGLYCERIDEMIA: ICD-10-CM

## 2018-07-02 RX ORDER — FENOFIBRATE 160 MG/1
160 TABLET ORAL DAILY
Qty: 90 TABLET | Refills: 3 | Status: SHIPPED | OUTPATIENT
Start: 2018-07-02 | End: 2018-10-08 | Stop reason: SDUPTHER

## 2018-07-02 NOTE — TELEPHONE ENCOUNTER
Requested Prescriptions     Pending Prescriptions Disp Refills    fenofibrate 160 MG Tab 90 tablet 3     Sig: Take 1 tablet (160 mg total) by mouth once daily.   LOV: 1/18/18

## 2018-07-05 ENCOUNTER — PATIENT MESSAGE (OUTPATIENT)
Dept: ORTHOPEDICS | Facility: CLINIC | Age: 53
End: 2018-07-05

## 2018-07-06 ENCOUNTER — TELEPHONE (OUTPATIENT)
Dept: ORTHOPEDICS | Facility: CLINIC | Age: 53
End: 2018-07-06

## 2018-07-12 DIAGNOSIS — M17.0 PRIMARY OSTEOARTHRITIS OF BOTH KNEES: Primary | ICD-10-CM

## 2018-07-19 ENCOUNTER — LAB VISIT (OUTPATIENT)
Dept: LAB | Facility: HOSPITAL | Age: 53
End: 2018-07-19
Attending: INTERNAL MEDICINE
Payer: COMMERCIAL

## 2018-07-19 DIAGNOSIS — E78.1 HYPERTRIGLYCERIDEMIA: ICD-10-CM

## 2018-07-19 LAB
ALBUMIN SERPL BCP-MCNC: 4 G/DL
ALP SERPL-CCNC: 56 U/L
ALT SERPL W/O P-5'-P-CCNC: 44 U/L
ANION GAP SERPL CALC-SCNC: 9 MMOL/L
AST SERPL-CCNC: 26 U/L
BASOPHILS # BLD AUTO: 0.03 K/UL
BASOPHILS NFR BLD: 0.5 %
BILIRUB SERPL-MCNC: 0.6 MG/DL
BUN SERPL-MCNC: 18 MG/DL
CALCIUM SERPL-MCNC: 9.7 MG/DL
CHLORIDE SERPL-SCNC: 106 MMOL/L
CHOLEST SERPL-MCNC: 128 MG/DL
CHOLEST/HDLC SERPL: 4.7 {RATIO}
CO2 SERPL-SCNC: 24 MMOL/L
CREAT SERPL-MCNC: 0.9 MG/DL
DIFFERENTIAL METHOD: ABNORMAL
EOSINOPHIL # BLD AUTO: 0.2 K/UL
EOSINOPHIL NFR BLD: 2.7 %
ERYTHROCYTE [DISTWIDTH] IN BLOOD BY AUTOMATED COUNT: 12.8 %
EST. GFR  (AFRICAN AMERICAN): >60 ML/MIN/1.73 M^2
EST. GFR  (NON AFRICAN AMERICAN): >60 ML/MIN/1.73 M^2
ESTIMATED AVG GLUCOSE: 177 MG/DL
GLUCOSE SERPL-MCNC: 177 MG/DL
HBA1C MFR BLD HPLC: 7.8 %
HCT VFR BLD AUTO: 41.9 %
HDLC SERPL-MCNC: 27 MG/DL
HDLC SERPL: 21.1 %
HGB BLD-MCNC: 14.3 G/DL
LDLC SERPL CALC-MCNC: 59.8 MG/DL
LYMPHOCYTES # BLD AUTO: 1.1 K/UL
LYMPHOCYTES NFR BLD: 18.3 %
MCH RBC QN AUTO: 29.6 PG
MCHC RBC AUTO-ENTMCNC: 34.1 G/DL
MCV RBC AUTO: 87 FL
MONOCYTES # BLD AUTO: 0.4 K/UL
MONOCYTES NFR BLD: 7.2 %
NEUTROPHILS # BLD AUTO: 4.3 K/UL
NEUTROPHILS NFR BLD: 71.3 %
NONHDLC SERPL-MCNC: 101 MG/DL
PLATELET # BLD AUTO: 249 K/UL
PMV BLD AUTO: 9.1 FL
POTASSIUM SERPL-SCNC: 4.8 MMOL/L
PROT SERPL-MCNC: 6.8 G/DL
RBC # BLD AUTO: 4.83 M/UL
SODIUM SERPL-SCNC: 139 MMOL/L
TRIGL SERPL-MCNC: 206 MG/DL
TSH SERPL DL<=0.005 MIU/L-ACNC: 1.75 UIU/ML
WBC # BLD AUTO: 5.96 K/UL

## 2018-07-19 PROCEDURE — 36415 COLL VENOUS BLD VENIPUNCTURE: CPT

## 2018-07-19 PROCEDURE — 84443 ASSAY THYROID STIM HORMONE: CPT

## 2018-07-19 PROCEDURE — 85025 COMPLETE CBC W/AUTO DIFF WBC: CPT

## 2018-07-19 PROCEDURE — 83036 HEMOGLOBIN GLYCOSYLATED A1C: CPT

## 2018-07-19 PROCEDURE — 80053 COMPREHEN METABOLIC PANEL: CPT

## 2018-07-19 PROCEDURE — 80061 LIPID PANEL: CPT

## 2018-07-20 ENCOUNTER — OFFICE VISIT (OUTPATIENT)
Dept: ORTHOPEDICS | Facility: CLINIC | Age: 53
End: 2018-07-20
Payer: COMMERCIAL

## 2018-07-20 VITALS
DIASTOLIC BLOOD PRESSURE: 87 MMHG | HEART RATE: 71 BPM | WEIGHT: 280 LBS | SYSTOLIC BLOOD PRESSURE: 146 MMHG | HEIGHT: 69 IN | BODY MASS INDEX: 41.47 KG/M2

## 2018-07-20 DIAGNOSIS — M17.0 PRIMARY OSTEOARTHRITIS OF BOTH KNEES: Primary | ICD-10-CM

## 2018-07-20 PROCEDURE — 99999 PR PBB SHADOW E&M-EST. PATIENT-LVL III: CPT | Mod: PBBFAC,,, | Performed by: PHYSICIAN ASSISTANT

## 2018-07-20 PROCEDURE — 99499 UNLISTED E&M SERVICE: CPT | Mod: S$GLB,,, | Performed by: PHYSICIAN ASSISTANT

## 2018-07-20 PROCEDURE — 20610 DRAIN/INJ JOINT/BURSA W/O US: CPT | Mod: 50,S$GLB,, | Performed by: PHYSICIAN ASSISTANT

## 2018-07-20 NOTE — PROGRESS NOTES
Wild Holliday is a 52 y.o. year old his here today for his 1st Euflexxa injection for degenerative changes of his bilateral knee . he was last seen and treated in the clinic on 1/11/2018. There has been no significant change in his medical status since his last visit. No Fever, chills, malaise, or unexplained weight change.      Allergies, Medications, past medical and surgical history were reviewed .    Examination of the knee demonstrates  No evidence of edema, erythema , echymosis strength and range of motion are unchanged from previous visit.    The injection site was identified and the skin was prepared with a betadine solution. The  bilateral knee  joint was injected with 2 ml of Euflexxa solution under sterile technique. Wild Holliday tolerated the procedure well, he was advised to rest the knee today, ice and elevation. I may take 3 -6 weeks following the last injection to get the full benefit of the medication.  I will see him back in 1 week. Sooner if he has any problems or concerns.           .

## 2018-07-25 ENCOUNTER — OFFICE VISIT (OUTPATIENT)
Dept: PODIATRY | Facility: CLINIC | Age: 53
End: 2018-07-25
Payer: COMMERCIAL

## 2018-07-25 VITALS
HEIGHT: 71 IN | HEART RATE: 69 BPM | BODY MASS INDEX: 39.06 KG/M2 | SYSTOLIC BLOOD PRESSURE: 157 MMHG | WEIGHT: 279 LBS | DIASTOLIC BLOOD PRESSURE: 94 MMHG

## 2018-07-25 DIAGNOSIS — E11.49 TYPE II DIABETES MELLITUS WITH NEUROLOGICAL MANIFESTATIONS: Primary | ICD-10-CM

## 2018-07-25 PROCEDURE — 99202 OFFICE O/P NEW SF 15 MIN: CPT | Mod: S$GLB,,, | Performed by: PODIATRIST

## 2018-07-25 PROCEDURE — 3045F PR MOST RECENT HEMOGLOBIN A1C LEVEL 7.0-9.0%: CPT | Mod: CPTII,S$GLB,, | Performed by: PODIATRIST

## 2018-07-25 PROCEDURE — 3008F BODY MASS INDEX DOCD: CPT | Mod: CPTII,S$GLB,, | Performed by: PODIATRIST

## 2018-07-25 PROCEDURE — 99999 PR PBB SHADOW E&M-EST. PATIENT-LVL III: CPT | Mod: PBBFAC,,, | Performed by: PODIATRIST

## 2018-07-25 NOTE — PROGRESS NOTES
Subjective:      Patient ID: Wild Holliday is a 52 y.o. male.    Chief Complaint: Diabetes Mellitus (dr noonan/01/18/2018) and Diabetic Foot Exam    Wild is a 52 y.o. male who presents to the clinic upon referral from Dr. Correa  for evaluation and treatment of diabetic feet. Wild has a past medical history of Arthritis; Cancer;  (central serous retinopathy); Depression; Diabetes mellitus (2015); Farsightedness; Psychiatric problem; and Therapy. Patient relates no major problem with feet. Only complaints today consist of Diabetes, increased risk amputation needing evaluation/management/optomization of foot care.  Denies current symptoms.    PCP: Joshua Noonan MD    Date Last Seen by PCP:   Chief Complaint   Patient presents with    Diabetes Mellitus     dr noonan/01/18/2018    Diabetic Foot Exam         Current shoe gear: Casual shoes    Hemoglobin A1C   Date Value Ref Range Status   07/19/2018 7.8 (H) 4.0 - 5.6 % Final     Comment:     ADA Screening Guidelines:  5.7-6.4%  Consistent with prediabetes  >or=6.5%  Consistent with diabetes  High levels of fetal hemoglobin interfere with the HbA1C  assay. Heterozygous hemoglobin variants (HbS, HgC, etc)do  not significantly interfere with this assay.   However, presence of multiple variants may affect accuracy.     04/19/2018 7.7 (H) 4.0 - 5.6 % Final     Comment:     According to ADA guidelines, hemoglobin A1c <7.0% represents  optimal control in non-pregnant diabetic patients. Different  metrics may apply to specific patient populations.   Standards of Medical Care in Diabetes-2016.  For the purpose of screening for the presence of diabetes:  <5.7%     Consistent with the absence of diabetes  5.7-6.4%  Consistent with increasing risk for diabetes   (prediabetes)  >or=6.5%  Consistent with diabetes  Currently, no consensus exists for use of hemoglobin A1c  for diagnosis of diabetes for children.  This Hemoglobin A1c assay has significant interference with  fetal   hemoglobin   (HbF). The results are invalid for patients with abnormal amounts of   HbF,   including those with known Hereditary Persistence   of Fetal Hemoglobin. Heterozygous hemoglobin variants (HbAS, HbAC,   HbAD, HbAE, HbA2) do not significantly interfere with this assay;   however, presence of multiple variants in a sample may impact the %   interference.     01/11/2018 7.5 (H) 4.0 - 5.6 % Final     Comment:     According to ADA guidelines, hemoglobin A1c <7.0% represents  optimal control in non-pregnant diabetic patients. Different  metrics may apply to specific patient populations.   Standards of Medical Care in Diabetes-2016.  For the purpose of screening for the presence of diabetes:  <5.7%     Consistent with the absence of diabetes  5.7-6.4%  Consistent with increasing risk for diabetes   (prediabetes)  >or=6.5%  Consistent with diabetes  Currently, no consensus exists for use of hemoglobin A1c  for diagnosis of diabetes for children.  This Hemoglobin A1c assay has significant interference with fetal   hemoglobin   (HbF). The results are invalid for patients with abnormal amounts of   HbF,   including those with known Hereditary Persistence   of Fetal Hemoglobin. Heterozygous hemoglobin variants (HbAS, HbAC,   HbAD, HbAE, HbA2) do not significantly interfere with this assay;   however, presence of multiple variants in a sample may impact the %   interference.             Review of Systems   Constitution: Negative for chills, diaphoresis, fever, malaise/fatigue and night sweats.   Cardiovascular: Negative for claudication, cyanosis, leg swelling and syncope.   Skin: Positive for nail changes. Negative for color change, dry skin, rash, suspicious lesions and unusual hair distribution.   Musculoskeletal: Negative for falls, joint pain, joint swelling, muscle cramps, muscle weakness and stiffness.   Gastrointestinal: Negative for constipation, diarrhea, nausea and vomiting.   Neurological: Positive  for sensory change. Negative for brief paralysis, disturbances in coordination, focal weakness, numbness, paresthesias and tremors.           Objective:      Physical Exam   Constitutional: He is oriented to person, place, and time. He appears well-developed and well-nourished. He is cooperative. No distress.   Cardiovascular:   Pulses:       Popliteal pulses are 2+ on the right side, and 2+ on the left side.        Dorsalis pedis pulses are 2+ on the right side, and 2+ on the left side.        Posterior tibial pulses are 2+ on the right side, and 2+ on the left side.   Capillary refill 3 seconds all toes/distal feet, all toes/both feet warm to touch.      Negative lymphadenopathy bilateral popliteal fossa and tarsal tunnel.      Negavie lower extremity edema bilateral.     Musculoskeletal:        Right ankle: He exhibits normal range of motion, no swelling, no ecchymosis, no deformity, no laceration and normal pulse. Achilles tendon normal. Achilles tendon exhibits no pain, no defect and normal Dela Cruz's test results.   Normal angle, base, station of gait. All ten toes without clubbing, cyanosis, or signs of ischemia.  No pain to palpation bilateral lower extremities.  Range of motion, stability, muscle strength, and muscle tone normal bilateral feet and legs.     Lymphadenopathy: No inguinal adenopathy noted on the right or left side.   Negative lymphadenopathy bilateral popliteal fossa and tarsal tunnel.    Negative lymphangitic streaking bilateral feet/ankles/legs.   Neurological: He is alert and oriented to person, place, and time. He has normal strength. He displays no atrophy and no tremor. A sensory deficit is present. He exhibits normal muscle tone. Gait normal.   Reflex Scores:       Patellar reflexes are 2+ on the right side and 2+ on the left side.       Achilles reflexes are 2+ on the right side and 2+ on the left side.  Negative tinel sign to percussion sural, superficial peroneal, deep peroneal,  saphenous, and posterior tibial nerves right and left ankles and feet.    Decreased/absent vibratory sensation bilateral feet to 128Hz tuning fork.     Skin: Skin is warm, dry and intact. Capillary refill takes 2 to 3 seconds. No abrasion, no bruising, no burn, no ecchymosis, no laceration, no lesion and no rash noted. He is not diaphoretic. No cyanosis or erythema. No pallor. Nails show no clubbing.   Skin is normal age and health appropriate color, turgor, texture, and temperature bilateral lower extremities without ulceration, hyperpigmentation, discoloration, masses nodules or cords palpated.  No ecchymosis, erythema, edema, or cardinal signs of infection bilateral lower extremities.    Toenails 1st, 2nd, 3rd, 4th, 5th  bilateral are hypertrophic thickened 2-3 mm, dystrophic, discolored tanish brown with tan, gray crumbly subungual debris.  Not tender to distal nail plate pressure, without periungual skin abnormality of each.       Psychiatric: He has a normal mood and affect.             Assessment:       Encounter Diagnosis   Name Primary?    Type II diabetes mellitus with neurological manifestations Yes         Plan:       Wild was seen today for diabetes mellitus and diabetic foot exam.    Diagnoses and all orders for this visit:    Type II diabetes mellitus with neurological manifestations      I counseled the patient on his conditions, their implications and medical management.        - Shoe inspection. Diabetic Foot Education. Patient reminded of the importance of good nutrition and blood sugar control to help prevent podiatric complications of diabetes. Patient instructed on proper foot hygeine. We discussed wearing proper shoe gear, daily foot inspections, never walking without protective shoe gear, never putting sharp instruments to feet, routine podiatric visits at least annually.      Discussed conservative treatment with shoes of adequate dimensions, material, and style to alleviate symptoms and  delay or prevent surgical intervention.           Follow-up in about 1 year (around 7/25/2019), or if symptoms worsen or fail to improve.

## 2018-07-26 ENCOUNTER — OFFICE VISIT (OUTPATIENT)
Dept: INTERNAL MEDICINE | Facility: CLINIC | Age: 53
End: 2018-07-26
Payer: COMMERCIAL

## 2018-07-26 VITALS
HEIGHT: 71 IN | SYSTOLIC BLOOD PRESSURE: 122 MMHG | WEIGHT: 278.25 LBS | BODY MASS INDEX: 38.95 KG/M2 | HEART RATE: 64 BPM | OXYGEN SATURATION: 97 % | RESPIRATION RATE: 16 BRPM | DIASTOLIC BLOOD PRESSURE: 78 MMHG

## 2018-07-26 DIAGNOSIS — E78.1 HYPERTRIGLYCERIDEMIA: ICD-10-CM

## 2018-07-26 DIAGNOSIS — Z12.11 SPECIAL SCREENING FOR MALIGNANT NEOPLASMS, COLON: ICD-10-CM

## 2018-07-26 PROCEDURE — 99999 PR PBB SHADOW E&M-EST. PATIENT-LVL III: CPT | Mod: PBBFAC,,, | Performed by: INTERNAL MEDICINE

## 2018-07-26 PROCEDURE — 99213 OFFICE O/P EST LOW 20 MIN: CPT | Mod: S$GLB,,, | Performed by: INTERNAL MEDICINE

## 2018-07-26 PROCEDURE — 3045F PR MOST RECENT HEMOGLOBIN A1C LEVEL 7.0-9.0%: CPT | Mod: CPTII,S$GLB,, | Performed by: INTERNAL MEDICINE

## 2018-07-26 PROCEDURE — 3008F BODY MASS INDEX DOCD: CPT | Mod: CPTII,S$GLB,, | Performed by: INTERNAL MEDICINE

## 2018-07-26 NOTE — PROGRESS NOTES
Subjective:       Patient ID: Wild Holliday is a 52 y.o. male.    Chief Complaint: Follow-up (follow up with lab review); Diabetes; and Hyperlipidemia    Wild Holliday is a 52 y.o. male  Here with follow up .  Labs are reviewed .  a1c 7.8 %.        Diabetes   He presents for his follow-up diabetic visit. He has type 2 diabetes mellitus. His disease course has been worsening. Pertinent negatives for hypoglycemia include no confusion, dizziness, headaches, nervousness/anxiousness, pallor, seizures, speech difficulty or tremors. Associated symptoms include polyphagia. Pertinent negatives for diabetes include no chest pain, no fatigue, no polydipsia, no polyuria and no weakness. Current diabetic treatment includes oral agent (monotherapy). He is compliant with treatment all of the time. An ACE inhibitor/angiotensin II receptor blocker is not being taken.   Hyperlipidemia   This is a chronic problem. The current episode started more than 1 year ago. The problem is controlled. Exacerbating diseases include diabetes and obesity. Pertinent negatives include no chest pain or shortness of breath. Current antihyperlipidemic treatment includes fibric acid derivatives. The current treatment provides mild improvement of lipids.     Review of Systems   Constitutional: Negative for activity change, chills, fatigue, fever and unexpected weight change.   HENT: Negative for congestion, hearing loss, postnasal drip, rhinorrhea, sore throat and trouble swallowing.    Eyes: Negative for photophobia, discharge and visual disturbance.   Respiratory: Negative for chest tightness, shortness of breath and wheezing.    Cardiovascular: Negative for chest pain and palpitations.   Gastrointestinal: Negative for abdominal distention, abdominal pain, blood in stool, constipation, diarrhea and vomiting.   Endocrine: Positive for polyphagia. Negative for polydipsia and polyuria.   Genitourinary: Negative for difficulty urinating, dysuria, flank pain,  hematuria and urgency.   Musculoskeletal: Negative for arthralgias, back pain, joint swelling and neck pain.   Skin: Negative for pallor.   Neurological: Negative for dizziness, tremors, seizures, facial asymmetry, speech difficulty, weakness, numbness and headaches.   Hematological: Does not bruise/bleed easily.   Psychiatric/Behavioral: Negative for agitation, confusion, dysphoric mood and suicidal ideas. The patient is not nervous/anxious.        Objective:      Physical Exam   Constitutional: He is oriented to person, place, and time. He appears well-developed and well-nourished.   HENT:   Head: Normocephalic and atraumatic.   Eyes: Right eye exhibits no chemosis. Left eye exhibits no chemosis. Right conjunctiva is not injected. Left conjunctiva is not injected.   Neck: No JVD present.   Cardiovascular: Normal rate, regular rhythm and normal heart sounds.    Pulmonary/Chest: Effort normal and breath sounds normal.   Abdominal: Soft. Bowel sounds are normal. There is no tenderness.   Musculoskeletal: He exhibits no edema.   Neurological: He is alert and oriented to person, place, and time.   Skin: Skin is warm and dry.   Psychiatric: He has a normal mood and affect. His behavior is normal. Judgment and thought content normal.   Nursing note and vitals reviewed.      Assessment:       1. Uncontrolled type 2 diabetes mellitus without complication, without long-term current use of insulin    2. Hypertriglyceridemia        Plan:   Wild was seen today for follow-up, diabetes and hyperlipidemia.    Diagnoses and all orders for this visit:    Uncontrolled type 2 diabetes mellitus without complication, without long-term current use of insulin  Patient has uncontrolled Diabetes .Patient  is unwilling to take more medicationss. Will try harder with present medications and diet   We discussed about diet ;low in calories. Avoid starches, rice, breads ,cookies and desserts    Also stressed  increasing activity; walking 2-3  miles a day.  I also adjusted patients  medications and gave  instructions about adherence to plan.  Goal of  A1c  less than 7 % stressed.  Also goal of LDL less than 70 highlighted to patient.    Hypertriglyceridemia  Limit the cholesterol in your diet to less than 300 mg per day.   Fats should contribute no more than 20 to 35% of your daily calories.   Less than 7 to 10% of your calories should come from saturated fat.   Avoid saturated fat products e.g., Butter, some oils, meat, and poultry fat contain a lot of saturated fat.   Check food labels for fat and cholesterol content. Choose the foods with less fat per serving.   Limit the amount of butter and margarine you eat.   Use salad dressings and margarine made with polyunsaturated and monounsaturated fats.   Use egg whites or egg substitutes rather than whole eggs.   Replace whole-milk dairy products with nonfat or low-fat milk, cheese, spreads, and yogurt.   Eat skinless chicken, turkey, fish, and meatless entrees more often than red meat.   Choose lean cuts of meat and trim off all visible fat. Keep portion sizes moderate.   Avoid fatty desserts such as ice cream, cream-filled cakes, and cheesecakes. Choose fresh fruits, nonfat frozen yogurt, Popsicles, etc.   Reduce the amount of fried foods, vending machine food, and fast food you eat.   Eat fruits and vegetables (especially fresh fruits and leafy vegetables), beans, and whole grains daily. The fiber in these foods helps lower cholesterol.   Look for low-fat or nonfat varieties of the foods you like to eat, or look for substitutes.   You may need to exercise 60 minutes a day to prevent weight gain and 90 minutes a day to lose weight.

## 2018-07-27 ENCOUNTER — OFFICE VISIT (OUTPATIENT)
Dept: ORTHOPEDICS | Facility: CLINIC | Age: 53
End: 2018-07-27
Payer: COMMERCIAL

## 2018-07-27 VITALS — BODY MASS INDEX: 39.83 KG/M2 | WEIGHT: 278.25 LBS | HEIGHT: 70 IN

## 2018-07-27 DIAGNOSIS — M17.0 PRIMARY OSTEOARTHRITIS OF BOTH KNEES: Primary | ICD-10-CM

## 2018-07-27 PROCEDURE — 99999 PR PBB SHADOW E&M-EST. PATIENT-LVL III: CPT | Mod: PBBFAC,,, | Performed by: PHYSICIAN ASSISTANT

## 2018-07-27 PROCEDURE — 99499 UNLISTED E&M SERVICE: CPT | Mod: S$GLB,,, | Performed by: PHYSICIAN ASSISTANT

## 2018-07-27 PROCEDURE — 20610 DRAIN/INJ JOINT/BURSA W/O US: CPT | Mod: 50,S$GLB,, | Performed by: PHYSICIAN ASSISTANT

## 2018-07-27 NOTE — PROGRESS NOTES
Wild Holliday is a 52 y.o. year old his here today for his 2nd Euflexxa injection for degenerative changes of his bilateral knee . he was last seen and treated in the clinic on 7/20/2018. There has been no significant change in his medical status since his last visit. No Fever, chills, malaise, or unexplained weight change.      Allergies, Medications, past medical and surgical history were reviewed .    Examination of the knee demonstrates  No evidence of edema, erythema , echymosis strength and range of motion are unchanged from previous visit.    The injection site was identified and the skin was prepared with a betadine solution. The  bilateral knee  joint was injected with 2 ml of Euflexxa solution under sterile technique. Wild Holliday tolerated the procedure well, he was advised to rest the knee today, ice and elevation. I may take 3 -6 weeks following the last injection to get the full benefit of the medication.  I will see him back in 1 week. Sooner if he has any problems or concerns.           .

## 2018-08-03 ENCOUNTER — OFFICE VISIT (OUTPATIENT)
Dept: ORTHOPEDICS | Facility: CLINIC | Age: 53
End: 2018-08-03
Payer: COMMERCIAL

## 2018-08-03 VITALS — HEIGHT: 70 IN | WEIGHT: 280.88 LBS | BODY MASS INDEX: 40.21 KG/M2

## 2018-08-03 DIAGNOSIS — M17.0 PRIMARY OSTEOARTHRITIS OF BOTH KNEES: Primary | ICD-10-CM

## 2018-08-03 PROCEDURE — 99499 UNLISTED E&M SERVICE: CPT | Mod: S$GLB,,, | Performed by: PHYSICIAN ASSISTANT

## 2018-08-03 PROCEDURE — 99999 PR PBB SHADOW E&M-EST. PATIENT-LVL III: CPT | Mod: PBBFAC,,, | Performed by: PHYSICIAN ASSISTANT

## 2018-08-03 PROCEDURE — 20610 DRAIN/INJ JOINT/BURSA W/O US: CPT | Mod: 50,S$GLB,, | Performed by: PHYSICIAN ASSISTANT

## 2018-08-03 NOTE — PROGRESS NOTES
Wild Holliday is a 52 y.o. year old his here today for his 3rd Euflexxa injection for degenerative changes of his bilateral knee . he was last seen and treated in the clinic on 7/27/2018. There has been no significant change in his medical status since his last visit. No Fever, chills, malaise, or unexplained weight change.      Allergies, Medications, past medical and surgical history were reviewed .    Examination of the knee demonstrates  No evidence of edema, erythema , echymosis strength and range of motion are unchanged from previous visit.    The injection site was identified and the skin was prepared with a betadine solution. The  bilateral knee  joint was injected with 2 ml of Euflexxa solution under sterile technique. Wild Holliday tolerated the procedure well, he was advised to rest the knee today, ice and elevation. I may take 3 -6 weeks following the last injection to get the full benefit of the medication.  I will see him back in 4-6 months. Sooner if he has any problems or concerns.           .

## 2018-08-11 ENCOUNTER — PATIENT MESSAGE (OUTPATIENT)
Dept: INTERNAL MEDICINE | Facility: CLINIC | Age: 53
End: 2018-08-11

## 2018-08-13 ENCOUNTER — PATIENT MESSAGE (OUTPATIENT)
Dept: INTERNAL MEDICINE | Facility: CLINIC | Age: 53
End: 2018-08-13

## 2018-08-21 ENCOUNTER — HOSPITAL ENCOUNTER (OUTPATIENT)
Dept: DIABETES | Facility: HOSPITAL | Age: 53
Discharge: HOME | End: 2018-08-21
Attending: INTERNAL MEDICINE
Payer: COMMERCIAL

## 2018-08-21 VITALS — WEIGHT: 280 LBS | BODY MASS INDEX: 41.47 KG/M2 | HEIGHT: 69 IN

## 2018-08-21 PROCEDURE — G0108 DIAB MANAGE TRN  PER INDIV: HCPCS

## 2018-08-21 NOTE — PROGRESS NOTES
" 08/21/18 1600   Reason for Assessment   Reason for Assessment consult   Diagnosis diabetes diagnosis/complications   Relevant Medical History on Prednisone, cancer, fly hx of DM   General Information Comments pt leaving out carbs until supper   Nutrition Discharge Planning follow meal plan as taught including CHO were indicated   Nutrition Risk Screen   Nutrition Risk Screen no indicators present   Nutrition/Diet History   Patient Reported Diet/Restrictions/Preferences heart healthy   Typical Food/Fluid Intake eating 3 meals and 3 snacks a day   Food Preferences eating mostly meat   Do you have any cultural, spiritual, Buddhist conflicts, given your current situation? no   Use of Vitamin/Mineral/Herbal Supplements MVI   Food Allergies NKFA   Factors Affecting Nutritional Intake socio-economic  (he works for himself)   Nutrition Support Formula Prior to Admit (n/a)   Anthropometrics   Height Method Stated   Height 5' 9" (1.753 m)   Height (inches) 69 in   Weight Method Stated   Weight 127 kg (280 lb)   Weight (lb) 280 lb   Ideal Body Weight (IBW), Male 160 lb   % Ideal Body Weight, Male (lb) 175 lb   BMI (Calculated) 41.4   BMI Grade greater than 40 - morbid obesity   Weight Loss intentional   Usual Body Weight (UBW), kg 127.27 kg   Weight Change Amount 30 lb  (but regained some)   % Usual Body Weight 100   % Weight Change From Usual Weight -0.21 %   Labs/Tests/Procedures/Meds   Pertinent Labs Reviewed reviewed   Pertinent Labs Comments Gluc and A1C   Pertinent Medications Reviewed reviewed   Pertinent Medications Comments Metformen and Prednisone   Physical Findings/Assessment   Overall Physical Appearance obese   Tubes (n/a)   Oral/Mouth Cavity all teeth present (age appropriate)   Skin intact   Estimated/Assessed Needs   Weight Used For Calorie Calculations 99.8 kg (220 lb)  (per pt preference)   Energy Calorie Requirements (kcal) 2000   Energy Need Method Kcal/kg   20 kcal/kg (kcal) 1995.83   Protein " Requirements 100 gm   Weight Used For Protein Calculations (based on 20% of 2000 calories)   Fluid Requirements (mL) 2000   Fluid Need Method RDA Method   RDA Method (mL) 2000   CHO Requirement 224 gm   Nutrition Prescription Ordered   Current Diet Order 2000 calorie diabetic   Nutrition Order Comments appropriate for pt's request   Current Nutrition Support Formula Ordered (n/a)   Nutrition Risk   Level of Risk/Frequency of Follow-up low   Recommendations   Recommendation/Intervention 1. follow meal plan as taught, 2. portion, 3. include carbs where indicated, 4. balance meals   Goals for pt's A1C to be below 7.0 by next labs   Nutrition Goal Status progressing towards goal   Communication of RD Recs reviewed with RN   Monitor and Evaluation   Food and Nutrient Intake food and beverage intake   Food and Nutrient Adminstration diet order   Knowledge/Beliefs/Attitudes food and nutrition knowledge/skill   Physical Activity and Function factors affecting access to physical activity   Anthropometric Measurements height/length;weight   Biochemical Data, Medical Tests and Procedures glucose/endocrine profile   Nutrition-Focused Physical Findings overall appearance   Nutrition Follow-up   RD Follow-up? No  (pt can call should any questions arise)

## 2018-08-21 NOTE — PROGRESS NOTES
08/21/18 0000   Diabetes Education Visit   Diabetes Education Record Assessment/Progress Initial   Diabetes Type   Diabetes Type  Type II  (dx approximately 2 years ago)   Diabetes History   Diabetes Diagnosis 1-3 years   Nutrition   Meal Planning water;3 meals per day;diet drinks;artificial sweeteners;snacks between meal;eats out often   What type of sweetener do you use? Splenda   What type of beverages do you drink? diet soda/tea;water  (approximately 1 diet soda a week and also drinks Crystal Light)   Monitoring    Monitoring Con [employer]   Blood Glucose Logs No   Do you use a personal glucose monitor? No   In the last month, how often have you had a low blood sugar reaction? never   Can you tell when your blood sugar is too high? no   Exercise    Exercise Type swimming   Intensity Low   Frequency 3-5 Times per week   Duration 30 min   Current Diabetes Treatment    Current Treatment Oral Medication  (Metformin 1000 mg daily)   Social History   Preferred Learning Method Face to Face;Demonstration   Primary Support Self   Educational Level Some College   Occupation    Smoking Status Never a Smoker   Alcohol Use Never   Barriers to Change   Barriers to Change None   Learning Challenges  None   Readiness to Learn    Readiness to Learn  Eager   Cultural Influences   Cultural Influences None   Diabetes Education Assessment/Progress   Diabetes Disease Process (diabetes disease process and treatment options) DC;5;IS;1;W   Nutrition (Incorporating nutritional management into one's lifestyle) DC;IS  (Meeting with dietician following this session)   Physical Activity (incorporating physical activity into one's lifestyle) DC;5;IS;1;W;D   Medications (states correct name, dose, onset, peak, duration, side effects & timing of meds) DC;5;IS;1  (Discussed Metformin)   Monitoring (monitoring blood glucose/other parameters & using results) DC;5;IS;1;W  (Set goals of fasting 70 mg/dl - 130 mg/dl and 2 hours post meals  <180 mg/dl. Instructed to maintain log of results and bring with him to MD appt.)   Acute Complications (preventing, detecting, and treating acute complications) DC;5;IS;1;W   Chronic Complications (preventing, detecting, and treating chronic complications) DC;5;IS;1;W   Clinical (diabetes, other pertinent medical history, and relevant comorbidities reviewed during visit) DC;IS;1   Cognitive (knowledge of self-management skills, functional health literacy) DC;IS;1   Psychosocial (emotional response to diabetes) DC;IS;1;W   Diabetes Distress and Support Systems DC;IS;1   Behavioral (readiness for change, lifestyle practices, self-care behaviors) DC;IS;1   Goals   Patient has selected/evaluated goals during today's session Yes, selected   Monitoring Set  (Will maintain log of glucometer results to bring with him to MD appt)   Start Date 08/21/18   Diabetes Self-Management Support Plan   Exercise/Nutrition (will swim at least 5 days/week)   Stress Management family   Medication (none needed at this time)   Review Status Patient has selected and agrees to support plan.   Diabetes Care Plan/Intervention   Education Plan/Intervention In F/U MNT   Education Units of Time    Time Spent 60 min

## 2018-08-21 NOTE — PROGRESS NOTES
Nutrition Problem  Diabetes    Related to (etiology):   Family hx, on steroids and wt gain    Signs and Symptoms (as evidenced by):   See labs     Interventions/Recommendations (treatment strategy):  Pt taught individualized 2000 calorie meal plan using his food preferences    Nutrition Diagnosis Status:   New

## 2018-09-27 DIAGNOSIS — E11.9 CONTROLLED TYPE 2 DIABETES MELLITUS WITHOUT COMPLICATION, WITHOUT LONG-TERM CURRENT USE OF INSULIN: ICD-10-CM

## 2018-09-28 RX ORDER — METFORMIN HYDROCHLORIDE 500 MG/1
TABLET, EXTENDED RELEASE ORAL
Qty: 180 TABLET | Refills: 1 | Status: SHIPPED | OUTPATIENT
Start: 2018-09-28 | End: 2019-01-24 | Stop reason: SDUPTHER

## 2018-10-01 ENCOUNTER — PATIENT MESSAGE (OUTPATIENT)
Dept: HEMATOLOGY/ONCOLOGY | Facility: CLINIC | Age: 53
End: 2018-10-01

## 2018-10-07 ENCOUNTER — PATIENT MESSAGE (OUTPATIENT)
Dept: INTERNAL MEDICINE | Facility: CLINIC | Age: 53
End: 2018-10-07

## 2018-10-08 ENCOUNTER — PATIENT MESSAGE (OUTPATIENT)
Dept: INTERNAL MEDICINE | Facility: CLINIC | Age: 53
End: 2018-10-08

## 2018-10-08 DIAGNOSIS — E78.1 HYPERTRIGLYCERIDEMIA: ICD-10-CM

## 2018-10-08 RX ORDER — FENOFIBRATE 160 MG/1
160 TABLET ORAL DAILY
Qty: 90 TABLET | Refills: 3 | Status: SHIPPED | OUTPATIENT
Start: 2018-10-08 | End: 2019-10-15 | Stop reason: SDUPTHER

## 2018-10-16 ENCOUNTER — OFFICE VISIT (OUTPATIENT)
Dept: HEMATOLOGY/ONCOLOGY | Facility: CLINIC | Age: 53
End: 2018-10-16
Payer: COMMERCIAL

## 2018-10-16 ENCOUNTER — LAB VISIT (OUTPATIENT)
Dept: LAB | Facility: HOSPITAL | Age: 53
End: 2018-10-16
Payer: COMMERCIAL

## 2018-10-16 VITALS
HEART RATE: 53 BPM | WEIGHT: 279.75 LBS | BODY MASS INDEX: 41.43 KG/M2 | HEIGHT: 69 IN | SYSTOLIC BLOOD PRESSURE: 158 MMHG | DIASTOLIC BLOOD PRESSURE: 84 MMHG | TEMPERATURE: 98 F | OXYGEN SATURATION: 97 %

## 2018-10-16 DIAGNOSIS — K14.8 TONGUE LESION: ICD-10-CM

## 2018-10-16 DIAGNOSIS — C83.00 SMALL B-CELL LYMPHOMA, UNSPECIFIED BODY REGION: ICD-10-CM

## 2018-10-16 DIAGNOSIS — C83.00 SMALL B-CELL LYMPHOMA, UNSPECIFIED BODY REGION: Primary | ICD-10-CM

## 2018-10-16 LAB
ALBUMIN SERPL BCP-MCNC: 4 G/DL
ALP SERPL-CCNC: 68 U/L
ALT SERPL W/O P-5'-P-CCNC: 35 U/L
ANION GAP SERPL CALC-SCNC: 8 MMOL/L
AST SERPL-CCNC: 20 U/L
BASOPHILS # BLD AUTO: 0.04 K/UL
BASOPHILS NFR BLD: 0.7 %
BILIRUB SERPL-MCNC: 0.4 MG/DL
BUN SERPL-MCNC: 17 MG/DL
CALCIUM SERPL-MCNC: 9.6 MG/DL
CHLORIDE SERPL-SCNC: 107 MMOL/L
CO2 SERPL-SCNC: 25 MMOL/L
CREAT SERPL-MCNC: 0.9 MG/DL
DIFFERENTIAL METHOD: ABNORMAL
EOSINOPHIL # BLD AUTO: 0.2 K/UL
EOSINOPHIL NFR BLD: 3 %
ERYTHROCYTE [DISTWIDTH] IN BLOOD BY AUTOMATED COUNT: 12.3 %
EST. GFR  (AFRICAN AMERICAN): >60 ML/MIN/1.73 M^2
EST. GFR  (NON AFRICAN AMERICAN): >60 ML/MIN/1.73 M^2
GLUCOSE SERPL-MCNC: 171 MG/DL
HCT VFR BLD AUTO: 41.6 %
HGB BLD-MCNC: 14.2 G/DL
IMM GRANULOCYTES # BLD AUTO: 0.02 K/UL
IMM GRANULOCYTES NFR BLD AUTO: 0.3 %
LDH SERPL L TO P-CCNC: 156 U/L
LYMPHOCYTES # BLD AUTO: 1.4 K/UL
LYMPHOCYTES NFR BLD: 23.6 %
MCH RBC QN AUTO: 29.9 PG
MCHC RBC AUTO-ENTMCNC: 34.1 G/DL
MCV RBC AUTO: 88 FL
MONOCYTES # BLD AUTO: 0.6 K/UL
MONOCYTES NFR BLD: 9.7 %
NEUTROPHILS # BLD AUTO: 3.8 K/UL
NEUTROPHILS NFR BLD: 62.7 %
NRBC BLD-RTO: 0 /100 WBC
PLATELET # BLD AUTO: 248 K/UL
PMV BLD AUTO: 9 FL
POTASSIUM SERPL-SCNC: 4.8 MMOL/L
PROT SERPL-MCNC: 7 G/DL
RBC # BLD AUTO: 4.75 M/UL
SODIUM SERPL-SCNC: 140 MMOL/L
WBC # BLD AUTO: 6.01 K/UL

## 2018-10-16 PROCEDURE — 99999 PR PBB SHADOW E&M-EST. PATIENT-LVL III: CPT | Mod: PBBFAC,,, | Performed by: INTERNAL MEDICINE

## 2018-10-16 PROCEDURE — 80053 COMPREHEN METABOLIC PANEL: CPT

## 2018-10-16 PROCEDURE — 3045F PR MOST RECENT HEMOGLOBIN A1C LEVEL 7.0-9.0%: CPT | Mod: CPTII,S$GLB,, | Performed by: INTERNAL MEDICINE

## 2018-10-16 PROCEDURE — 83615 LACTATE (LD) (LDH) ENZYME: CPT

## 2018-10-16 PROCEDURE — 3008F BODY MASS INDEX DOCD: CPT | Mod: CPTII,S$GLB,, | Performed by: INTERNAL MEDICINE

## 2018-10-16 PROCEDURE — 36415 COLL VENOUS BLD VENIPUNCTURE: CPT

## 2018-10-16 PROCEDURE — 85025 COMPLETE CBC W/AUTO DIFF WBC: CPT

## 2018-10-16 PROCEDURE — 99214 OFFICE O/P EST MOD 30 MIN: CPT | Mod: S$GLB,,, | Performed by: INTERNAL MEDICINE

## 2018-10-18 ENCOUNTER — PATIENT MESSAGE (OUTPATIENT)
Dept: HEMATOLOGY/ONCOLOGY | Facility: CLINIC | Age: 53
End: 2018-10-18

## 2018-10-19 ENCOUNTER — TELEPHONE (OUTPATIENT)
Dept: OTOLARYNGOLOGY | Facility: CLINIC | Age: 53
End: 2018-10-19

## 2018-10-19 ENCOUNTER — PATIENT MESSAGE (OUTPATIENT)
Dept: HEMATOLOGY/ONCOLOGY | Facility: CLINIC | Age: 53
End: 2018-10-19

## 2018-10-19 DIAGNOSIS — C83.00 SMALL B-CELL LYMPHOMA, UNSPECIFIED BODY REGION: Primary | ICD-10-CM
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
## 2018-11-13 ENCOUNTER — PATIENT MESSAGE (OUTPATIENT)
Dept: HEMATOLOGY/ONCOLOGY | Facility: CLINIC | Age: 53
End: 2018-11-13

## 2018-11-14 ENCOUNTER — PATIENT MESSAGE (OUTPATIENT)
Dept: HEMATOLOGY/ONCOLOGY | Facility: CLINIC | Age: 53
End: 2018-11-14

## 2018-11-14 DIAGNOSIS — C83.00 SMALL B-CELL LYMPHOMA, UNSPECIFIED BODY REGION: Primary | ICD-10-CM

## 2018-11-28 ENCOUNTER — TELEPHONE (OUTPATIENT)
Dept: HEMATOLOGY/ONCOLOGY | Facility: CLINIC | Age: 53
End: 2018-11-28

## 2018-11-28 ENCOUNTER — PATIENT MESSAGE (OUTPATIENT)
Dept: HEMATOLOGY/ONCOLOGY | Facility: CLINIC | Age: 53
End: 2018-11-28

## 2018-11-28 NOTE — TELEPHONE ENCOUNTER
Spoke to patient. Rescheduled appts for 12/6      ----- Message from Lamar Rosa NP sent at 11/28/2018 10:07 AM CST -----  Contact: Self  Can we call patient to reschedule  Thanks    ----- Message -----  From: Samanta Abril  Sent: 11/28/2018   9:52 AM  To: Moe SCHUSTER Staff    Pt called to reschedule his appts scheduled for tomorrow due to a family emergency.   was able to reschedule the appt with Dr. Barger to 12/6/18@9:00 am and requests the lab and appt with Dr. Rosa is scheduled the same day.    He can be reached at [phone].    Thank you.
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
## 2018-12-06 ENCOUNTER — INITIAL CONSULT (OUTPATIENT)
Dept: OTOLARYNGOLOGY | Facility: CLINIC | Age: 53
End: 2018-12-06
Payer: COMMERCIAL

## 2018-12-06 ENCOUNTER — LAB VISIT (OUTPATIENT)
Dept: LAB | Facility: HOSPITAL | Age: 53
End: 2018-12-06
Attending: INTERNAL MEDICINE
Payer: COMMERCIAL

## 2018-12-06 ENCOUNTER — OFFICE VISIT (OUTPATIENT)
Dept: HEMATOLOGY/ONCOLOGY | Facility: CLINIC | Age: 53
End: 2018-12-06
Payer: COMMERCIAL

## 2018-12-06 VITALS
TEMPERATURE: 98 F | WEIGHT: 282.19 LBS | BODY MASS INDEX: 41.8 KG/M2 | RESPIRATION RATE: 20 BRPM | HEIGHT: 69 IN | HEART RATE: 68 BPM | DIASTOLIC BLOOD PRESSURE: 83 MMHG | SYSTOLIC BLOOD PRESSURE: 159 MMHG | OXYGEN SATURATION: 95 %

## 2018-12-06 VITALS
HEART RATE: 59 BPM | SYSTOLIC BLOOD PRESSURE: 160 MMHG | BODY MASS INDEX: 41.22 KG/M2 | DIASTOLIC BLOOD PRESSURE: 88 MMHG | WEIGHT: 279.13 LBS

## 2018-12-06 DIAGNOSIS — K13.70 MOUTH LESION: Primary | ICD-10-CM

## 2018-12-06 DIAGNOSIS — L70.0 ACNE COMEDONE: Primary | ICD-10-CM

## 2018-12-06 DIAGNOSIS — C83.00 SMALL B-CELL LYMPHOMA, UNSPECIFIED BODY REGION: ICD-10-CM

## 2018-12-06 DIAGNOSIS — K13.70 MOUTH LESION: ICD-10-CM

## 2018-12-06 DIAGNOSIS — E78.1 HYPERTRIGLYCERIDEMIA: ICD-10-CM

## 2018-12-06 LAB
ALBUMIN SERPL BCP-MCNC: 3.9 G/DL
ALP SERPL-CCNC: 60 U/L
ALT SERPL W/O P-5'-P-CCNC: 42 U/L
ANION GAP SERPL CALC-SCNC: 9 MMOL/L
AST SERPL-CCNC: 25 U/L
BASOPHILS # BLD AUTO: 0.03 K/UL
BASOPHILS NFR BLD: 0.4 %
BILIRUB SERPL-MCNC: 0.4 MG/DL
BUN SERPL-MCNC: 14 MG/DL
CALCIUM SERPL-MCNC: 9.9 MG/DL
CHLORIDE SERPL-SCNC: 103 MMOL/L
CO2 SERPL-SCNC: 25 MMOL/L
CREAT SERPL-MCNC: 0.9 MG/DL
DIFFERENTIAL METHOD: ABNORMAL
EOSINOPHIL # BLD AUTO: 0.2 K/UL
EOSINOPHIL NFR BLD: 2.4 %
ERYTHROCYTE [DISTWIDTH] IN BLOOD BY AUTOMATED COUNT: 12.4 %
EST. GFR  (AFRICAN AMERICAN): >60 ML/MIN/1.73 M^2
EST. GFR  (NON AFRICAN AMERICAN): >60 ML/MIN/1.73 M^2
GLUCOSE SERPL-MCNC: 156 MG/DL
HCT VFR BLD AUTO: 40.7 %
HGB BLD-MCNC: 14.1 G/DL
IMM GRANULOCYTES # BLD AUTO: 0.03 K/UL
IMM GRANULOCYTES NFR BLD AUTO: 0.4 %
LDH SERPL L TO P-CCNC: 152 U/L
LYMPHOCYTES # BLD AUTO: 1.8 K/UL
LYMPHOCYTES NFR BLD: 25.5 %
MCH RBC QN AUTO: 29.7 PG
MCHC RBC AUTO-ENTMCNC: 34.6 G/DL
MCV RBC AUTO: 86 FL
MONOCYTES # BLD AUTO: 0.5 K/UL
MONOCYTES NFR BLD: 7.3 %
NEUTROPHILS # BLD AUTO: 4.6 K/UL
NEUTROPHILS NFR BLD: 64 %
NRBC BLD-RTO: 0 /100 WBC
PLATELET # BLD AUTO: 246 K/UL
PMV BLD AUTO: 9.1 FL
POTASSIUM SERPL-SCNC: 4.5 MMOL/L
PROT SERPL-MCNC: 6.8 G/DL
RBC # BLD AUTO: 4.75 M/UL
SODIUM SERPL-SCNC: 137 MMOL/L
WBC # BLD AUTO: 7.13 K/UL

## 2018-12-06 PROCEDURE — 3008F BODY MASS INDEX DOCD: CPT | Mod: CPTII,S$GLB,, | Performed by: NURSE PRACTITIONER

## 2018-12-06 PROCEDURE — 83615 LACTATE (LD) (LDH) ENZYME: CPT

## 2018-12-06 PROCEDURE — 99203 OFFICE O/P NEW LOW 30 MIN: CPT | Mod: S$GLB,,, | Performed by: OTOLARYNGOLOGY

## 2018-12-06 PROCEDURE — 99999 PR PBB SHADOW E&M-EST. PATIENT-LVL III: CPT | Mod: PBBFAC,,, | Performed by: NURSE PRACTITIONER

## 2018-12-06 PROCEDURE — 99214 OFFICE O/P EST MOD 30 MIN: CPT | Mod: S$GLB,,, | Performed by: NURSE PRACTITIONER

## 2018-12-06 PROCEDURE — 99999 PR PBB SHADOW E&M-EST. PATIENT-LVL III: CPT | Mod: PBBFAC,,, | Performed by: OTOLARYNGOLOGY

## 2018-12-06 PROCEDURE — 3045F PR MOST RECENT HEMOGLOBIN A1C LEVEL 7.0-9.0%: CPT | Mod: CPTII,S$GLB,, | Performed by: NURSE PRACTITIONER

## 2018-12-06 PROCEDURE — 80053 COMPREHEN METABOLIC PANEL: CPT

## 2018-12-06 PROCEDURE — 85025 COMPLETE CBC W/AUTO DIFF WBC: CPT

## 2018-12-06 PROCEDURE — 3008F BODY MASS INDEX DOCD: CPT | Mod: CPTII,S$GLB,, | Performed by: OTOLARYNGOLOGY

## 2018-12-06 PROCEDURE — 36415 COLL VENOUS BLD VENIPUNCTURE: CPT

## 2018-12-06 NOTE — ASSESSMENT & PLAN NOTE
No mucosal lesions.  Possible submandibular sialolith.  No stones noted on neck CT in April.  He states that he may be undergoing a PET/CT in the next few weeks.  He will contact me if he does not undergo this scan and I will order a neck CT.

## 2018-12-06 NOTE — LETTER
December 6, 2018      Sav Lara MD  [address] [facility] [address]           [facility] Head/Neck Surg [facility]  [address]  [place] [address]  Phone: [phone]  Fax: [phone]          Patient: Wild Holliday   MR Number: [id number]   YOB: 1965   Date of Visit: 12/6/2018       Dear Dr. Sav Lara:    Thank you for referring Wild Holliday to me for evaluation. Attached you will find relevant portions of my assessment and plan of care.    If you have questions, please do not hesitate to call me. I look forward to following Wild Holliday along with you.    Sincerely,    Xavier Barger MD    Enclosure  CC:  No Recipients    If you would like to receive this communication electronically, please contact [email or web address] or [phone] to request more information on [employer] access.    For providers and/or their staff who would like to refer a patient to [facility], please contact us through our one-stop-shop provider referral line, [facility], at [phone].    If you feel you have received this communication in error or would no longer like to receive these types of communications, please e-mail [email or web address]

## 2018-12-06 NOTE — PROGRESS NOTES
Chief Complaint   Patient presents with    Consult     small B cell lymphoma/ tongue lesion       HPI   53 y.o. male presents from Dr. Sav Lara for evaluation of a lesion of the R FOM.  He has a history of lymphoma.  He noted this lesion within the last few mos.  He denies pain.  He reports that he noted this spot with his tongue.     Review of Systems   Constitutional: Negative for fatigue and unexpected weight change.   HENT: Per HPI.  Eyes: Negative for visual disturbance.   Respiratory: Negative for shortness of breath, hemoptysis   Cardiovascular: Negative for chest pain and palpitations.   Musculoskeletal: Negative for decreased ROM, back pain.   Skin: Negative for rash, sunburn, itching.   Neurological: Negative for dizziness and seizures.   Hematological: Negative for adenopathy. Does not bruise/bleed easily.   Endocrine: Negative for rapid weight loss/weight gain, heat/cold intolerance.     Past Medical History   Patient Active Problem List   Diagnosis    Thoracic spine tumor    Other malignant lymphomas, unspecified site, extranodal and solid organ sites    Diabetes    Lymphoma    Small B-cell lymphoma    GERD (gastroesophageal reflux disease)    Insomnia    Adjustment disorder with mixed anxiety and depressed mood    Uncontrolled type 2 diabetes mellitus without complication, without long-term current use of insulin    Hypertriglyceridemia    ASCVD (arteriosclerotic cardiovascular disease)           Past Surgical History   Past Surgical History:   Procedure Laterality Date    BIOPSY-BONE MARROW Left 11/28/2016    [facility] at [facility] OR 2ND FLR    [id number]-[id number] N/A 11/15/2016    Performed by [facility] at [facility] OR 2ND FLR    KZMIHJFCU-AVVP-Z-CATH Left 12/1/2016    Performed by John Pierre [facility] at [facility] OR 2ND FLR    lipoma removal      right rib; below right navel    PORTACATH PLACEMENT      left upper chest    REMOVAL-PORT-A-CATH Left  10/20/2017    [facility]    TONSILLECTOMY      tricep tendon repair Right 2014         Family History   Family History   Problem Relation Age of Onset    Valvular heart disease Mother     Hypertension Mother     Cancer Father     Diabetes Father     Hypertension Father     Drug abuse Brother     Heart attack Brother     ALS Maternal Aunt     Cancer Maternal Uncle     Cancer Maternal Grandfather     Breast cancer Paternal Grandmother     Diabetes Paternal Grandfather     Cancer Maternal Uncle            Social History   .  Social History     Socioeconomic History    Marital status:      Spouse name: Not on file    Number of children: 3    Years of education: Not on file    Highest education level: Not on file   Social Needs    Financial resource strain: Not on file    Food insecurity - worry: Not on file    Food insecurity - inability: Not on file    Transportation needs - medical: Not on file    Transportation needs - non-medical: Not on file   Occupational History    Not on file   Tobacco Use    Smoking status: Never Smoker    Smokeless tobacco: Never Used   Substance and Sexual Activity    Alcohol use: Yes     Comment: socially    Drug use: No    Sexual activity: Yes   Other Topics Concern    Patient feels they ought to cut down on drinking/drug use Not Asked    Patient annoyed by others criticizing their drinking/drug use Not Asked    Patient has felt bad or guilty about drinking/drug use Not Asked    Patient has had a drink/used drugs as an eye opener in the AM Not Asked   Social History Narrative    Owns electrical company,  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      General: AOx3, NAD    Respiratory:  Symmetric chest rise, normal effort  Oral Cavity:  Oral Tongue mobile, no lesions noted. ~5 mm submucosal firmness in R anterior FOM consistent with a sialolith.  Hard Palate WNL. No buccal or FOM lesions.  Oropharynx:  No masses/lesions of the posterior pharyngeal wall. Tonsillar fossa without lesions. Soft palate without masses. Midline uvula.   Neck: No scars.  No cervical lymphadenopathy, thyromegaly or thyroid nodules.  Normal range of motion.    Face: House Brackmann I bilaterally.     Assessment/Plan  Problem List Items Addressed This Visit        ENT    Mouth lesion - Primary     No mucosal lesions.  Possible submandibular sialolith.  No stones noted on neck CT in April.  He states that he may be undergoing a PET/CT in the next few weeks.  He will contact me if he does not undergo this scan and I will order a neck CT.

## 2019-01-17 ENCOUNTER — LAB VISIT (OUTPATIENT)
Dept: LAB | Facility: HOSPITAL | Age: 54
End: 2019-01-17
Attending: INTERNAL MEDICINE
Payer: COMMERCIAL

## 2019-01-17 DIAGNOSIS — E78.1 HYPERTRIGLYCERIDEMIA: ICD-10-CM

## 2019-01-17 LAB
ALBUMIN SERPL BCP-MCNC: 3.9 G/DL
ALBUMIN/CREAT UR: 4.6 UG/MG
ALP SERPL-CCNC: 61 U/L
ALT SERPL W/O P-5'-P-CCNC: 63 U/L
ANION GAP SERPL CALC-SCNC: 11 MMOL/L
AST SERPL-CCNC: 38 U/L
BASOPHILS # BLD AUTO: 0.02 K/UL
BASOPHILS NFR BLD: 0.3 %
BILIRUB SERPL-MCNC: 0.5 MG/DL
BUN SERPL-MCNC: 15 MG/DL
CALCIUM SERPL-MCNC: 9.5 MG/DL
CHLORIDE SERPL-SCNC: 103 MMOL/L
CHOLEST SERPL-MCNC: 127 MG/DL
CHOLEST/HDLC SERPL: 4.2 {RATIO}
CO2 SERPL-SCNC: 24 MMOL/L
CREAT SERPL-MCNC: 0.8 MG/DL
CREAT UR-MCNC: 65.2 MG/DL
DIFFERENTIAL METHOD: ABNORMAL
EOSINOPHIL # BLD AUTO: 0.2 K/UL
EOSINOPHIL NFR BLD: 3.1 %
ERYTHROCYTE [DISTWIDTH] IN BLOOD BY AUTOMATED COUNT: 12.7 %
EST. GFR  (AFRICAN AMERICAN): >60 ML/MIN/1.73 M^2
EST. GFR  (NON AFRICAN AMERICAN): >60 ML/MIN/1.73 M^2
ESTIMATED AVG GLUCOSE: 171 MG/DL
GLUCOSE SERPL-MCNC: 176 MG/DL
HBA1C MFR BLD HPLC: 7.6 %
HCT VFR BLD AUTO: 40.5 %
HDLC SERPL-MCNC: 30 MG/DL
HDLC SERPL: 23.6 %
HGB BLD-MCNC: 13.7 G/DL
LDLC SERPL CALC-MCNC: 60.6 MG/DL
LYMPHOCYTES # BLD AUTO: 1.3 K/UL
LYMPHOCYTES NFR BLD: 21.3 %
MCH RBC QN AUTO: 29.7 PG
MCHC RBC AUTO-ENTMCNC: 33.8 G/DL
MCV RBC AUTO: 88 FL
MICROALBUMIN UR DL<=1MG/L-MCNC: 3 UG/ML
MONOCYTES # BLD AUTO: 0.5 K/UL
MONOCYTES NFR BLD: 8.6 %
NEUTROPHILS # BLD AUTO: 4.1 K/UL
NEUTROPHILS NFR BLD: 66.7 %
NONHDLC SERPL-MCNC: 97 MG/DL
PLATELET # BLD AUTO: 207 K/UL
PMV BLD AUTO: 9.1 FL
POTASSIUM SERPL-SCNC: 4.4 MMOL/L
PROT SERPL-MCNC: 6.7 G/DL
RBC # BLD AUTO: 4.61 M/UL
SODIUM SERPL-SCNC: 138 MMOL/L
TRIGL SERPL-MCNC: 182 MG/DL
TSH SERPL DL<=0.005 MIU/L-ACNC: 2.74 UIU/ML
WBC # BLD AUTO: 6.19 K/UL

## 2019-01-17 PROCEDURE — 36415 COLL VENOUS BLD VENIPUNCTURE: CPT

## 2019-01-17 PROCEDURE — 83036 HEMOGLOBIN GLYCOSYLATED A1C: CPT

## 2019-01-17 PROCEDURE — 84443 ASSAY THYROID STIM HORMONE: CPT

## 2019-01-17 PROCEDURE — 80053 COMPREHEN METABOLIC PANEL: CPT

## 2019-01-17 PROCEDURE — 85025 COMPLETE CBC W/AUTO DIFF WBC: CPT

## 2019-01-17 PROCEDURE — 82043 UR ALBUMIN QUANTITATIVE: CPT

## 2019-01-17 PROCEDURE — 80061 LIPID PANEL: CPT

## 2019-01-24 ENCOUNTER — OFFICE VISIT (OUTPATIENT)
Dept: INTERNAL MEDICINE | Facility: CLINIC | Age: 54
End: 2019-01-24
Payer: COMMERCIAL

## 2019-01-24 VITALS
BODY MASS INDEX: 41.47 KG/M2 | SYSTOLIC BLOOD PRESSURE: 132 MMHG | HEART RATE: 62 BPM | RESPIRATION RATE: 14 BRPM | OXYGEN SATURATION: 97 % | DIASTOLIC BLOOD PRESSURE: 80 MMHG | WEIGHT: 280 LBS | HEIGHT: 69 IN

## 2019-01-24 DIAGNOSIS — E78.1 HYPERTRIGLYCERIDEMIA: ICD-10-CM

## 2019-01-24 DIAGNOSIS — C82.28 GRADE 3 FOLLICULAR LYMPHOMA OF LYMPH NODES OF MULTIPLE REGIONS: ICD-10-CM

## 2019-01-24 DIAGNOSIS — E11.9 CONTROLLED TYPE 2 DIABETES MELLITUS WITHOUT COMPLICATION, WITHOUT LONG-TERM CURRENT USE OF INSULIN: ICD-10-CM

## 2019-01-24 PROCEDURE — 99214 OFFICE O/P EST MOD 30 MIN: CPT | Mod: S$GLB,,, | Performed by: INTERNAL MEDICINE

## 2019-01-24 PROCEDURE — 99214 PR OFFICE/OUTPT VISIT, EST, LEVL IV, 30-39 MIN: ICD-10-PCS | Mod: S$GLB,,, | Performed by: INTERNAL MEDICINE

## 2019-01-24 PROCEDURE — 3045F PR MOST RECENT HEMOGLOBIN A1C LEVEL 7.0-9.0%: ICD-10-PCS | Mod: CPTII,S$GLB,, | Performed by: INTERNAL MEDICINE

## 2019-01-24 PROCEDURE — 3008F BODY MASS INDEX DOCD: CPT | Mod: CPTII,S$GLB,, | Performed by: INTERNAL MEDICINE

## 2019-01-24 PROCEDURE — 99999 PR PBB SHADOW E&M-EST. PATIENT-LVL III: CPT | Mod: PBBFAC,,, | Performed by: INTERNAL MEDICINE

## 2019-01-24 PROCEDURE — 99999 PR PBB SHADOW E&M-EST. PATIENT-LVL III: ICD-10-PCS | Mod: PBBFAC,,, | Performed by: INTERNAL MEDICINE

## 2019-01-24 PROCEDURE — 3045F PR MOST RECENT HEMOGLOBIN A1C LEVEL 7.0-9.0%: CPT | Mod: CPTII,S$GLB,, | Performed by: INTERNAL MEDICINE

## 2019-01-24 PROCEDURE — 3008F PR BODY MASS INDEX (BMI) DOCUMENTED: ICD-10-PCS | Mod: CPTII,S$GLB,, | Performed by: INTERNAL MEDICINE

## 2019-01-24 RX ORDER — ALBUTEROL SULFATE 90 UG/1
AEROSOL, METERED RESPIRATORY (INHALATION)
Refills: 1 | COMMUNITY
Start: 2019-01-18 | End: 2019-04-24

## 2019-01-24 RX ORDER — TIZANIDINE 4 MG/1
TABLET ORAL
Refills: 0 | COMMUNITY
Start: 2019-01-18 | End: 2019-02-20

## 2019-01-24 RX ORDER — METFORMIN HYDROCHLORIDE 500 MG/1
TABLET, EXTENDED RELEASE ORAL
Qty: 270 TABLET | Refills: 1 | Status: SHIPPED | OUTPATIENT
Start: 2019-01-24 | End: 2019-07-25 | Stop reason: SDUPTHER

## 2019-01-24 NOTE — PROGRESS NOTES
Subjective:       Patient ID: Wild Holliday is a 53 y.o. male.    Chief Complaint: Diabetes (follow up with lab review); Depression; and Hyperlipidemia    Wild Holliday is a 53 y.o. male  Here with follow up .  Labs are reviewed .  a1c 7.6 %.        Diabetes   He presents for his follow-up diabetic visit. He has type 2 diabetes mellitus. His disease course has been worsening. Pertinent negatives for hypoglycemia include no confusion, dizziness, headaches, nervousness/anxiousness, pallor, seizures, speech difficulty or tremors. Associated symptoms include polyphagia. Pertinent negatives for diabetes include no chest pain, no fatigue, no polydipsia, no polyuria and no weakness. Current diabetic treatment includes oral agent (monotherapy). He is compliant with treatment all of the time. He never participates in exercise. An ACE inhibitor/angiotensin II receptor blocker is not being taken.   Hyperlipidemia   This is a chronic problem. The current episode started more than 1 year ago. The problem is controlled. Exacerbating diseases include diabetes and obesity. Pertinent negatives include no chest pain or shortness of breath. Current antihyperlipidemic treatment includes fibric acid derivatives. The current treatment provides mild improvement of lipids.     Review of Systems   Constitutional: Negative for activity change, chills, fatigue, fever and unexpected weight change.   HENT: Negative for congestion, hearing loss, postnasal drip, rhinorrhea, sore throat and trouble swallowing.    Eyes: Negative for photophobia, discharge and visual disturbance.   Respiratory: Negative for chest tightness, shortness of breath and wheezing.    Cardiovascular: Negative for chest pain and palpitations.   Gastrointestinal: Negative for abdominal distention, abdominal pain, blood in stool, constipation, diarrhea and vomiting.   Endocrine: Positive for polyphagia. Negative for polydipsia and polyuria.   Genitourinary: Negative for  difficulty urinating, dysuria, flank pain, hematuria and urgency.   Musculoskeletal: Negative for arthralgias, back pain, joint swelling and neck pain.   Skin: Negative for pallor.   Neurological: Negative for dizziness, tremors, seizures, facial asymmetry, speech difficulty, weakness, numbness and headaches.   Hematological: Does not bruise/bleed easily.   Psychiatric/Behavioral: Negative for agitation, confusion, dysphoric mood and suicidal ideas. The patient is not nervous/anxious.        Objective:      Physical Exam   Constitutional: He is oriented to person, place, and time. He appears well-developed and well-nourished.   HENT:   Head: Normocephalic and atraumatic.   Eyes: Right eye exhibits no chemosis. Left eye exhibits no chemosis. Right conjunctiva is not injected. Left conjunctiva is not injected.   Neck: No JVD present.   Cardiovascular: Normal rate, regular rhythm and normal heart sounds.   Pulmonary/Chest: Effort normal and breath sounds normal.   Abdominal: Soft. Bowel sounds are normal. There is no tenderness.   Musculoskeletal: He exhibits no edema.   Neurological: He is alert and oriented to person, place, and time.   Skin: Skin is warm and dry.   Psychiatric: He has a normal mood and affect. His behavior is normal. Judgment and thought content normal.   Nursing note and vitals reviewed.      Assessment:       1. Uncontrolled type 2 diabetes mellitus without complication, without long-term current use of insulin    2. Hypertriglyceridemia    3. Grade 3 follicular lymphoma of lymph nodes of multiple regions    4. Controlled type 2 diabetes mellitus without complication, without long-term current use of insulin        Plan:   Wild was seen today for diabetes, depression and hyperlipidemia.    Diagnoses and all orders for this visit:    Uncontrolled type 2 diabetes mellitus without complication, without long-term current use of insulin  -     CBC auto differential; Future  -     Comprehensive  metabolic panel; Future  -     Hemoglobin A1c; Future  -     Microalbumin/creatinine urine ratio; Future  Patient has uncontrolled Diabetes .  We discussed about diet ;low in calories. Avoid sweats, sodas.  Also increasing activity;walking 2-3 miles a day.  I also adjusted medications and gave patient  instructions about adherence to plan.  Goal of  A1c  less than 7 % stressed.  Also goal of LDL less than 70 highlighted to patient.    Increase metformin xr 500 mg three a day.    Hypertriglyceridemia  -     CBC auto differential; Future  -     Comprehensive metabolic panel; Future  -     Lipid panel; Future  -     TSH; Future  He is on fenofibrate .  Lab Results   Component Value Date    LDLCALC 60.6 (L) 01/17/2019       Grade 3 follicular lymphoma of lymph nodes of multiple regions  -     CBC auto differential; Future  Stable      Problem List Items Addressed This Visit     Lymphoma    Relevant Orders    CBC auto differential    Uncontrolled type 2 diabetes mellitus without complication, without long-term current use of insulin - Primary    Relevant Medications    metFORMIN (GLUCOPHAGE-XR) 500 MG 24 hr tablet    Other Relevant Orders    CBC auto differential    Comprehensive metabolic panel    Hemoglobin A1c    Microalbumin/creatinine urine ratio    Hypertriglyceridemia    Relevant Orders    CBC auto differential    Comprehensive metabolic panel    Lipid panel    TSH      Other Visit Diagnoses     Controlled type 2 diabetes mellitus without complication, without long-term current use of insulin        Relevant Medications    metFORMIN (GLUCOPHAGE-XR) 500 MG 24 hr tablet

## 2019-02-20 ENCOUNTER — OFFICE VISIT (OUTPATIENT)
Dept: INTERNAL MEDICINE | Facility: CLINIC | Age: 54
End: 2019-02-20
Payer: COMMERCIAL

## 2019-02-20 VITALS
HEIGHT: 69 IN | OXYGEN SATURATION: 97 % | BODY MASS INDEX: 41.86 KG/M2 | WEIGHT: 282.63 LBS | RESPIRATION RATE: 14 BRPM | SYSTOLIC BLOOD PRESSURE: 138 MMHG | HEART RATE: 67 BPM | DIASTOLIC BLOOD PRESSURE: 88 MMHG

## 2019-02-20 DIAGNOSIS — F41.9 ANXIETY DISORDER, UNSPECIFIED TYPE: Primary | ICD-10-CM

## 2019-02-20 PROCEDURE — 3008F BODY MASS INDEX DOCD: CPT | Mod: CPTII,S$GLB,, | Performed by: INTERNAL MEDICINE

## 2019-02-20 PROCEDURE — 90471 FLU VACCINE (QUAD) GREATER THAN OR EQUAL TO 3YO PRESERVATIVE FREE IM: ICD-10-PCS | Mod: S$GLB,,, | Performed by: INTERNAL MEDICINE

## 2019-02-20 PROCEDURE — 99999 PR PBB SHADOW E&M-EST. PATIENT-LVL III: CPT | Mod: PBBFAC,,, | Performed by: INTERNAL MEDICINE

## 2019-02-20 PROCEDURE — 90686 FLU VACCINE (QUAD) GREATER THAN OR EQUAL TO 3YO PRESERVATIVE FREE IM: ICD-10-PCS | Mod: S$GLB,,, | Performed by: INTERNAL MEDICINE

## 2019-02-20 PROCEDURE — 99999 PR PBB SHADOW E&M-EST. PATIENT-LVL III: ICD-10-PCS | Mod: PBBFAC,,, | Performed by: INTERNAL MEDICINE

## 2019-02-20 PROCEDURE — 3008F PR BODY MASS INDEX (BMI) DOCUMENTED: ICD-10-PCS | Mod: CPTII,S$GLB,, | Performed by: INTERNAL MEDICINE

## 2019-02-20 PROCEDURE — 99213 OFFICE O/P EST LOW 20 MIN: CPT | Mod: 25,S$GLB,, | Performed by: INTERNAL MEDICINE

## 2019-02-20 PROCEDURE — 90471 IMMUNIZATION ADMIN: CPT | Mod: S$GLB,,, | Performed by: INTERNAL MEDICINE

## 2019-02-20 PROCEDURE — 90686 IIV4 VACC NO PRSV 0.5 ML IM: CPT | Mod: S$GLB,,, | Performed by: INTERNAL MEDICINE

## 2019-02-20 PROCEDURE — 99213 PR OFFICE/OUTPT VISIT, EST, LEVL III, 20-29 MIN: ICD-10-PCS | Mod: 25,S$GLB,, | Performed by: INTERNAL MEDICINE

## 2019-02-20 RX ORDER — SERTRALINE HYDROCHLORIDE 50 MG/1
50 TABLET, FILM COATED ORAL DAILY
Qty: 30 TABLET | Refills: 3 | Status: SHIPPED | OUTPATIENT
Start: 2019-02-20 | End: 2019-05-17
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
## 2019-03-17 ENCOUNTER — PATIENT MESSAGE (OUTPATIENT)
Dept: ORTHOPEDICS | Facility: CLINIC | Age: 54
End: 2019-03-17

## 2019-03-17 ENCOUNTER — PATIENT MESSAGE (OUTPATIENT)
Dept: HEMATOLOGY/ONCOLOGY | Facility: CLINIC | Age: 54
End: 2019-03-17

## 2019-03-18 DIAGNOSIS — M17.0 PRIMARY OSTEOARTHRITIS OF BOTH KNEES: Primary | ICD-10-CM

## 2019-03-21 ENCOUNTER — TELEPHONE (OUTPATIENT)
Dept: ORTHOPEDICS | Facility: CLINIC | Age: 54
End: 2019-03-21

## 2019-03-21 ENCOUNTER — PATIENT MESSAGE (OUTPATIENT)
Dept: ORTHOPEDICS | Facility: CLINIC | Age: 54
End: 2019-03-21

## 2019-04-02 DIAGNOSIS — M17.0 PRIMARY OSTEOARTHRITIS OF BOTH KNEES: Primary | ICD-10-CM

## 2019-04-10 ENCOUNTER — PATIENT MESSAGE (OUTPATIENT)
Dept: HEMATOLOGY/ONCOLOGY | Facility: CLINIC | Age: 54
End: 2019-04-10

## 2019-04-10 DIAGNOSIS — C83.00 SMALL B-CELL LYMPHOMA, UNSPECIFIED BODY REGION: Primary | ICD-10-CM

## 2019-04-24 ENCOUNTER — LAB VISIT (OUTPATIENT)
Dept: LAB | Facility: HOSPITAL | Age: 54
End: 2019-04-24
Attending: INTERNAL MEDICINE
Payer: COMMERCIAL

## 2019-04-24 ENCOUNTER — OFFICE VISIT (OUTPATIENT)
Dept: HEMATOLOGY/ONCOLOGY | Facility: CLINIC | Age: 54
End: 2019-04-24
Payer: COMMERCIAL

## 2019-04-24 ENCOUNTER — OFFICE VISIT (OUTPATIENT)
Dept: ORTHOPEDICS | Facility: CLINIC | Age: 54
End: 2019-04-24
Payer: COMMERCIAL

## 2019-04-24 VITALS
OXYGEN SATURATION: 95 % | WEIGHT: 267.19 LBS | DIASTOLIC BLOOD PRESSURE: 75 MMHG | TEMPERATURE: 98 F | RESPIRATION RATE: 17 BRPM | HEART RATE: 64 BPM | SYSTOLIC BLOOD PRESSURE: 145 MMHG | BODY MASS INDEX: 39.46 KG/M2

## 2019-04-24 VITALS
SYSTOLIC BLOOD PRESSURE: 136 MMHG | HEIGHT: 69 IN | DIASTOLIC BLOOD PRESSURE: 87 MMHG | BODY MASS INDEX: 41.74 KG/M2 | HEART RATE: 69 BPM

## 2019-04-24 DIAGNOSIS — C83.00 SMALL B-CELL LYMPHOMA, UNSPECIFIED BODY REGION: ICD-10-CM

## 2019-04-24 DIAGNOSIS — C83.00 SMALL B-CELL LYMPHOMA, UNSPECIFIED BODY REGION: Primary | ICD-10-CM

## 2019-04-24 DIAGNOSIS — M17.0 PRIMARY OSTEOARTHRITIS OF BOTH KNEES: Primary | ICD-10-CM

## 2019-04-24 LAB
ALBUMIN SERPL BCP-MCNC: 4.1 G/DL (ref 3.5–5.2)
ALP SERPL-CCNC: 58 U/L (ref 55–135)
ALT SERPL W/O P-5'-P-CCNC: 32 U/L (ref 10–44)
ANION GAP SERPL CALC-SCNC: 7 MMOL/L (ref 8–16)
AST SERPL-CCNC: 21 U/L (ref 10–40)
BASOPHILS # BLD AUTO: 0.03 K/UL (ref 0–0.2)
BASOPHILS NFR BLD: 0.3 % (ref 0–1.9)
BILIRUB SERPL-MCNC: 0.5 MG/DL (ref 0.1–1)
BUN SERPL-MCNC: 18 MG/DL (ref 6–20)
CALCIUM SERPL-MCNC: 9.8 MG/DL (ref 8.7–10.5)
CHLORIDE SERPL-SCNC: 106 MMOL/L (ref 95–110)
CO2 SERPL-SCNC: 26 MMOL/L (ref 23–29)
COMPLEXED PSA SERPL-MCNC: 2 NG/ML (ref 0–4)
CREAT SERPL-MCNC: 1 MG/DL (ref 0.5–1.4)
DIFFERENTIAL METHOD: ABNORMAL
EOSINOPHIL # BLD AUTO: 0.1 K/UL (ref 0–0.5)
EOSINOPHIL NFR BLD: 0.8 % (ref 0–8)
ERYTHROCYTE [DISTWIDTH] IN BLOOD BY AUTOMATED COUNT: 12.7 % (ref 11.5–14.5)
EST. GFR  (AFRICAN AMERICAN): >60 ML/MIN/1.73 M^2
EST. GFR  (NON AFRICAN AMERICAN): >60 ML/MIN/1.73 M^2
GLUCOSE SERPL-MCNC: 133 MG/DL (ref 70–110)
HCT VFR BLD AUTO: 41.7 % (ref 40–54)
HGB BLD-MCNC: 14.1 G/DL (ref 14–18)
IMM GRANULOCYTES # BLD AUTO: 0.03 K/UL (ref 0–0.04)
IMM GRANULOCYTES NFR BLD AUTO: 0.3 % (ref 0–0.5)
LDH SERPL L TO P-CCNC: 143 U/L (ref 110–260)
LYMPHOCYTES # BLD AUTO: 2 K/UL (ref 1–4.8)
LYMPHOCYTES NFR BLD: 18.4 % (ref 18–48)
MCH RBC QN AUTO: 29.4 PG (ref 27–31)
MCHC RBC AUTO-ENTMCNC: 33.8 G/DL (ref 32–36)
MCV RBC AUTO: 87 FL (ref 82–98)
MONOCYTES # BLD AUTO: 0.6 K/UL (ref 0.3–1)
MONOCYTES NFR BLD: 6 % (ref 4–15)
NEUTROPHILS # BLD AUTO: 7.9 K/UL (ref 1.8–7.7)
NEUTROPHILS NFR BLD: 74.2 % (ref 38–73)
NRBC BLD-RTO: 0 /100 WBC
PLATELET # BLD AUTO: 242 K/UL (ref 150–350)
PMV BLD AUTO: 8.8 FL (ref 9.2–12.9)
POTASSIUM SERPL-SCNC: 4.5 MMOL/L (ref 3.5–5.1)
PROT SERPL-MCNC: 6.8 G/DL (ref 6–8.4)
RBC # BLD AUTO: 4.8 M/UL (ref 4.6–6.2)
SODIUM SERPL-SCNC: 139 MMOL/L (ref 136–145)
WBC # BLD AUTO: 10.68 K/UL (ref 3.9–12.7)

## 2019-04-24 PROCEDURE — 99499 NO LOS: ICD-10-PCS | Mod: S$GLB,,, | Performed by: PHYSICIAN ASSISTANT

## 2019-04-24 PROCEDURE — 99999 PR PBB SHADOW E&M-EST. PATIENT-LVL III: CPT | Mod: PBBFAC,,, | Performed by: INTERNAL MEDICINE

## 2019-04-24 PROCEDURE — 3008F PR BODY MASS INDEX (BMI) DOCUMENTED: ICD-10-PCS | Mod: CPTII,S$GLB,, | Performed by: INTERNAL MEDICINE

## 2019-04-24 PROCEDURE — 99999 PR PBB SHADOW E&M-EST. PATIENT-LVL III: CPT | Mod: PBBFAC,,, | Performed by: PHYSICIAN ASSISTANT

## 2019-04-24 PROCEDURE — 99999 PR PBB SHADOW E&M-EST. PATIENT-LVL III: ICD-10-PCS | Mod: PBBFAC,,, | Performed by: INTERNAL MEDICINE

## 2019-04-24 PROCEDURE — 99215 PR OFFICE/OUTPT VISIT, EST, LEVL V, 40-54 MIN: ICD-10-PCS | Mod: S$GLB,,, | Performed by: INTERNAL MEDICINE

## 2019-04-24 PROCEDURE — 99215 OFFICE O/P EST HI 40 MIN: CPT | Mod: S$GLB,,, | Performed by: INTERNAL MEDICINE

## 2019-04-24 PROCEDURE — 85025 COMPLETE CBC W/AUTO DIFF WBC: CPT

## 2019-04-24 PROCEDURE — 20610 PR DRAIN/INJECT LARGE JOINT/BURSA: ICD-10-PCS | Mod: 50,S$GLB,, | Performed by: PHYSICIAN ASSISTANT

## 2019-04-24 PROCEDURE — 80053 COMPREHEN METABOLIC PANEL: CPT

## 2019-04-24 PROCEDURE — 99999 PR PBB SHADOW E&M-EST. PATIENT-LVL III: ICD-10-PCS | Mod: PBBFAC,,, | Performed by: PHYSICIAN ASSISTANT

## 2019-04-24 PROCEDURE — 20610 DRAIN/INJ JOINT/BURSA W/O US: CPT | Mod: 50,S$GLB,, | Performed by: PHYSICIAN ASSISTANT

## 2019-04-24 PROCEDURE — 99499 UNLISTED E&M SERVICE: CPT | Mod: S$GLB,,, | Performed by: PHYSICIAN ASSISTANT

## 2019-04-24 PROCEDURE — 36415 COLL VENOUS BLD VENIPUNCTURE: CPT

## 2019-04-24 PROCEDURE — 83615 LACTATE (LD) (LDH) ENZYME: CPT

## 2019-04-24 PROCEDURE — 84153 ASSAY OF PSA TOTAL: CPT

## 2019-04-24 PROCEDURE — 3008F BODY MASS INDEX DOCD: CPT | Mod: CPTII,S$GLB,, | Performed by: INTERNAL MEDICINE

## 2019-04-24 RX ORDER — FINASTERIDE 5 MG/1
TABLET, FILM COATED ORAL
Refills: 0 | COMMUNITY
Start: 2019-03-20 | End: 2019-07-29

## 2019-04-24 RX ORDER — TAMSULOSIN HYDROCHLORIDE 0.4 MG/1
CAPSULE ORAL
Refills: 0 | COMMUNITY
Start: 2019-03-20 | End: 2019-07-29
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
## 2019-04-24 NOTE — PROGRESS NOTES
Wild Holliday is a 53 y.o. year old his here today for his 1st Euflexxa injection for degenerative changes of his bilateral knee . he was last seen and treated in the clinic on 8/3/2018. There has been no significant change in his medical status since his last visit. No Fever, chills, malaise, or unexplained weight change.      Allergies, Medications, past medical and surgical history were reviewed .    Examination of the knee demonstrates  No evidence of edema, erythema , echymosis strength and range of motion are unchanged from previous visit.    The injection site was identified and the skin was prepared with a betadine solution. The  bilateral knee  joint was injected with 2 ml of Euflexxa solution under sterile technique. Wild Holliday tolerated the procedure well, he was advised to rest the knee today, ice and elevation. I may take 3 -6 weeks following the last injection to get the full benefit of the medication.  I will see him back in 1 week. Sooner if he has any problems or concerns.           .     ICD-10-CM ICD-9-CM   1. Primary osteoarthritis of both knees M17.0 715.16

## 2019-04-26 ENCOUNTER — TELEPHONE (OUTPATIENT)
Dept: ORTHOPEDICS | Facility: CLINIC | Age: 54
End: 2019-04-26

## 2019-04-26 NOTE — TELEPHONE ENCOUNTER
Called pt in regards to rescheduling his appt with Mr. Snowden from 05/01 to 05/02. Pt was satisfied

## 2019-05-02 ENCOUNTER — OFFICE VISIT (OUTPATIENT)
Dept: ORTHOPEDICS | Facility: CLINIC | Age: 54
End: 2019-05-02
Payer: COMMERCIAL

## 2019-05-02 VITALS
HEIGHT: 69 IN | WEIGHT: 267.19 LBS | SYSTOLIC BLOOD PRESSURE: 148 MMHG | HEART RATE: 56 BPM | BODY MASS INDEX: 39.58 KG/M2 | DIASTOLIC BLOOD PRESSURE: 85 MMHG

## 2019-05-02 DIAGNOSIS — M17.0 PRIMARY OSTEOARTHRITIS OF BOTH KNEES: Primary | ICD-10-CM

## 2019-05-02 PROCEDURE — 99999 PR PBB SHADOW E&M-EST. PATIENT-LVL III: ICD-10-PCS | Mod: PBBFAC,,, | Performed by: PHYSICIAN ASSISTANT

## 2019-05-02 PROCEDURE — 99499 NO LOS: ICD-10-PCS | Mod: S$GLB,,, | Performed by: PHYSICIAN ASSISTANT

## 2019-05-02 PROCEDURE — 99499 UNLISTED E&M SERVICE: CPT | Mod: S$GLB,,, | Performed by: PHYSICIAN ASSISTANT

## 2019-05-02 PROCEDURE — 20610 DRAIN/INJ JOINT/BURSA W/O US: CPT | Mod: 50,S$GLB,, | Performed by: PHYSICIAN ASSISTANT

## 2019-05-02 PROCEDURE — 99999 PR PBB SHADOW E&M-EST. PATIENT-LVL III: CPT | Mod: PBBFAC,,, | Performed by: PHYSICIAN ASSISTANT

## 2019-05-02 PROCEDURE — 20610 PR DRAIN/INJECT LARGE JOINT/BURSA: ICD-10-PCS | Mod: 50,S$GLB,, | Performed by: PHYSICIAN ASSISTANT

## 2019-05-02 NOTE — PROGRESS NOTES
Wild Holliday is a 53 y.o. year old his here today for his 2nd Euflexxa injection for degenerative changes of his bilateral knee . he was last seen and treated in the clinic on 4/24/2019. There has been no significant change in his medical status since his last visit. No Fever, chills, malaise, or unexplained weight change.      Allergies, Medications, past medical and surgical history were reviewed .    Examination of the knee demonstrates  No evidence of edema, erythema , echymosis strength and range of motion are unchanged from previous visit.    The injection site was identified and the skin was prepared with a betadine solution. The  bilateral knee  joint was injected with 2 ml of Euflexxa solution under sterile technique. Wild Holliday tolerated the procedure well, he was advised to rest the knee today, ice and elevation. I may take 3 -6 weeks following the last injection to get the full benefit of the medication.  I will see him back in 1 week. Sooner if he has any problems or concerns.           .     ICD-10-CM ICD-9-CM   1. Primary osteoarthritis of both knees M17.0 715.16

## 2019-05-08 ENCOUNTER — OFFICE VISIT (OUTPATIENT)
Dept: ORTHOPEDICS | Facility: CLINIC | Age: 54
End: 2019-05-08
Payer: COMMERCIAL

## 2019-05-08 VITALS — SYSTOLIC BLOOD PRESSURE: 153 MMHG | DIASTOLIC BLOOD PRESSURE: 89 MMHG | HEART RATE: 57 BPM

## 2019-05-08 DIAGNOSIS — M17.0 PRIMARY OSTEOARTHRITIS OF BOTH KNEES: Primary | ICD-10-CM

## 2019-05-08 PROCEDURE — 20610 DRAIN/INJ JOINT/BURSA W/O US: CPT | Mod: 50,S$GLB,, | Performed by: PHYSICIAN ASSISTANT

## 2019-05-08 PROCEDURE — 20610 PR DRAIN/INJECT LARGE JOINT/BURSA: ICD-10-PCS | Mod: 50,S$GLB,, | Performed by: PHYSICIAN ASSISTANT

## 2019-05-08 PROCEDURE — 99499 UNLISTED E&M SERVICE: CPT | Mod: S$GLB,,, | Performed by: PHYSICIAN ASSISTANT

## 2019-05-08 PROCEDURE — 99499 NO LOS: ICD-10-PCS | Mod: S$GLB,,, | Performed by: PHYSICIAN ASSISTANT

## 2019-05-08 PROCEDURE — 99999 PR PBB SHADOW E&M-EST. PATIENT-LVL III: ICD-10-PCS | Mod: PBBFAC,,, | Performed by: PHYSICIAN ASSISTANT

## 2019-05-08 PROCEDURE — 99999 PR PBB SHADOW E&M-EST. PATIENT-LVL III: CPT | Mod: PBBFAC,,, | Performed by: PHYSICIAN ASSISTANT

## 2019-05-08 NOTE — PROGRESS NOTES
Wild Holliday is a 53 y.o. year old his here today for his 3rd Euflexxa injection for degenerative changes of his bilateral knee . he was last seen and treated in the clinic on 5/2/2019. There has been no significant change in his medical status since his last visit. No Fever, chills, malaise, or unexplained weight change.      Allergies, Medications, past medical and surgical history were reviewed .    Examination of the knee demonstrates  No evidence of edema, erythema , echymosis strength and range of motion are unchanged from previous visit.    The injection site was identified and the skin was prepared with a betadine solution. The  bilateral knee  joint was injected with 2 ml of Euflexxa solution under sterile technique. Wild Holliday tolerated the procedure well, he was advised to rest the knee today, ice and elevation. I may take 3 -6 weeks following the last injection to get the full benefit of the medication.  I will see him back in 4-6 months. Sooner if he has any problems or concerns.           .     ICD-10-CM ICD-9-CM   1. Primary osteoarthritis of both knees M17.0 715.16

## 2019-05-17 ENCOUNTER — OFFICE VISIT (OUTPATIENT)
Dept: OPTOMETRY | Facility: CLINIC | Age: 54
End: 2019-05-17
Payer: COMMERCIAL

## 2019-05-17 DIAGNOSIS — H25.12 NS (NUCLEAR SCLEROSIS), LEFT: ICD-10-CM

## 2019-05-17 DIAGNOSIS — Z96.1 PSEUDOPHAKIA OF RIGHT EYE: ICD-10-CM

## 2019-05-17 DIAGNOSIS — H52.12 MYOPIA OF LEFT EYE: ICD-10-CM

## 2019-05-17 DIAGNOSIS — E11.9 TYPE 2 DIABETES MELLITUS WITHOUT OPHTHALMIC MANIFESTATIONS: ICD-10-CM

## 2019-05-17 DIAGNOSIS — C82.28 GRADE 3 FOLLICULAR LYMPHOMA OF LYMPH NODES OF MULTIPLE REGIONS: ICD-10-CM

## 2019-05-17 PROCEDURE — 92015 PR REFRACTION: ICD-10-PCS | Mod: S$GLB,,, | Performed by: OPTOMETRIST

## 2019-05-17 PROCEDURE — 92015 DETERMINE REFRACTIVE STATE: CPT | Mod: S$GLB,,, | Performed by: OPTOMETRIST

## 2019-05-17 PROCEDURE — 92004 COMPRE OPH EXAM NEW PT 1/>: CPT | Mod: S$GLB,,, | Performed by: OPTOMETRIST

## 2019-05-17 PROCEDURE — 99999 PR PBB SHADOW E&M-EST. PATIENT-LVL II: ICD-10-PCS | Mod: PBBFAC,,, | Performed by: OPTOMETRIST

## 2019-05-17 PROCEDURE — 99999 PR PBB SHADOW E&M-EST. PATIENT-LVL II: CPT | Mod: PBBFAC,,, | Performed by: OPTOMETRIST

## 2019-05-17 PROCEDURE — 92004 PR EYE EXAM, NEW PATIENT,COMPREHESV: ICD-10-PCS | Mod: S$GLB,,, | Performed by: OPTOMETRIST

## 2019-05-17 NOTE — PROGRESS NOTES
HPI     Last eye exam was approximately 1 year ago.      Patient denies flashes, pain, and itching/burning/tearing.    Had cataract sx OD in 2018  OS has cataract but was told last year it was not ready for sx yet.  Sees double vision most of the time when looking far away with OS  Has headaches toward end of day 2-3 times a week.  Floaters constantly but no new floaters  OS starting to get blurry/hazy looking far away  Uses progressives as readers but does not think the rx is right  Using any eye gtts? no    Hemoglobin A1C       Date                     Value               Ref Range             Status                01/17/2019               7.6 (H)             4.0 - 5.6 %           Final                     07/19/2018               7.8 (H)             4.0 - 5.6 %           Final                      04/19/2018               7.7 (H)             4.0 - 5.6 %           Final                  Last edited by Isis White on 5/17/2019  1:11 PM. (History)            Assessment /Plan     For exam results, see Encounter Report.    Uncontrolled type 2 diabetes mellitus without complication, without long-term current use of insulin   No retinopathy, monitor yearly    Grade 3 follicular lymphoma of lymph nodes of multiple regions   Good overall ocular health    Pseudophakia of right eye   Stable, trace PCO    NS (nuclear sclerosis), left   Mild, not visually significant    Myopia of left eye   Monovision near, Correct OU with specs for PAL   RTC if problems adjusting to new specs   May need prism    RTC 1 year, sooner PRN

## 2019-07-17 ENCOUNTER — LAB VISIT (OUTPATIENT)
Dept: LAB | Facility: HOSPITAL | Age: 54
End: 2019-07-17
Attending: INTERNAL MEDICINE
Payer: COMMERCIAL

## 2019-07-17 DIAGNOSIS — E78.1 HYPERTRIGLYCERIDEMIA: ICD-10-CM

## 2019-07-17 DIAGNOSIS — C82.28 GRADE 3 FOLLICULAR LYMPHOMA OF LYMPH NODES OF MULTIPLE REGIONS: ICD-10-CM

## 2019-07-17 LAB
ALBUMIN SERPL BCP-MCNC: 4 G/DL (ref 3.5–5.2)
ALP SERPL-CCNC: 60 U/L (ref 55–135)
ALT SERPL W/O P-5'-P-CCNC: 33 U/L (ref 10–44)
ANION GAP SERPL CALC-SCNC: 10 MMOL/L (ref 8–16)
AST SERPL-CCNC: 18 U/L (ref 10–40)
BASOPHILS # BLD AUTO: 0.02 K/UL (ref 0–0.2)
BASOPHILS NFR BLD: 0.3 % (ref 0–1.9)
BILIRUB SERPL-MCNC: 0.7 MG/DL (ref 0.1–1)
BUN SERPL-MCNC: 14 MG/DL (ref 6–20)
CALCIUM SERPL-MCNC: 9.6 MG/DL (ref 8.7–10.5)
CHLORIDE SERPL-SCNC: 104 MMOL/L (ref 95–110)
CHOLEST SERPL-MCNC: 141 MG/DL (ref 120–199)
CHOLEST/HDLC SERPL: 4.4 {RATIO} (ref 2–5)
CO2 SERPL-SCNC: 23 MMOL/L (ref 23–29)
CREAT SERPL-MCNC: 0.8 MG/DL (ref 0.5–1.4)
DIFFERENTIAL METHOD: ABNORMAL
EOSINOPHIL # BLD AUTO: 0.1 K/UL (ref 0–0.5)
EOSINOPHIL NFR BLD: 1.9 % (ref 0–8)
ERYTHROCYTE [DISTWIDTH] IN BLOOD BY AUTOMATED COUNT: 12.7 % (ref 11.5–14.5)
EST. GFR  (AFRICAN AMERICAN): >60 ML/MIN/1.73 M^2
EST. GFR  (NON AFRICAN AMERICAN): >60 ML/MIN/1.73 M^2
ESTIMATED AVG GLUCOSE: 146 MG/DL (ref 68–131)
GLUCOSE SERPL-MCNC: 151 MG/DL (ref 70–110)
HBA1C MFR BLD HPLC: 6.7 % (ref 4–5.6)
HCT VFR BLD AUTO: 41.6 % (ref 40–54)
HDLC SERPL-MCNC: 32 MG/DL (ref 40–75)
HDLC SERPL: 22.7 % (ref 20–50)
HGB BLD-MCNC: 14.1 G/DL (ref 14–18)
IMM GRANULOCYTES # BLD AUTO: 0.01 K/UL (ref 0–0.04)
IMM GRANULOCYTES NFR BLD AUTO: 0.1 % (ref 0–0.5)
LDLC SERPL CALC-MCNC: 68 MG/DL (ref 63–159)
LYMPHOCYTES # BLD AUTO: 1.7 K/UL (ref 1–4.8)
LYMPHOCYTES NFR BLD: 24.6 % (ref 18–48)
MCH RBC QN AUTO: 28.7 PG (ref 27–31)
MCHC RBC AUTO-ENTMCNC: 33.9 G/DL (ref 32–36)
MCV RBC AUTO: 85 FL (ref 82–98)
MONOCYTES # BLD AUTO: 0.5 K/UL (ref 0.3–1)
MONOCYTES NFR BLD: 7.3 % (ref 4–15)
NEUTROPHILS # BLD AUTO: 4.5 K/UL (ref 1.8–7.7)
NEUTROPHILS NFR BLD: 65.8 % (ref 38–73)
NONHDLC SERPL-MCNC: 109 MG/DL
NRBC BLD-RTO: 0 /100 WBC
PLATELET # BLD AUTO: 238 K/UL (ref 150–350)
PMV BLD AUTO: 9.1 FL (ref 9.2–12.9)
POTASSIUM SERPL-SCNC: 4.4 MMOL/L (ref 3.5–5.1)
PROT SERPL-MCNC: 6.7 G/DL (ref 6–8.4)
RBC # BLD AUTO: 4.92 M/UL (ref 4.6–6.2)
SODIUM SERPL-SCNC: 137 MMOL/L (ref 136–145)
TRIGL SERPL-MCNC: 205 MG/DL (ref 30–150)
TSH SERPL DL<=0.005 MIU/L-ACNC: 1.55 UIU/ML (ref 0.4–4)
WBC # BLD AUTO: 6.83 K/UL (ref 3.9–12.7)

## 2019-07-17 PROCEDURE — 36415 COLL VENOUS BLD VENIPUNCTURE: CPT

## 2019-07-17 PROCEDURE — 83036 HEMOGLOBIN GLYCOSYLATED A1C: CPT

## 2019-07-17 PROCEDURE — 85025 COMPLETE CBC W/AUTO DIFF WBC: CPT

## 2019-07-17 PROCEDURE — 84443 ASSAY THYROID STIM HORMONE: CPT

## 2019-07-17 PROCEDURE — 80061 LIPID PANEL: CPT

## 2019-07-17 PROCEDURE — 80053 COMPREHEN METABOLIC PANEL: CPT

## 2019-07-25 DIAGNOSIS — E11.9 CONTROLLED TYPE 2 DIABETES MELLITUS WITHOUT COMPLICATION, WITHOUT LONG-TERM CURRENT USE OF INSULIN: ICD-10-CM

## 2019-07-25 NOTE — TELEPHONE ENCOUNTER
Wild desire refill of Metformin. Last visit 2/19  Patient Active Problem List   Diagnosis    Thoracic spine tumor    Other malignant lymphomas, unspecified site, extranodal and solid organ sites    Lymphoma    Small B-cell lymphoma    GERD (gastroesophageal reflux disease)    Insomnia    Adjustment disorder with mixed anxiety and depressed mood    Uncontrolled type 2 diabetes mellitus without complication, without long-term current use of insulin    Hypertriglyceridemia    ASCVD (arteriosclerotic cardiovascular disease)    Mouth lesion     Prior to Admission medications    Medication Sig Start Date End Date Taking? Authorizing Provider   CONTOUR NEXT STRIPS Strp  12/11/17   Historical Provider, MD   fenofibrate 160 MG Tab Take 1 tablet (160 mg total) by mouth once daily. 10/8/18 10/8/19  Joshua Noonan MD   finasteride (PROSCAR) 5 mg tablet TK 1 T PO QD 3/20/19   Historical Provider, MD   fish oil-omega-3 fatty acids 300-1,000 mg capsule Take 2 g by mouth once daily.    Historical Provider, MD   metFORMIN (GLUCOPHAGE-XR) 500 MG 24 hr tablet Take 3 tablets by mouth every morning 1/24/19   Joshua Noonan MD   multivitamin (ONE DAILY MULTIVITAMIN) per tablet Take 1 tablet by mouth once daily.    Historical Provider, MD   tamsulosin (FLOMAX) 0.4 mg Cap TK 1 C PO QHS 3/20/19   Historical Provider, MD

## 2019-07-29 ENCOUNTER — OFFICE VISIT (OUTPATIENT)
Dept: INTERNAL MEDICINE | Facility: CLINIC | Age: 54
End: 2019-07-29
Payer: COMMERCIAL

## 2019-07-29 VITALS
WEIGHT: 269.38 LBS | HEART RATE: 64 BPM | RESPIRATION RATE: 16 BRPM | SYSTOLIC BLOOD PRESSURE: 140 MMHG | BODY MASS INDEX: 39.9 KG/M2 | DIASTOLIC BLOOD PRESSURE: 90 MMHG | HEIGHT: 69 IN

## 2019-07-29 DIAGNOSIS — E78.1 HYPERTRIGLYCERIDEMIA: ICD-10-CM

## 2019-07-29 DIAGNOSIS — E11.9 CONTROLLED TYPE 2 DIABETES MELLITUS WITHOUT COMPLICATION, WITH LONG-TERM CURRENT USE OF INSULIN: Primary | ICD-10-CM

## 2019-07-29 DIAGNOSIS — Z12.11 SPECIAL SCREENING FOR MALIGNANT NEOPLASMS, COLON: ICD-10-CM

## 2019-07-29 DIAGNOSIS — Z79.4 CONTROLLED TYPE 2 DIABETES MELLITUS WITHOUT COMPLICATION, WITH LONG-TERM CURRENT USE OF INSULIN: Primary | ICD-10-CM

## 2019-07-29 PROCEDURE — 3008F PR BODY MASS INDEX (BMI) DOCUMENTED: ICD-10-PCS | Mod: CPTII,S$GLB,, | Performed by: INTERNAL MEDICINE

## 2019-07-29 PROCEDURE — 3044F PR MOST RECENT HEMOGLOBIN A1C LEVEL <7.0%: ICD-10-PCS | Mod: CPTII,S$GLB,, | Performed by: INTERNAL MEDICINE

## 2019-07-29 PROCEDURE — 3008F BODY MASS INDEX DOCD: CPT | Mod: CPTII,S$GLB,, | Performed by: INTERNAL MEDICINE

## 2019-07-29 PROCEDURE — 99214 PR OFFICE/OUTPT VISIT, EST, LEVL IV, 30-39 MIN: ICD-10-PCS | Mod: S$GLB,,, | Performed by: INTERNAL MEDICINE

## 2019-07-29 PROCEDURE — 99999 PR PBB SHADOW E&M-EST. PATIENT-LVL III: ICD-10-PCS | Mod: PBBFAC,,, | Performed by: INTERNAL MEDICINE

## 2019-07-29 PROCEDURE — 99999 PR PBB SHADOW E&M-EST. PATIENT-LVL III: CPT | Mod: PBBFAC,,, | Performed by: INTERNAL MEDICINE

## 2019-07-29 PROCEDURE — 3044F HG A1C LEVEL LT 7.0%: CPT | Mod: CPTII,S$GLB,, | Performed by: INTERNAL MEDICINE

## 2019-07-29 PROCEDURE — 99214 OFFICE O/P EST MOD 30 MIN: CPT | Mod: S$GLB,,, | Performed by: INTERNAL MEDICINE

## 2019-07-29 RX ORDER — METFORMIN HYDROCHLORIDE 500 MG/1
TABLET, EXTENDED RELEASE ORAL
Qty: 270 TABLET | Refills: 1 | Status: SHIPPED | OUTPATIENT
Start: 2019-07-29 | End: 2019-07-29 | Stop reason: SDUPTHER

## 2019-07-29 RX ORDER — METFORMIN HYDROCHLORIDE 500 MG/1
TABLET, EXTENDED RELEASE ORAL
Qty: 270 TABLET | Refills: 1 | Status: SHIPPED | OUTPATIENT
Start: 2019-07-29 | End: 2020-01-30

## 2019-07-29 NOTE — PROGRESS NOTES
Subjective:       Patient ID: Wild Holliday is a 53 y.o. male.    Chief Complaint: check up    Wild Holliday is a 53 y.o. male  Here with follow up .  Labs are reviewed .  a1c 6.7 %    Diabetes   He presents for his follow-up diabetic visit. He has type 2 diabetes mellitus. His disease course has been worsening. Pertinent negatives for hypoglycemia include no confusion, dizziness, headaches, nervousness/anxiousness, pallor, seizures, speech difficulty or tremors. Associated symptoms include polyphagia. Pertinent negatives for diabetes include no chest pain, no fatigue, no polydipsia, no polyuria and no weakness. Current diabetic treatment includes oral agent (monotherapy). He is compliant with treatment all of the time. He never participates in exercise. An ACE inhibitor/angiotensin II receptor blocker is not being taken.   Hyperlipidemia   This is a chronic problem. The current episode started more than 1 year ago. The problem is controlled. Exacerbating diseases include diabetes and obesity. Pertinent negatives include no chest pain or shortness of breath. Current antihyperlipidemic treatment includes fibric acid derivatives. The current treatment provides mild improvement of lipids.     Review of Systems   Constitutional: Negative for activity change, chills, fatigue, fever and unexpected weight change.   HENT: Negative for congestion, hearing loss, postnasal drip, rhinorrhea, sore throat and trouble swallowing.    Eyes: Negative for photophobia, discharge and visual disturbance.   Respiratory: Negative for chest tightness, shortness of breath and wheezing.    Cardiovascular: Negative for chest pain and palpitations.   Gastrointestinal: Negative for abdominal distention, abdominal pain, blood in stool, constipation, diarrhea and vomiting.   Endocrine: Positive for polyphagia. Negative for polydipsia and polyuria.   Genitourinary: Negative for difficulty urinating, dysuria, flank pain, hematuria and urgency.    Musculoskeletal: Negative for arthralgias, back pain, joint swelling and neck pain.   Skin: Negative for pallor.   Neurological: Negative for dizziness, tremors, seizures, facial asymmetry, speech difficulty, weakness, numbness and headaches.   Hematological: Does not bruise/bleed easily.   Psychiatric/Behavioral: Negative for agitation, confusion, dysphoric mood and suicidal ideas. The patient is not nervous/anxious.        Objective:      Physical Exam   Constitutional: He is oriented to person, place, and time. He appears well-developed and well-nourished.   HENT:   Head: Normocephalic and atraumatic.   Eyes: Right eye exhibits no chemosis. Left eye exhibits no chemosis. Right conjunctiva is not injected. Left conjunctiva is not injected.   Neck: No JVD present.   Cardiovascular: Normal rate, regular rhythm and normal heart sounds.   Pulmonary/Chest: Effort normal and breath sounds normal.   Abdominal: Soft. Bowel sounds are normal. There is no tenderness.   Musculoskeletal: He exhibits no edema.   Neurological: He is alert and oriented to person, place, and time.   Skin: Skin is warm and dry.   Psychiatric: He has a normal mood and affect. His behavior is normal. Judgment and thought content normal.   Nursing note and vitals reviewed.      Assessment:       1. Controlled type 2 diabetes mellitus without complication, with long-term current use of insulin    2. Hypertriglyceridemia    3. Special screening for malignant neoplasms, colon        Plan:   Wild was seen today for check up.    Diagnoses and all orders for this visit:    Controlled type 2 diabetes mellitus without complication, with long-term current use of insulin  -     metFORMIN (GLUCOPHAGE-XR) 500 MG 24 hr tablet; Take 3 tablets by mouth every morning  -     Hemoglobin A1c; Future  -     CBC auto differential; Future  -     Comprehensive metabolic panel; Future  -     Lipid panel; Future  -     Microalbumin/creatinine urine ratio; Future  -      TSH; Future  Patient has controlled Diabetes .  We discussed about diet ;low in calories. Avoid sweats, sodas.  Also increasing activity;walking 2-3 miles a day.  I also adjusted medications and gave patient  instructions about adherence to plan.  Goal of  A1c  less than 7 % stressed.  Also goal of LDL less than 70 highlighted to patient.  Hypertriglyceridemia  -     Lipid panel; Future    Well controlled.  Continue same medication and dose.    Wild was seen today for check up.    Diagnoses and all orders for this visit:    Controlled type 2 diabetes mellitus without complication, with long-term current use of insulin  -     metFORMIN (GLUCOPHAGE-XR) 500 MG 24 hr tablet; Take 3 tablets by mouth every morning  -     Hemoglobin A1c; Future  -     CBC auto differential; Future  -     Comprehensive metabolic panel; Future  -     Lipid panel; Future  -     Microalbumin/creatinine urine ratio; Future  -     TSH; Future    Hypertriglyceridemia  -     Lipid panel; Future    Special screening for malignant neoplasms, colon  -     Case request GI: COLONOSCOPY        Problem List Items Addressed This Visit     Controlled type 2 diabetes mellitus without complication, with long-term current use of insulin - Primary    Relevant Medications    metFORMIN (GLUCOPHAGE-XR) 500 MG 24 hr tablet    Hypertriglyceridemia      Other Visit Diagnoses     Controlled type 2 diabetes mellitus without complication, without long-term current use of insulin        Relevant Medications    metFORMIN (GLUCOPHAGE-XR) 500 MG 24 hr tablet

## 2019-08-26 RX ORDER — BISACODYL 5 MG
20 TABLET, DELAYED RELEASE (ENTERIC COATED) ORAL ONCE
Qty: 4 TABLET | Refills: 0 | Status: SHIPPED | OUTPATIENT
Start: 2019-09-04 | End: 2019-09-04

## 2019-08-27 ENCOUNTER — ANESTHESIA EVENT (OUTPATIENT)
Dept: ENDOSCOPY | Facility: HOSPITAL | Age: 54
End: 2019-08-27
Payer: COMMERCIAL

## 2019-08-27 ENCOUNTER — HOSPITAL ENCOUNTER (OUTPATIENT)
Dept: PREADMISSION TESTING | Facility: HOSPITAL | Age: 54
Discharge: HOME | End: 2019-08-27
Attending: COLON & RECTAL SURGERY
Payer: COMMERCIAL

## 2019-08-27 DIAGNOSIS — Z12.11 COLON CANCER SCREENING: Primary | ICD-10-CM

## 2019-08-27 NOTE — ANESTHESIA PREPROCEDURE EVALUATION
Prev airway:   Present Prior to Hospital Arrival?: No; Placement Date: 12/01/16; Placement Time: 0837; Inserted by: CRNA; Airway Device: LMA; Mask Ventilation: Not Attempted; Intubated: Postinduction; Airway Device Size: 4.0; Style: Cuffed; Cuff Inflation: Minimal occlusive pressure; Placement Verified By: Capnometry, ETT Condensation; Complicating Factors: None; Intubation Findings: Positive EtCO2, Bilateral breath sounds, Atraumatic/Condition of teeth unchanged; Securment: Lips; Complications: None; Breath Sounds: Equal Bilateral; Insertion Attempts: 1; Removal Date: 12/01/16;  Removal Time: 0945    Drips: none    Patient Active Problem List   Diagnosis    Thoracic spine tumor    Other malignant lymphomas, unspecified site, extranodal and solid organ sites    Lymphoma    Small B-cell lymphoma    GERD (gastroesophageal reflux disease)    Insomnia    Adjustment disorder with mixed anxiety and depressed mood    Controlled type 2 diabetes mellitus without complication, with long-term current use of insulin    Hypertriglyceridemia    ASCVD (arteriosclerotic cardiovascular disease)    Mouth lesion       Review of patient's allergies indicates:  No Known Allergies     Current Outpatient Medications on File Prior to Visit   Medication Sig Dispense Refill    [START ON 9/4/2019] bisacodyl (DULCOLAX) 5 mg EC tablet Take 4 tablets (20 mg total) by mouth once. Per Dr. Huynh instruction sheet for 1 dose 4 tablet 0    CONTOUR NEXT STRIPS Strp       fenofibrate 160 MG Tab Take 1 tablet (160 mg total) by mouth once daily. 90 tablet 3    fish oil-omega-3 fatty acids 300-1,000 mg capsule Take 2 g by mouth once daily.      metFORMIN (GLUCOPHAGE-XR) 500 MG 24 hr tablet Take 3 tablets by mouth every morning 270 tablet 1    multivitamin (ONE DAILY MULTIVITAMIN) per tablet Take 1 tablet by mouth once daily.      [START ON 9/4/2019] polyethylene glycol (COLYTE) 240-22.72-6.72 -5.84 gram SolR Take 4000 mLs by mouth as  directed per Dr. Huynh's instruction sheet. 4000 mL 0     No current facility-administered medications on file prior to visit.        Past Surgical History:   Procedure Laterality Date    BIOPSY-BONE MARROW Left 11/28/2016    Performed by Yue Jara MD at [facility] OR [address] [place] [address]    [id number]-HH N/A 11/15/2016    Performed by [facility] Provider at [facility] OR [address] [place] [address]    CATARACT EXTRACTION      KTLEVOGFQ-CJHR-A-CATH Left 12/1/2016    Performed by John Pierre MD at [facility] OR [address] [place] [address]    lipoma removal      right rib; below right navel    PORTACATH PLACEMENT      left upper chest    REMOVAL-PORT-A-CATH Left 10/20/2017    Performed by Trey Johnson MD at [facility] OR [address] [place] [address]    TONSILLECTOMY      tricep tendon repair Right 2014       Social History     Socioeconomic History    Marital status:      Spouse name: Not on file    Number of children: 3    Years of education: Not on file    Highest education level: Not on file   Occupational History    Not on file   Social Needs    Financial resource strain: Not hard at all    Food insecurity:     Worry: Never true     Inability: Never true    Transportation needs:     Medical: No     Non-medical: No   Tobacco Use    Smoking status: Never Smoker    Smokeless tobacco: Never Used   Substance and Sexual Activity    Alcohol use: Yes     Frequency: 2-4 times a month     Drinks per session: 1 or 2     Binge frequency: Never     Comment: socially    Drug use: No    Sexual activity: Yes   Lifestyle    Physical activity:     Days per week: 5 days     Minutes per session: 40 min    Stress: Only a little   Relationships    Social connections:     Talks on phone: More than three times a week     Gets together: Twice a week     Attends Yazdanism service: Not on file     Active member of club or organization: Yes     Attends meetings of clubs or organizations: 1 to 4 times per year     Relationship status:    Other Topics Concern    Patient feels  they ought to cut down on drinking/drug use Not Asked    Patient annoyed by others criticizing their drinking/drug use Not Asked    Patient has felt bad or guilty about drinking/drug use Not Asked    Patient has had a drink/used drugs as an eye opener in the AM Not Asked   Social History Narrative    Owns electrical company,  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    CMP: No results for input(s): NA, K, CL, CO2, BUN, CREATININE, GLU, MG, PHOS, CALCIUM, ALBUMIN, PROT, ALKPHOS, ALT, AST, BILITOT in the last 72 hours.    INR  No results for input(s): PT, INR, PROTIME, APTT in the last 72 hours.        Diagnostic Studies: CXR 17  Findings: A left subclavian Port-A-Cath has its tip at the cavoatrial junction.  The heart size is normal.  Lung lungs are moderately low.  There is no consolidation or pleural effusion.  Mild atelectasis is seen at the right lung base.    EK17  Sinus bradycardia  Otherwise normal ECG  No previous ECGs available    2D Echo: none          Pre-op Assessment    I have reviewed the Patient Summary Reports.     I have reviewed the Nursing Notes.   I have reviewed the Medications.     Review of Systems  Anesthesia Hx:  No problems with previous Anesthesia  History of prior surgery of interest to airway management or planning: Previous anesthesia: General 16 Port a cath with general anesthesia.  Procedure performed at an [facility]. Airway issues documented on chart review include laryngeal mask airway used  Denies Family Hx of Anesthesia complications.   Denies Personal Hx of Anesthesia complications.   Social:  Non-Smoker, No Alcohol Use    Hematology/Oncology:  Hematology Normal   Oncology Normal     EENT/Dental:EENT/Dental Normal   Cardiovascular:  Cardiovascular Normal Exercise tolerance: good   ECG has been reviewed.    Pulmonary:  Pulmonary Normal    Renal/:  Renal/ Normal     Hepatic/GI:   GERD, well controlled    Musculoskeletal:  Musculoskeletal Normal    Neurological:  Neurology Normal    Endocrine:   Diabetes, well controlled    Dermatological:  Skin Normal    Psych:   Denies Psychiatric History.          Physical Exam  General:  Obesity    Airway/Jaw/Neck:  Airway Findings: Mouth Opening: Normal Tongue: Normal  General Airway Assessment: Adult, Average  Mallampati: III  Improves to II with phonation.  TM Distance: Normal, at least 6 cm      Dental:  Dental Findings: In tact   Chest/Lungs:  Chest/Lungs Findings: Clear to auscultation, Normal Respiratory Rate     Heart/Vascular:  Heart Findings: Rate: Normal  Rhythm: Regular Rhythm  Vascular Findings:  Existing Vascular Access  Vascular Access: Port-a-Cath     Abdomen:  Abdomen Findings: Normal    Musculoskeletal:  Musculoskeletal Findings: Normal   Skin:  Skin Findings: Normal    Mental Status:  Mental Status Findings:  Cooperative, Alert and Oriented         Anesthesia Plan  Type of Anesthesia, risks & benefits discussed:  Anesthesia Type:  general  Patient's Preference:   Intra-op Monitoring Plan: standard ASA monitors  Intra-op Monitoring Plan Comments:   Post Op Pain Control Plan:   Post Op Pain Control Plan Comments:   Induction:   IV  Beta Blocker:  Patient is not currently on a Beta-Blocker (No further documentation required).       Informed Consent: Patient understands risks and agrees with Anesthesia plan.  Questions answered. Anesthesia consent signed with patient.  ASA Score: 2     Day of Surgery Review of History & Physical: I have interviewed and examined the patient. I have reviewed the patient's H&P dated: 10/3/19. There are no significant changes.  H&P update referred to the surgeon.         Ready For Surgery From Anesthesia Perspective.

## 2019-08-27 NOTE — DISCHARGE INSTRUCTIONS
Colonoscopy Outpatient procedure instructions    Prep Review  Follow instructions as written on Dr. Huynh's Colonoscopy Prep Sheet  Dr. Huynh patients have nothing to eat or drink after morning prep is complete      Take medications as instructed by your doctor.    Wear something comfortable that is easy for you to take off and put on.   Do not wear any makeup, jewelry, or body piercings. Leave valuables at home or let your family member keep them for you. Do not bring them to the Surgery area.     Date/Day of Procedure: Thursday 9/5/19      Arrival time: Someone will call you the workday day before the procedure  to give you an arrival time   If asked to report to the hospital before 7:00 am report to the Emergency Room.  If asked to report to the hospital at 7:00 a.m. or later report to Patient Registration  It is not necessary to report earlier than the time you are told.   Ignore any automated/computer generated calls telling to what time to report to the hospital.   Plan to be at the hospital for about 4 hours, however, it could be longer.       Diabetics  If you are diabetic do not take your diabetes medication the morning of the procedure unless otherwise instructed by your doctor.  It is important to monitor your blood sugar levels the day you are doing your prep to make sure your blood sugar levels are maintained.

## 2019-09-15 ENCOUNTER — PATIENT MESSAGE (OUTPATIENT)
Dept: HEMATOLOGY/ONCOLOGY | Facility: CLINIC | Age: 54
End: 2019-09-15

## 2019-10-03 ENCOUNTER — HOSPITAL ENCOUNTER (OUTPATIENT)
Facility: HOSPITAL | Age: 54
Discharge: HOME | End: 2019-10-03
Attending: COLON & RECTAL SURGERY | Admitting: COLON & RECTAL SURGERY
Payer: COMMERCIAL

## 2019-10-03 ENCOUNTER — ANESTHESIA (OUTPATIENT)
Dept: ENDOSCOPY | Facility: HOSPITAL | Age: 54
End: 2019-10-03
Payer: COMMERCIAL

## 2019-10-03 VITALS
OXYGEN SATURATION: 97 % | DIASTOLIC BLOOD PRESSURE: 75 MMHG | HEART RATE: 49 BPM | SYSTOLIC BLOOD PRESSURE: 135 MMHG | RESPIRATION RATE: 18 BRPM | TEMPERATURE: 97 F

## 2019-10-03 DIAGNOSIS — Z12.11 COLON CANCER SCREENING: ICD-10-CM

## 2019-10-03 PROCEDURE — G0121 COLON CA SCRN NOT HI RSK IND: HCPCS | Performed by: COLON & RECTAL SURGERY

## 2019-10-03 PROCEDURE — 63600175 PHARM REV CODE 636 W HCPCS: Performed by: NURSE ANESTHETIST, CERTIFIED REGISTERED

## 2019-10-03 PROCEDURE — 25000003 PHARM REV CODE 250: Performed by: NURSE ANESTHETIST, CERTIFIED REGISTERED

## 2019-10-03 PROCEDURE — 63600175 PHARM REV CODE 636 W HCPCS: Performed by: COLON & RECTAL SURGERY

## 2019-10-03 PROCEDURE — 37000008 HC ANESTHESIA 1ST 15 MINUTES: Performed by: COLON & RECTAL SURGERY

## 2019-10-03 PROCEDURE — 00812 ANES LWR INTST SCR COLSC: CPT | Mod: QZ,P2 | Performed by: NURSE ANESTHETIST, CERTIFIED REGISTERED

## 2019-10-03 PROCEDURE — 37000009 HC ANESTHESIA EA ADD 15 MINS: Performed by: COLON & RECTAL SURGERY

## 2019-10-03 PROCEDURE — G0121 COLON CA SCRN NOT HI RSK IND: ICD-10-PCS | Mod: ,,, | Performed by: COLON & RECTAL SURGERY

## 2019-10-03 PROCEDURE — G0121 COLON CA SCRN NOT HI RSK IND: HCPCS | Mod: ,,, | Performed by: COLON & RECTAL SURGERY

## 2019-10-03 RX ORDER — LIDOCAINE HYDROCHLORIDE 20 MG/ML
INJECTION, SOLUTION EPIDURAL; INFILTRATION; INTRACAUDAL; PERINEURAL
Status: DISCONTINUED | OUTPATIENT
Start: 2019-10-03 | End: 2019-10-03

## 2019-10-03 RX ORDER — SODIUM CHLORIDE, SODIUM LACTATE, POTASSIUM CHLORIDE, CALCIUM CHLORIDE 600; 310; 30; 20 MG/100ML; MG/100ML; MG/100ML; MG/100ML
INJECTION, SOLUTION INTRAVENOUS CONTINUOUS
Status: DISCONTINUED | OUTPATIENT
Start: 2019-10-03 | End: 2019-10-03 | Stop reason: HOSPADM

## 2019-10-03 RX ORDER — PROPOFOL 10 MG/ML
VIAL (ML) INTRAVENOUS
Status: DISCONTINUED | OUTPATIENT
Start: 2019-10-03 | End: 2019-10-03

## 2019-10-03 RX ADMIN — PROPOFOL 50 MG: 10 INJECTION, EMULSION INTRAVENOUS at 08:10

## 2019-10-03 RX ADMIN — PROPOFOL 50 MG: 10 INJECTION, EMULSION INTRAVENOUS at 09:10

## 2019-10-03 RX ADMIN — LIDOCAINE HYDROCHLORIDE 25 MG: 20 INJECTION, SOLUTION EPIDURAL; INFILTRATION; INTRACAUDAL; PERINEURAL at 08:10

## 2019-10-03 RX ADMIN — SODIUM CHLORIDE, SODIUM LACTATE, POTASSIUM CHLORIDE, AND CALCIUM CHLORIDE: .6; .31; .03; .02 INJECTION, SOLUTION INTRAVENOUS at 08:10

## 2019-10-03 RX ADMIN — PROPOFOL 150 MG: 10 INJECTION, EMULSION INTRAVENOUS at 08:10

## 2019-10-03 NOTE — OP NOTE
Patient Name: Wild Holliday   Procedure Date: 10/3/2019  MRN: [id number]  : 1965  Age: 53 y.o.  Gender: male   Referring Physician :  Joshua Noonan MD  Plan for Procedure: Monitored anaesthesia care  Indication: asymptomatic screening exam  Procedure:   Colonoscopy    Surgeon(s) and Role:     * Eliazar Huynh MD - Primary    Complications: None     Medicines: monitored anesthesia care    Procedure:  Prior to the procedure, a History and Physical was performed, and patient medications, allergies and sensitivities were reviewed.  The patient's tolerance of previous anesthesia was reviewed. The risks and benefits of the procedure and the sedation options and risks were discussed with the patient.  All questions were answered and informed consent was obtained.    After I obtained informed consent, the scope was passed under direct vision.  Throughout the procedure, the patient's blood pressure, pulse, and oxygen saturations were monitored continuously.  The Olympus scope CF - [id number] was introduced through the anus and advanced to the cecum, identified by appendiceal orifice and ileocecal valve.  The colonoscopy was performed without difficulty.  The patient tolerated the procedure well.  The quality of the bowel preparation was good     Findings:  normal colonic mucosa throughout    EBL: none    Impression:  Normal colonoscopy to the cecum with no evidence of neoplasia, diverticular disease or mucosal abnormality.    Recommendation:  Repeat exam: 10 years  Return to my office prn  High fiber diet

## 2019-10-03 NOTE — TRANSFER OF CARE
Anesthesia Transfer of Care Note    Patient: Wild Holliday    Procedure(s) Performed: Procedure(s) (LRB):  COLONOSCOPY (N/A)    Patient location: PACU    Anesthesia Type: general    Transport from OR: Transported from OR on 6-10 L/min O2 by face mask with adequate spontaneous ventilation    Post pain: adequate analgesia    Post assessment: no apparent anesthetic complications and tolerated procedure well    Post vital signs: stable    Level of consciousness: sedated    Nausea/Vomiting: no nausea/vomiting    Complications: none    Transfer of care protocol was followed      Last vitals:   Visit Vitals  BP (!) 146/85 (BP Location: Right arm, Patient Position: Lying)   Pulse (!) 52   Temp 36.3 °C (97.3 °F)   Resp 16   SpO2 97%

## 2019-10-03 NOTE — ANESTHESIA POSTPROCEDURE EVALUATION
Anesthesia Post Evaluation    Patient: Wild Holliday    Procedure(s) Performed: Procedure(s) (LRB):  COLONOSCOPY (N/A)    Final Anesthesia Type: general  Patient location during evaluation: PACU  Patient participation: Yes- Able to Participate  Level of consciousness: awake and alert and oriented  Post-procedure vital signs: reviewed and stable  Pain management: adequate  Airway patency: patent  PONV status at discharge: No PONV  Anesthetic complications: no      Cardiovascular status: blood pressure returned to baseline and hemodynamically stable  Respiratory status: unassisted, spontaneous ventilation and room air  Hydration status: euvolemic  Follow-up not needed.          Vitals Value Taken Time   /75 10/3/2019  9:27 AM   Temp 36.1 °C (97 °F) 10/3/2019  9:12 AM   Pulse 49 10/3/2019  9:27 AM   Resp 18 10/3/2019  9:27 AM   SpO2 97 % 10/3/2019  9:27 AM         Event Time     Out of Recovery 09:32:33          Pain/Roger Score: Roger Score: 10 (10/3/2019  9:21 AM)

## 2019-10-03 NOTE — H&P
Procedure : Colonoscopy    Indication(s):  asymptomatic screening exam    Review of patient's allergies indicates:  No Known Allergies    Past Medical History:   Diagnosis Date    Arthritis     Cancer     CLL, non hogkin's    Cataract     had sx in OD still has cataract in OS     (central serous retinopathy)     right eye on cornia    Depression     Diabetes mellitus 2015    Farsightedness     wears glasses    Psychiatric problem     Therapy        Prior to Admission medications    Medication Sig Start Date End Date Taking? Authorizing Provider   CONTOUR NEXT STRIPS Strp  12/11/17   Historical Provider, MD   fenofibrate 160 MG Tab Take 1 tablet (160 mg total) by mouth once daily. 10/8/18 10/8/19  Joshua Noonan MD   fish oil-omega-3 fatty acids 300-1,000 mg capsule Take 2 g by mouth once daily.    Historical Provider, MD   metFORMIN (GLUCOPHAGE-XR) 500 MG 24 hr tablet Take 3 tablets by mouth every morning 7/29/19   Joshua Noonan MD   multivitamin (ONE DAILY MULTIVITAMIN) per tablet Take 1 tablet by mouth once daily.    Historical Provider, MD       Sedation Problems: NO    Family History   Problem Relation Age of Onset    Valvular heart disease Mother     Hypertension Mother     Cancer Father     Diabetes Father     Hypertension Father     Drug abuse Brother     Heart attack Brother     ALS Maternal Aunt     Cancer Maternal Uncle     Cancer Maternal Grandfather     Breast cancer Paternal Grandmother     Diabetes Paternal Grandfather     Cancer Maternal Uncle     Glaucoma Neg Hx     Macular degeneration Neg Hx        Fam Hx of Sedation Problems: NO    Social History     Socioeconomic History    Marital status:      Spouse name: Not on file    Number of children: 3    Years of education: Not on file    Highest education level: Not on file   Occupational History    Not on file   Social Needs    Financial resource strain: Not hard at all    Food insecurity:     Worry:  Never true     Inability: Never true    Transportation needs:     Medical: No     Non-medical: No   Tobacco Use    Smoking status: Never Smoker    Smokeless tobacco: Never Used   Substance and Sexual Activity    Alcohol use: Yes     Frequency: 2-4 times a month     Drinks per session: 1 or 2     Binge frequency: Never     Comment: socially    Drug use: No    Sexual activity: Yes   Lifestyle    Physical activity:     Days per week: 5 days     Minutes per session: 40 min    Stress: Only a little   Relationships    Social connections:     Talks on phone: More than three times a week     Gets together: Twice a week     Attends Anabaptist service: Not on file     Active member of club or organization: Yes     Attends meetings of clubs or organizations: 1 to 4 times per year     Relationship status:    Other Topics Concern    Patient feels they ought to cut down on drinking/drug use Not Asked    Patient annoyed by others criticizing their drinking/drug use Not Asked    Patient has felt bad or guilty about drinking/drug use Not Asked    Patient has had a drink/used drugs as an eye opener in the AM Not Asked   Social History Narrative    Owns electrical company,  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, 3 children, Congregational, good social support, loves fishing       Review of Systems -     Respiratory ROS: no cough, shortness of breath, or wheezing  Cardiovascular ROS: no chest pain or dyspnea on exertion  Gastrointestinal ROS: no abdominal pain, change in bowel habits, or black or bloody stools  Musculoskeletal ROS: negative  Neurological ROS: no TIA or stroke symptoms        Physical Exam:  General: no distress  Head: normocephalic  Airway:  normal oropharynx, airway normal  Neck: supple, symmetrical, trachea midline  Lungs:  normal respiratory effort  Heart: regular rate and rhythm  Abdomen: soft, non-tender non-distented; bowel sounds normal; no masses,  no organomegaly  Extremities: no cyanosis or edema, or  clubbing       Deep Sedation: Mallampati Score per anesthesia     SedationPlan :Moderate     ASA : II    Patient is medically cleared for anesthesia.    Anesthesia/Surgery risks, benefits and alternative options discussed and understood by patient/family.

## 2019-10-03 NOTE — DISCHARGE SUMMARY
Discharge Note  Short Stay      SUMMARY     Admit Date: 10/3/2019    Attending Physician: Eliazar Huynh MD     Discharge Physician: Eliazar Huynh MD    Discharge Date: 10/3/2019 9:18 AM    Admission Diagnosis: asymptomatic screening exam    Final Diagnosis:  Normal colonoscopy    Procedures Performed:    Colonoscopy    Disposition: Home or Self Care    Condition at Discharge:  Stable    Patient Instructions:   Current Discharge Medication List      CONTINUE these medications which have NOT CHANGED    Details   CONTOUR NEXT STRIPS Strp       fenofibrate 160 MG Tab Take 1 tablet (160 mg total) by mouth once daily.  Qty: 90 tablet, Refills: 3    Associated Diagnoses: Hypertriglyceridemia      fish oil-omega-3 fatty acids 300-1,000 mg capsule Take 2 g by mouth once daily.      metFORMIN (GLUCOPHAGE-XR) 500 MG 24 hr tablet Take 3 tablets by mouth every morning  Qty: 270 tablet, Refills: 1    Associated Diagnoses: Controlled type 2 diabetes mellitus without complication, with long-term current use of insulin      multivitamin (ONE DAILY MULTIVITAMIN) per tablet Take 1 tablet by mouth once daily.             Discharge Procedure Orders (must include Diet, Follow-up, Activity)   Discharge Procedure Orders (must include Diet, Follow-up, Activity)   Activity as tolerated        Repeat colonoscopy 10 years.  High fiber diet

## 2019-10-15 DIAGNOSIS — E78.1 HYPERTRIGLYCERIDEMIA: ICD-10-CM

## 2019-10-15 RX ORDER — FENOFIBRATE 160 MG/1
TABLET ORAL
Qty: 90 TABLET | Refills: 0 | Status: SHIPPED | OUTPATIENT
Start: 2019-10-15 | End: 2020-01-13

## 2019-10-22 ENCOUNTER — LAB VISIT (OUTPATIENT)
Dept: LAB | Facility: HOSPITAL | Age: 54
End: 2019-10-22
Attending: INTERNAL MEDICINE
Payer: COMMERCIAL

## 2019-10-22 ENCOUNTER — OFFICE VISIT (OUTPATIENT)
Dept: HEMATOLOGY/ONCOLOGY | Facility: CLINIC | Age: 54
End: 2019-10-22
Payer: COMMERCIAL

## 2019-10-22 VITALS
WEIGHT: 274.25 LBS | BODY MASS INDEX: 40.62 KG/M2 | TEMPERATURE: 98 F | OXYGEN SATURATION: 98 % | HEART RATE: 59 BPM | HEIGHT: 69 IN | DIASTOLIC BLOOD PRESSURE: 86 MMHG | RESPIRATION RATE: 20 BRPM | SYSTOLIC BLOOD PRESSURE: 151 MMHG

## 2019-10-22 DIAGNOSIS — C83.00 SMALL B-CELL LYMPHOMA, UNSPECIFIED BODY REGION: ICD-10-CM

## 2019-10-22 DIAGNOSIS — E11.9 CONTROLLED TYPE 2 DIABETES MELLITUS WITHOUT COMPLICATION, WITH LONG-TERM CURRENT USE OF INSULIN: ICD-10-CM

## 2019-10-22 DIAGNOSIS — C83.00 SMALL B-CELL LYMPHOMA, UNSPECIFIED BODY REGION: Primary | ICD-10-CM

## 2019-10-22 DIAGNOSIS — Z79.4 CONTROLLED TYPE 2 DIABETES MELLITUS WITHOUT COMPLICATION, WITH LONG-TERM CURRENT USE OF INSULIN: ICD-10-CM

## 2019-10-22 LAB
ALBUMIN SERPL BCP-MCNC: 4 G/DL (ref 3.5–5.2)
ALP SERPL-CCNC: 73 U/L (ref 55–135)
ALT SERPL W/O P-5'-P-CCNC: 30 U/L (ref 10–44)
ANION GAP SERPL CALC-SCNC: 10 MMOL/L (ref 8–16)
AST SERPL-CCNC: 18 U/L (ref 10–40)
BASOPHILS # BLD AUTO: 0.03 K/UL (ref 0–0.2)
BASOPHILS NFR BLD: 0.4 % (ref 0–1.9)
BILIRUB SERPL-MCNC: 0.3 MG/DL (ref 0.1–1)
BUN SERPL-MCNC: 19 MG/DL (ref 6–20)
CALCIUM SERPL-MCNC: 9.7 MG/DL (ref 8.7–10.5)
CHLORIDE SERPL-SCNC: 106 MMOL/L (ref 95–110)
CO2 SERPL-SCNC: 22 MMOL/L (ref 23–29)
CREAT SERPL-MCNC: 0.8 MG/DL (ref 0.5–1.4)
DIFFERENTIAL METHOD: ABNORMAL
EOSINOPHIL # BLD AUTO: 0.1 K/UL (ref 0–0.5)
EOSINOPHIL NFR BLD: 1.9 % (ref 0–8)
ERYTHROCYTE [DISTWIDTH] IN BLOOD BY AUTOMATED COUNT: 12.6 % (ref 11.5–14.5)
EST. GFR  (AFRICAN AMERICAN): >60 ML/MIN/1.73 M^2
EST. GFR  (NON AFRICAN AMERICAN): >60 ML/MIN/1.73 M^2
GLUCOSE SERPL-MCNC: 182 MG/DL (ref 70–110)
HCT VFR BLD AUTO: 43.9 % (ref 40–54)
HGB BLD-MCNC: 15 G/DL (ref 14–18)
IMM GRANULOCYTES # BLD AUTO: 0.04 K/UL (ref 0–0.04)
IMM GRANULOCYTES NFR BLD AUTO: 0.5 % (ref 0–0.5)
LDH SERPL L TO P-CCNC: 158 U/L (ref 110–260)
LYMPHOCYTES # BLD AUTO: 2.1 K/UL (ref 1–4.8)
LYMPHOCYTES NFR BLD: 27.9 % (ref 18–48)
MCH RBC QN AUTO: 29.3 PG (ref 27–31)
MCHC RBC AUTO-ENTMCNC: 34.2 G/DL (ref 32–36)
MCV RBC AUTO: 86 FL (ref 82–98)
MONOCYTES # BLD AUTO: 0.5 K/UL (ref 0.3–1)
MONOCYTES NFR BLD: 6.5 % (ref 4–15)
NEUTROPHILS # BLD AUTO: 4.6 K/UL (ref 1.8–7.7)
NEUTROPHILS NFR BLD: 62.8 % (ref 38–73)
NRBC BLD-RTO: 0 /100 WBC
PLATELET # BLD AUTO: 237 K/UL (ref 150–350)
PMV BLD AUTO: 9.1 FL (ref 9.2–12.9)
POTASSIUM SERPL-SCNC: 4.8 MMOL/L (ref 3.5–5.1)
PROT SERPL-MCNC: 7 G/DL (ref 6–8.4)
RBC # BLD AUTO: 5.12 M/UL (ref 4.6–6.2)
SODIUM SERPL-SCNC: 138 MMOL/L (ref 136–145)
WBC # BLD AUTO: 7.38 K/UL (ref 3.9–12.7)

## 2019-10-22 PROCEDURE — 3044F HG A1C LEVEL LT 7.0%: CPT | Mod: CPTII,S$GLB,, | Performed by: INTERNAL MEDICINE

## 2019-10-22 PROCEDURE — 99215 PR OFFICE/OUTPT VISIT, EST, LEVL V, 40-54 MIN: ICD-10-PCS | Mod: S$GLB,,, | Performed by: INTERNAL MEDICINE

## 2019-10-22 PROCEDURE — 99999 PR PBB SHADOW E&M-EST. PATIENT-LVL III: ICD-10-PCS | Mod: PBBFAC,,, | Performed by: INTERNAL MEDICINE

## 2019-10-22 PROCEDURE — 83615 LACTATE (LD) (LDH) ENZYME: CPT

## 2019-10-22 PROCEDURE — 3044F PR MOST RECENT HEMOGLOBIN A1C LEVEL <7.0%: ICD-10-PCS | Mod: CPTII,S$GLB,, | Performed by: INTERNAL MEDICINE

## 2019-10-22 PROCEDURE — 99999 PR PBB SHADOW E&M-EST. PATIENT-LVL III: CPT | Mod: PBBFAC,,, | Performed by: INTERNAL MEDICINE

## 2019-10-22 PROCEDURE — 3008F BODY MASS INDEX DOCD: CPT | Mod: CPTII,S$GLB,, | Performed by: INTERNAL MEDICINE

## 2019-10-22 PROCEDURE — 85025 COMPLETE CBC W/AUTO DIFF WBC: CPT

## 2019-10-22 PROCEDURE — 80053 COMPREHEN METABOLIC PANEL: CPT

## 2019-10-22 PROCEDURE — 36415 COLL VENOUS BLD VENIPUNCTURE: CPT

## 2019-10-22 PROCEDURE — 99215 OFFICE O/P EST HI 40 MIN: CPT | Mod: S$GLB,,, | Performed by: INTERNAL MEDICINE

## 2019-10-22 PROCEDURE — 3008F PR BODY MASS INDEX (BMI) DOCUMENTED: ICD-10-PCS | Mod: CPTII,S$GLB,, | Performed by: INTERNAL MEDICINE
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
## 2019-10-25 DIAGNOSIS — Z12.11 COLON CANCER SCREENING: ICD-10-CM

## 2019-10-28 NOTE — NURSING
ENDOSCOPIC ULTRASOUND (EUS) REPORT    PLACE OF SERVICE: [facility]    PATIENT NAME: Chito Rocha  :1967   MRN:[id number]   PCP: Isaac Nelson MD    DATE OF SERVICE: 10/28/2019      PRE-OP DIAGNOSIS:   Idiopathic acute pancreatitis without infection or necrosis [K85.00]    POST-OP DIAGNOSIS:   Small pancreatic cyst    PROCEDURE(S) PERFORMED:  EUS     ENDOSCOPIST:  Mile Melgoza MD, MPH    ASSISTANT:  Nurse: Jemma Ruiz RN  GI Tech: ST Shruthi    ANESTHESIOLOGIST:  Anesthesiologist: Star Seals MD    ANESTHESIA TYPE:   Monitored Anesthesia Care    PROCEDURE MEDICATIONS:   * No intraprocedure medications in log *     INDICATIONS:  This is a 52 year old-year-old male patient presenting for Outpatient EGD+EUS for for a lesion in the body of the pancreas that was detected during last he was.  Repeat ease CT scan of the abdomen did not reveal that lesion.  The patient is otherwise doing well.    CONSENT:    Informed written consent was obtained from the patient after explaining the  risks, benefits and alternatives of the procedure, and the personnel involved.    DESCRIPTION OF PROCEDURE:    After informed consent was obtained, the patient was brought to the endoscopy suite and connected to the monitoring devices in the usual manner.  The patient was then turned to the left lateral position.  A verbal time out was called. After comfortable level of sedation was achieved, a linear echoendoscope was/were introduced in the mouth and advanced under direct visualization to the second portion of the duodenum.  The endoscope was withdrawn, retroflexed in the stomach and finally taken out of the patient.  A careful examination was performed to evaluate for any abnormalities during this process.  The patient tolerated the procedure well and was then transferred to the recovery room in a stable condition.    EVENTS:   Scope in: Scope In: 1417   Scope Out: Scope Out: 1422  Events:   Event  Patient here for blood draw from PAC.  Dennis CBC and CMP and sent to labs.  Flushed PAC with NS and heparin.  Left PAC accessed due to patient receiving chemo today.     Tracking     Panel 1     Procedure : ULTRASOUND, ENDOSCOPIC      Event Time In    Procedure Start 1417    Procedure End 1422             FINDINGS:  EUS:   Linear echoendoscopy was performed.  The visualized esophagus, stomach and duodenum appeared to be normal.  There was a 7.2 mm x 5.3 mm anechoic lesion present in the body of the pancreas at the site of the previous lesion suggesting that the previous lesion was probably an area of necrosis that has now liquified.  This cystic lesion was too small to aspirate as it was less than 10 mm.  The remainder of the pancreas otherwise appeared to be normal.  The pancreatic duct measured 0.4 mm.  The common bile duct measured 3.9 mm.  There was no surrounding peripancreatic lymphadenopathy present.    SPECIMEN: * No specimens in log *     ESTIMATED BLOOD LOSS:  Less than 5 mL    COMPLICATIONS:   None    DISPOSITION:  Home    RECOMMENDATIONS:   We will get a CT scan of the abdomen and pelvis to evaluate the left lower quadrant pain that he has been having for the last few days      Mile Melgoza MD, MPH   Date: 10/28/2019  Time: 2:22 PM            CC: Isaac Nelson MD

## 2019-11-05 ENCOUNTER — PATIENT MESSAGE (OUTPATIENT)
Dept: ORTHOPEDICS | Facility: CLINIC | Age: 54
End: 2019-11-05

## 2019-11-05 ENCOUNTER — TELEPHONE (OUTPATIENT)
Dept: ORTHOPEDICS | Facility: CLINIC | Age: 54
End: 2019-11-05

## 2019-11-08 DIAGNOSIS — M17.0 PRIMARY OSTEOARTHRITIS OF BOTH KNEES: Primary | ICD-10-CM

## 2019-11-13 ENCOUNTER — PATIENT OUTREACH (OUTPATIENT)
Dept: ADMINISTRATIVE | Facility: OTHER | Age: 54
End: 2019-11-13

## 2019-11-15 ENCOUNTER — OFFICE VISIT (OUTPATIENT)
Dept: ORTHOPEDICS | Facility: CLINIC | Age: 54
End: 2019-11-15
Payer: COMMERCIAL

## 2019-11-15 VITALS
HEART RATE: 61 BPM | BODY MASS INDEX: 41.2 KG/M2 | HEIGHT: 69 IN | WEIGHT: 278.13 LBS | SYSTOLIC BLOOD PRESSURE: 155 MMHG | DIASTOLIC BLOOD PRESSURE: 96 MMHG

## 2019-11-15 DIAGNOSIS — M17.0 PRIMARY OSTEOARTHRITIS OF BOTH KNEES: Primary | ICD-10-CM

## 2019-11-15 PROCEDURE — 99999 PR PBB SHADOW E&M-EST. PATIENT-LVL III: CPT | Mod: PBBFAC,,, | Performed by: PHYSICIAN ASSISTANT

## 2019-11-15 PROCEDURE — 99999 PR PBB SHADOW E&M-EST. PATIENT-LVL III: ICD-10-PCS | Mod: PBBFAC,,, | Performed by: PHYSICIAN ASSISTANT

## 2019-11-15 PROCEDURE — 99499 NO LOS: ICD-10-PCS | Mod: S$GLB,,, | Performed by: PHYSICIAN ASSISTANT

## 2019-11-15 PROCEDURE — 20610 DRAIN/INJ JOINT/BURSA W/O US: CPT | Mod: 50,S$GLB,, | Performed by: PHYSICIAN ASSISTANT

## 2019-11-15 PROCEDURE — 20610 PR DRAIN/INJECT LARGE JOINT/BURSA: ICD-10-PCS | Mod: 50,S$GLB,, | Performed by: PHYSICIAN ASSISTANT

## 2019-11-15 PROCEDURE — 99499 UNLISTED E&M SERVICE: CPT | Mod: S$GLB,,, | Performed by: PHYSICIAN ASSISTANT

## 2019-11-15 NOTE — PROGRESS NOTES
Wild Holliday is a 54 y.o. year old his here today for his 1st Euflexxa injection for degenerative changes of his bilateral knee . he was last seen and treated in the clinic on 5/8/2019. There has been no significant change in his medical status since his last visit. No Fever, chills, malaise, or unexplained weight change.      Allergies, Medications, past medical and surgical history were reviewed .    Examination of the knee demonstrates  No evidence of edema, erythema , echymosis strength and range of motion are unchanged from previous visit.    The injection site was identified and the skin was prepared with a betadine solution. The  bilateral knee  joint was injected with 2 ml of Euflexxa solution under sterile technique. Wild Holliday tolerated the procedure well, he was advised to rest the knee today, ice and elevation. I may take 3 -6 weeks following the last injection to get the full benefit of the medication.  I will see him back in 1 week. Sooner if he has any problems or concerns.           .     ICD-10-CM ICD-9-CM   1. Primary osteoarthritis of both knees M17.0 715.16

## 2019-11-20 ENCOUNTER — PATIENT OUTREACH (OUTPATIENT)
Dept: ADMINISTRATIVE | Facility: OTHER | Age: 54
End: 2019-11-20

## 2019-11-22 ENCOUNTER — OFFICE VISIT (OUTPATIENT)
Dept: ORTHOPEDICS | Facility: CLINIC | Age: 54
End: 2019-11-22
Payer: COMMERCIAL

## 2019-11-22 DIAGNOSIS — M17.0 PRIMARY OSTEOARTHRITIS OF BOTH KNEES: ICD-10-CM

## 2019-11-22 PROCEDURE — 99499 NO LOS: ICD-10-PCS | Mod: S$GLB,,, | Performed by: NURSE PRACTITIONER

## 2019-11-22 PROCEDURE — 20610 DRAIN/INJ JOINT/BURSA W/O US: CPT | Mod: 50,S$GLB,, | Performed by: NURSE PRACTITIONER

## 2019-11-22 PROCEDURE — 99499 UNLISTED E&M SERVICE: CPT | Mod: S$GLB,,, | Performed by: NURSE PRACTITIONER

## 2019-11-22 PROCEDURE — 99999 PR PBB SHADOW E&M-EST. PATIENT-LVL II: CPT | Mod: PBBFAC,,, | Performed by: NURSE PRACTITIONER

## 2019-11-22 PROCEDURE — 20610 PR DRAIN/INJECT LARGE JOINT/BURSA: ICD-10-PCS | Mod: 50,S$GLB,, | Performed by: NURSE PRACTITIONER

## 2019-11-22 PROCEDURE — 99999 PR PBB SHADOW E&M-EST. PATIENT-LVL II: ICD-10-PCS | Mod: PBBFAC,,, | Performed by: NURSE PRACTITIONER

## 2019-11-22 NOTE — LETTER
November 23, 2019      Usama Snowden PA-C  [address]  [facility] [address]           [facility] - Orthopedics  [address]  [facility] [address]  Phone: [phone]          Patient: Wild Holliday   MR Number: [id number]   YOB: 1965   Date of Visit: 11/22/2019       Dear Usama Snowden:    Thank you for referring Wild Holliday to me for evaluation. Attached you will find relevant portions of my assessment and plan of care.    If you have questions, please do not hesitate to call me. I look forward to following Wild Holliday along with you.    Sincerely,    Evelyn Pfeiffer, NP    Enclosure  CC:  No Recipients    If you would like to receive this communication electronically, please contact [email or web address] or [phone] to request more information on [employer] Link access.    For providers and/or their staff who would like to refer a patient to [facility], please contact us through our one-stop-shop provider referral line, [facility], at [phone].    If you feel you have received this communication in error or would no longer like to receive these types of communications, please e-mail [email or web address]

## 2019-11-23 NOTE — PROGRESS NOTES
Wild Holliday presents to clinic today for the second bilateral knee Euflexxa injection.    Exam demonstrates the no effusion in the  bilateral knee, and the skin is intact.    Diagnosis: osteoarthritis knee    After time out was performed and patient ID, side, and site were verified, the  bilateral  knee was sterilly prepped in the standard fashion.  A 22-gauge needle was introduced into bilateral knee joint from an tana-lateral site without complication and knee was then injected with 2 ml of Euflexxa.  Sterile dressing was applied.  The patient was instructed to resume activities as tolerated and to call with any problems.     We will see Wild Holliday back next week for the third injection.

## 2019-11-26 ENCOUNTER — PATIENT OUTREACH (OUTPATIENT)
Dept: ADMINISTRATIVE | Facility: OTHER | Age: 54
End: 2019-11-26

## 2019-11-29 ENCOUNTER — OFFICE VISIT (OUTPATIENT)
Dept: ORTHOPEDICS | Facility: CLINIC | Age: 54
End: 2019-11-29
Payer: COMMERCIAL

## 2019-11-29 VITALS
DIASTOLIC BLOOD PRESSURE: 82 MMHG | WEIGHT: 280.44 LBS | BODY MASS INDEX: 41.54 KG/M2 | HEIGHT: 69 IN | HEART RATE: 68 BPM | SYSTOLIC BLOOD PRESSURE: 142 MMHG

## 2019-11-29 DIAGNOSIS — M17.0 PRIMARY OSTEOARTHRITIS OF BOTH KNEES: Primary | ICD-10-CM

## 2019-11-29 PROCEDURE — 20610 PR DRAIN/INJECT LARGE JOINT/BURSA: ICD-10-PCS | Mod: 50,S$GLB,, | Performed by: PHYSICIAN ASSISTANT

## 2019-11-29 PROCEDURE — 99999 PR PBB SHADOW E&M-EST. PATIENT-LVL III: CPT | Mod: PBBFAC,,, | Performed by: PHYSICIAN ASSISTANT

## 2019-11-29 PROCEDURE — 99999 PR PBB SHADOW E&M-EST. PATIENT-LVL III: ICD-10-PCS | Mod: PBBFAC,,, | Performed by: PHYSICIAN ASSISTANT

## 2019-11-29 PROCEDURE — 20610 DRAIN/INJ JOINT/BURSA W/O US: CPT | Mod: 50,S$GLB,, | Performed by: PHYSICIAN ASSISTANT

## 2019-11-29 PROCEDURE — 99499 NO LOS: ICD-10-PCS | Mod: S$GLB,,, | Performed by: PHYSICIAN ASSISTANT

## 2019-11-29 PROCEDURE — 99499 UNLISTED E&M SERVICE: CPT | Mod: S$GLB,,, | Performed by: PHYSICIAN ASSISTANT

## 2019-11-29 NOTE — PROGRESS NOTES
Wild Holliday presents to clinic today for the third bilateral knee Euflexxa injection.  There has been no significant change in his medical status since his last visit. No fever, chills, malaise, or unexplained weight change.    Allergies, medications, past medical and surgical history were reviewed.    Exam demonstrates there is no effusion in the  bilateral knee, and the skin is intact.    Diagnosis: osteoarthritis knee    After time out was performed and patient ID, side, and site were verified, the  bilateral  knee was sterilly prepped in the standard fashion.  A 22-gauge needle was introduced into bilateral knee joint from an tana-lateral site without complication and knee was then injected with 2 ml of Euflexxa.  Sterile dressing was applied.  The patient was instructed to resume activities as tolerated and to call with any problems.     We will see Wild Holliday back for follow up if symptoms worsen or fail to improve

## 2019-11-29 NOTE — LETTER
November 29, 2019      Usama Snowden PA-C  [address]  [facility] [address]           [facility] - Orthopedics  [address]  [facility] [address]  Phone: [phone]          Patient: Wild Holliday   MR Number: [id number]   YOB: 1965   Date of Visit: 11/29/2019       Dear Usama Snowden:    Thank you for referring Wild Holliday to me for evaluation. Attached you will find relevant portions of my assessment and plan of care.    If you have questions, please do not hesitate to call me. I look forward to following Wild Holliday along with you.    Sincerely,    Nathaly Smalls PA-C    Enclosure  CC:  No Recipients    If you would like to receive this communication electronically, please contact [email or web address] or [phone] to request more information on [employer] Link access.    For providers and/or their staff who would like to refer a patient to [facility], please contact us through our one-stop-shop provider referral line, [facility] , at [phone].    If you feel you have received this communication in error or would no longer like to receive these types of communications, please e-mail [email or web address]

## 2019-12-02 ENCOUNTER — IMMUNIZATION (OUTPATIENT)
Dept: PHARMACY | Facility: CLINIC | Age: 54
End: 2019-12-02
Payer: COMMERCIAL

## 2019-12-12 ENCOUNTER — PATIENT MESSAGE (OUTPATIENT)
Dept: HEMATOLOGY/ONCOLOGY | Facility: CLINIC | Age: 54
End: 2019-12-12

## 2019-12-13 ENCOUNTER — PATIENT MESSAGE (OUTPATIENT)
Dept: ORTHOPEDICS | Facility: CLINIC | Age: 54
End: 2019-12-13

## 2019-12-13 DIAGNOSIS — H93.19 TINNITUS, UNSPECIFIED LATERALITY: Primary | ICD-10-CM

## 2019-12-15 ENCOUNTER — PATIENT OUTREACH (OUTPATIENT)
Dept: ADMINISTRATIVE | Facility: OTHER | Age: 54
End: 2019-12-15

## 2019-12-18 ENCOUNTER — PATIENT MESSAGE (OUTPATIENT)
Dept: ORTHOPEDICS | Facility: CLINIC | Age: 54
End: 2019-12-18

## 2019-12-20 ENCOUNTER — OFFICE VISIT (OUTPATIENT)
Dept: OTOLARYNGOLOGY | Facility: CLINIC | Age: 54
End: 2019-12-20
Payer: COMMERCIAL

## 2019-12-20 ENCOUNTER — CLINICAL SUPPORT (OUTPATIENT)
Dept: AUDIOLOGY | Facility: CLINIC | Age: 54
End: 2019-12-20
Payer: COMMERCIAL

## 2019-12-20 VITALS
HEART RATE: 62 BPM | DIASTOLIC BLOOD PRESSURE: 89 MMHG | WEIGHT: 280 LBS | BODY MASS INDEX: 41.35 KG/M2 | SYSTOLIC BLOOD PRESSURE: 148 MMHG

## 2019-12-20 DIAGNOSIS — H90.42 SENSORINEURAL HEARING LOSS (SNHL) OF LEFT EAR WITH UNRESTRICTED HEARING OF RIGHT EAR: ICD-10-CM

## 2019-12-20 DIAGNOSIS — H93.13 TINNITUS OF BOTH EARS: Primary | ICD-10-CM

## 2019-12-20 DIAGNOSIS — J34.89 NASAL OBSTRUCTION: ICD-10-CM

## 2019-12-20 DIAGNOSIS — J34.2 ACQUIRED DEVIATED NASAL SEPTUM: ICD-10-CM

## 2019-12-20 PROCEDURE — 92567 TYMPANOMETRY: CPT | Mod: S$GLB,,, | Performed by: AUDIOLOGIST

## 2019-12-20 PROCEDURE — 99999 PR PBB SHADOW E&M-EST. PATIENT-LVL III: ICD-10-PCS | Mod: PBBFAC,,, | Performed by: OTOLARYNGOLOGY

## 2019-12-20 PROCEDURE — 99999 PR PBB SHADOW E&M-EST. PATIENT-LVL III: CPT | Mod: PBBFAC,,, | Performed by: OTOLARYNGOLOGY

## 2019-12-20 PROCEDURE — 99999 PR PBB SHADOW E&M-EST. PATIENT-LVL I: ICD-10-PCS | Mod: PBBFAC,,, | Performed by: AUDIOLOGIST

## 2019-12-20 PROCEDURE — 3008F PR BODY MASS INDEX (BMI) DOCUMENTED: ICD-10-PCS | Mod: CPTII,S$GLB,, | Performed by: OTOLARYNGOLOGY

## 2019-12-20 PROCEDURE — 99213 PR OFFICE/OUTPT VISIT, EST, LEVL III, 20-29 MIN: ICD-10-PCS | Mod: S$GLB,,, | Performed by: OTOLARYNGOLOGY

## 2019-12-20 PROCEDURE — 92557 PR COMPREHENSIVE HEARING TEST: ICD-10-PCS | Mod: S$GLB,,, | Performed by: AUDIOLOGIST

## 2019-12-20 PROCEDURE — 92567 PR TYMPA2METRY: ICD-10-PCS | Mod: S$GLB,,, | Performed by: AUDIOLOGIST

## 2019-12-20 PROCEDURE — 3008F BODY MASS INDEX DOCD: CPT | Mod: CPTII,S$GLB,, | Performed by: OTOLARYNGOLOGY

## 2019-12-20 PROCEDURE — 99213 OFFICE O/P EST LOW 20 MIN: CPT | Mod: S$GLB,,, | Performed by: OTOLARYNGOLOGY

## 2019-12-20 PROCEDURE — 92557 COMPREHENSIVE HEARING TEST: CPT | Mod: S$GLB,,, | Performed by: AUDIOLOGIST

## 2019-12-20 PROCEDURE — 99999 PR PBB SHADOW E&M-EST. PATIENT-LVL I: CPT | Mod: PBBFAC,,, | Performed by: AUDIOLOGIST

## 2019-12-20 NOTE — LETTER
December 20, 2019      Cami Bradley MD  [address]  [facility] [address]           [facility] - Otorhinol[facility]ology  [address]  [facility] [address]  Phone: [phone]  Fax: [phone]          Patient: Wild Holliday   MR Number: [id number]   YOB: 1965   Date of Visit: 12/20/2019       Dear Dr. Cami Bradley:    Thank you for referring Wild Holliday to me for evaluation. Attached you will find relevant portions of my assessment and plan of care.    If you have questions, please do not hesitate to call me. I look forward to following Wild Holliday along with you.    Sincerely,    Rm Ferreira MD    Enclosure  CC:  No Recipients    If you would like to receive this communication electronically, please contact [email or web address] or [phone] to request more information on [employer] Link access.    For providers and/or their staff who would like to refer a patient to [facility], please contact us through our one-stop-shop provider referral line, [facility], at [phone].    If you feel you have received this communication in error or would no longer like to receive these types of communications, please e-mail [email or web address]

## 2019-12-20 NOTE — PROGRESS NOTES
OTOLARYNGOLOGY CLINIC    Subjective:       Patient ID: Wild Holliday is a 54 y.o. male.    Chief Complaint: Tinnitus    HPI  Patien with ringing in the ears s/p chemotherapy for non-Hodgkins lymphoma with the left ear greater then the right.  Patient first noticed ear symptoms 2 year ago after a flight with the left ear clogging up for 4 days with resolution.  Patient required steroids at that time.  No vertigo.  No pain.  No recent ear infections but had ear infections as a child.    Patient has seen Dr. Alejo, ENT, in [place], [place].  No other ENT problems.      Past Medical History:   Diagnosis Date    Arthritis     Cancer     CLL, non hogkin's    Cataract     had sx in [place] still has cataract in [place]     (central serous retinopathy)     right eye on cornia    Depression     Diabetes mellitus 2015    Farsightedness     wears glasses    Psychiatric problem     Therapy        Past Surgical History:   Procedure Laterality Date    CATARACT EXTRACTION      COLONOSCOPY N/A 10/3/2019    Procedure: COLONOSCOPY;  Surgeon: Eliazar Huynh MD;  Location: [facility];  Service: Endoscopy;  Laterality: N/A;    lipoma removal      right rib; below right navel    PORTACATH PLACEMENT      left upper chest    TONSILLECTOMY      tricep tendon repair Right 2014         Current Outpatient Medications:     CONTOUR NEXT STRIPS Strp, , Disp: , Rfl:     fenofibrate 160 MG Tab, TAKE 1 TABLET(160 MG) BY MOUTH EVERY DAY, Disp: 90 tablet, Rfl: 0    fish oil-omega-3 fatty acids 300-1,000 mg capsule, Take 2 g by mouth once daily., Disp: , Rfl:     metFORMIN (GLUCOPHAGE-XR) 500 MG 24 hr tablet, Take 3 tablets by mouth every morning, Disp: 270 tablet, Rfl: 1    multivitamin (ONE DAILY MULTIVITAMIN) per tablet, Take 1 tablet by mouth once daily., Disp: , Rfl:     Review of patient's allergies indicates:  No Known Allergies    Social History     Socioeconomic History    Marital status:      Spouse name: Not on file    Number  of children: 3    Years of education: Not on file    Highest education level: Not on file   Occupational History    Not on file   Social Needs    Financial resource strain: Not hard at all    Food insecurity:     Worry: Never true     Inability: Never true    Transportation needs:     Medical: No     Non-medical: No   Tobacco Use    Smoking status: Never Smoker    Smokeless tobacco: Never Used   Substance and Sexual Activity    Alcohol use: Yes     Frequency: 2-4 times a month     Drinks per session: 1 or 2     Binge frequency: Never     Comment: socially    Drug use: No    Sexual activity: Yes   Lifestyle    Physical activity:     Days per week: 5 days     Minutes per session: 40 min    Stress: Only a little   Relationships    Social connections:     Talks on phone: More than three times a week     Gets together: Twice a week     Attends Gnosticist service: Not on file     Active member of club or organization: Yes     Attends meetings of clubs or organizations: 1 to 4 times per year     Relationship status:    Other Topics Concern    Patient feels they ought to cut down on drinking/drug use Not Asked    Patient annoyed by others criticizing their drinking/drug use Not Asked    Patient has felt bad or guilty about drinking/drug use Not Asked    Patient has had a drink/used drugs as an eye opener in the AM Not Asked   Social History Narrative    Owns electrical company,  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   HENT: Positive for tinnitus. Negative for congestion, ear discharge, ear pain, facial swelling, hearing loss, nosebleeds, postnasal drip, rhinorrhea, sinus pressure, sneezing, sore throat and voice change.    Eyes: Negative for discharge.   Respiratory: Positive for cough.    Cardiovascular: Negative for chest pain.   Gastrointestinal: Negative for vomiting.   Genitourinary: Negative for difficulty urinating.   Musculoskeletal: Positive for arthralgias. Negative for neck pain.   Skin: Negative for rash.   Neurological: Negative for headaches.   Hematological: Negative for adenopathy.   Psychiatric/Behavioral: Negative for sleep disturbance.       Objective:     Physical Exam   Constitutional: He is oriented to person, place, and time. He appears well-developed and well-nourished.   HENT:   Head: Normocephalic and atraumatic.   Right Ear: Hearing, tympanic membrane, external ear and ear canal normal. Tympanic membrane is not injected, not scarred, not perforated, not erythematous and not retracted.   Left Ear: Hearing, tympanic membrane, external ear and ear canal normal. Tympanic membrane is not injected, not scarred, not perforated, not erythematous and not retracted.   Nose: Septal deviation (to the right.) present. No rhinorrhea, sinus tenderness or nasal deformity. No epistaxis.   Mouth/Throat: Uvula is midline, oropharynx is clear and moist and mucous membranes are normal. No oral lesions. No uvula swelling. No oropharyngeal exudate or posterior oropharyngeal erythema.   Eyes: Pupils are equal, round, and reactive to light. Conjunctivae, EOM and lids are normal. Right eye exhibits no discharge. Left eye exhibits no discharge.   Neck: Neck supple. No thyromegaly present.   Pulmonary/Chest: Effort normal.   Musculoskeletal: Normal range of motion.   Lymphadenopathy:     He has no cervical adenopathy.   Neurological:  He is alert and oriented to person, place, and time.   Skin: Skin is warm.   Psychiatric: He has a normal mood and affect.        Assessment & Plan:         Problem List Items Addressed This Visit     None      Visit Diagnoses     Tinnitus of both ears    -  Primary    Sensorineural hearing loss (SNHL) of left ear with unrestricted hearing of right ear            Patient Instructions   1.  Audiogram/tympanogram with test today with results given to patient with copy of test given to patient.  2.  Keep ears dry.  3.  Noise protection with ear muffs and ear plugs when exposed to loud noises.  4.  Audiogram/tympanogram in 3 months with appointment to Dr. Courtney after appointment.  5.  If cleared for asymmetrical hearing loss by Dr. Courtney, may benefit from a hearing aid in the left ear.  6.  Return to ENT/Dr. Courtney as needed for additional change in hearing/tinnitus.        Answers for HPI/ROS submitted by the patient on 12/18/2019   Fatigue (Tiredness)?: Yes  Snoring?: Yes

## 2019-12-20 NOTE — PATIENT INSTRUCTIONS
1.  Audiogram/tympanogram with test today with results given to patient with copy of test given to patient.  2.  Keep ears dry.  3.  Noise protection with ear muffs and ear plugs when exposed to loud noises.  4.  Audiogram/tympanogram in 3 months with appointment to Dr. Courtney after appointment.  5.  If cleared for asymmetrical hearing loss by Dr. Courtney, may benefit from a hearing aid in the left ear.  6.  Return to ENT/Dr. Courtney as needed for additional change in hearing/tinnitus.  7.  Flonase Nasal Inhaler OTC:  2 puffs each side of the nose daily for 3 months and then as needed daily for nasal symptoms or nasal obstruction.  8.  Zyrtec 10 mg OTC: 1 pill by mouth each day forfor 3 months and then as needed daily for nasal symptoms or nasal obstruction.  9. Return to ENT.

## 2019-12-20 NOTE — PROGRESS NOTES
Wild Holliday was seen today for a hearing evaluation.     Pure tone audiometry revealed  normal hearing for the right ear; mild SNHL beginning at 3k Hz., for the left ear  SRT and PTA are in good agreement bilaterally.  Excellent speech discrimination for the right ear: Excellent for the left ear   Tympanometry revealed Type A, bilaterally      Recommendations:  1. Otologic Evaluation  2. Annual Audiogram or repeat audiogram as needed  3. Hearing Protection  4. Hearing Aid Consult for the left ear pending medical clearance for asymmetry

## 2020-01-13 DIAGNOSIS — E78.1 HYPERTRIGLYCERIDEMIA: ICD-10-CM

## 2020-01-13 RX ORDER — FENOFIBRATE 160 MG/1
TABLET ORAL
Qty: 90 TABLET | Refills: 0 | Status: SHIPPED | OUTPATIENT
Start: 2020-01-13 | End: 2020-04-13

## 2020-01-14 ENCOUNTER — PATIENT OUTREACH (OUTPATIENT)
Dept: ADMINISTRATIVE | Facility: OTHER | Age: 55
End: 2020-01-14

## 2020-01-15 ENCOUNTER — HOSPITAL ENCOUNTER (OUTPATIENT)
Dept: RADIOLOGY | Facility: HOSPITAL | Age: 55
Discharge: HOME | End: 2020-01-15
Attending: FAMILY MEDICINE
Payer: COMMERCIAL

## 2020-01-15 ENCOUNTER — OFFICE VISIT (OUTPATIENT)
Dept: SPORTS MEDICINE | Facility: CLINIC | Age: 55
End: 2020-01-15
Payer: COMMERCIAL

## 2020-01-15 ENCOUNTER — PATIENT MESSAGE (OUTPATIENT)
Dept: HEMATOLOGY/ONCOLOGY | Facility: CLINIC | Age: 55
End: 2020-01-15

## 2020-01-15 VITALS — HEIGHT: 69 IN | WEIGHT: 279 LBS | BODY MASS INDEX: 41.32 KG/M2 | TEMPERATURE: 98 F

## 2020-01-15 DIAGNOSIS — M25.562 PAIN IN BOTH KNEES, UNSPECIFIED CHRONICITY: ICD-10-CM

## 2020-01-15 DIAGNOSIS — M25.561 PAIN IN BOTH KNEES, UNSPECIFIED CHRONICITY: ICD-10-CM

## 2020-01-15 DIAGNOSIS — M17.0 PRIMARY OSTEOARTHRITIS OF BOTH KNEES: Primary | ICD-10-CM

## 2020-01-15 PROCEDURE — 99204 PR OFFICE/OUTPT VISIT, NEW, LEVL IV, 45-59 MIN: ICD-10-PCS | Mod: S$GLB,,, | Performed by: FAMILY MEDICINE

## 2020-01-15 PROCEDURE — 99999 PR PBB SHADOW E&M-EST. PATIENT-LVL III: ICD-10-PCS | Mod: PBBFAC,,, | Performed by: FAMILY MEDICINE

## 2020-01-15 PROCEDURE — 73564 XR KNEE ORTHO BILAT WITH FLEXION: ICD-10-PCS | Mod: 26,,, | Performed by: RADIOLOGY

## 2020-01-15 PROCEDURE — 3008F PR BODY MASS INDEX (BMI) DOCUMENTED: ICD-10-PCS | Mod: CPTII,S$GLB,, | Performed by: FAMILY MEDICINE

## 2020-01-15 PROCEDURE — 3008F BODY MASS INDEX DOCD: CPT | Mod: CPTII,S$GLB,, | Performed by: FAMILY MEDICINE

## 2020-01-15 PROCEDURE — 73564 X-RAY EXAM KNEE 4 OR MORE: CPT | Mod: TC,50

## 2020-01-15 PROCEDURE — 99204 OFFICE O/P NEW MOD 45 MIN: CPT | Mod: S$GLB,,, | Performed by: FAMILY MEDICINE

## 2020-01-15 PROCEDURE — 99999 PR PBB SHADOW E&M-EST. PATIENT-LVL III: CPT | Mod: PBBFAC,,, | Performed by: FAMILY MEDICINE

## 2020-01-15 PROCEDURE — 73564 X-RAY EXAM KNEE 4 OR MORE: CPT | Mod: 26,,, | Performed by: RADIOLOGY
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
## 2020-01-17 ENCOUNTER — OFFICE VISIT (OUTPATIENT)
Dept: SPORTS MEDICINE | Facility: CLINIC | Age: 55
End: 2020-01-17
Payer: COMMERCIAL

## 2020-01-17 VITALS — TEMPERATURE: 98 F | BODY MASS INDEX: 41.32 KG/M2 | WEIGHT: 279 LBS | HEIGHT: 69 IN

## 2020-01-17 DIAGNOSIS — M17.0 PRIMARY OSTEOARTHRITIS OF BOTH KNEES: Primary | ICD-10-CM

## 2020-01-17 PROCEDURE — 99499 UNLISTED E&M SERVICE: CPT | Mod: CSM,S$GLB,, | Performed by: FAMILY MEDICINE

## 2020-01-17 PROCEDURE — 99499 NO LOS: ICD-10-PCS | Mod: CSM,S$GLB,, | Performed by: FAMILY MEDICINE

## 2020-01-17 PROCEDURE — 0232T PR INJECT PLATELET PLASMA W/IMG HARVEST/PREPARATOIN: ICD-10-PCS | Mod: CSM,,, | Performed by: FAMILY MEDICINE

## 2020-01-17 PROCEDURE — 99999 PR PBB SHADOW E&M-EST. PATIENT-LVL III: CPT | Mod: PBBFAC,,, | Performed by: FAMILY MEDICINE

## 2020-01-17 PROCEDURE — 99999 PR PBB SHADOW E&M-EST. PATIENT-LVL III: ICD-10-PCS | Mod: PBBFAC,,, | Performed by: FAMILY MEDICINE

## 2020-01-17 PROCEDURE — 0232T NJX PLATELET PLASMA: CPT | Mod: CSM,,, | Performed by: FAMILY MEDICINE

## 2020-01-17 NOTE — PROGRESS NOTES
Patient indications  Wild Holliday, a 54 y.o. male, presents today with:  #1 Kellgren-Amos Grade III osteoarthritis of knee, danilo med > ant compartments, bilateral    Procedure performed  Platelet rich plasma (PRP) injection performed using ultrasound guidance for exact placement of orthobiologic at pathologic site     Patient consent to perform procedure  See the electronic medical record for full written and signed patient consent to proceed with this procedure.    Description of procedure    A) Surgical time out  A surgical time out was performed, including verification of patient ID, procedure to be performed, site and side, availability of information and equipment, review of safety issues, and the patients agreement and consent.     B) Phlebotomy, platelet harvest, and PRP preparation  Sterile technique was used to phlebotomize 120mL of the patients blood from a peripheral vein. This blood was  into density-divided layers using an [employer] Galindo centrifuge. The layer of leukocyte poor (hematocrit 2%) PRP, a volume of 2.2mL was drawn up to 4.0mL with platelet poor plasma, and divided into two sterile syringes in preparation for injection.    C) Platelet rich plasma delivery to pathologic site  Injection site: BILATERAL KNEE  Using 1) physical examination and 2) musculoskeletal ultrasound, the anatomy was visualized and the diseased tissue was identified and confirmed. The procedure site was marked with a skin marker. The patient was placed in position maximizing 1) physician access to pathologic tissue and 2) patient comfort. The skin about the area was sterilized with ChloraPrep (2%w/v CHG, 70% IPA). The site of needle insertion was anesthetized using vapocoolant. Under ultrasound guidance, the needle was advanced to the site of tissue pathology, and the injectate was deposited under direct visualization.    D) Post-procedure care  Following the procedure, a sterile bandage was placed over the  injection site. After Care Instructions handout given.     Complications: none     Estimated blood loss from injection procedure: none     Joint aspirate volume: 0 mL     Disposition  The patient tolerated the procedure well and there were no immediate complications. The patient was instructed to call the clinic immediately for any mild to moderate adverse side effects, or to call 911 in the event of an emergency.        Description of ultrasound utilization for needle / probe guidance  Ultrasound guidance was used for needle / probe localization. Images (and videos, if appropriate) were saved and stored for documentation. Dynamic visualization of the needle / probe was continuous throughout the procedure.    0232T

## 2020-01-20 ENCOUNTER — PATIENT OUTREACH (OUTPATIENT)
Dept: ADMINISTRATIVE | Facility: OTHER | Age: 55
End: 2020-01-20

## 2020-01-21 ENCOUNTER — OFFICE VISIT (OUTPATIENT)
Dept: SPORTS MEDICINE | Facility: CLINIC | Age: 55
End: 2020-01-21

## 2020-01-21 VITALS — WEIGHT: 279 LBS | BODY MASS INDEX: 41.32 KG/M2 | HEIGHT: 69 IN | TEMPERATURE: 98 F

## 2020-01-21 DIAGNOSIS — M25.562 BILATERAL CHRONIC KNEE PAIN: Primary | ICD-10-CM

## 2020-01-21 DIAGNOSIS — M17.0 PRIMARY OSTEOARTHRITIS OF BOTH KNEES: ICD-10-CM

## 2020-01-21 DIAGNOSIS — M25.561 BILATERAL CHRONIC KNEE PAIN: Primary | ICD-10-CM

## 2020-01-21 DIAGNOSIS — G89.29 BILATERAL CHRONIC KNEE PAIN: Primary | ICD-10-CM

## 2020-01-21 PROCEDURE — 0232T NJX PLATELET PLASMA: CPT | Mod: CSM,,, | Performed by: FAMILY MEDICINE

## 2020-01-21 PROCEDURE — 99999 PR PBB SHADOW E&M-EST. PATIENT-LVL III: ICD-10-PCS | Mod: PBBFAC,,, | Performed by: FAMILY MEDICINE

## 2020-01-21 PROCEDURE — 99499 UNLISTED E&M SERVICE: CPT | Mod: CSM,S$GLB,, | Performed by: FAMILY MEDICINE

## 2020-01-21 PROCEDURE — 99999 PR PBB SHADOW E&M-EST. PATIENT-LVL III: CPT | Mod: PBBFAC,,, | Performed by: FAMILY MEDICINE

## 2020-01-21 PROCEDURE — 0232T PR INJECT PLATELET PLASMA W/IMG HARVEST/PREPARATOIN: ICD-10-PCS | Mod: CSM,,, | Performed by: FAMILY MEDICINE

## 2020-01-21 PROCEDURE — 99499 NO LOS: ICD-10-PCS | Mod: CSM,S$GLB,, | Performed by: FAMILY MEDICINE

## 2020-01-21 RX ORDER — DIAZEPAM 10 MG/1
10 TABLET ORAL DAILY PRN
Qty: 1 TABLET | Refills: 0 | Status: SHIPPED | OUTPATIENT
Start: 2020-01-21 | End: 2020-07-28

## 2020-01-21 NOTE — PROGRESS NOTES
Patient indications  Wild Holliday, a 54 y.o. male, presents today with:  #1 Kellgren-Amos Grade III osteoarthritis of knee, danilo med > ant compartments, bilateral    Procedure performed  Platelet rich plasma (PRP) injection performed using ultrasound guidance for exact placement of orthobiologic at pathologic site     Patient consent to perform procedure  See the electronic medical record for full written and signed patient consent to proceed with this procedure.    Description of procedure    A) Surgical time out  A surgical time out was performed, including verification of patient ID, procedure to be performed, site and side, availability of information and equipment, review of safety issues, and the patients agreement and consent.     B) Phlebotomy, platelet harvest, and PRP preparation  Sterile technique was used to phlebotomize 120mL of the patients blood from a peripheral vein. This blood was  into density-divided layers using an [employer] Galindo centrifuge. The layer of leukocyte poor (hematocrit 2%) PRP, a volume of 3.3mL was drawn up to 4.0mL with platelet poor plasma, and divided into two sterile syringes in preparation for injection.    C) Platelet rich plasma delivery to pathologic site  Injection site: BILATERAL KNEE  Using 1) physical examination and 2) musculoskeletal ultrasound, the anatomy was visualized and the diseased tissue was identified and confirmed. The procedure site was marked with a skin marker. The patient was placed in position maximizing 1) physician access to pathologic tissue and 2) patient comfort. The skin about the area was sterilized with ChloraPrep (2%w/v CHG, 70% IPA). The site of needle insertion was anesthetized using vapocoolant. Under ultrasound guidance, the needle was advanced to the site of tissue pathology, and the injectate was deposited under direct visualization.    D) Post-procedure care  Following the procedure, a sterile bandage was placed over the  injection site. After Care Instructions handout given.     Complications: none     Estimated blood loss from injection procedure: none     Joint aspirate volume: 0 mL     Disposition  The patient tolerated the procedure well and there were no immediate complications. The patient was instructed to call the clinic immediately for any mild to moderate adverse side effects, or to call 911 in the event of an emergency.        Description of ultrasound utilization for needle / probe guidance  Ultrasound guidance was used for needle / probe localization. Images (and videos, if appropriate) were saved and stored for documentation. Dynamic visualization of the needle / probe was continuous throughout the procedure.    0232T

## 2020-01-22 ENCOUNTER — LAB VISIT (OUTPATIENT)
Dept: LAB | Facility: HOSPITAL | Age: 55
End: 2020-01-22
Attending: INTERNAL MEDICINE
Payer: COMMERCIAL

## 2020-01-22 DIAGNOSIS — E11.9 CONTROLLED TYPE 2 DIABETES MELLITUS WITHOUT COMPLICATION, WITH LONG-TERM CURRENT USE OF INSULIN: ICD-10-CM

## 2020-01-22 DIAGNOSIS — Z79.4 CONTROLLED TYPE 2 DIABETES MELLITUS WITHOUT COMPLICATION, WITH LONG-TERM CURRENT USE OF INSULIN: ICD-10-CM

## 2020-01-22 DIAGNOSIS — E78.1 HYPERTRIGLYCERIDEMIA: ICD-10-CM

## 2020-01-22 LAB
ALBUMIN SERPL BCP-MCNC: 4.1 G/DL (ref 3.5–5.2)
ALBUMIN/CREAT UR: 7 UG/MG (ref 0–30)
ALP SERPL-CCNC: 69 U/L (ref 55–135)
ALT SERPL W/O P-5'-P-CCNC: 43 U/L (ref 10–44)
ANION GAP SERPL CALC-SCNC: 10 MMOL/L (ref 8–16)
AST SERPL-CCNC: 23 U/L (ref 10–40)
BASOPHILS # BLD AUTO: 0.04 K/UL (ref 0–0.2)
BASOPHILS NFR BLD: 0.6 % (ref 0–1.9)
BILIRUB SERPL-MCNC: 0.5 MG/DL (ref 0.1–1)
BUN SERPL-MCNC: 15 MG/DL (ref 6–20)
CALCIUM SERPL-MCNC: 9.9 MG/DL (ref 8.7–10.5)
CHLORIDE SERPL-SCNC: 103 MMOL/L (ref 95–110)
CHOLEST SERPL-MCNC: 149 MG/DL (ref 120–199)
CHOLEST/HDLC SERPL: 4.8 {RATIO} (ref 2–5)
CO2 SERPL-SCNC: 25 MMOL/L (ref 23–29)
CREAT SERPL-MCNC: 1 MG/DL (ref 0.5–1.4)
CREAT UR-MCNC: 142.4 MG/DL (ref 23–375)
DIFFERENTIAL METHOD: ABNORMAL
EOSINOPHIL # BLD AUTO: 0.2 K/UL (ref 0–0.5)
EOSINOPHIL NFR BLD: 2.8 % (ref 0–8)
ERYTHROCYTE [DISTWIDTH] IN BLOOD BY AUTOMATED COUNT: 12.6 % (ref 11.5–14.5)
EST. GFR  (AFRICAN AMERICAN): >60 ML/MIN/1.73 M^2
EST. GFR  (NON AFRICAN AMERICAN): >60 ML/MIN/1.73 M^2
ESTIMATED AVG GLUCOSE: 174 MG/DL (ref 68–131)
GLUCOSE SERPL-MCNC: 204 MG/DL (ref 70–110)
HBA1C MFR BLD HPLC: 7.7 % (ref 4–5.6)
HCT VFR BLD AUTO: 43.6 % (ref 40–54)
HDLC SERPL-MCNC: 31 MG/DL (ref 40–75)
HDLC SERPL: 20.8 % (ref 20–50)
HGB BLD-MCNC: 14.6 G/DL (ref 14–18)
IMM GRANULOCYTES # BLD AUTO: 0.04 K/UL (ref 0–0.04)
IMM GRANULOCYTES NFR BLD AUTO: 0.6 % (ref 0–0.5)
LDLC SERPL CALC-MCNC: 64 MG/DL (ref 63–159)
LYMPHOCYTES # BLD AUTO: 1.9 K/UL (ref 1–4.8)
LYMPHOCYTES NFR BLD: 26.9 % (ref 18–48)
MCH RBC QN AUTO: 29.4 PG (ref 27–31)
MCHC RBC AUTO-ENTMCNC: 33.5 G/DL (ref 32–36)
MCV RBC AUTO: 88 FL (ref 82–98)
MICROALBUMIN UR DL<=1MG/L-MCNC: 10 UG/ML
MONOCYTES # BLD AUTO: 0.5 K/UL (ref 0.3–1)
MONOCYTES NFR BLD: 7.1 % (ref 4–15)
NEUTROPHILS # BLD AUTO: 4.4 K/UL (ref 1.8–7.7)
NEUTROPHILS NFR BLD: 62 % (ref 38–73)
NONHDLC SERPL-MCNC: 118 MG/DL
NRBC BLD-RTO: 0 /100 WBC
PLATELET # BLD AUTO: 248 K/UL (ref 150–350)
PMV BLD AUTO: 8.9 FL (ref 9.2–12.9)
POTASSIUM SERPL-SCNC: 5.5 MMOL/L (ref 3.5–5.1)
PROT SERPL-MCNC: 7 G/DL (ref 6–8.4)
RBC # BLD AUTO: 4.97 M/UL (ref 4.6–6.2)
SODIUM SERPL-SCNC: 138 MMOL/L (ref 136–145)
TRIGL SERPL-MCNC: 270 MG/DL (ref 30–150)
TSH SERPL DL<=0.005 MIU/L-ACNC: 1.48 UIU/ML (ref 0.4–4)
WBC # BLD AUTO: 7.07 K/UL (ref 3.9–12.7)

## 2020-01-22 PROCEDURE — 80053 COMPREHEN METABOLIC PANEL: CPT

## 2020-01-22 PROCEDURE — 85025 COMPLETE CBC W/AUTO DIFF WBC: CPT

## 2020-01-22 PROCEDURE — 83036 HEMOGLOBIN GLYCOSYLATED A1C: CPT

## 2020-01-22 PROCEDURE — 84443 ASSAY THYROID STIM HORMONE: CPT

## 2020-01-22 PROCEDURE — 36415 COLL VENOUS BLD VENIPUNCTURE: CPT

## 2020-01-22 PROCEDURE — 80061 LIPID PANEL: CPT

## 2020-01-22 PROCEDURE — 82043 UR ALBUMIN QUANTITATIVE: CPT

## 2020-01-23 ENCOUNTER — PATIENT MESSAGE (OUTPATIENT)
Dept: SPORTS MEDICINE | Facility: CLINIC | Age: 55
End: 2020-01-23

## 2020-01-28 ENCOUNTER — PATIENT MESSAGE (OUTPATIENT)
Dept: SPORTS MEDICINE | Facility: CLINIC | Age: 55
End: 2020-01-28

## 2020-01-28 ENCOUNTER — OFFICE VISIT (OUTPATIENT)
Dept: INTERNAL MEDICINE | Facility: CLINIC | Age: 55
End: 2020-01-28
Payer: COMMERCIAL

## 2020-01-28 ENCOUNTER — PATIENT OUTREACH (OUTPATIENT)
Dept: ADMINISTRATIVE | Facility: OTHER | Age: 55
End: 2020-01-28

## 2020-01-28 VITALS
BODY MASS INDEX: 41.37 KG/M2 | RESPIRATION RATE: 16 BRPM | HEART RATE: 74 BPM | DIASTOLIC BLOOD PRESSURE: 86 MMHG | WEIGHT: 279.31 LBS | SYSTOLIC BLOOD PRESSURE: 138 MMHG | HEIGHT: 69 IN

## 2020-01-28 DIAGNOSIS — Z12.5 SCREENING FOR PROSTATE CANCER: ICD-10-CM

## 2020-01-28 DIAGNOSIS — C82.28 GRADE 3 FOLLICULAR LYMPHOMA OF LYMPH NODES OF MULTIPLE REGIONS: ICD-10-CM

## 2020-01-28 DIAGNOSIS — E78.1 HYPERTRIGLYCERIDEMIA: ICD-10-CM

## 2020-01-28 DIAGNOSIS — Z79.4 CONTROLLED TYPE 2 DIABETES MELLITUS WITHOUT COMPLICATION, WITH LONG-TERM CURRENT USE OF INSULIN: Primary | ICD-10-CM

## 2020-01-28 DIAGNOSIS — E11.9 CONTROLLED TYPE 2 DIABETES MELLITUS WITHOUT COMPLICATION, WITH LONG-TERM CURRENT USE OF INSULIN: Primary | ICD-10-CM

## 2020-01-28 PROCEDURE — 99214 OFFICE O/P EST MOD 30 MIN: CPT | Mod: S$GLB,,, | Performed by: INTERNAL MEDICINE

## 2020-01-28 PROCEDURE — 3008F PR BODY MASS INDEX (BMI) DOCUMENTED: ICD-10-PCS | Mod: CPTII,S$GLB,, | Performed by: INTERNAL MEDICINE

## 2020-01-28 PROCEDURE — 99999 PR PBB SHADOW E&M-EST. PATIENT-LVL III: ICD-10-PCS | Mod: PBBFAC,,, | Performed by: INTERNAL MEDICINE

## 2020-01-28 PROCEDURE — 3008F BODY MASS INDEX DOCD: CPT | Mod: CPTII,S$GLB,, | Performed by: INTERNAL MEDICINE

## 2020-01-28 PROCEDURE — 99214 PR OFFICE/OUTPT VISIT, EST, LEVL IV, 30-39 MIN: ICD-10-PCS | Mod: S$GLB,,, | Performed by: INTERNAL MEDICINE

## 2020-01-28 PROCEDURE — 99999 PR PBB SHADOW E&M-EST. PATIENT-LVL III: CPT | Mod: PBBFAC,,, | Performed by: INTERNAL MEDICINE

## 2020-01-28 NOTE — PROGRESS NOTES
Subjective:       Patient ID: Wild Holliday is a 54 y.o. male.    Chief Complaint: Follow-up; Diabetes; and Hyperlipidemia    Wlid Holliday is a 54 y.o. male  Here with follow up .  Labs are reviewed .      Diabetes   He presents for his follow-up diabetic visit. He has type 2 diabetes mellitus. His disease course has been worsening. Pertinent negatives for hypoglycemia include no confusion, dizziness, headaches, nervousness/anxiousness, pallor, seizures, speech difficulty or tremors. Associated symptoms include polyphagia. Pertinent negatives for diabetes include no chest pain, no fatigue, no polydipsia, no polyuria and no weakness. Current diabetic treatment includes oral agent (monotherapy). He is compliant with treatment all of the time. He never participates in exercise. An ACE inhibitor/angiotensin II receptor blocker is not being taken.   Hyperlipidemia   This is a chronic problem. The current episode started more than 1 year ago. The problem is controlled. Exacerbating diseases include diabetes and obesity. Pertinent negatives include no chest pain or shortness of breath. Current antihyperlipidemic treatment includes fibric acid derivatives. The current treatment provides mild improvement of lipids.     Review of Systems   Constitutional: Negative for activity change, chills, fatigue, fever and unexpected weight change.   HENT: Negative for congestion, hearing loss, postnasal drip, rhinorrhea, sore throat and trouble swallowing.    Eyes: Negative for photophobia, discharge and visual disturbance.   Respiratory: Negative for chest tightness, shortness of breath and wheezing.    Cardiovascular: Negative for chest pain and palpitations.   Gastrointestinal: Negative for abdominal distention, abdominal pain, blood in stool, constipation, diarrhea and vomiting.   Endocrine: Positive for polyphagia. Negative for polydipsia and polyuria.   Genitourinary: Negative for difficulty urinating, dysuria, flank pain,  hematuria and urgency.   Musculoskeletal: Positive for arthralgias and joint swelling. Negative for back pain and neck pain.        Knee pain ans swelling ; getting stem cell treatment per orthopedics.     Skin: Negative for pallor.   Neurological: Negative for dizziness, tremors, seizures, facial asymmetry, speech difficulty, weakness, numbness and headaches.   Hematological: Does not bruise/bleed easily.   Psychiatric/Behavioral: Negative for agitation, confusion, dysphoric mood and suicidal ideas. The patient is not nervous/anxious.        Objective:      Physical Exam   Constitutional: He is oriented to person, place, and time. He appears well-developed and well-nourished.   HENT:   Head: Normocephalic and atraumatic.   Eyes: Right eye exhibits no chemosis. Left eye exhibits no chemosis. Right conjunctiva is not injected. Left conjunctiva is not injected.   R eye cataract changes in lens.     Neck: No JVD present.   Cardiovascular: Normal rate, regular rhythm and normal heart sounds.   Pulses:       Dorsalis pedis pulses are 2+ on the right side, and 2+ on the left side.        Posterior tibial pulses are 2+ on the right side, and 2+ on the left side.   Pulmonary/Chest: Effort normal and breath sounds normal.   Abdominal: Soft. Bowel sounds are normal. There is no tenderness.   Musculoskeletal: He exhibits no edema.   Feet:   Right Foot:   Protective Sensation: 7 sites tested. 7 sites sensed.   Skin Integrity: Negative for ulcer, erythema or dry skin.   Left Foot:   Protective Sensation: 7 sites tested. 7 sites sensed.   Skin Integrity: Negative for ulcer, erythema or dry skin.   Neurological: He is alert and oriented to person, place, and time.   Skin: Skin is warm and dry.   Psychiatric: He has a normal mood and affect. His behavior is normal. Judgment and thought content normal.   Nursing note and vitals reviewed.      Assessment:       1. Controlled type 2 diabetes mellitus without complication, with  long-term current use of insulin    2. Hypertriglyceridemia    3. Grade 3 follicular lymphoma of lymph nodes of multiple regions    4. Screening for prostate cancer        Plan:   Wild was seen today for follow-up, diabetes and hyperlipidemia.    Diagnoses and all orders for this visit:    Controlled type 2 diabetes mellitus without complication, with long-term current use of insulin  -     CBC auto differential; Future  -     Comprehensive metabolic panel; Future  -     Hemoglobin A1c; Future  -     Microalbumin/creatinine urine ratio; Future  -     TSH; Future  Patient has uncontrolled Diabetes .  We discussed about diet ;low in calories. Avoid sweats, sodas.  Also increasing activity;walking 2-3 miles a day.  I also adjusted medications and gave patient  instructions about adherence to plan.  Goal of  A1c  less than 7 % stressed.  Also goal of LDL less than 70 highlighted to patient.  Hypertriglyceridemia  -     Lipid panel; Future  The current medical regimen is effective;  continue present plan and medications.    Grade 3 follicular lymphoma of lymph nodes of multiple regions  -     CBC auto differential; Future  In remission   S/p chemotherapy     Screening for prostate cancer  -     PSA, Screening; Future      Problem List Items Addressed This Visit     Lymphoma    Controlled type 2 diabetes mellitus without complication, with long-term current use of insulin - Primary    Hypertriglyceridemia

## 2020-01-30 ENCOUNTER — PATIENT MESSAGE (OUTPATIENT)
Dept: SPORTS MEDICINE | Facility: CLINIC | Age: 55
End: 2020-01-30

## 2020-01-30 DIAGNOSIS — E11.9 CONTROLLED TYPE 2 DIABETES MELLITUS WITHOUT COMPLICATION, WITH LONG-TERM CURRENT USE OF INSULIN: ICD-10-CM

## 2020-01-30 DIAGNOSIS — Z79.4 CONTROLLED TYPE 2 DIABETES MELLITUS WITHOUT COMPLICATION, WITH LONG-TERM CURRENT USE OF INSULIN: ICD-10-CM

## 2020-01-30 RX ORDER — METFORMIN HYDROCHLORIDE 500 MG/1
TABLET, EXTENDED RELEASE ORAL
Qty: 180 TABLET | Refills: 1 | Status: SHIPPED | OUTPATIENT
Start: 2020-01-30 | End: 2020-06-04

## 2020-02-03 ENCOUNTER — PATIENT OUTREACH (OUTPATIENT)
Dept: ADMINISTRATIVE | Facility: OTHER | Age: 55
End: 2020-02-03

## 2020-02-04 ENCOUNTER — OFFICE VISIT (OUTPATIENT)
Dept: OTOLARYNGOLOGY | Facility: CLINIC | Age: 55
End: 2020-02-04
Payer: COMMERCIAL

## 2020-02-04 VITALS — WEIGHT: 279.31 LBS | HEIGHT: 69 IN | BODY MASS INDEX: 41.37 KG/M2

## 2020-02-04 DIAGNOSIS — H93.13 TINNITUS AURIUM, BILATERAL: Primary | ICD-10-CM

## 2020-02-04 PROCEDURE — 3008F BODY MASS INDEX DOCD: CPT | Mod: CPTII,S$GLB,, | Performed by: OTOLARYNGOLOGY

## 2020-02-04 PROCEDURE — 99213 OFFICE O/P EST LOW 20 MIN: CPT | Mod: S$GLB,,, | Performed by: OTOLARYNGOLOGY

## 2020-02-04 PROCEDURE — 99999 PR PBB SHADOW E&M-EST. PATIENT-LVL III: ICD-10-PCS | Mod: PBBFAC,,, | Performed by: OTOLARYNGOLOGY

## 2020-02-04 PROCEDURE — 3008F PR BODY MASS INDEX (BMI) DOCUMENTED: ICD-10-PCS | Mod: CPTII,S$GLB,, | Performed by: OTOLARYNGOLOGY

## 2020-02-04 PROCEDURE — 99213 PR OFFICE/OUTPT VISIT, EST, LEVL III, 20-29 MIN: ICD-10-PCS | Mod: S$GLB,,, | Performed by: OTOLARYNGOLOGY

## 2020-02-04 PROCEDURE — 99999 PR PBB SHADOW E&M-EST. PATIENT-LVL III: CPT | Mod: PBBFAC,,, | Performed by: OTOLARYNGOLOGY

## 2020-02-04 NOTE — LETTER
February 4, 2020      Rm Ferreira MD  [address]  [facility] [address]           [facility] - Otorhinolaryngology  [address]  [facility] [address]  Phone: [phone]  Fax: [phone]          Patient: Wild Holliday   MR Number: [id number]   YOB: 1965   Date of Visit: 2/4/2020       Dear Dr. Rm Ferreira:    Thank you for referring Wild Holliday to me for evaluation. Attached you will find relevant portions of my assessment and plan of care.    If you have questions, please do not hesitate to call me. I look forward to following Wild Holliday along with you.    Sincerely,    Yvon Castaneda MD    Enclosure  CC:  No Recipients    If you would like to receive this communication electronically, please contact [email or web address] or [phone] to request more information on [employer] access.    For providers and/or their staff who would like to refer a patient to [facility], please contact us through our one-stop-shop provider referral line, [facility], at [phone].    If you feel you have received this communication in error or would no longer like to receive these types of communications, please e-mail [email or web address]

## 2020-02-04 NOTE — PROGRESS NOTES
Subjective:       Patient ID: Wild Holliday is a 54 y.o. male.    Chief Complaint: Tinnitus    Ringing in Ears:   Chronicity:  Chronic  Onset:  More than 1 year ago  Progression since onset:  Gradually worsening  Frequency:  Constantly  Severity:  Mild  Hearing loss characteristics: AU, but AS > AD.   Associated symptoms: tinnitus.  No dizziness, no vertigo, no ear pain, no fever and no otalgia.  Risk factors for lung disease:  Noise exposure (history of skeet shooting)No dizziness and no ear infections.     Review of Systems   Constitutional: Negative for chills and fever.   HENT: Positive for tinnitus. Negative for ear pain, sore throat and trouble swallowing.    Respiratory: Positive for cough. Negative for apnea and chest tightness.    Cardiovascular: Negative for chest pain.   Musculoskeletal: Positive for arthralgias.   Neurological: Negative for dizziness and vertigo.       Objective:      Physical Exam   Constitutional: He appears well-developed and well-nourished.   HENT:   Head: Normocephalic and atraumatic.   Right Ear: Tympanic membrane, external ear and ear canal normal.   Left Ear: Tympanic membrane, external ear and ear canal normal.   Nose: Nose normal.   Mouth/Throat: Uvula is midline, oropharynx is clear and moist and mucous membranes are normal.   Neck: Normal range of motion. No thyroid mass present.   Nursing note and vitals reviewed.      Data:      Audiogram tracings independently reviewed and discussed with patient.  There is a high frequency SNHL of AS with overall normal pure tone average, and speech discrimination is 100%.  Tympanometry is type A in both ears.   Assessment:       1. Tinnitus aurium, bilateral    2. Asymmetrical left sensorineural hearing loss        Plan:     -HEARING LOSS-  I spent a considerable amount of time educating the patient on hearing and hearing loss.  We discussed the basic characteristics of conductive hearing loss versus sensorineural hearing loss and the  significant differences in treatment options between the two categories.      We discussed that in cases of conductive hearing loss, this suggests a mechanical disorder that sometimes can be improved with medications &/or surgery.  It may occur secondary to external ear pathology (atresia, otitis externa, etc), tympanic membrane disorders (large perforations, immobility due to scarring or eustachian tube dysfunction), and middle ear disorders (effusions, ossicular disorders).    Sensorineural hearing loss is the expected hearing loss pattern with aging, but some disorders such as Meniere's may accelerate this process.  Additionally, amplification with hearing aids is generally the best option for hearing rehabilitation, except where the hearing loss is profound.  We discussed that this generally does not represent a dangerous condition, but in cases where there is a large discrepancy between the two ears in terms of nerve function, more investigation is often necessary due to the possiblity of vestibular schwannomas or meningiomas at the cerebellopontine angle.  The definitive test for this is an MRI with gadolinium.  However, these masses are usually very slow growing (1-2mm/year), so patients may elect to repeat an audiogram in about 6 months or obtain an ABR so long as they understand that this may result in a delay in diagnosis (although very unlikely that this would have a significant clinical impact on their outcome due to the slow growing nature of these masses) with the understanding that if ABR is abnormal or asymmetry increases, an MRI would then be required.    Wild  presents with what appears to be sensorineural hearing loss.  Based on this, my recommendation is   1.  Observation, recheck hearing in 1 yr pt has shooters ear, w/o other symptoms of AN  2.  Discussed with patient multiple tinnitus management strategies including the use of maskers, instruments, background sound enrichment and other  means. All questions answered and appropriate references given.

## 2020-02-06 ENCOUNTER — OFFICE VISIT (OUTPATIENT)
Dept: SPORTS MEDICINE | Facility: CLINIC | Age: 55
End: 2020-02-06
Payer: COMMERCIAL

## 2020-02-06 VITALS — BODY MASS INDEX: 41.32 KG/M2 | TEMPERATURE: 98 F | HEIGHT: 69 IN | WEIGHT: 279 LBS

## 2020-02-06 DIAGNOSIS — M17.0 PRIMARY OSTEOARTHRITIS OF BOTH KNEES: Primary | ICD-10-CM

## 2020-02-06 DIAGNOSIS — M22.41 CHONDROMALACIA OF RIGHT PATELLA: ICD-10-CM

## 2020-02-06 DIAGNOSIS — R26.9 GAIT ABNORMALITY: ICD-10-CM

## 2020-02-06 DIAGNOSIS — M25.561 CHRONIC PAIN OF BOTH KNEES: ICD-10-CM

## 2020-02-06 DIAGNOSIS — G89.29 CHRONIC PAIN OF BOTH KNEES: ICD-10-CM

## 2020-02-06 DIAGNOSIS — G89.18 POST PROCEDURE DISCOMFORT: ICD-10-CM

## 2020-02-06 DIAGNOSIS — M25.562 CHRONIC PAIN OF BOTH KNEES: ICD-10-CM

## 2020-02-06 DIAGNOSIS — M22.42 CHONDROMALACIA, PATELLA, LEFT: ICD-10-CM

## 2020-02-06 PROCEDURE — 27599 UNLISTED PX FEMUR/KNEE: CPT | Mod: CSM,,, | Performed by: FAMILY MEDICINE

## 2020-02-06 PROCEDURE — 99999 PR PBB SHADOW E&M-EST. PATIENT-LVL III: CPT | Mod: PBBFAC,,, | Performed by: FAMILY MEDICINE

## 2020-02-06 PROCEDURE — 27599 PR STEM CELL INJECTION, ANY JOINT: ICD-10-PCS | Mod: CSM,,, | Performed by: FAMILY MEDICINE

## 2020-02-06 PROCEDURE — 99499 NO LOS: ICD-10-PCS | Mod: CSM,S$GLB,, | Performed by: FAMILY MEDICINE

## 2020-02-06 PROCEDURE — 99999 PR PBB SHADOW E&M-EST. PATIENT-LVL III: ICD-10-PCS | Mod: PBBFAC,,, | Performed by: FAMILY MEDICINE

## 2020-02-06 PROCEDURE — 99499 UNLISTED E&M SERVICE: CPT | Mod: CSM,S$GLB,, | Performed by: FAMILY MEDICINE

## 2020-02-06 RX ORDER — OXYCODONE HYDROCHLORIDE 15 MG/1
15 TABLET ORAL EVERY 4 HOURS PRN
Qty: 15 TABLET | Refills: 0 | Status: SHIPPED | OUTPATIENT
Start: 2020-02-06 | End: 2020-07-28

## 2020-02-06 NOTE — PROGRESS NOTES
Patient indications  Wild Holliday, a 54 y.o. male, presents today with:  #1 Kellgren-Amos Grade III osteoarthritis of knee, danilo medial > anterior compartments, bilateral    Procedure performed  Mesenchymal cell (MSC) injection using bone marrow aspirate concentrate (BMC) performed using ultrasound guidance for exact placement of orthobiologic at pathologic site     Patient consent to perform procedure  See the electronic medical record for full written and signed patient consent to proceed with this procedure.    Description of procedure    A) Surgical time out  A surgical time out was performed, including verification of patient ID, procedure to be performed, site and side, availability of information and equipment, review of safety issues, and the patients agreement and consent.    B) Bone marrow aspiration, mesenchymal cell harvest, and mesenchymal cell preparation  The posterior superior iliac spine (PSIS) of the iliac crest was identified using 1) physical examination and 2) musculoskeletal ultrasound. The procedure site was marked with a skin marker. The skin about the area was sterilized with ChloraPrep (2%w/v CHG, 70% IPA). The skin, subdermal soft tissues, and periosteum of the PSIS were anesthetized using 1% lidocaine. A blade no. 11 scalpel was used to make a single incision in the skin above the PSIS, through which a 12g bone marrow aspiration needle was introduced to the depth of the osseous cortex. The 12g needle was rotated under light pressure until penetration of the cortex was achieved. A volume of 118mL of the patients bone marrow was aspirated through the 12g needle. This bone marrow was  into density-divided layers using an [employer] Galindo centrifuge. The layer of mesenchymal cells, a volume of 2.2mL, was placed into a sterile syringe in preparation for injection. Following the mesenchymal cell harvest, a sterile 4 x 4 inch gauze was placed over the incision site and the gauze was  covered with a sterile transparent dressing.     C) Mesenchymal cell delivery to pathologic site  Injection site: RIGHT and LEFT KNEE  Using 1) physical examination and 2) musculoskeletal ultrasound, the anatomy was visualized and the diseased tissue was identified and confirmed. The procedure site was marked with a skin marker. The patient was placed in position maximizing 1) physician access to pathologic tissue and 2) patient comfort. The skin about the area was sterilized with ChloraPrep (2%w/v CHG, 70% IPA). The site of needle insertion was anesthetized using vapocoolant. Under ultrasound guidance, the needle was advanced to the site of tissue pathology, and the injectate was deposited under direct visualization.     D) Post-procedure care  Following the procedure, a sterile bandage was placed over the injection site.    Complications: none     Estimated blood loss from injection procedure: none     Joint aspirate volume, if required: 0 mL     Disposition  The patient tolerated the procedure well and there were no immediate complications. The patient was instructed to call the clinic immediately for any mild to moderate adverse side effects, or to call 911 in the event of an emergency.      Description of ultrasound utilization for needle / probe guidance  Ultrasound guidance was used for needle / probe localization. Images (and videos, if appropriate) were saved and stored for documentation. Dynamic visualization of the needle / probe was continuous throughout the procedure.    [id number]

## 2020-02-11 ENCOUNTER — PATIENT MESSAGE (OUTPATIENT)
Dept: INTERNAL MEDICINE | Facility: CLINIC | Age: 55
End: 2020-02-11

## 2020-02-12 ENCOUNTER — CLINICAL SUPPORT (OUTPATIENT)
Dept: REHABILITATION | Facility: HOSPITAL | Age: 55
End: 2020-02-12
Attending: FAMILY MEDICINE
Payer: COMMERCIAL

## 2020-02-12 DIAGNOSIS — R53.1 WEAKNESS: ICD-10-CM

## 2020-02-12 PROCEDURE — 97110 THERAPEUTIC EXERCISES: CPT | Performed by: PHYSICAL THERAPIST

## 2020-02-12 PROCEDURE — 97161 PT EVAL LOW COMPLEX 20 MIN: CPT | Performed by: PHYSICAL THERAPIST

## 2020-02-12 NOTE — PLAN OF CARE
[facility]  Physical Therapy Initial Evaluation    Name: Wild Holliday  Clinic Number: [id number]    Therapy Diagnosis:   Encounter Diagnosis   Name Primary?    Weakness      Physician: Chintan Bartholomew, *    Physician Orders: PT Eval and Treat   Medical Diagnosis: M17.0 (ICD-10-CM) - Primary osteoarthritis of both knees M25.561,M25.562,G89.29 (ICD-10-CM) - Bilateral chronic knee pain   Evaluation Date: 2/12/2020  Authorization Period Expiration: 1-20-21  Plan of Care Certification Period: 5-30-20  Visit # / Visits authorized: 1/ 1    Time In: 5:00 pm  Time Out: 5:30 pm  Total Billable Time: 30 min minutes    Precautions: Standard    Subjective     Date of surgery:  2-06-20  History of current condition - Wild reports: B knee feel real good since surgery last week.  He states being pain free unless he walks a lot.  States he lives in [place] and would like to go to PT there.       Past Medical History:   Diagnosis Date    Arthritis     Cancer     CLL, non hogkin's    Cataract     had sx in OD still has cataract in OS     (central serous retinopathy)     right eye on cornia    Depression     Diabetes mellitus 2015    Farsightedness     wears glasses    Psychiatric problem     Therapy      Wild Holliday  has a past surgical history that includes tricep tendon repair (Right, 2014); lipoma removal; Tonsillectomy; Portacath placement; Cataract extraction; and Colonoscopy (N/A, 10/3/2019).    Wild has a current medication list which includes the following prescription(s): contour next test strips, diazepam, fenofibrate, fish oil-omega-3 fatty acids, metformin, multivitamin, and oxycodone.    Review of patient's allergies indicates:  No Known Allergies     Imaging: na    Prior Therapy: na  Social History:  lives with their spouse  Occupation:   Prior Level of Function: ADL limited by pain  Current Level of Function: independent with ADL    Pain:  Current 0/10,  worst 2/10, best 0/10   Location: bilateral knee   Description: Aching  Aggravating Factors: Walking  Easing Factors: rest    Pts goals: pain-free walking    Objective     Postural examination:  obese     Functional assessment:   - walking:   independent             AROM:  -5 deg ext to 115 flexion on R, 0-115 L     MMT:   Hip abductors 4+/5, quads 4/5 and hams 5/5    Tone:  Decreased quads; FAIR QS ability; no lag with SLR    Flexibility testing:   Tight hams    Special tests:   Neg Lachman and varus/valgus stress    Palpation:   No TTP    Joint mobility: good pat mobility    Swelling:  MIN      TREATMENT     Treatment Time In: 5:00 pm  Treatment Time Out: 5:30 pm  Total Treatment time separate from Evaluation time:  15 min    Treatment:  HEP    Home Exercises and Patient Education Provided    Education provided:   - ice for pain    Written Home Exercises Provided: yes.  Exercises were reviewed and Wild was able to demonstrate them prior to the end of the session.  Wild demonstrated good  understanding of the education provided.     See EMR under Media for exercises provided 2/12/2020.      Assessment     Wild is a 54 y.o. male referred to outpatient Physical Therapy with a medical diagnosis of M17.0 (ICD-10-CM) - Primary osteoarthritis of both knees M25.561,M25.562,G89.29 (ICD-10-CM) - Bilateral chronic knee pain   .  Pt presents with B LE weakness and decreased hams flexibility.    Pt prognosis is Good.   Pt will benefit from skilled outpatient Physical Therapy to address the deficits stated above and in the chart below, provide pt/family education, and to maximize pt's level of independence.     Plan of care discussed with patient: Yes  Pt's spiritual, cultural and educational needs considered and patient is agreeable to the plan of care and goals as stated below:     Anticipated Barriers for therapy: none    Medical Necessity is demonstrated by the following:  History  Co-morbidities and personal  factors that may impact the plan of care Co-morbidities:   diabetes and high BMI    Personal Factors:   no deficits     low   Examination  Body Structures and Functions, activity limitations and participation restrictions that may impact the plan of care Body Regions:   lower extremities    Body Systems:    gross symmetry  ROM  strength    Participation Restrictions:   none    Activity limitations:   Learning and applying knowledge  no deficits    General Tasks and Commands  no deficits    Communication  no deficits    Mobility  walking    Self care  no deficits    Domestic Life  no deficits    Interactions/Relationships  no deficits    Life Areas  no deficits    Community and Social Life  no deficits         low   Clinical Presentation stable and uncomplicated low   Decision Making/ Complexity Score: low     Goals:  Short Term Goals: 2 weeks         1.   Independent with HEP        2.  Pt will report decreased pain level of < 50% from above measure with ADL    Long Term Goals:   GOALS:    6_   weeks. Pt agrees with goals set.  1. Independent with HEP.  2. Report decreased    B knee    pain  <   / =  2/10 with ADL such as walking  3. Increased MMT  for  B LE to 4+/5 to 5/5  with ADL such as lifting  4. Increased arom  for  R knee to full extension with functional activities such walking or self-care      Plan     Certification Period/Plan of care expiration: 2/12/2020 to 5-30-20.    Pt to continue rehab at clinic in [place] near his house.    Monty Navarrete, PT

## 2020-02-12 NOTE — PATIENT INSTRUCTIONS
ROM: Heel Prop    Lie with pillow under right heel. Tighten the muscles on the top of leg while trying to push knee toward the floor. Hold 1-5 minutes for a cumulative total of 30 minutes per day.       [email or web address]       Strengthening: Quadriceps Set    Tighten muscles on top of thighs by pushing knees down into surface. Hold 5 seconds.  Repeat 25 times . R/L or BL  leg per set. Do 1 sets per session. Do 2 sessions per day.      Heel Slides    With towel around left heel, gently pull knee up with towel until stretch is felt. Hold 10 seconds.  Repeat 25 times.. Do 1 set per session. Do 2 sessions per day.      Straight Leg Raise     With R/L or BL leg straight, other leg bent, raise straight leg until knees are even. Slowly lower. Roll on your side and repeat lifting top leg up, on other side, lifting bottom leg up, and on stomach kicking back (squeezing your rear end before you kick back).  Repeat 25 times. Do 1 sets per session. Do 2 sessions per day.       Glute Squeeze, supine    Lie face up and squeeze your rear end. Do not tighten your abdominals or hold your breath. Squeeze our glutes and hold 5 seconds. Relax.  Repeat 25 times per set. Do 1 sets per session. Do 2 sessions per day.      Strengthening: Hip Adduction - Isometric    Sit back or lie down with ball or folded pillow between knees, and squeeze knees together. Hold 5 seconds.  Repeat 25 times per set. Do 1 sets per session. Do 2 sessions per day.        Strengthening: Hip Abductor - Resisted    Lie flat with band looped around both legs above knees, and push thighs apart, ensuring right and left move together.  Repeat 25 times per set. Do 1 sets per session. Do 2 sessions per day.      Clam Shells    Lie on side with knees bent. Raise top leg, keeping knee bent and ankles together. Do not let your hips roll back as your raise your leg.  Repeat 25 times. Do 1 sets per session. Do 2 sessions per day.      Hamstring Stretch    Perform 5  times, 30 sec hold 2x day - (HEP to go)      Hamstring Curl        Perform 25 times, 2x per day. (HEP to GO)

## 2020-02-14 ENCOUNTER — PATIENT MESSAGE (OUTPATIENT)
Dept: SPORTS MEDICINE | Facility: CLINIC | Age: 55
End: 2020-02-14

## 2020-03-04 ENCOUNTER — PATIENT MESSAGE (OUTPATIENT)
Dept: SPORTS MEDICINE | Facility: CLINIC | Age: 55
End: 2020-03-04

## 2020-03-04 ENCOUNTER — TELEPHONE (OUTPATIENT)
Dept: SPORTS MEDICINE | Facility: CLINIC | Age: 55
End: 2020-03-04

## 2020-03-04 DIAGNOSIS — M17.0 PRIMARY OSTEOARTHRITIS OF BOTH KNEES: Primary | ICD-10-CM

## 2020-03-20 ENCOUNTER — PATIENT MESSAGE (OUTPATIENT)
Dept: ADMINISTRATIVE | Facility: OTHER | Age: 55
End: 2020-03-20

## 2020-03-20 DIAGNOSIS — E11.9 TYPE 2 DIABETES MELLITUS: ICD-10-CM

## 2020-04-11 DIAGNOSIS — E78.1 HYPERTRIGLYCERIDEMIA: ICD-10-CM

## 2020-04-13 RX ORDER — FENOFIBRATE 160 MG/1
TABLET ORAL
Qty: 90 TABLET | Refills: 0 | Status: SHIPPED | OUTPATIENT
Start: 2020-04-13 | End: 2020-07-13

## 2020-04-27 ENCOUNTER — OFFICE VISIT (OUTPATIENT)
Dept: HEMATOLOGY/ONCOLOGY | Facility: CLINIC | Age: 55
End: 2020-04-27
Payer: COMMERCIAL

## 2020-04-27 ENCOUNTER — LAB VISIT (OUTPATIENT)
Dept: LAB | Facility: HOSPITAL | Age: 55
End: 2020-04-27
Attending: INTERNAL MEDICINE
Payer: COMMERCIAL

## 2020-04-27 DIAGNOSIS — C91.10 CLL (CHRONIC LYMPHOCYTIC LEUKEMIA): ICD-10-CM

## 2020-04-27 DIAGNOSIS — C83.00 SMALL B-CELL LYMPHOMA, UNSPECIFIED BODY REGION: Primary | ICD-10-CM

## 2020-04-27 DIAGNOSIS — C83.00 SMALL B-CELL LYMPHOMA, UNSPECIFIED BODY REGION: ICD-10-CM

## 2020-04-27 LAB
ALBUMIN SERPL BCP-MCNC: 4.1 G/DL (ref 3.5–5.2)
ALP SERPL-CCNC: 67 U/L (ref 55–135)
ALT SERPL W/O P-5'-P-CCNC: 37 U/L (ref 10–44)
ANION GAP SERPL CALC-SCNC: 11 MMOL/L (ref 8–16)
AST SERPL-CCNC: 25 U/L (ref 10–40)
BASOPHILS # BLD AUTO: 0.05 K/UL (ref 0–0.2)
BASOPHILS NFR BLD: 0.6 % (ref 0–1.9)
BILIRUB SERPL-MCNC: 0.3 MG/DL (ref 0.1–1)
BUN SERPL-MCNC: 15 MG/DL (ref 6–20)
CALCIUM SERPL-MCNC: 9.4 MG/DL (ref 8.7–10.5)
CHLORIDE SERPL-SCNC: 104 MMOL/L (ref 95–110)
CO2 SERPL-SCNC: 23 MMOL/L (ref 23–29)
CREAT SERPL-MCNC: 1 MG/DL (ref 0.5–1.4)
DIFFERENTIAL METHOD: ABNORMAL
EOSINOPHIL # BLD AUTO: 0.2 K/UL (ref 0–0.5)
EOSINOPHIL NFR BLD: 1.9 % (ref 0–8)
ERYTHROCYTE [DISTWIDTH] IN BLOOD BY AUTOMATED COUNT: 12.3 % (ref 11.5–14.5)
EST. GFR  (AFRICAN AMERICAN): >60 ML/MIN/1.73 M^2
EST. GFR  (NON AFRICAN AMERICAN): >60 ML/MIN/1.73 M^2
GLUCOSE SERPL-MCNC: 235 MG/DL (ref 70–110)
HCT VFR BLD AUTO: 41.9 % (ref 40–54)
HGB BLD-MCNC: 14.2 G/DL (ref 14–18)
IMM GRANULOCYTES # BLD AUTO: 0.03 K/UL (ref 0–0.04)
IMM GRANULOCYTES NFR BLD AUTO: 0.4 % (ref 0–0.5)
LDH SERPL L TO P-CCNC: 210 U/L (ref 110–260)
LYMPHOCYTES # BLD AUTO: 1.7 K/UL (ref 1–4.8)
LYMPHOCYTES NFR BLD: 20 % (ref 18–48)
MCH RBC QN AUTO: 29.3 PG (ref 27–31)
MCHC RBC AUTO-ENTMCNC: 33.9 G/DL (ref 32–36)
MCV RBC AUTO: 87 FL (ref 82–98)
MONOCYTES # BLD AUTO: 0.6 K/UL (ref 0.3–1)
MONOCYTES NFR BLD: 6.6 % (ref 4–15)
NEUTROPHILS # BLD AUTO: 6 K/UL (ref 1.8–7.7)
NEUTROPHILS NFR BLD: 70.5 % (ref 38–73)
NRBC BLD-RTO: 0 /100 WBC
PLATELET # BLD AUTO: 230 K/UL (ref 150–350)
PMV BLD AUTO: 9.1 FL (ref 9.2–12.9)
POTASSIUM SERPL-SCNC: 4.6 MMOL/L (ref 3.5–5.1)
PROT SERPL-MCNC: 6.8 G/DL (ref 6–8.4)
RBC # BLD AUTO: 4.84 M/UL (ref 4.6–6.2)
SODIUM SERPL-SCNC: 138 MMOL/L (ref 136–145)
WBC # BLD AUTO: 8.49 K/UL (ref 3.9–12.7)

## 2020-04-27 PROCEDURE — 83615 LACTATE (LD) (LDH) ENZYME: CPT

## 2020-04-27 PROCEDURE — 80053 COMPREHEN METABOLIC PANEL: CPT

## 2020-04-27 PROCEDURE — 36415 COLL VENOUS BLD VENIPUNCTURE: CPT

## 2020-04-27 PROCEDURE — 85025 COMPLETE CBC W/AUTO DIFF WBC: CPT

## 2020-04-27 PROCEDURE — 99214 PR OFFICE/OUTPT VISIT, EST, LEVL IV, 30-39 MIN: ICD-10-PCS | Mod: 95,,, | Performed by: INTERNAL MEDICINE

## 2020-04-27 PROCEDURE — 99214 OFFICE O/P EST MOD 30 MIN: CPT | Mod: 95,,, | Performed by: INTERNAL MEDICINE
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
## 2020-05-29 ENCOUNTER — PATIENT OUTREACH (OUTPATIENT)
Dept: ADMINISTRATIVE | Facility: OTHER | Age: 55
End: 2020-05-29

## 2020-06-12 ENCOUNTER — PATIENT OUTREACH (OUTPATIENT)
Dept: ADMINISTRATIVE | Facility: OTHER | Age: 55
End: 2020-06-12

## 2020-06-12 NOTE — PROGRESS NOTES
Care Everywhere: n/a  Immunization: updated  Health Maintenance: updated  Media Review: reviewed for possible outside eye exam report  Legacy Review: n/a  Order placed: n/a  Upcoming appts:hemoglobin a1c 7.21.2020

## 2020-06-15 ENCOUNTER — OFFICE VISIT (OUTPATIENT)
Dept: SPORTS MEDICINE | Facility: CLINIC | Age: 55
End: 2020-06-15
Payer: COMMERCIAL

## 2020-06-15 VITALS — HEIGHT: 69 IN | WEIGHT: 279 LBS | BODY MASS INDEX: 41.32 KG/M2 | TEMPERATURE: 98 F

## 2020-06-15 DIAGNOSIS — R26.89 ANTALGIC GAIT: ICD-10-CM

## 2020-06-15 DIAGNOSIS — M25.562 BILATERAL CHRONIC KNEE PAIN: Primary | ICD-10-CM

## 2020-06-15 DIAGNOSIS — M17.0 PRIMARY OSTEOARTHRITIS OF BOTH KNEES: ICD-10-CM

## 2020-06-15 DIAGNOSIS — M25.561 BILATERAL CHRONIC KNEE PAIN: Primary | ICD-10-CM

## 2020-06-15 DIAGNOSIS — G89.29 BILATERAL CHRONIC KNEE PAIN: Primary | ICD-10-CM

## 2020-06-15 PROCEDURE — 99214 PR OFFICE/OUTPT VISIT, EST, LEVL IV, 30-39 MIN: ICD-10-PCS | Mod: 25,S$GLB,, | Performed by: FAMILY MEDICINE

## 2020-06-15 PROCEDURE — 3008F BODY MASS INDEX DOCD: CPT | Mod: CPTII,S$GLB,, | Performed by: FAMILY MEDICINE

## 2020-06-15 PROCEDURE — 20611 DRAIN/INJ JOINT/BURSA W/US: CPT | Mod: 50,S$GLB,, | Performed by: FAMILY MEDICINE

## 2020-06-15 PROCEDURE — 3008F PR BODY MASS INDEX (BMI) DOCUMENTED: ICD-10-PCS | Mod: CPTII,S$GLB,, | Performed by: FAMILY MEDICINE

## 2020-06-15 PROCEDURE — 99999 PR PBB SHADOW E&M-EST. PATIENT-LVL III: ICD-10-PCS | Mod: PBBFAC,,, | Performed by: FAMILY MEDICINE

## 2020-06-15 PROCEDURE — 99214 OFFICE O/P EST MOD 30 MIN: CPT | Mod: 25,S$GLB,, | Performed by: FAMILY MEDICINE

## 2020-06-15 PROCEDURE — 20611 LARGE JOINT ASPIRATION/INJECTION: BILATERAL KNEE: ICD-10-PCS | Mod: 50,S$GLB,, | Performed by: FAMILY MEDICINE

## 2020-06-15 PROCEDURE — 99999 PR PBB SHADOW E&M-EST. PATIENT-LVL III: CPT | Mod: PBBFAC,,, | Performed by: FAMILY MEDICINE

## 2020-06-15 RX ORDER — TRIAMCINOLONE ACETONIDE 40 MG/ML
40 INJECTION, SUSPENSION INTRA-ARTICULAR; INTRAMUSCULAR
Status: DISCONTINUED | OUTPATIENT
Start: 2020-06-15 | End: 2020-06-15 | Stop reason: HOSPADM

## 2020-06-15 RX ADMIN — TRIAMCINOLONE ACETONIDE 40 MG: 40 INJECTION, SUSPENSION INTRA-ARTICULAR; INTRAMUSCULAR at 01:06

## 2020-06-15 NOTE — PROCEDURES
"Large Joint Aspiration/Injection: bilateral knee    Date/Time: 6/15/2020 1:30 PM  Performed by: Chintan Bartholomew MD  Authorized by: Chintan Bartholomew MD     Consent Done?:  Yes (Verbal)  Indications:  Pain  Site marked: the procedure site was marked    Timeout: prior to procedure the correct patient, procedure, and site was verified    Prep: patient was prepped and draped in usual sterile fashion    Local anesthesia used?: No      Details:  Needle Size:  20 G  Ultrasonic Guidance for needle placement?: Yes    Images are saved and documented.  Approach:  Lateral  Location:  Knee  Laterality:  Bilateral  Site:  Bilateral knee  Medications (Right):  40 mg triamcinolone acetonide 40 mg/mL  Medications (Left):  40 mg triamcinolone acetonide 40 mg/mL  Patient tolerance:  Patient tolerated the procedure well with no immediate complications     Description of ultrasound utilization for needle guidance:   Ultrasound guidance used for needle localization. Images saved and stored for documentation. The knee joint was visualized. Dynamic visualization of the 20g x 3.5" needle was continuous throughout the procedure.       "
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
## 2020-06-23 ENCOUNTER — PATIENT MESSAGE (OUTPATIENT)
Dept: INTERNAL MEDICINE | Facility: CLINIC | Age: 55
End: 2020-06-23

## 2020-07-09 DIAGNOSIS — E11.9 TYPE 2 DIABETES MELLITUS WITHOUT COMPLICATION, UNSPECIFIED WHETHER LONG TERM INSULIN USE: ICD-10-CM

## 2020-07-22 ENCOUNTER — LAB VISIT (OUTPATIENT)
Dept: LAB | Facility: HOSPITAL | Age: 55
End: 2020-07-22
Attending: INTERNAL MEDICINE
Payer: COMMERCIAL

## 2020-07-22 DIAGNOSIS — Z79.4 CONTROLLED TYPE 2 DIABETES MELLITUS WITHOUT COMPLICATION, WITH LONG-TERM CURRENT USE OF INSULIN: ICD-10-CM

## 2020-07-22 DIAGNOSIS — Z12.5 SCREENING FOR PROSTATE CANCER: ICD-10-CM

## 2020-07-22 DIAGNOSIS — E78.1 HYPERTRIGLYCERIDEMIA: ICD-10-CM

## 2020-07-22 DIAGNOSIS — E11.9 CONTROLLED TYPE 2 DIABETES MELLITUS WITHOUT COMPLICATION, WITH LONG-TERM CURRENT USE OF INSULIN: ICD-10-CM

## 2020-07-22 DIAGNOSIS — C82.28 GRADE 3 FOLLICULAR LYMPHOMA OF LYMPH NODES OF MULTIPLE REGIONS: ICD-10-CM

## 2020-07-22 LAB
ALBUMIN SERPL BCP-MCNC: 3.9 G/DL (ref 3.5–5.2)
ALBUMIN/CREAT UR: 5.1 UG/MG (ref 0–30)
ALP SERPL-CCNC: 56 U/L (ref 55–135)
ALT SERPL W/O P-5'-P-CCNC: 54 U/L (ref 10–44)
ANION GAP SERPL CALC-SCNC: 8 MMOL/L (ref 8–16)
AST SERPL-CCNC: 36 U/L (ref 10–40)
BASOPHILS # BLD AUTO: 0.04 K/UL (ref 0–0.2)
BASOPHILS NFR BLD: 0.7 % (ref 0–1.9)
BILIRUB SERPL-MCNC: 0.4 MG/DL (ref 0.1–1)
BUN SERPL-MCNC: 17 MG/DL (ref 6–20)
CALCIUM SERPL-MCNC: 9.6 MG/DL (ref 8.7–10.5)
CHLORIDE SERPL-SCNC: 104 MMOL/L (ref 95–110)
CHOLEST SERPL-MCNC: 143 MG/DL (ref 120–199)
CHOLEST/HDLC SERPL: 4.9 {RATIO} (ref 2–5)
CO2 SERPL-SCNC: 22 MMOL/L (ref 23–29)
COMPLEXED PSA SERPL-MCNC: 2.8 NG/ML (ref 0–4)
CREAT SERPL-MCNC: 0.9 MG/DL (ref 0.5–1.4)
CREAT UR-MCNC: 117 MG/DL (ref 23–375)
DIFFERENTIAL METHOD: ABNORMAL
EOSINOPHIL # BLD AUTO: 0.1 K/UL (ref 0–0.5)
EOSINOPHIL NFR BLD: 2.2 % (ref 0–8)
ERYTHROCYTE [DISTWIDTH] IN BLOOD BY AUTOMATED COUNT: 12.8 % (ref 11.5–14.5)
EST. GFR  (AFRICAN AMERICAN): >60 ML/MIN/1.73 M^2
EST. GFR  (NON AFRICAN AMERICAN): >60 ML/MIN/1.73 M^2
ESTIMATED AVG GLUCOSE: 200 MG/DL (ref 68–131)
GLUCOSE SERPL-MCNC: 219 MG/DL (ref 70–110)
HBA1C MFR BLD HPLC: 8.6 % (ref 4–5.6)
HCT VFR BLD AUTO: 41.5 % (ref 40–54)
HDLC SERPL-MCNC: 29 MG/DL (ref 40–75)
HDLC SERPL: 20.3 % (ref 20–50)
HGB BLD-MCNC: 13.9 G/DL (ref 14–18)
IMM GRANULOCYTES # BLD AUTO: 0.03 K/UL (ref 0–0.04)
IMM GRANULOCYTES NFR BLD AUTO: 0.5 % (ref 0–0.5)
LDLC SERPL CALC-MCNC: 58.4 MG/DL (ref 63–159)
LYMPHOCYTES # BLD AUTO: 1.7 K/UL (ref 1–4.8)
LYMPHOCYTES NFR BLD: 30.2 % (ref 18–48)
MCH RBC QN AUTO: 29.2 PG (ref 27–31)
MCHC RBC AUTO-ENTMCNC: 33.5 G/DL (ref 32–36)
MCV RBC AUTO: 87 FL (ref 82–98)
MICROALBUMIN UR DL<=1MG/L-MCNC: 6 UG/ML
MONOCYTES # BLD AUTO: 0.4 K/UL (ref 0.3–1)
MONOCYTES NFR BLD: 7.4 % (ref 4–15)
NEUTROPHILS # BLD AUTO: 3.3 K/UL (ref 1.8–7.7)
NEUTROPHILS NFR BLD: 59 % (ref 38–73)
NONHDLC SERPL-MCNC: 114 MG/DL
NRBC BLD-RTO: 0 /100 WBC
PLATELET # BLD AUTO: 258 K/UL (ref 150–350)
PMV BLD AUTO: 9.1 FL (ref 9.2–12.9)
POTASSIUM SERPL-SCNC: 4.5 MMOL/L (ref 3.5–5.1)
PROT SERPL-MCNC: 6.6 G/DL (ref 6–8.4)
RBC # BLD AUTO: 4.76 M/UL (ref 4.6–6.2)
SODIUM SERPL-SCNC: 134 MMOL/L (ref 136–145)
TRIGL SERPL-MCNC: 278 MG/DL (ref 30–150)
TSH SERPL DL<=0.005 MIU/L-ACNC: 1.68 UIU/ML (ref 0.4–4)
WBC # BLD AUTO: 5.57 K/UL (ref 3.9–12.7)

## 2020-07-22 PROCEDURE — 82043 UR ALBUMIN QUANTITATIVE: CPT

## 2020-07-22 PROCEDURE — 80053 COMPREHEN METABOLIC PANEL: CPT

## 2020-07-22 PROCEDURE — 83036 HEMOGLOBIN GLYCOSYLATED A1C: CPT

## 2020-07-22 PROCEDURE — 84443 ASSAY THYROID STIM HORMONE: CPT

## 2020-07-22 PROCEDURE — 85025 COMPLETE CBC W/AUTO DIFF WBC: CPT

## 2020-07-22 PROCEDURE — 80061 LIPID PANEL: CPT

## 2020-07-22 PROCEDURE — 84153 ASSAY OF PSA TOTAL: CPT

## 2020-07-22 PROCEDURE — 36415 COLL VENOUS BLD VENIPUNCTURE: CPT

## 2020-07-28 ENCOUNTER — OFFICE VISIT (OUTPATIENT)
Dept: INTERNAL MEDICINE | Facility: CLINIC | Age: 55
End: 2020-07-28
Payer: COMMERCIAL

## 2020-07-28 VITALS
HEART RATE: 68 BPM | SYSTOLIC BLOOD PRESSURE: 136 MMHG | WEIGHT: 276 LBS | OXYGEN SATURATION: 96 % | BODY MASS INDEX: 40.88 KG/M2 | DIASTOLIC BLOOD PRESSURE: 80 MMHG | HEIGHT: 69 IN | RESPIRATION RATE: 16 BRPM

## 2020-07-28 DIAGNOSIS — E11.65 UNCONTROLLED TYPE 2 DIABETES MELLITUS WITH HYPERGLYCEMIA: Primary | ICD-10-CM

## 2020-07-28 DIAGNOSIS — E78.1 HYPERTRIGLYCERIDEMIA: ICD-10-CM

## 2020-07-28 PROCEDURE — 99214 OFFICE O/P EST MOD 30 MIN: CPT | Mod: S$GLB,,, | Performed by: INTERNAL MEDICINE

## 2020-07-28 PROCEDURE — 99999 PR PBB SHADOW E&M-EST. PATIENT-LVL III: ICD-10-PCS | Mod: PBBFAC,,, | Performed by: INTERNAL MEDICINE

## 2020-07-28 PROCEDURE — 3008F BODY MASS INDEX DOCD: CPT | Mod: CPTII,S$GLB,, | Performed by: INTERNAL MEDICINE

## 2020-07-28 PROCEDURE — 99999 PR PBB SHADOW E&M-EST. PATIENT-LVL III: CPT | Mod: PBBFAC,,, | Performed by: INTERNAL MEDICINE

## 2020-07-28 PROCEDURE — 99214 PR OFFICE/OUTPT VISIT, EST, LEVL IV, 30-39 MIN: ICD-10-PCS | Mod: S$GLB,,, | Performed by: INTERNAL MEDICINE

## 2020-07-28 PROCEDURE — 3052F HG A1C>EQUAL 8.0%<EQUAL 9.0%: CPT | Mod: CPTII,S$GLB,, | Performed by: INTERNAL MEDICINE

## 2020-07-28 PROCEDURE — 3052F PR MOST RECENT HEMOGLOBIN A1C LEVEL 8.0 - < 9.0%: ICD-10-PCS | Mod: CPTII,S$GLB,, | Performed by: INTERNAL MEDICINE

## 2020-07-28 PROCEDURE — 3008F PR BODY MASS INDEX (BMI) DOCUMENTED: ICD-10-PCS | Mod: CPTII,S$GLB,, | Performed by: INTERNAL MEDICINE

## 2020-07-28 RX ORDER — TAMSULOSIN HYDROCHLORIDE 0.4 MG/1
CAPSULE ORAL
COMMUNITY
Start: 2020-07-22

## 2020-07-28 RX ORDER — SULFAMETHOXAZOLE AND TRIMETHOPRIM 800; 160 MG/1; MG/1
TABLET ORAL
COMMUNITY
Start: 2020-06-24 | End: 2020-07-28

## 2020-07-28 RX ORDER — FINASTERIDE 5 MG/1
TABLET, FILM COATED ORAL
COMMUNITY
Start: 2020-07-22

## 2020-07-28 RX ORDER — FENOFIBRATE 160 MG/1
TABLET ORAL
Qty: 90 TABLET | Refills: 0 | Status: SHIPPED | OUTPATIENT
Start: 2020-07-28 | End: 2021-04-07

## 2020-07-28 RX ORDER — METFORMIN HYDROCHLORIDE 500 MG/1
1500 TABLET, EXTENDED RELEASE ORAL DAILY
Qty: 270 TABLET | Refills: 3 | Status: SHIPPED | OUTPATIENT
Start: 2020-07-28 | End: 2021-10-04

## 2020-07-28 NOTE — PROGRESS NOTES
Subjective:       Patient ID: Wild Holliday is a 54 y.o. male.    Chief Complaint: Follow-up, Hypertension, Hyperlipidemia, and Diabetes    Diabetes  He has type 2 diabetes mellitus. No MedicAlert identification noted. Pertinent negatives for hypoglycemia include no confusion, dizziness, headaches, hunger, mood changes, nervousness/anxiousness, pallor, seizures, sleepiness, speech difficulty, sweats or tremors. Associated symptoms include fatigue, polyphagia and polyuria. Pertinent negatives for diabetes include no blurred vision, no chest pain, no foot paresthesias, no foot ulcerations, no polydipsia, no visual change, no weakness and no weight loss. Pertinent negatives for hypoglycemia complications include no blackouts, no hospitalization, no nocturnal hypoglycemia, no required assistance and no required glucagon injection. Symptoms are stable. Pertinent negatives for diabetic complications include no autonomic neuropathy, CVA, heart disease, impotence, nephropathy, peripheral neuropathy, PVD or retinopathy. Risk factors for coronary artery disease include family history, obesity, stress, diabetes mellitus and male sex. Current diabetic treatment includes diet and oral agent (monotherapy). He is compliant with treatment most of the time. His weight is stable. He is following a generally healthy diet. Meal planning includes avoidance of concentrated sweets. He has had a previous visit with a dietitian. He participates in exercise three times a week. He monitors blood glucose at home 1-2 x per week. Blood glucose monitoring compliance is fair. He sees a podiatrist.Eye exam is not current.     Review of Systems   Constitutional: Positive for fatigue. Negative for chills, fever and weight loss.   HENT: Negative for congestion, postnasal drip and sore throat.    Eyes: Negative for blurred vision and photophobia.   Respiratory: Negative for chest tightness and shortness of breath.    Cardiovascular: Negative for chest  pain.   Gastrointestinal: Negative for abdominal distention, abdominal pain, blood in stool and vomiting.   Endocrine: Positive for polyphagia and polyuria. Negative for polydipsia.   Genitourinary: Negative for dysuria, flank pain, hematuria and impotence.   Musculoskeletal: Negative for back pain.   Skin: Negative for pallor.   Neurological: Negative for dizziness, tremors, seizures, facial asymmetry, speech difficulty, weakness, numbness and headaches.   Hematological: Does not bruise/bleed easily.   Psychiatric/Behavioral: Negative for agitation, confusion and suicidal ideas. The patient is not nervous/anxious.        Objective:      Physical Exam  Vitals signs and nursing note reviewed.   Constitutional:       Appearance: He is well-developed.   HENT:      Head: Normocephalic and atraumatic.   Neck:      Vascular: No JVD.   Cardiovascular:      Rate and Rhythm: Normal rate and regular rhythm.      Heart sounds: Normal heart sounds.   Pulmonary:      Effort: Pulmonary effort is normal.      Breath sounds: Normal breath sounds.   Abdominal:      General: Bowel sounds are normal.      Palpations: Abdomen is soft.      Tenderness: There is no abdominal tenderness.   Skin:     General: Skin is warm and dry.   Neurological:      Mental Status: He is alert and oriented to person, place, and time.   Psychiatric:         Behavior: Behavior normal.         Thought Content: Thought content normal.         Judgment: Judgment normal.         Assessment:       1. Uncontrolled type 2 diabetes mellitus with hyperglycemia    2. Hypertriglyceridemia        Plan:   Wild was seen today for follow-up, hypertension, hyperlipidemia and diabetes.    Diagnoses and all orders for this visit:    Hypertriglyceridemia  -     fenofibrate 160 MG Tab; TAKE 1 TABLET(160 MG) BY MOUTH EVERY DAY    Uncontrolled type 2 diabetes mellitus with hyperglycemia  -     SITagliptin (JANUVIA) 50 MG Tab; Take 1 tablet (50 mg total) by mouth once  daily.  -     metFORMIN (GLUCOPHAGE-XR) 500 MG XR 24hr tablet; Take 3 tablets (1,500 mg total) by mouth once daily.  -     Hemoglobin A1C; Future    Patient has uncontrolled Diabetes .  We discussed about diet ;low in calories. Avoid sweats, sodas.  Also increasing activity;walking 2-3 miles a day.  I also adjusted medications and gave patient  instructions about adherence to plan.  Goal of  A1c  less than 7 % stressed.  Also goal of LDL less than 70 highlighted to patient.        Problem List Items Addressed This Visit     Hypertriglyceridemia - Primary    Uncontrolled type 2 diabetes mellitus with hyperglycemia    Relevant Medications    SITagliptin (JANUVIA) 50 MG Tab    metFORMIN (GLUCOPHAGE-XR) 500 MG XR 24hr tablet    Other Relevant Orders    Hemoglobin A1C

## 2020-10-05 ENCOUNTER — PATIENT MESSAGE (OUTPATIENT)
Dept: ADMINISTRATIVE | Facility: HOSPITAL | Age: 55
End: 2020-10-05

## 2020-10-14 ENCOUNTER — PATIENT MESSAGE (OUTPATIENT)
Dept: HEMATOLOGY/ONCOLOGY | Facility: CLINIC | Age: 55
End: 2020-10-14

## 2020-10-20 ENCOUNTER — LAB VISIT (OUTPATIENT)
Dept: LAB | Facility: HOSPITAL | Age: 55
End: 2020-10-20
Attending: INTERNAL MEDICINE
Payer: COMMERCIAL

## 2020-10-20 ENCOUNTER — PATIENT OUTREACH (OUTPATIENT)
Dept: ADMINISTRATIVE | Facility: OTHER | Age: 55
End: 2020-10-20

## 2020-10-20 DIAGNOSIS — E11.65 UNCONTROLLED TYPE 2 DIABETES MELLITUS WITH HYPERGLYCEMIA: ICD-10-CM

## 2020-10-20 LAB
ESTIMATED AVG GLUCOSE: 154 MG/DL (ref 68–131)
HBA1C MFR BLD HPLC: 7 % (ref 4–5.6)

## 2020-10-20 PROCEDURE — 83036 HEMOGLOBIN GLYCOSYLATED A1C: CPT

## 2020-10-20 PROCEDURE — 36415 COLL VENOUS BLD VENIPUNCTURE: CPT

## 2020-10-20 NOTE — PROGRESS NOTES
Care Everywhere:   Immunization: updated(delay in links)   Health Maintenance: updated  Media Review: review for outside eye exam report   Legacy Review:   Order placed:   Upcoming appts:hemoglobin 10/20

## 2020-10-21 ENCOUNTER — OFFICE VISIT (OUTPATIENT)
Dept: SPORTS MEDICINE | Facility: CLINIC | Age: 55
End: 2020-10-21
Payer: COMMERCIAL

## 2020-10-21 VITALS — WEIGHT: 280.13 LBS | HEIGHT: 69 IN | BODY MASS INDEX: 41.49 KG/M2

## 2020-10-21 DIAGNOSIS — M25.562 BILATERAL CHRONIC KNEE PAIN: ICD-10-CM

## 2020-10-21 DIAGNOSIS — R26.89 ANTALGIC GAIT: ICD-10-CM

## 2020-10-21 DIAGNOSIS — G89.29 BILATERAL CHRONIC KNEE PAIN: ICD-10-CM

## 2020-10-21 DIAGNOSIS — M17.0 PRIMARY OSTEOARTHRITIS OF BOTH KNEES: Primary | ICD-10-CM

## 2020-10-21 DIAGNOSIS — M25.561 BILATERAL CHRONIC KNEE PAIN: ICD-10-CM

## 2020-10-21 PROCEDURE — 20611 LARGE JOINT ASPIRATION/INJECTION: BILATERAL KNEE: ICD-10-PCS | Mod: 50,S$GLB,, | Performed by: FAMILY MEDICINE

## 2020-10-21 PROCEDURE — 99214 PR OFFICE/OUTPT VISIT, EST, LEVL IV, 30-39 MIN: ICD-10-PCS | Mod: 25,S$GLB,, | Performed by: FAMILY MEDICINE

## 2020-10-21 PROCEDURE — 99214 OFFICE O/P EST MOD 30 MIN: CPT | Mod: 25,S$GLB,, | Performed by: FAMILY MEDICINE

## 2020-10-21 PROCEDURE — 99999 PR PBB SHADOW E&M-EST. PATIENT-LVL II: CPT | Mod: PBBFAC,,, | Performed by: FAMILY MEDICINE

## 2020-10-21 PROCEDURE — 99999 PR PBB SHADOW E&M-EST. PATIENT-LVL II: ICD-10-PCS | Mod: PBBFAC,,, | Performed by: FAMILY MEDICINE

## 2020-10-21 PROCEDURE — 3008F PR BODY MASS INDEX (BMI) DOCUMENTED: ICD-10-PCS | Mod: CPTII,S$GLB,, | Performed by: FAMILY MEDICINE

## 2020-10-21 PROCEDURE — 20611 DRAIN/INJ JOINT/BURSA W/US: CPT | Mod: 50,S$GLB,, | Performed by: FAMILY MEDICINE

## 2020-10-21 PROCEDURE — 3008F BODY MASS INDEX DOCD: CPT | Mod: CPTII,S$GLB,, | Performed by: FAMILY MEDICINE

## 2020-10-21 RX ORDER — TRIAMCINOLONE ACETONIDE 40 MG/ML
40 INJECTION, SUSPENSION INTRA-ARTICULAR; INTRAMUSCULAR
Status: DISCONTINUED | OUTPATIENT
Start: 2020-10-21 | End: 2020-10-21 | Stop reason: HOSPADM

## 2020-10-21 RX ADMIN — TRIAMCINOLONE ACETONIDE 40 MG: 40 INJECTION, SUSPENSION INTRA-ARTICULAR; INTRAMUSCULAR at 08:10
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
## 2020-10-21 NOTE — PROCEDURES
"Large Joint Aspiration/Injection: bilateral knee    Date/Time: 10/21/2020 8:30 AM  Performed by: Chintan Bartholomew MD  Authorized by: Chintan Bartholomew MD     Consent Done?:  Yes (Verbal)  Indications:  Pain  Site marked: the procedure site was marked    Timeout: prior to procedure the correct patient, procedure, and site was verified    Prep: patient was prepped and draped in usual sterile fashion    Local anesthesia used?: No      Details:  Needle Size:  20 G  Ultrasonic Guidance for needle placement?: Yes    Images are saved and documented.  Approach:  Lateral  Location:  Knee  Laterality:  Bilateral  Site:  Bilateral knee  Medications (Right):  40 mg triamcinolone acetonide 40 mg/mL  Medications (Left):  40 mg triamcinolone acetonide 40 mg/mL  Patient tolerance:  Patient tolerated the procedure well with no immediate complications     Description of ultrasound utilization for needle guidance:   Ultrasound guidance used for needle localization. Images saved and stored for documentation. The knee joint was visualized. Dynamic visualization of the 20g x 3.5" needle was continuous throughout the procedure.       "

## 2020-10-23 DIAGNOSIS — E11.9 TYPE 2 DIABETES MELLITUS WITHOUT COMPLICATION, UNSPECIFIED WHETHER LONG TERM INSULIN USE: ICD-10-CM

## 2020-10-27 ENCOUNTER — OFFICE VISIT (OUTPATIENT)
Dept: INTERNAL MEDICINE | Facility: CLINIC | Age: 55
End: 2020-10-27
Payer: COMMERCIAL

## 2020-10-27 VITALS
DIASTOLIC BLOOD PRESSURE: 80 MMHG | RESPIRATION RATE: 16 BRPM | BODY MASS INDEX: 40.07 KG/M2 | HEIGHT: 69 IN | HEART RATE: 68 BPM | SYSTOLIC BLOOD PRESSURE: 136 MMHG | WEIGHT: 270.5 LBS | OXYGEN SATURATION: 97 %

## 2020-10-27 DIAGNOSIS — E11.65 UNCONTROLLED TYPE 2 DIABETES MELLITUS WITH HYPERGLYCEMIA: Primary | ICD-10-CM

## 2020-10-27 PROCEDURE — 3051F HG A1C>EQUAL 7.0%<8.0%: CPT | Mod: CPTII,S$GLB,, | Performed by: INTERNAL MEDICINE

## 2020-10-27 PROCEDURE — 90471 FLU VACCINE (QUAD) GREATER THAN OR EQUAL TO 3YO PRESERVATIVE FREE IM: ICD-10-PCS | Mod: S$GLB,,, | Performed by: INTERNAL MEDICINE

## 2020-10-27 PROCEDURE — 3008F BODY MASS INDEX DOCD: CPT | Mod: CPTII,S$GLB,, | Performed by: INTERNAL MEDICINE

## 2020-10-27 PROCEDURE — 99999 PR PBB SHADOW E&M-EST. PATIENT-LVL IV: ICD-10-PCS | Mod: PBBFAC,,, | Performed by: INTERNAL MEDICINE

## 2020-10-27 PROCEDURE — 99999 PR PBB SHADOW E&M-EST. PATIENT-LVL IV: CPT | Mod: PBBFAC,,, | Performed by: INTERNAL MEDICINE

## 2020-10-27 PROCEDURE — 99213 OFFICE O/P EST LOW 20 MIN: CPT | Mod: 25,S$GLB,, | Performed by: INTERNAL MEDICINE

## 2020-10-27 PROCEDURE — 90471 IMMUNIZATION ADMIN: CPT | Mod: S$GLB,,, | Performed by: INTERNAL MEDICINE

## 2020-10-27 PROCEDURE — 3008F PR BODY MASS INDEX (BMI) DOCUMENTED: ICD-10-PCS | Mod: CPTII,S$GLB,, | Performed by: INTERNAL MEDICINE

## 2020-10-27 PROCEDURE — 90686 FLU VACCINE (QUAD) GREATER THAN OR EQUAL TO 3YO PRESERVATIVE FREE IM: ICD-10-PCS | Mod: S$GLB,,, | Performed by: INTERNAL MEDICINE

## 2020-10-27 PROCEDURE — 3051F PR MOST RECENT HEMOGLOBIN A1C LEVEL 7.0 - < 8.0%: ICD-10-PCS | Mod: CPTII,S$GLB,, | Performed by: INTERNAL MEDICINE

## 2020-10-27 PROCEDURE — 90686 IIV4 VACC NO PRSV 0.5 ML IM: CPT | Mod: S$GLB,,, | Performed by: INTERNAL MEDICINE

## 2020-10-27 PROCEDURE — 99213 PR OFFICE/OUTPT VISIT, EST, LEVL III, 20-29 MIN: ICD-10-PCS | Mod: 25,S$GLB,, | Performed by: INTERNAL MEDICINE

## 2020-10-27 NOTE — PROGRESS NOTES
Subjective:       Patient ID: Wild Holliday is a 55 y.o. male.    Chief Complaint: Follow-up    Diabetes  He presents for his follow-up diabetic visit. He has type 2 diabetes mellitus. No MedicAlert identification noted. His disease course has been improving. Pertinent negatives for hypoglycemia include no confusion, dizziness, headaches, hunger, mood changes, nervousness/anxiousness, pallor, seizures, sleepiness, speech difficulty, sweats or tremors. Associated symptoms include fatigue, polyphagia and polyuria. Pertinent negatives for diabetes include no blurred vision, no chest pain, no foot paresthesias, no foot ulcerations, no polydipsia, no weakness and no weight loss. Pertinent negatives for hypoglycemia complications include no blackouts, no hospitalization, no nocturnal hypoglycemia, no required assistance and no required glucagon injection. Symptoms are stable. Pertinent negatives for diabetic complications include no autonomic neuropathy, CVA, heart disease, impotence, nephropathy, peripheral neuropathy, PVD or retinopathy. Risk factors for coronary artery disease include family history, obesity, stress, diabetes mellitus and male sex. Current diabetic treatment includes diet and oral agent (monotherapy). He is compliant with treatment most of the time. His weight is stable. He is following a generally healthy diet. Meal planning includes avoidance of concentrated sweets. He has had a previous visit with a dietitian. He participates in exercise three times a week. He monitors blood glucose at home 1-2 x per week. Blood glucose monitoring compliance is fair. He sees a podiatrist.Eye exam is not current.     Review of Systems   Constitutional: Positive for fatigue. Negative for chills, fever and weight loss.   HENT: Negative for congestion, postnasal drip and sore throat.    Eyes: Negative for blurred vision and photophobia.   Respiratory: Negative for chest tightness and shortness of breath.     Cardiovascular: Negative for chest pain.   Gastrointestinal: Negative for abdominal distention, abdominal pain, blood in stool and vomiting.   Endocrine: Positive for polyphagia and polyuria. Negative for polydipsia.   Genitourinary: Negative for dysuria, flank pain, hematuria and impotence.   Musculoskeletal: Negative for back pain.   Skin: Negative for pallor.   Neurological: Negative for dizziness, tremors, seizures, facial asymmetry, speech difficulty, weakness, numbness and headaches.   Hematological: Does not bruise/bleed easily.   Psychiatric/Behavioral: Negative for agitation, confusion and suicidal ideas. The patient is not nervous/anxious.        Objective:      Physical Exam  Vitals signs and nursing note reviewed.   Constitutional:       Appearance: He is well-developed.   HENT:      Head: Normocephalic and atraumatic.   Neck:      Vascular: No JVD.   Cardiovascular:      Rate and Rhythm: Normal rate and regular rhythm.      Heart sounds: Normal heart sounds.   Pulmonary:      Effort: Pulmonary effort is normal.      Breath sounds: Normal breath sounds.   Abdominal:      General: Bowel sounds are normal.      Palpations: Abdomen is soft.      Tenderness: There is no abdominal tenderness.   Skin:     General: Skin is warm and dry.   Neurological:      Mental Status: He is alert and oriented to person, place, and time.   Psychiatric:         Behavior: Behavior normal.         Thought Content: Thought content normal.         Judgment: Judgment normal.         Assessment:       1. Uncontrolled type 2 diabetes mellitus with hyperglycemia        Plan:   Wild was seen today for follow-up.    Diagnoses and all orders for this visit:    Uncontrolled type 2 diabetes mellitus with hyperglycemia  -     Hemoglobin A1C; Future  -     Lipid Panel; Future  -     CBC auto differential; Future  -     Comprehensive Metabolic Panel; Future  -     TSH; Future    Patient has uncontrolled Diabetes .  We discussed about  diet ;low in calories. Avoid sweats, sodas.  Also increasing activity;walking 2-3 miles a day.  I also adjusted medications and gave patient  instructions about adherence to plan.  Goal of  A1c  less than 7 % stressed.  Also goal of LDL less than 70 highlighted to patient.        Problem List Items Addressed This Visit     Uncontrolled type 2 diabetes mellitus with hyperglycemia - Primary

## 2020-10-28 ENCOUNTER — PATIENT MESSAGE (OUTPATIENT)
Dept: HEMATOLOGY/ONCOLOGY | Facility: CLINIC | Age: 55
End: 2020-10-28

## 2020-10-29 ENCOUNTER — LAB VISIT (OUTPATIENT)
Dept: LAB | Facility: HOSPITAL | Age: 55
End: 2020-10-29
Attending: INTERNAL MEDICINE
Payer: COMMERCIAL

## 2020-10-29 ENCOUNTER — OFFICE VISIT (OUTPATIENT)
Dept: HEMATOLOGY/ONCOLOGY | Facility: CLINIC | Age: 55
End: 2020-10-29
Payer: COMMERCIAL

## 2020-10-29 VITALS
BODY MASS INDEX: 40.71 KG/M2 | HEIGHT: 69 IN | OXYGEN SATURATION: 96 % | RESPIRATION RATE: 18 BRPM | WEIGHT: 274.88 LBS | HEART RATE: 65 BPM | SYSTOLIC BLOOD PRESSURE: 167 MMHG | TEMPERATURE: 99 F | DIASTOLIC BLOOD PRESSURE: 87 MMHG

## 2020-10-29 DIAGNOSIS — C91.10 CLL (CHRONIC LYMPHOCYTIC LEUKEMIA): ICD-10-CM

## 2020-10-29 DIAGNOSIS — C91.10 CLL (CHRONIC LYMPHOCYTIC LEUKEMIA): Primary | ICD-10-CM

## 2020-10-29 DIAGNOSIS — C83.00 SMALL B-CELL LYMPHOMA, UNSPECIFIED BODY REGION: ICD-10-CM

## 2020-10-29 LAB
ALBUMIN SERPL BCP-MCNC: 4 G/DL (ref 3.5–5.2)
ALP SERPL-CCNC: 61 U/L (ref 55–135)
ALT SERPL W/O P-5'-P-CCNC: 38 U/L (ref 10–44)
ANION GAP SERPL CALC-SCNC: 10 MMOL/L (ref 8–16)
AST SERPL-CCNC: 23 U/L (ref 10–40)
BASOPHILS # BLD AUTO: 0.04 K/UL (ref 0–0.2)
BASOPHILS NFR BLD: 0.4 % (ref 0–1.9)
BILIRUB SERPL-MCNC: 0.2 MG/DL (ref 0.1–1)
BUN SERPL-MCNC: 20 MG/DL (ref 6–20)
CALCIUM SERPL-MCNC: 9.3 MG/DL (ref 8.7–10.5)
CHLORIDE SERPL-SCNC: 102 MMOL/L (ref 95–110)
CO2 SERPL-SCNC: 24 MMOL/L (ref 23–29)
CREAT SERPL-MCNC: 1 MG/DL (ref 0.5–1.4)
DIFFERENTIAL METHOD: ABNORMAL
EOSINOPHIL # BLD AUTO: 0.2 K/UL (ref 0–0.5)
EOSINOPHIL NFR BLD: 1.9 % (ref 0–8)
ERYTHROCYTE [DISTWIDTH] IN BLOOD BY AUTOMATED COUNT: 12.5 % (ref 11.5–14.5)
EST. GFR  (AFRICAN AMERICAN): >60 ML/MIN/1.73 M^2
EST. GFR  (NON AFRICAN AMERICAN): >60 ML/MIN/1.73 M^2
GLUCOSE SERPL-MCNC: 153 MG/DL (ref 70–110)
HCT VFR BLD AUTO: 48.9 % (ref 40–54)
HGB BLD-MCNC: 15.7 G/DL (ref 14–18)
IMM GRANULOCYTES # BLD AUTO: 0.06 K/UL (ref 0–0.04)
IMM GRANULOCYTES NFR BLD AUTO: 0.7 % (ref 0–0.5)
LDH SERPL L TO P-CCNC: 159 U/L (ref 110–260)
LYMPHOCYTES # BLD AUTO: 2.1 K/UL (ref 1–4.8)
LYMPHOCYTES NFR BLD: 23.4 % (ref 18–48)
MCH RBC QN AUTO: 28.1 PG (ref 27–31)
MCHC RBC AUTO-ENTMCNC: 32.1 G/DL (ref 32–36)
MCV RBC AUTO: 88 FL (ref 82–98)
MONOCYTES # BLD AUTO: 0.7 K/UL (ref 0.3–1)
MONOCYTES NFR BLD: 7.3 % (ref 4–15)
NEUTROPHILS # BLD AUTO: 6 K/UL (ref 1.8–7.7)
NEUTROPHILS NFR BLD: 66.3 % (ref 38–73)
NRBC BLD-RTO: 0 /100 WBC
PLATELET # BLD AUTO: 253 K/UL (ref 150–350)
PMV BLD AUTO: 9 FL (ref 9.2–12.9)
POTASSIUM SERPL-SCNC: 4.6 MMOL/L (ref 3.5–5.1)
PROT SERPL-MCNC: 6.8 G/DL (ref 6–8.4)
RBC # BLD AUTO: 5.58 M/UL (ref 4.6–6.2)
SODIUM SERPL-SCNC: 136 MMOL/L (ref 136–145)
WBC # BLD AUTO: 9.07 K/UL (ref 3.9–12.7)

## 2020-10-29 PROCEDURE — 85025 COMPLETE CBC W/AUTO DIFF WBC: CPT

## 2020-10-29 PROCEDURE — 36415 COLL VENOUS BLD VENIPUNCTURE: CPT

## 2020-10-29 PROCEDURE — 99999 PR PBB SHADOW E&M-EST. PATIENT-LVL III: ICD-10-PCS | Mod: PBBFAC,,, | Performed by: INTERNAL MEDICINE

## 2020-10-29 PROCEDURE — 3008F BODY MASS INDEX DOCD: CPT | Mod: CPTII,S$GLB,, | Performed by: INTERNAL MEDICINE

## 2020-10-29 PROCEDURE — 80053 COMPREHEN METABOLIC PANEL: CPT

## 2020-10-29 PROCEDURE — 99999 PR PBB SHADOW E&M-EST. PATIENT-LVL III: CPT | Mod: PBBFAC,,, | Performed by: INTERNAL MEDICINE

## 2020-10-29 PROCEDURE — 3008F PR BODY MASS INDEX (BMI) DOCUMENTED: ICD-10-PCS | Mod: CPTII,S$GLB,, | Performed by: INTERNAL MEDICINE

## 2020-10-29 PROCEDURE — 99215 PR OFFICE/OUTPT VISIT, EST, LEVL V, 40-54 MIN: ICD-10-PCS | Mod: S$GLB,,, | Performed by: INTERNAL MEDICINE

## 2020-10-29 PROCEDURE — 83615 LACTATE (LD) (LDH) ENZYME: CPT

## 2020-10-29 PROCEDURE — 99215 OFFICE O/P EST HI 40 MIN: CPT | Mod: S$GLB,,, | Performed by: INTERNAL MEDICINE
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
## 2020-12-24 ENCOUNTER — PATIENT MESSAGE (OUTPATIENT)
Dept: INTERNAL MEDICINE | Facility: CLINIC | Age: 55
End: 2020-12-24

## 2020-12-25 NOTE — PLAN OF CARE
Problem: Comfort Care (End of Life)  Goal: Environment and physical comfort needs met  Outcome: Outcome Met, Continue evaluating goal progress toward completion Problem: Patient Care Overview (Adult)  Goal: Individualization & Mutuality  Outcome: Ongoing (interventions implemented as appropriate)  1330-Labs , hx, and medications reviewed, patient returns today for next bendeka, port is already accessed. Assessment completed. Discussed plan of care with patient. Patient in agreement. Chair reclined and warm blanket and snack offered.

## 2020-12-29 ENCOUNTER — OFFICE VISIT (OUTPATIENT)
Dept: INTERNAL MEDICINE | Facility: CLINIC | Age: 55
End: 2020-12-29
Payer: COMMERCIAL

## 2020-12-29 VITALS
BODY MASS INDEX: 40 KG/M2 | TEMPERATURE: 99 F | OXYGEN SATURATION: 95 % | WEIGHT: 270.06 LBS | RESPIRATION RATE: 20 BRPM | SYSTOLIC BLOOD PRESSURE: 138 MMHG | HEART RATE: 73 BPM | HEIGHT: 69 IN | DIASTOLIC BLOOD PRESSURE: 88 MMHG

## 2020-12-29 DIAGNOSIS — M54.42 LOW BACK PAIN WITH LEFT-SIDED SCIATICA, UNSPECIFIED BACK PAIN LATERALITY, UNSPECIFIED CHRONICITY: Primary | ICD-10-CM

## 2020-12-29 PROCEDURE — 3008F BODY MASS INDEX DOCD: CPT | Mod: CPTII,S$GLB,, | Performed by: INTERNAL MEDICINE

## 2020-12-29 PROCEDURE — 1125F PR PAIN SEVERITY QUANTIFIED, PAIN PRESENT: ICD-10-PCS | Mod: S$GLB,,, | Performed by: INTERNAL MEDICINE

## 2020-12-29 PROCEDURE — 1125F AMNT PAIN NOTED PAIN PRSNT: CPT | Mod: S$GLB,,, | Performed by: INTERNAL MEDICINE

## 2020-12-29 PROCEDURE — 3008F PR BODY MASS INDEX (BMI) DOCUMENTED: ICD-10-PCS | Mod: CPTII,S$GLB,, | Performed by: INTERNAL MEDICINE

## 2020-12-29 PROCEDURE — 99999 PR PBB SHADOW E&M-EST. PATIENT-LVL IV: ICD-10-PCS | Mod: PBBFAC,,, | Performed by: INTERNAL MEDICINE

## 2020-12-29 PROCEDURE — 3072F PR LOW RISK FOR RETINOPATHY: ICD-10-PCS | Mod: S$GLB,,, | Performed by: INTERNAL MEDICINE

## 2020-12-29 PROCEDURE — 99999 PR PBB SHADOW E&M-EST. PATIENT-LVL IV: CPT | Mod: PBBFAC,,, | Performed by: INTERNAL MEDICINE

## 2020-12-29 PROCEDURE — 3072F LOW RISK FOR RETINOPATHY: CPT | Mod: S$GLB,,, | Performed by: INTERNAL MEDICINE

## 2020-12-29 PROCEDURE — 99213 OFFICE O/P EST LOW 20 MIN: CPT | Mod: S$GLB,,, | Performed by: INTERNAL MEDICINE

## 2020-12-29 PROCEDURE — 99213 PR OFFICE/OUTPT VISIT, EST, LEVL III, 20-29 MIN: ICD-10-PCS | Mod: S$GLB,,, | Performed by: INTERNAL MEDICINE

## 2020-12-29 RX ORDER — METHOCARBAMOL 500 MG/1
TABLET, FILM COATED ORAL
COMMUNITY
Start: 2020-12-20 | End: 2021-02-26

## 2020-12-29 RX ORDER — GABAPENTIN 300 MG/1
300 CAPSULE ORAL NIGHTLY
Qty: 30 CAPSULE | Refills: 3 | Status: SHIPPED | OUTPATIENT
Start: 2020-12-29 | End: 2021-09-15

## 2020-12-29 NOTE — PROGRESS NOTES
Subjective:       Patient ID: Wild Holliday is a 55 y.o. male.    Chief Complaint: Back Pain and Sciatica    Xray report he has ; shows arthritis .      Back Pain  This is a new problem. The current episode started more than 1 month ago (4 months ; doing chiropracter ). The problem occurs daily. The pain is present in the lumbar spine. The pain is at a severity of 7/10. The pain is moderate. The symptoms are aggravated by lying down and bending. Stiffness is present at night. Pertinent negatives include no abdominal pain, chest pain, dysuria, fever, headaches, numbness or weakness.     Review of Systems   Constitutional: Positive for fatigue. Negative for chills and fever.   HENT: Negative for congestion, postnasal drip and sore throat.    Eyes: Negative for photophobia.   Respiratory: Negative for chest tightness and shortness of breath.    Cardiovascular: Negative for chest pain.   Gastrointestinal: Negative for abdominal distention, abdominal pain, blood in stool and vomiting.   Endocrine: Positive for polyphagia and polyuria. Negative for polydipsia.   Genitourinary: Negative for dysuria, flank pain and hematuria.   Musculoskeletal: Positive for back pain.   Skin: Negative for pallor.   Neurological: Negative for dizziness, tremors, seizures, facial asymmetry, speech difficulty, weakness, numbness and headaches.   Hematological: Does not bruise/bleed easily.   Psychiatric/Behavioral: Negative for agitation, confusion and suicidal ideas. The patient is not nervous/anxious.        Objective:      Physical Exam  Vitals signs and nursing note reviewed.   Constitutional:       Appearance: He is well-developed.   HENT:      Head: Normocephalic and atraumatic.   Neck:      Vascular: No JVD.   Cardiovascular:      Rate and Rhythm: Normal rate and regular rhythm.      Heart sounds: Normal heart sounds.   Pulmonary:      Effort: Pulmonary effort is normal.      Breath sounds: Normal breath sounds.   Abdominal:       General: Bowel sounds are normal.      Palpations: Abdomen is soft.      Tenderness: There is no abdominal tenderness.   Skin:     General: Skin is warm and dry.   Neurological:      Mental Status: He is alert and oriented to person, place, and time.   Psychiatric:         Behavior: Behavior normal.         Thought Content: Thought content normal.         Judgment: Judgment normal.         Assessment:       1. Low back pain with left-sided sciatica, unspecified back pain laterality, unspecified chronicity        Plan:   Wild was seen today for back pain and sciatica.    Diagnoses and all orders for this visit:    Low back pain with left-sided sciatica, unspecified back pain laterality, unspecified chronicity  -     gabapentin (NEURONTIN) 300 MG capsule; Take 1 capsule (300 mg total) by mouth every evening.    continue meloxicam   Continue with chiropractor .  Will try gabapentin  ; if not better see pain clinic       Problem List Items Addressed This Visit     None

## 2021-01-04 ENCOUNTER — PATIENT MESSAGE (OUTPATIENT)
Dept: ADMINISTRATIVE | Facility: HOSPITAL | Age: 56
End: 2021-01-04

## 2021-01-21 ENCOUNTER — PATIENT MESSAGE (OUTPATIENT)
Dept: SPORTS MEDICINE | Facility: CLINIC | Age: 56
End: 2021-01-21

## 2021-01-22 ENCOUNTER — TELEPHONE (OUTPATIENT)
Dept: SPORTS MEDICINE | Facility: CLINIC | Age: 56
End: 2021-01-22

## 2021-01-25 ENCOUNTER — LAB VISIT (OUTPATIENT)
Dept: LAB | Facility: HOSPITAL | Age: 56
End: 2021-01-25
Attending: INTERNAL MEDICINE
Payer: COMMERCIAL

## 2021-01-25 DIAGNOSIS — E11.65 UNCONTROLLED TYPE 2 DIABETES MELLITUS WITH HYPERGLYCEMIA: ICD-10-CM

## 2021-01-25 LAB
ALBUMIN SERPL BCP-MCNC: 4 G/DL (ref 3.5–5.2)
ALP SERPL-CCNC: 48 U/L (ref 55–135)
ALT SERPL W/O P-5'-P-CCNC: 33 U/L (ref 10–44)
ANION GAP SERPL CALC-SCNC: 8 MMOL/L (ref 8–16)
AST SERPL-CCNC: 19 U/L (ref 10–40)
BASOPHILS # BLD AUTO: 0.04 K/UL (ref 0–0.2)
BASOPHILS NFR BLD: 0.6 % (ref 0–1.9)
BILIRUB SERPL-MCNC: 0.5 MG/DL (ref 0.1–1)
BUN SERPL-MCNC: 14 MG/DL (ref 6–20)
CALCIUM SERPL-MCNC: 9.2 MG/DL (ref 8.7–10.5)
CHLORIDE SERPL-SCNC: 106 MMOL/L (ref 95–110)
CHOLEST SERPL-MCNC: 157 MG/DL (ref 120–199)
CHOLEST/HDLC SERPL: 6 {RATIO} (ref 2–5)
CO2 SERPL-SCNC: 25 MMOL/L (ref 23–29)
CREAT SERPL-MCNC: 0.9 MG/DL (ref 0.5–1.4)
DIFFERENTIAL METHOD: ABNORMAL
EOSINOPHIL # BLD AUTO: 0.3 K/UL (ref 0–0.5)
EOSINOPHIL NFR BLD: 5 % (ref 0–8)
ERYTHROCYTE [DISTWIDTH] IN BLOOD BY AUTOMATED COUNT: 13.4 % (ref 11.5–14.5)
EST. GFR  (AFRICAN AMERICAN): >60 ML/MIN/1.73 M^2
EST. GFR  (NON AFRICAN AMERICAN): >60 ML/MIN/1.73 M^2
ESTIMATED AVG GLUCOSE: 177 MG/DL (ref 68–131)
GLUCOSE SERPL-MCNC: 196 MG/DL (ref 70–110)
HBA1C MFR BLD HPLC: 7.8 % (ref 4–5.6)
HCT VFR BLD AUTO: 43 % (ref 40–54)
HDLC SERPL-MCNC: 26 MG/DL (ref 40–75)
HDLC SERPL: 16.6 % (ref 20–50)
HGB BLD-MCNC: 14 G/DL (ref 14–18)
IMM GRANULOCYTES # BLD AUTO: 0.03 K/UL (ref 0–0.04)
IMM GRANULOCYTES NFR BLD AUTO: 0.5 % (ref 0–0.5)
LDLC SERPL CALC-MCNC: 68.2 MG/DL (ref 63–159)
LYMPHOCYTES # BLD AUTO: 2 K/UL (ref 1–4.8)
LYMPHOCYTES NFR BLD: 29.8 % (ref 18–48)
MCH RBC QN AUTO: 28.2 PG (ref 27–31)
MCHC RBC AUTO-ENTMCNC: 32.6 G/DL (ref 32–36)
MCV RBC AUTO: 87 FL (ref 82–98)
MONOCYTES # BLD AUTO: 0.5 K/UL (ref 0.3–1)
MONOCYTES NFR BLD: 6.9 % (ref 4–15)
NEUTROPHILS # BLD AUTO: 3.8 K/UL (ref 1.8–7.7)
NEUTROPHILS NFR BLD: 57.2 % (ref 38–73)
NONHDLC SERPL-MCNC: 131 MG/DL
NRBC BLD-RTO: 0 /100 WBC
PLATELET # BLD AUTO: 260 K/UL (ref 150–350)
PMV BLD AUTO: 9.1 FL (ref 9.2–12.9)
POTASSIUM SERPL-SCNC: 4.5 MMOL/L (ref 3.5–5.1)
PROT SERPL-MCNC: 6.5 G/DL (ref 6–8.4)
RBC # BLD AUTO: 4.97 M/UL (ref 4.6–6.2)
SODIUM SERPL-SCNC: 139 MMOL/L (ref 136–145)
TRIGL SERPL-MCNC: 314 MG/DL (ref 30–150)
TSH SERPL DL<=0.005 MIU/L-ACNC: 1.89 UIU/ML (ref 0.4–4)
WBC # BLD AUTO: 6.62 K/UL (ref 3.9–12.7)

## 2021-01-25 PROCEDURE — 83036 HEMOGLOBIN GLYCOSYLATED A1C: CPT

## 2021-01-25 PROCEDURE — 80053 COMPREHEN METABOLIC PANEL: CPT

## 2021-01-25 PROCEDURE — 85025 COMPLETE CBC W/AUTO DIFF WBC: CPT

## 2021-01-25 PROCEDURE — 84443 ASSAY THYROID STIM HORMONE: CPT

## 2021-01-25 PROCEDURE — 36415 COLL VENOUS BLD VENIPUNCTURE: CPT

## 2021-01-25 PROCEDURE — 80061 LIPID PANEL: CPT

## 2021-01-25 NOTE — PROGRESS NOTES
PSYCHO-ONCOLOGY NOTE/ Individual Psychotherapy     Date: 8/15/2017   Site:  [facility]         Therapeutic Intervention: Met with patient.  Outpatient - Behavior modifying psychotherapy 45 min - CPT code 56763    Chief complaint/reason for encounter: depression and anxiety     Objective:  Wild Holliday arrived promptly for the session accompanied by his wife who did not participate in the visit.  Mr. Holliday was independently ambulatory at the time of session. The patient was fully cooperative throughout the session.  Appearance: age appropriate, casually dressed, well groomed  Behavior/Cooperation: friendly and cooperative  Speech: Not pressured, clearly audible, no slurring  Mood: anxious and depressed  Affect: mildly constricted  Thought Process: goal-directed, logical  Thought Content: normal,  No delusions or paranoia; did not appear to be responding to internal stimuli during the session  Orientation: grossly intact  Memory: Grossly intact  Attention Span/Concentration: Attends to session without distraction; reports subjective difficulty  Fund of Knowledge: average  Estimate of Intelligence: average from verbal skills and history  Cognition: grossly intact  Insight: patient has awareness of illness; good insight into own behavior and behavior of others  Judgment: the patient's behavior is adequate to circumstances    Interval history and content of current session:  Discussed current adaptation to disease and treatment status. Reports to be coping with difficulty.  Patient describes recent stressor (death of his brother) and accompanying guilt.  This has exacerbated his already decreased mood and his tendency toward worry.    Symptoms:   · Mood: depressed mood, diminished interest, insomnia, psychomotor agitation, fatigue, worthlessness/guilt, poor concentration, thoughts of death, tearfulness and social isolation      PHQ-9=10  · Anxiety: decreased memory, excessive anxiety/worry, restlessness/keyed up  and irritability FARIHA-7=7  · Sleep: Restful sleep, no sleep onset difficulty; early AM awakening (4 am) without return to sleep, is falling asleep earlier in evening than in past (8-10 pm)        Focused on providing cognitive behavioral therapy to address negative cognitions. Examnined at proactive behaviors that may be implemented to minimize or ameliorate psychosocial stressors secondary to diagnosis and treatment. An antidepressant trial may be useful for Mr. Holliday.       Risk parameters:   Patient reports no suicidal ideation  Patient reports no homicidal ideation  Patient reports no self-injurious behavior  Patient reports no violent behavior   Safety needs:  None at this time      Verbal deficits: None     Patient's response to intervention:The patient's response to intervention is motivated.     Progress toward goals and other mental status changes:  The patient's progress toward goals is good.      Progress to date:Progress as Expected      Goals from last visit: partially met      Patient reported outcomes:  Distress Thermometer:   Distress Score    Distress Score: 6        Practical Problems Physical Problems               Insurance / Financial: Yes                                   Family Problems Fatigue: Yes    Dealing with Children: Yes                     Family Health Issues: Yes        Memory / Concentration: Yes   Emotional Problems      Depression: Yes       Fears: Yes       Nervousness: Yes  Pain: Yes    Sadness: Yes      Worry: Yes      Lost of Interest in Usual Activities: Yes            Spiritual/Religions Concerns               Other Problems               Client Strengths: verbal, intelligent, successful, good social support, good insight    Diagnosis:     ICD-10-CM ICD-9-CM   1. Adjustment disorder with mixed anxiety and depressed mood F43.23 309.28       · Treatment Plan:medication management by physician; re-contact for therapy as needed  · Target symptoms: depression, anxiety   · Why  "chosen therapy is appropriate versus another modality: relevant to diagnosis, evidence based practice  · Outcome monitoring methods: self-report, checklist/rating scale  · Therapeutic intervention type: behavior modifying psychotherapy  · Prognosis: Excellent      Behavioral goals:    Exercise:  Swimming each evening to decrease anxiety and improve mood   Stress management:   Social engagement:continue regular activities and interactions with friends, even though it does not "feel fun"   Nutrition: continue healthier eating goals   Smoking Cessation:   Therapy:  Speak to PCP about antidepressant medication options; buy and read Feeling Good      Return to clinic: as needed     Length of Service (minutes direct face-to-face contact): 45    Kvng Bazan, PhD  [place] License #[id number]  " English

## 2021-01-26 ENCOUNTER — OFFICE VISIT (OUTPATIENT)
Dept: INTERNAL MEDICINE | Facility: CLINIC | Age: 56
End: 2021-01-26
Payer: COMMERCIAL

## 2021-01-26 VITALS
RESPIRATION RATE: 16 BRPM | HEIGHT: 69 IN | HEART RATE: 72 BPM | SYSTOLIC BLOOD PRESSURE: 138 MMHG | BODY MASS INDEX: 39.44 KG/M2 | WEIGHT: 266.31 LBS | DIASTOLIC BLOOD PRESSURE: 80 MMHG | OXYGEN SATURATION: 96 %

## 2021-01-26 DIAGNOSIS — E78.1 HYPERTRIGLYCERIDEMIA: ICD-10-CM

## 2021-01-26 DIAGNOSIS — M54.32 BACK PAIN WITH LEFT-SIDED SCIATICA: ICD-10-CM

## 2021-01-26 DIAGNOSIS — E11.65 UNCONTROLLED TYPE 2 DIABETES MELLITUS WITH HYPERGLYCEMIA: Primary | ICD-10-CM

## 2021-01-26 PROCEDURE — 3051F HG A1C>EQUAL 7.0%<8.0%: CPT | Mod: CPTII,S$GLB,, | Performed by: INTERNAL MEDICINE

## 2021-01-26 PROCEDURE — 99214 PR OFFICE/OUTPT VISIT, EST, LEVL IV, 30-39 MIN: ICD-10-PCS | Mod: S$GLB,,, | Performed by: INTERNAL MEDICINE

## 2021-01-26 PROCEDURE — 99999 PR PBB SHADOW E&M-EST. PATIENT-LVL IV: CPT | Mod: PBBFAC,,, | Performed by: INTERNAL MEDICINE

## 2021-01-26 PROCEDURE — 99214 OFFICE O/P EST MOD 30 MIN: CPT | Mod: S$GLB,,, | Performed by: INTERNAL MEDICINE

## 2021-01-26 PROCEDURE — 3008F PR BODY MASS INDEX (BMI) DOCUMENTED: ICD-10-PCS | Mod: CPTII,S$GLB,, | Performed by: INTERNAL MEDICINE

## 2021-01-26 PROCEDURE — 99999 PR PBB SHADOW E&M-EST. PATIENT-LVL IV: ICD-10-PCS | Mod: PBBFAC,,, | Performed by: INTERNAL MEDICINE

## 2021-01-26 PROCEDURE — 1125F AMNT PAIN NOTED PAIN PRSNT: CPT | Mod: S$GLB,,, | Performed by: INTERNAL MEDICINE

## 2021-01-26 PROCEDURE — 3051F PR MOST RECENT HEMOGLOBIN A1C LEVEL 7.0 - < 8.0%: ICD-10-PCS | Mod: CPTII,S$GLB,, | Performed by: INTERNAL MEDICINE

## 2021-01-26 PROCEDURE — 3008F BODY MASS INDEX DOCD: CPT | Mod: CPTII,S$GLB,, | Performed by: INTERNAL MEDICINE

## 2021-01-26 PROCEDURE — 1125F PR PAIN SEVERITY QUANTIFIED, PAIN PRESENT: ICD-10-PCS | Mod: S$GLB,,, | Performed by: INTERNAL MEDICINE

## 2021-02-04 ENCOUNTER — OFFICE VISIT (OUTPATIENT)
Dept: SPORTS MEDICINE | Facility: CLINIC | Age: 56
End: 2021-02-04
Payer: COMMERCIAL

## 2021-02-04 VITALS — WEIGHT: 266 LBS | HEIGHT: 69 IN | TEMPERATURE: 98 F | BODY MASS INDEX: 39.4 KG/M2

## 2021-02-04 DIAGNOSIS — R26.89 ANTALGIC GAIT: ICD-10-CM

## 2021-02-04 DIAGNOSIS — M25.561 BILATERAL CHRONIC KNEE PAIN: ICD-10-CM

## 2021-02-04 DIAGNOSIS — M17.0 PRIMARY OSTEOARTHRITIS OF BOTH KNEES: Primary | ICD-10-CM

## 2021-02-04 DIAGNOSIS — G89.29 BILATERAL CHRONIC KNEE PAIN: ICD-10-CM

## 2021-02-04 DIAGNOSIS — M25.562 BILATERAL CHRONIC KNEE PAIN: ICD-10-CM

## 2021-02-04 PROCEDURE — 99999 PR PBB SHADOW E&M-EST. PATIENT-LVL III: ICD-10-PCS | Mod: PBBFAC,,, | Performed by: FAMILY MEDICINE

## 2021-02-04 PROCEDURE — 99999 PR PBB SHADOW E&M-EST. PATIENT-LVL III: CPT | Mod: PBBFAC,,, | Performed by: FAMILY MEDICINE

## 2021-02-04 PROCEDURE — 20611 DRAIN/INJ JOINT/BURSA W/US: CPT | Mod: 50,S$GLB,, | Performed by: FAMILY MEDICINE

## 2021-02-04 PROCEDURE — 3008F PR BODY MASS INDEX (BMI) DOCUMENTED: ICD-10-PCS | Mod: CPTII,S$GLB,, | Performed by: FAMILY MEDICINE

## 2021-02-04 PROCEDURE — 3008F BODY MASS INDEX DOCD: CPT | Mod: CPTII,S$GLB,, | Performed by: FAMILY MEDICINE

## 2021-02-04 PROCEDURE — 1125F PR PAIN SEVERITY QUANTIFIED, PAIN PRESENT: ICD-10-PCS | Mod: S$GLB,,, | Performed by: FAMILY MEDICINE

## 2021-02-04 PROCEDURE — 99214 OFFICE O/P EST MOD 30 MIN: CPT | Mod: 25,S$GLB,, | Performed by: FAMILY MEDICINE

## 2021-02-04 PROCEDURE — 99214 PR OFFICE/OUTPT VISIT, EST, LEVL IV, 30-39 MIN: ICD-10-PCS | Mod: 25,S$GLB,, | Performed by: FAMILY MEDICINE

## 2021-02-04 PROCEDURE — 20611 LARGE JOINT ASPIRATION/INJECTION: BILATERAL KNEE: ICD-10-PCS | Mod: 50,S$GLB,, | Performed by: FAMILY MEDICINE

## 2021-02-04 PROCEDURE — 1125F AMNT PAIN NOTED PAIN PRSNT: CPT | Mod: S$GLB,,, | Performed by: FAMILY MEDICINE

## 2021-02-04 RX ORDER — TRIAMCINOLONE ACETONIDE 40 MG/ML
40 INJECTION, SUSPENSION INTRA-ARTICULAR; INTRAMUSCULAR
Status: DISCONTINUED | OUTPATIENT
Start: 2021-02-04 | End: 2021-02-04 | Stop reason: HOSPADM

## 2021-02-04 RX ADMIN — TRIAMCINOLONE ACETONIDE 40 MG: 40 INJECTION, SUSPENSION INTRA-ARTICULAR; INTRAMUSCULAR at 10:02

## 2021-02-24 ENCOUNTER — PATIENT OUTREACH (OUTPATIENT)
Dept: ADMINISTRATIVE | Facility: OTHER | Age: 56
End: 2021-02-24

## 2021-02-26 ENCOUNTER — OFFICE VISIT (OUTPATIENT)
Dept: OPTOMETRY | Facility: CLINIC | Age: 56
End: 2021-02-26
Payer: COMMERCIAL

## 2021-02-26 DIAGNOSIS — C82.28 GRADE 3 FOLLICULAR LYMPHOMA OF LYMPH NODES OF MULTIPLE REGIONS: ICD-10-CM

## 2021-02-26 DIAGNOSIS — H25.12 NS (NUCLEAR SCLEROSIS), LEFT: ICD-10-CM

## 2021-02-26 DIAGNOSIS — H52.4 PRESBYOPIA: ICD-10-CM

## 2021-02-26 DIAGNOSIS — E11.9 TYPE 2 DIABETES MELLITUS WITHOUT OPHTHALMIC MANIFESTATIONS: Primary | ICD-10-CM

## 2021-02-26 DIAGNOSIS — Z96.1 PSEUDOPHAKIA OF RIGHT EYE: ICD-10-CM

## 2021-02-26 DIAGNOSIS — H52.12 MYOPIA OF LEFT EYE: ICD-10-CM

## 2021-02-26 LAB
LEFT EYE DM RETINOPATHY: NEGATIVE
RIGHT EYE DM RETINOPATHY: NEGATIVE

## 2021-02-26 PROCEDURE — 99999 PR PBB SHADOW E&M-EST. PATIENT-LVL III: CPT | Mod: PBBFAC,,, | Performed by: OPTOMETRIST

## 2021-02-26 PROCEDURE — 92014 PR EYE EXAM, EST PATIENT,COMPREHESV: ICD-10-PCS | Mod: S$GLB,,, | Performed by: OPTOMETRIST

## 2021-02-26 PROCEDURE — 92015 DETERMINE REFRACTIVE STATE: CPT | Mod: S$GLB,,, | Performed by: OPTOMETRIST

## 2021-02-26 PROCEDURE — 1126F PR PAIN SEVERITY QUANTIFIED, NO PAIN PRESENT: ICD-10-PCS | Mod: S$GLB,,, | Performed by: OPTOMETRIST

## 2021-02-26 PROCEDURE — 1126F AMNT PAIN NOTED NONE PRSNT: CPT | Mod: S$GLB,,, | Performed by: OPTOMETRIST

## 2021-02-26 PROCEDURE — 92015 PR REFRACTION: ICD-10-PCS | Mod: S$GLB,,, | Performed by: OPTOMETRIST

## 2021-02-26 PROCEDURE — 2023F PR DILATED RETINAL EXAM W/O EVID OF RETINOPATHY: ICD-10-PCS | Mod: S$GLB,,, | Performed by: OPTOMETRIST

## 2021-02-26 PROCEDURE — 99999 PR PBB SHADOW E&M-EST. PATIENT-LVL III: ICD-10-PCS | Mod: PBBFAC,,, | Performed by: OPTOMETRIST

## 2021-02-26 PROCEDURE — 2023F DILAT RTA XM W/O RTNOPTHY: CPT | Mod: S$GLB,,, | Performed by: OPTOMETRIST

## 2021-02-26 PROCEDURE — 92014 COMPRE OPH EXAM EST PT 1/>: CPT | Mod: S$GLB,,, | Performed by: OPTOMETRIST

## 2021-03-08 ENCOUNTER — OFFICE VISIT (OUTPATIENT)
Dept: PAIN MEDICINE | Facility: CLINIC | Age: 56
End: 2021-03-08
Payer: COMMERCIAL

## 2021-03-08 VITALS
TEMPERATURE: 98 F | DIASTOLIC BLOOD PRESSURE: 84 MMHG | HEIGHT: 69 IN | BODY MASS INDEX: 40.45 KG/M2 | SYSTOLIC BLOOD PRESSURE: 142 MMHG | RESPIRATION RATE: 18 BRPM | HEART RATE: 86 BPM | WEIGHT: 273.13 LBS

## 2021-03-08 DIAGNOSIS — Z85.6 PERSONAL HISTORY OF CLL (CHRONIC LYMPHOCYTIC LEUKEMIA): ICD-10-CM

## 2021-03-08 DIAGNOSIS — M54.16 LUMBAR RADICULOPATHY: ICD-10-CM

## 2021-03-08 DIAGNOSIS — G89.29 CHRONIC BILATERAL LOW BACK PAIN WITH LEFT-SIDED SCIATICA: ICD-10-CM

## 2021-03-08 DIAGNOSIS — F40.240 CLAUSTROPHOBIA: ICD-10-CM

## 2021-03-08 DIAGNOSIS — M54.42 CHRONIC BILATERAL LOW BACK PAIN WITH LEFT-SIDED SCIATICA: ICD-10-CM

## 2021-03-08 DIAGNOSIS — F41.8 SITUATIONAL ANXIETY: Primary | ICD-10-CM

## 2021-03-08 PROCEDURE — 99204 PR OFFICE/OUTPT VISIT, NEW, LEVL IV, 45-59 MIN: ICD-10-PCS | Mod: S$GLB,,, | Performed by: PHYSICAL MEDICINE & REHABILITATION

## 2021-03-08 PROCEDURE — 99999 PR PBB SHADOW E&M-EST. PATIENT-LVL V: CPT | Mod: PBBFAC,,, | Performed by: PHYSICAL MEDICINE & REHABILITATION

## 2021-03-08 PROCEDURE — 3008F PR BODY MASS INDEX (BMI) DOCUMENTED: ICD-10-PCS | Mod: CPTII,S$GLB,, | Performed by: PHYSICAL MEDICINE & REHABILITATION

## 2021-03-08 PROCEDURE — 1125F PR PAIN SEVERITY QUANTIFIED, PAIN PRESENT: ICD-10-PCS | Mod: S$GLB,,, | Performed by: PHYSICAL MEDICINE & REHABILITATION

## 2021-03-08 PROCEDURE — 99999 PR PBB SHADOW E&M-EST. PATIENT-LVL V: ICD-10-PCS | Mod: PBBFAC,,, | Performed by: PHYSICAL MEDICINE & REHABILITATION

## 2021-03-08 PROCEDURE — 99204 OFFICE O/P NEW MOD 45 MIN: CPT | Mod: S$GLB,,, | Performed by: PHYSICAL MEDICINE & REHABILITATION

## 2021-03-08 PROCEDURE — 1125F AMNT PAIN NOTED PAIN PRSNT: CPT | Mod: S$GLB,,, | Performed by: PHYSICAL MEDICINE & REHABILITATION

## 2021-03-08 PROCEDURE — 3008F BODY MASS INDEX DOCD: CPT | Mod: CPTII,S$GLB,, | Performed by: PHYSICAL MEDICINE & REHABILITATION

## 2021-03-08 RX ORDER — DIAZEPAM 5 MG/1
5 TABLET ORAL ONCE
Qty: 1 TABLET | Refills: 0 | Status: SHIPPED | OUTPATIENT
Start: 2021-03-08 | End: 2021-08-04

## 2021-04-12 ENCOUNTER — PATIENT MESSAGE (OUTPATIENT)
Dept: INTERNAL MEDICINE | Facility: CLINIC | Age: 56
End: 2021-04-12

## 2021-04-13 ENCOUNTER — TELEPHONE (OUTPATIENT)
Dept: INTERNAL MEDICINE | Facility: CLINIC | Age: 56
End: 2021-04-13

## 2021-04-13 ENCOUNTER — OFFICE VISIT (OUTPATIENT)
Dept: INTERNAL MEDICINE | Facility: CLINIC | Age: 56
End: 2021-04-13
Payer: COMMERCIAL

## 2021-04-13 VITALS
DIASTOLIC BLOOD PRESSURE: 82 MMHG | WEIGHT: 272.25 LBS | RESPIRATION RATE: 16 BRPM | HEART RATE: 64 BPM | BODY MASS INDEX: 40.32 KG/M2 | HEIGHT: 69 IN | SYSTOLIC BLOOD PRESSURE: 142 MMHG

## 2021-04-13 DIAGNOSIS — R06.83 LOUD SNORING: Primary | ICD-10-CM

## 2021-04-13 PROCEDURE — 3008F BODY MASS INDEX DOCD: CPT | Mod: CPTII,S$GLB,, | Performed by: INTERNAL MEDICINE

## 2021-04-13 PROCEDURE — 99213 PR OFFICE/OUTPT VISIT, EST, LEVL III, 20-29 MIN: ICD-10-PCS | Mod: S$GLB,,, | Performed by: INTERNAL MEDICINE

## 2021-04-13 PROCEDURE — 1126F AMNT PAIN NOTED NONE PRSNT: CPT | Mod: S$GLB,,, | Performed by: INTERNAL MEDICINE

## 2021-04-13 PROCEDURE — 1126F PR PAIN SEVERITY QUANTIFIED, NO PAIN PRESENT: ICD-10-PCS | Mod: S$GLB,,, | Performed by: INTERNAL MEDICINE

## 2021-04-13 PROCEDURE — 99999 PR PBB SHADOW E&M-EST. PATIENT-LVL IV: CPT | Mod: PBBFAC,,, | Performed by: INTERNAL MEDICINE

## 2021-04-13 PROCEDURE — 3008F PR BODY MASS INDEX (BMI) DOCUMENTED: ICD-10-PCS | Mod: CPTII,S$GLB,, | Performed by: INTERNAL MEDICINE

## 2021-04-13 PROCEDURE — 99999 PR PBB SHADOW E&M-EST. PATIENT-LVL IV: ICD-10-PCS | Mod: PBBFAC,,, | Performed by: INTERNAL MEDICINE

## 2021-04-13 PROCEDURE — 99213 OFFICE O/P EST LOW 20 MIN: CPT | Mod: S$GLB,,, | Performed by: INTERNAL MEDICINE

## 2021-05-05 ENCOUNTER — LAB VISIT (OUTPATIENT)
Dept: LAB | Facility: HOSPITAL | Age: 56
End: 2021-05-05
Payer: COMMERCIAL

## 2021-05-05 ENCOUNTER — OFFICE VISIT (OUTPATIENT)
Dept: HEMATOLOGY/ONCOLOGY | Facility: CLINIC | Age: 56
End: 2021-05-05
Payer: COMMERCIAL

## 2021-05-05 VITALS
HEART RATE: 60 BPM | HEIGHT: 69 IN | WEIGHT: 269.5 LBS | SYSTOLIC BLOOD PRESSURE: 155 MMHG | OXYGEN SATURATION: 97 % | DIASTOLIC BLOOD PRESSURE: 77 MMHG | BODY MASS INDEX: 39.92 KG/M2 | TEMPERATURE: 98 F | RESPIRATION RATE: 16 BRPM

## 2021-05-05 DIAGNOSIS — C91.10 CLL (CHRONIC LYMPHOCYTIC LEUKEMIA): ICD-10-CM

## 2021-05-05 DIAGNOSIS — C91.10 CLL (CHRONIC LYMPHOCYTIC LEUKEMIA): Primary | ICD-10-CM

## 2021-05-05 LAB
ALBUMIN SERPL BCP-MCNC: 4.2 G/DL (ref 3.5–5.2)
ALP SERPL-CCNC: 49 U/L (ref 55–135)
ALT SERPL W/O P-5'-P-CCNC: 36 U/L (ref 10–44)
ANION GAP SERPL CALC-SCNC: 9 MMOL/L (ref 8–16)
AST SERPL-CCNC: 22 U/L (ref 10–40)
BASOPHILS # BLD AUTO: 0.04 K/UL (ref 0–0.2)
BASOPHILS NFR BLD: 0.5 % (ref 0–1.9)
BILIRUB SERPL-MCNC: 0.4 MG/DL (ref 0.1–1)
BUN SERPL-MCNC: 15 MG/DL (ref 6–20)
CALCIUM SERPL-MCNC: 10.2 MG/DL (ref 8.7–10.5)
CHLORIDE SERPL-SCNC: 102 MMOL/L (ref 95–110)
CO2 SERPL-SCNC: 25 MMOL/L (ref 23–29)
CREAT SERPL-MCNC: 1 MG/DL (ref 0.5–1.4)
DIFFERENTIAL METHOD: NORMAL
EOSINOPHIL # BLD AUTO: 0.4 K/UL (ref 0–0.5)
EOSINOPHIL NFR BLD: 4.7 % (ref 0–8)
ERYTHROCYTE [DISTWIDTH] IN BLOOD BY AUTOMATED COUNT: 12.5 % (ref 11.5–14.5)
EST. GFR  (AFRICAN AMERICAN): >60 ML/MIN/1.73 M^2
EST. GFR  (NON AFRICAN AMERICAN): >60 ML/MIN/1.73 M^2
GLUCOSE SERPL-MCNC: 212 MG/DL (ref 70–110)
HCT VFR BLD AUTO: 47 % (ref 40–54)
HGB BLD-MCNC: 15.9 G/DL (ref 14–18)
IMM GRANULOCYTES # BLD AUTO: 0.04 K/UL (ref 0–0.04)
IMM GRANULOCYTES NFR BLD AUTO: 0.5 % (ref 0–0.5)
LDH SERPL L TO P-CCNC: 165 U/L (ref 110–260)
LYMPHOCYTES # BLD AUTO: 1.7 K/UL (ref 1–4.8)
LYMPHOCYTES NFR BLD: 20.6 % (ref 18–48)
MCH RBC QN AUTO: 29.6 PG (ref 27–31)
MCHC RBC AUTO-ENTMCNC: 33.8 G/DL (ref 32–36)
MCV RBC AUTO: 87 FL (ref 82–98)
MONOCYTES # BLD AUTO: 0.6 K/UL (ref 0.3–1)
MONOCYTES NFR BLD: 6.5 % (ref 4–15)
NEUTROPHILS # BLD AUTO: 5.7 K/UL (ref 1.8–7.7)
NEUTROPHILS NFR BLD: 67.2 % (ref 38–73)
NRBC BLD-RTO: 0 /100 WBC
PLATELET # BLD AUTO: 275 K/UL (ref 150–450)
PMV BLD AUTO: 9.7 FL (ref 9.2–12.9)
POTASSIUM SERPL-SCNC: 4.6 MMOL/L (ref 3.5–5.1)
PROT SERPL-MCNC: 6.9 G/DL (ref 6–8.4)
RBC # BLD AUTO: 5.38 M/UL (ref 4.6–6.2)
SODIUM SERPL-SCNC: 136 MMOL/L (ref 136–145)
WBC # BLD AUTO: 8.45 K/UL (ref 3.9–12.7)

## 2021-05-05 PROCEDURE — 99215 OFFICE O/P EST HI 40 MIN: CPT | Mod: S$GLB,,, | Performed by: INTERNAL MEDICINE

## 2021-05-05 PROCEDURE — 1125F PR PAIN SEVERITY QUANTIFIED, PAIN PRESENT: ICD-10-PCS | Mod: S$GLB,,, | Performed by: INTERNAL MEDICINE

## 2021-05-05 PROCEDURE — 36415 COLL VENOUS BLD VENIPUNCTURE: CPT | Performed by: INTERNAL MEDICINE

## 2021-05-05 PROCEDURE — 99999 PR PBB SHADOW E&M-EST. PATIENT-LVL III: ICD-10-PCS | Mod: PBBFAC,,, | Performed by: INTERNAL MEDICINE

## 2021-05-05 PROCEDURE — 3008F BODY MASS INDEX DOCD: CPT | Mod: CPTII,S$GLB,, | Performed by: INTERNAL MEDICINE

## 2021-05-05 PROCEDURE — 3008F PR BODY MASS INDEX (BMI) DOCUMENTED: ICD-10-PCS | Mod: CPTII,S$GLB,, | Performed by: INTERNAL MEDICINE

## 2021-05-05 PROCEDURE — 99215 PR OFFICE/OUTPT VISIT, EST, LEVL V, 40-54 MIN: ICD-10-PCS | Mod: S$GLB,,, | Performed by: INTERNAL MEDICINE

## 2021-05-05 PROCEDURE — 83615 LACTATE (LD) (LDH) ENZYME: CPT | Performed by: INTERNAL MEDICINE

## 2021-05-05 PROCEDURE — 80053 COMPREHEN METABOLIC PANEL: CPT | Performed by: INTERNAL MEDICINE

## 2021-05-05 PROCEDURE — 1125F AMNT PAIN NOTED PAIN PRSNT: CPT | Mod: S$GLB,,, | Performed by: INTERNAL MEDICINE

## 2021-05-05 PROCEDURE — 99999 PR PBB SHADOW E&M-EST. PATIENT-LVL III: CPT | Mod: PBBFAC,,, | Performed by: INTERNAL MEDICINE

## 2021-05-05 PROCEDURE — 85025 COMPLETE CBC W/AUTO DIFF WBC: CPT | Performed by: INTERNAL MEDICINE

## 2021-05-28 ENCOUNTER — PATIENT MESSAGE (OUTPATIENT)
Dept: INTERNAL MEDICINE | Facility: CLINIC | Age: 56
End: 2021-05-28

## 2021-07-07 ENCOUNTER — PATIENT MESSAGE (OUTPATIENT)
Dept: ADMINISTRATIVE | Facility: HOSPITAL | Age: 56
End: 2021-07-07

## 2021-07-07 ENCOUNTER — PATIENT MESSAGE (OUTPATIENT)
Dept: INTERNAL MEDICINE | Facility: CLINIC | Age: 56
End: 2021-07-07

## 2021-07-07 DIAGNOSIS — Z12.5 PROSTATE CANCER SCREENING: Primary | ICD-10-CM

## 2021-07-26 ENCOUNTER — LAB VISIT (OUTPATIENT)
Dept: LAB | Facility: HOSPITAL | Age: 56
End: 2021-07-26
Attending: INTERNAL MEDICINE
Payer: COMMERCIAL

## 2021-07-26 DIAGNOSIS — E78.1 HYPERTRIGLYCERIDEMIA: ICD-10-CM

## 2021-07-26 DIAGNOSIS — Z12.5 PROSTATE CANCER SCREENING: ICD-10-CM

## 2021-07-26 DIAGNOSIS — E11.65 UNCONTROLLED TYPE 2 DIABETES MELLITUS WITH HYPERGLYCEMIA: ICD-10-CM

## 2021-07-26 LAB
ALBUMIN SERPL BCP-MCNC: 4.2 G/DL (ref 3.5–5.2)
ALBUMIN/CREAT UR: 7.1 UG/MG (ref 0–30)
ALP SERPL-CCNC: 41 U/L (ref 55–135)
ALT SERPL W/O P-5'-P-CCNC: 38 U/L (ref 10–44)
ANION GAP SERPL CALC-SCNC: 12 MMOL/L (ref 8–16)
AST SERPL-CCNC: 23 U/L (ref 10–40)
BASOPHILS # BLD AUTO: 0.05 K/UL (ref 0–0.2)
BASOPHILS NFR BLD: 0.6 % (ref 0–1.9)
BILIRUB SERPL-MCNC: 0.6 MG/DL (ref 0.1–1)
BUN SERPL-MCNC: 20 MG/DL (ref 6–20)
CALCIUM SERPL-MCNC: 10 MG/DL (ref 8.7–10.5)
CHLORIDE SERPL-SCNC: 103 MMOL/L (ref 95–110)
CHOLEST SERPL-MCNC: 173 MG/DL (ref 120–199)
CHOLEST/HDLC SERPL: 6.7 {RATIO} (ref 2–5)
CO2 SERPL-SCNC: 23 MMOL/L (ref 23–29)
COMPLEXED PSA SERPL-MCNC: 2.7 NG/ML (ref 0–4)
CREAT SERPL-MCNC: 1.1 MG/DL (ref 0.5–1.4)
CREAT UR-MCNC: 140.7 MG/DL (ref 23–375)
DIFFERENTIAL METHOD: ABNORMAL
EOSINOPHIL # BLD AUTO: 0.3 K/UL (ref 0–0.5)
EOSINOPHIL NFR BLD: 4.1 % (ref 0–8)
ERYTHROCYTE [DISTWIDTH] IN BLOOD BY AUTOMATED COUNT: 12.9 % (ref 11.5–14.5)
EST. GFR  (AFRICAN AMERICAN): >60 ML/MIN/1.73 M^2
EST. GFR  (NON AFRICAN AMERICAN): >60 ML/MIN/1.73 M^2
ESTIMATED AVG GLUCOSE: 186 MG/DL (ref 68–131)
GLUCOSE SERPL-MCNC: 217 MG/DL (ref 70–110)
HBA1C MFR BLD: 8.1 % (ref 4–5.6)
HCT VFR BLD AUTO: 46.3 % (ref 40–54)
HDLC SERPL-MCNC: 26 MG/DL (ref 40–75)
HDLC SERPL: 15 % (ref 20–50)
HGB BLD-MCNC: 15.4 G/DL (ref 14–18)
IMM GRANULOCYTES # BLD AUTO: 0.05 K/UL (ref 0–0.04)
IMM GRANULOCYTES NFR BLD AUTO: 0.6 % (ref 0–0.5)
LDLC SERPL CALC-MCNC: 75 MG/DL (ref 63–159)
LYMPHOCYTES # BLD AUTO: 2 K/UL (ref 1–4.8)
LYMPHOCYTES NFR BLD: 25.5 % (ref 18–48)
MCH RBC QN AUTO: 29.1 PG (ref 27–31)
MCHC RBC AUTO-ENTMCNC: 33.3 G/DL (ref 32–36)
MCV RBC AUTO: 88 FL (ref 82–98)
MICROALBUMIN UR DL<=1MG/L-MCNC: 10 UG/ML
MONOCYTES # BLD AUTO: 0.5 K/UL (ref 0.3–1)
MONOCYTES NFR BLD: 6.8 % (ref 4–15)
NEUTROPHILS # BLD AUTO: 4.9 K/UL (ref 1.8–7.7)
NEUTROPHILS NFR BLD: 62.4 % (ref 38–73)
NONHDLC SERPL-MCNC: 147 MG/DL
NRBC BLD-RTO: 0 /100 WBC
PLATELET # BLD AUTO: 262 K/UL (ref 150–450)
PMV BLD AUTO: 9.2 FL (ref 9.2–12.9)
POTASSIUM SERPL-SCNC: 5 MMOL/L (ref 3.5–5.1)
PROT SERPL-MCNC: 6.9 G/DL (ref 6–8.4)
RBC # BLD AUTO: 5.29 M/UL (ref 4.6–6.2)
SODIUM SERPL-SCNC: 138 MMOL/L (ref 136–145)
TRIGL SERPL-MCNC: 360 MG/DL (ref 30–150)
TSH SERPL DL<=0.005 MIU/L-ACNC: 2.3 UIU/ML (ref 0.4–4)
WBC # BLD AUTO: 7.79 K/UL (ref 3.9–12.7)

## 2021-07-26 PROCEDURE — 84153 ASSAY OF PSA TOTAL: CPT | Performed by: INTERNAL MEDICINE

## 2021-07-26 PROCEDURE — 82043 UR ALBUMIN QUANTITATIVE: CPT | Performed by: INTERNAL MEDICINE

## 2021-07-26 PROCEDURE — 82570 ASSAY OF URINE CREATININE: CPT | Performed by: INTERNAL MEDICINE

## 2021-07-26 PROCEDURE — 83036 HEMOGLOBIN GLYCOSYLATED A1C: CPT | Performed by: INTERNAL MEDICINE

## 2021-07-26 PROCEDURE — 85025 COMPLETE CBC W/AUTO DIFF WBC: CPT | Performed by: INTERNAL MEDICINE

## 2021-07-26 PROCEDURE — 36415 COLL VENOUS BLD VENIPUNCTURE: CPT | Performed by: INTERNAL MEDICINE

## 2021-07-26 PROCEDURE — 80053 COMPREHEN METABOLIC PANEL: CPT | Performed by: INTERNAL MEDICINE

## 2021-07-26 PROCEDURE — 80061 LIPID PANEL: CPT | Performed by: INTERNAL MEDICINE

## 2021-07-26 PROCEDURE — 84443 ASSAY THYROID STIM HORMONE: CPT | Performed by: INTERNAL MEDICINE

## 2021-08-04 ENCOUNTER — OFFICE VISIT (OUTPATIENT)
Dept: INTERNAL MEDICINE | Facility: CLINIC | Age: 56
End: 2021-08-04
Payer: COMMERCIAL

## 2021-08-04 VITALS
SYSTOLIC BLOOD PRESSURE: 126 MMHG | WEIGHT: 271.38 LBS | RESPIRATION RATE: 19 BRPM | OXYGEN SATURATION: 95 % | HEART RATE: 66 BPM | DIASTOLIC BLOOD PRESSURE: 76 MMHG | BODY MASS INDEX: 40.2 KG/M2 | HEIGHT: 69 IN

## 2021-08-04 DIAGNOSIS — E78.1 HYPERTRIGLYCERIDEMIA: ICD-10-CM

## 2021-08-04 DIAGNOSIS — E11.65 UNCONTROLLED TYPE 2 DIABETES MELLITUS WITH HYPERGLYCEMIA: Primary | ICD-10-CM

## 2021-08-04 PROCEDURE — 99999 PR PBB SHADOW E&M-EST. PATIENT-LVL IV: CPT | Mod: PBBFAC,,, | Performed by: INTERNAL MEDICINE

## 2021-08-04 PROCEDURE — 1160F RVW MEDS BY RX/DR IN RCRD: CPT | Mod: CPTII,S$GLB,, | Performed by: INTERNAL MEDICINE

## 2021-08-04 PROCEDURE — 99213 PR OFFICE/OUTPT VISIT, EST, LEVL III, 20-29 MIN: ICD-10-PCS | Mod: S$GLB,,, | Performed by: INTERNAL MEDICINE

## 2021-08-04 PROCEDURE — 3052F PR MOST RECENT HEMOGLOBIN A1C LEVEL 8.0 - < 9.0%: ICD-10-PCS | Mod: CPTII,S$GLB,, | Performed by: INTERNAL MEDICINE

## 2021-08-04 PROCEDURE — 3078F PR MOST RECENT DIASTOLIC BLOOD PRESSURE < 80 MM HG: ICD-10-PCS | Mod: CPTII,S$GLB,, | Performed by: INTERNAL MEDICINE

## 2021-08-04 PROCEDURE — 1160F PR REVIEW ALL MEDS BY PRESCRIBER/CLIN PHARMACIST DOCUMENTED: ICD-10-PCS | Mod: CPTII,S$GLB,, | Performed by: INTERNAL MEDICINE

## 2021-08-04 PROCEDURE — 3008F BODY MASS INDEX DOCD: CPT | Mod: CPTII,S$GLB,, | Performed by: INTERNAL MEDICINE

## 2021-08-04 PROCEDURE — 1125F AMNT PAIN NOTED PAIN PRSNT: CPT | Mod: CPTII,S$GLB,, | Performed by: INTERNAL MEDICINE

## 2021-08-04 PROCEDURE — 3052F HG A1C>EQUAL 8.0%<EQUAL 9.0%: CPT | Mod: CPTII,S$GLB,, | Performed by: INTERNAL MEDICINE

## 2021-08-04 PROCEDURE — 1159F MED LIST DOCD IN RCRD: CPT | Mod: CPTII,S$GLB,, | Performed by: INTERNAL MEDICINE

## 2021-08-04 PROCEDURE — 3078F DIAST BP <80 MM HG: CPT | Mod: CPTII,S$GLB,, | Performed by: INTERNAL MEDICINE

## 2021-08-04 PROCEDURE — 99213 OFFICE O/P EST LOW 20 MIN: CPT | Mod: S$GLB,,, | Performed by: INTERNAL MEDICINE

## 2021-08-04 PROCEDURE — 1125F PR PAIN SEVERITY QUANTIFIED, PAIN PRESENT: ICD-10-PCS | Mod: CPTII,S$GLB,, | Performed by: INTERNAL MEDICINE

## 2021-08-04 PROCEDURE — 1159F PR MEDICATION LIST DOCUMENTED IN MEDICAL RECORD: ICD-10-PCS | Mod: CPTII,S$GLB,, | Performed by: INTERNAL MEDICINE

## 2021-08-04 PROCEDURE — 99999 PR PBB SHADOW E&M-EST. PATIENT-LVL IV: ICD-10-PCS | Mod: PBBFAC,,, | Performed by: INTERNAL MEDICINE

## 2021-08-04 PROCEDURE — 3008F PR BODY MASS INDEX (BMI) DOCUMENTED: ICD-10-PCS | Mod: CPTII,S$GLB,, | Performed by: INTERNAL MEDICINE

## 2021-08-04 PROCEDURE — 3074F SYST BP LT 130 MM HG: CPT | Mod: CPTII,S$GLB,, | Performed by: INTERNAL MEDICINE

## 2021-08-04 PROCEDURE — 3074F PR MOST RECENT SYSTOLIC BLOOD PRESSURE < 130 MM HG: ICD-10-PCS | Mod: CPTII,S$GLB,, | Performed by: INTERNAL MEDICINE

## 2021-09-15 ENCOUNTER — OFFICE VISIT (OUTPATIENT)
Dept: INTERNAL MEDICINE | Facility: CLINIC | Age: 56
End: 2021-09-15
Payer: COMMERCIAL

## 2021-09-15 VITALS
WEIGHT: 272.94 LBS | DIASTOLIC BLOOD PRESSURE: 80 MMHG | HEART RATE: 68 BPM | RESPIRATION RATE: 16 BRPM | BODY MASS INDEX: 40.42 KG/M2 | HEIGHT: 69 IN | SYSTOLIC BLOOD PRESSURE: 144 MMHG

## 2021-09-15 DIAGNOSIS — F41.1 GAD (GENERALIZED ANXIETY DISORDER): Primary | ICD-10-CM

## 2021-09-15 PROCEDURE — 3077F PR MOST RECENT SYSTOLIC BLOOD PRESSURE >= 140 MM HG: ICD-10-PCS | Mod: CPTII,S$GLB,, | Performed by: INTERNAL MEDICINE

## 2021-09-15 PROCEDURE — 3079F DIAST BP 80-89 MM HG: CPT | Mod: CPTII,S$GLB,, | Performed by: INTERNAL MEDICINE

## 2021-09-15 PROCEDURE — 99214 OFFICE O/P EST MOD 30 MIN: CPT | Mod: S$GLB,,, | Performed by: INTERNAL MEDICINE

## 2021-09-15 PROCEDURE — 3079F PR MOST RECENT DIASTOLIC BLOOD PRESSURE 80-89 MM HG: ICD-10-PCS | Mod: CPTII,S$GLB,, | Performed by: INTERNAL MEDICINE

## 2021-09-15 PROCEDURE — 3066F NEPHROPATHY DOC TX: CPT | Mod: CPTII,S$GLB,, | Performed by: INTERNAL MEDICINE

## 2021-09-15 PROCEDURE — 3077F SYST BP >= 140 MM HG: CPT | Mod: CPTII,S$GLB,, | Performed by: INTERNAL MEDICINE

## 2021-09-15 PROCEDURE — 3052F PR MOST RECENT HEMOGLOBIN A1C LEVEL 8.0 - < 9.0%: ICD-10-PCS | Mod: CPTII,S$GLB,, | Performed by: INTERNAL MEDICINE

## 2021-09-15 PROCEDURE — 99214 PR OFFICE/OUTPT VISIT, EST, LEVL IV, 30-39 MIN: ICD-10-PCS | Mod: S$GLB,,, | Performed by: INTERNAL MEDICINE

## 2021-09-15 PROCEDURE — 1159F MED LIST DOCD IN RCRD: CPT | Mod: CPTII,S$GLB,, | Performed by: INTERNAL MEDICINE

## 2021-09-15 PROCEDURE — 3008F BODY MASS INDEX DOCD: CPT | Mod: CPTII,S$GLB,, | Performed by: INTERNAL MEDICINE

## 2021-09-15 PROCEDURE — 99999 PR PBB SHADOW E&M-EST. PATIENT-LVL IV: ICD-10-PCS | Mod: PBBFAC,,, | Performed by: INTERNAL MEDICINE

## 2021-09-15 PROCEDURE — 1160F PR REVIEW ALL MEDS BY PRESCRIBER/CLIN PHARMACIST DOCUMENTED: ICD-10-PCS | Mod: CPTII,S$GLB,, | Performed by: INTERNAL MEDICINE

## 2021-09-15 PROCEDURE — 3052F HG A1C>EQUAL 8.0%<EQUAL 9.0%: CPT | Mod: CPTII,S$GLB,, | Performed by: INTERNAL MEDICINE

## 2021-09-15 PROCEDURE — 3061F NEG MICROALBUMINURIA REV: CPT | Mod: CPTII,S$GLB,, | Performed by: INTERNAL MEDICINE

## 2021-09-15 PROCEDURE — 3061F PR NEG MICROALBUMINURIA RESULT DOCUMENTED/REVIEW: ICD-10-PCS | Mod: CPTII,S$GLB,, | Performed by: INTERNAL MEDICINE

## 2021-09-15 PROCEDURE — 1159F PR MEDICATION LIST DOCUMENTED IN MEDICAL RECORD: ICD-10-PCS | Mod: CPTII,S$GLB,, | Performed by: INTERNAL MEDICINE

## 2021-09-15 PROCEDURE — 3008F PR BODY MASS INDEX (BMI) DOCUMENTED: ICD-10-PCS | Mod: CPTII,S$GLB,, | Performed by: INTERNAL MEDICINE

## 2021-09-15 PROCEDURE — 1160F RVW MEDS BY RX/DR IN RCRD: CPT | Mod: CPTII,S$GLB,, | Performed by: INTERNAL MEDICINE

## 2021-09-15 PROCEDURE — 99999 PR PBB SHADOW E&M-EST. PATIENT-LVL IV: CPT | Mod: PBBFAC,,, | Performed by: INTERNAL MEDICINE

## 2021-09-15 PROCEDURE — 3066F PR DOCUMENTATION OF TREATMENT FOR NEPHROPATHY: ICD-10-PCS | Mod: CPTII,S$GLB,, | Performed by: INTERNAL MEDICINE

## 2021-09-15 RX ORDER — ALPRAZOLAM 0.25 MG/1
0.25 TABLET ORAL NIGHTLY PRN
Qty: 30 TABLET | Refills: 0 | Status: SHIPPED | OUTPATIENT
Start: 2021-09-15 | End: 2021-11-15

## 2021-09-15 RX ORDER — ESCITALOPRAM OXALATE 10 MG/1
10 TABLET ORAL DAILY
Qty: 30 TABLET | Refills: 3 | Status: SHIPPED | OUTPATIENT
Start: 2021-09-15 | End: 2021-11-15

## 2021-10-05 ENCOUNTER — PATIENT MESSAGE (OUTPATIENT)
Dept: HEMATOLOGY/ONCOLOGY | Facility: CLINIC | Age: 56
End: 2021-10-05

## 2021-10-05 DIAGNOSIS — C91.10 CLL (CHRONIC LYMPHOCYTIC LEUKEMIA): Primary | ICD-10-CM

## 2021-10-13 ENCOUNTER — LAB VISIT (OUTPATIENT)
Dept: LAB | Facility: HOSPITAL | Age: 56
End: 2021-10-13
Attending: INTERNAL MEDICINE
Payer: COMMERCIAL

## 2021-10-13 DIAGNOSIS — C91.10 CLL (CHRONIC LYMPHOCYTIC LEUKEMIA): ICD-10-CM

## 2021-10-13 LAB
ALBUMIN SERPL BCP-MCNC: 4.2 G/DL (ref 3.5–5.2)
ALP SERPL-CCNC: 43 U/L (ref 55–135)
ALT SERPL W/O P-5'-P-CCNC: 36 U/L (ref 10–44)
ANION GAP SERPL CALC-SCNC: 10 MMOL/L (ref 8–16)
AST SERPL-CCNC: 24 U/L (ref 10–40)
BASOPHILS # BLD AUTO: 0.04 K/UL (ref 0–0.2)
BASOPHILS NFR BLD: 0.4 % (ref 0–1.9)
BILIRUB SERPL-MCNC: 0.5 MG/DL (ref 0.1–1)
BUN SERPL-MCNC: 20 MG/DL (ref 6–20)
CALCIUM SERPL-MCNC: 10.5 MG/DL (ref 8.7–10.5)
CHLORIDE SERPL-SCNC: 102 MMOL/L (ref 95–110)
CO2 SERPL-SCNC: 25 MMOL/L (ref 23–29)
CREAT SERPL-MCNC: 1.2 MG/DL (ref 0.5–1.4)
DIFFERENTIAL METHOD: ABNORMAL
EOSINOPHIL # BLD AUTO: 0.3 K/UL (ref 0–0.5)
EOSINOPHIL NFR BLD: 3 % (ref 0–8)
ERYTHROCYTE [DISTWIDTH] IN BLOOD BY AUTOMATED COUNT: 12.7 % (ref 11.5–14.5)
EST. GFR  (AFRICAN AMERICAN): >60 ML/MIN/1.73 M^2
EST. GFR  (NON AFRICAN AMERICAN): >60 ML/MIN/1.73 M^2
GLUCOSE SERPL-MCNC: 178 MG/DL (ref 70–110)
HCT VFR BLD AUTO: 45.7 % (ref 40–54)
HGB BLD-MCNC: 15.3 G/DL (ref 14–18)
IMM GRANULOCYTES # BLD AUTO: 0.06 K/UL (ref 0–0.04)
IMM GRANULOCYTES NFR BLD AUTO: 0.6 % (ref 0–0.5)
LDH SERPL L TO P-CCNC: 151 U/L (ref 110–260)
LYMPHOCYTES # BLD AUTO: 2.4 K/UL (ref 1–4.8)
LYMPHOCYTES NFR BLD: 25.2 % (ref 18–48)
MCH RBC QN AUTO: 29.3 PG (ref 27–31)
MCHC RBC AUTO-ENTMCNC: 33.5 G/DL (ref 32–36)
MCV RBC AUTO: 88 FL (ref 82–98)
MONOCYTES # BLD AUTO: 0.7 K/UL (ref 0.3–1)
MONOCYTES NFR BLD: 7 % (ref 4–15)
NEUTROPHILS # BLD AUTO: 6.1 K/UL (ref 1.8–7.7)
NEUTROPHILS NFR BLD: 63.8 % (ref 38–73)
NRBC BLD-RTO: 0 /100 WBC
PLATELET # BLD AUTO: 253 K/UL (ref 150–450)
PMV BLD AUTO: 9.4 FL (ref 9.2–12.9)
POTASSIUM SERPL-SCNC: 4.8 MMOL/L (ref 3.5–5.1)
PROT SERPL-MCNC: 6.8 G/DL (ref 6–8.4)
RBC # BLD AUTO: 5.22 M/UL (ref 4.6–6.2)
SODIUM SERPL-SCNC: 137 MMOL/L (ref 136–145)
WBC # BLD AUTO: 9.63 K/UL (ref 3.9–12.7)

## 2021-10-13 PROCEDURE — 83615 LACTATE (LD) (LDH) ENZYME: CPT | Performed by: INTERNAL MEDICINE

## 2021-10-13 PROCEDURE — 36415 COLL VENOUS BLD VENIPUNCTURE: CPT | Performed by: INTERNAL MEDICINE

## 2021-10-13 PROCEDURE — 85025 COMPLETE CBC W/AUTO DIFF WBC: CPT | Performed by: INTERNAL MEDICINE

## 2021-10-13 PROCEDURE — 80053 COMPREHEN METABOLIC PANEL: CPT | Performed by: INTERNAL MEDICINE

## 2021-11-04 ENCOUNTER — LAB VISIT (OUTPATIENT)
Dept: LAB | Facility: HOSPITAL | Age: 56
End: 2021-11-04
Attending: INTERNAL MEDICINE
Payer: COMMERCIAL

## 2021-11-04 DIAGNOSIS — E11.65 UNCONTROLLED TYPE 2 DIABETES MELLITUS WITH HYPERGLYCEMIA: ICD-10-CM

## 2021-11-04 LAB
ESTIMATED AVG GLUCOSE: 197 MG/DL (ref 68–131)
HBA1C MFR BLD: 8.5 % (ref 4–5.6)

## 2021-11-04 PROCEDURE — 36415 COLL VENOUS BLD VENIPUNCTURE: CPT | Performed by: INTERNAL MEDICINE

## 2021-11-04 PROCEDURE — 83036 HEMOGLOBIN GLYCOSYLATED A1C: CPT | Performed by: INTERNAL MEDICINE

## 2021-11-08 ENCOUNTER — OFFICE VISIT (OUTPATIENT)
Dept: HEMATOLOGY/ONCOLOGY | Facility: CLINIC | Age: 56
End: 2021-11-08
Payer: COMMERCIAL

## 2021-11-08 VITALS
BODY MASS INDEX: 40.38 KG/M2 | OXYGEN SATURATION: 95 % | RESPIRATION RATE: 12 BRPM | TEMPERATURE: 98 F | HEIGHT: 69 IN | SYSTOLIC BLOOD PRESSURE: 155 MMHG | DIASTOLIC BLOOD PRESSURE: 74 MMHG | HEART RATE: 63 BPM | WEIGHT: 272.63 LBS

## 2021-11-08 DIAGNOSIS — C91.10 CLL (CHRONIC LYMPHOCYTIC LEUKEMIA): Primary | ICD-10-CM

## 2021-11-08 PROCEDURE — 3078F PR MOST RECENT DIASTOLIC BLOOD PRESSURE < 80 MM HG: ICD-10-PCS | Mod: CPTII,S$GLB,, | Performed by: INTERNAL MEDICINE

## 2021-11-08 PROCEDURE — 3008F BODY MASS INDEX DOCD: CPT | Mod: CPTII,S$GLB,, | Performed by: INTERNAL MEDICINE

## 2021-11-08 PROCEDURE — 3077F PR MOST RECENT SYSTOLIC BLOOD PRESSURE >= 140 MM HG: ICD-10-PCS | Mod: CPTII,S$GLB,, | Performed by: INTERNAL MEDICINE

## 2021-11-08 PROCEDURE — 99999 PR PBB SHADOW E&M-EST. PATIENT-LVL IV: CPT | Mod: PBBFAC,,, | Performed by: INTERNAL MEDICINE

## 2021-11-08 PROCEDURE — 3008F PR BODY MASS INDEX (BMI) DOCUMENTED: ICD-10-PCS | Mod: CPTII,S$GLB,, | Performed by: INTERNAL MEDICINE

## 2021-11-08 PROCEDURE — 3078F DIAST BP <80 MM HG: CPT | Mod: CPTII,S$GLB,, | Performed by: INTERNAL MEDICINE

## 2021-11-08 PROCEDURE — 3061F NEG MICROALBUMINURIA REV: CPT | Mod: CPTII,S$GLB,, | Performed by: INTERNAL MEDICINE

## 2021-11-08 PROCEDURE — 99999 PR PBB SHADOW E&M-EST. PATIENT-LVL IV: ICD-10-PCS | Mod: PBBFAC,,, | Performed by: INTERNAL MEDICINE

## 2021-11-08 PROCEDURE — 3061F PR NEG MICROALBUMINURIA RESULT DOCUMENTED/REVIEW: ICD-10-PCS | Mod: CPTII,S$GLB,, | Performed by: INTERNAL MEDICINE

## 2021-11-08 PROCEDURE — 3066F NEPHROPATHY DOC TX: CPT | Mod: CPTII,S$GLB,, | Performed by: INTERNAL MEDICINE

## 2021-11-08 PROCEDURE — 3052F HG A1C>EQUAL 8.0%<EQUAL 9.0%: CPT | Mod: CPTII,S$GLB,, | Performed by: INTERNAL MEDICINE

## 2021-11-08 PROCEDURE — 3066F PR DOCUMENTATION OF TREATMENT FOR NEPHROPATHY: ICD-10-PCS | Mod: CPTII,S$GLB,, | Performed by: INTERNAL MEDICINE

## 2021-11-08 PROCEDURE — 99215 OFFICE O/P EST HI 40 MIN: CPT | Mod: S$GLB,,, | Performed by: INTERNAL MEDICINE

## 2021-11-08 PROCEDURE — 3052F PR MOST RECENT HEMOGLOBIN A1C LEVEL 8.0 - < 9.0%: ICD-10-PCS | Mod: CPTII,S$GLB,, | Performed by: INTERNAL MEDICINE

## 2021-11-08 PROCEDURE — 3077F SYST BP >= 140 MM HG: CPT | Mod: CPTII,S$GLB,, | Performed by: INTERNAL MEDICINE

## 2021-11-08 PROCEDURE — 1159F MED LIST DOCD IN RCRD: CPT | Mod: CPTII,S$GLB,, | Performed by: INTERNAL MEDICINE

## 2021-11-08 PROCEDURE — 1159F PR MEDICATION LIST DOCUMENTED IN MEDICAL RECORD: ICD-10-PCS | Mod: CPTII,S$GLB,, | Performed by: INTERNAL MEDICINE

## 2021-11-08 PROCEDURE — 99215 PR OFFICE/OUTPT VISIT, EST, LEVL V, 40-54 MIN: ICD-10-PCS | Mod: S$GLB,,, | Performed by: INTERNAL MEDICINE

## 2021-11-08 RX ORDER — ALBUTEROL SULFATE 90 UG/1
AEROSOL, METERED RESPIRATORY (INHALATION)
COMMUNITY
Start: 2021-09-07 | End: 2022-05-16

## 2021-11-15 ENCOUNTER — OFFICE VISIT (OUTPATIENT)
Dept: INTERNAL MEDICINE | Facility: CLINIC | Age: 56
End: 2021-11-15
Payer: COMMERCIAL

## 2021-11-15 VITALS
RESPIRATION RATE: 16 BRPM | DIASTOLIC BLOOD PRESSURE: 80 MMHG | BODY MASS INDEX: 40.88 KG/M2 | SYSTOLIC BLOOD PRESSURE: 142 MMHG | HEART RATE: 72 BPM | WEIGHT: 276 LBS | HEIGHT: 69 IN | OXYGEN SATURATION: 95 %

## 2021-11-15 DIAGNOSIS — E11.65 UNCONTROLLED TYPE 2 DIABETES MELLITUS WITH HYPERGLYCEMIA: ICD-10-CM

## 2021-11-15 DIAGNOSIS — F41.1 GAD (GENERALIZED ANXIETY DISORDER): Primary | ICD-10-CM

## 2021-11-15 PROCEDURE — 99213 OFFICE O/P EST LOW 20 MIN: CPT | Mod: S$GLB,,, | Performed by: INTERNAL MEDICINE

## 2021-11-15 PROCEDURE — 3061F NEG MICROALBUMINURIA REV: CPT | Mod: CPTII,S$GLB,, | Performed by: INTERNAL MEDICINE

## 2021-11-15 PROCEDURE — 3052F HG A1C>EQUAL 8.0%<EQUAL 9.0%: CPT | Mod: CPTII,S$GLB,, | Performed by: INTERNAL MEDICINE

## 2021-11-15 PROCEDURE — 3008F BODY MASS INDEX DOCD: CPT | Mod: CPTII,S$GLB,, | Performed by: INTERNAL MEDICINE

## 2021-11-15 PROCEDURE — 3066F PR DOCUMENTATION OF TREATMENT FOR NEPHROPATHY: ICD-10-PCS | Mod: CPTII,S$GLB,, | Performed by: INTERNAL MEDICINE

## 2021-11-15 PROCEDURE — 3008F PR BODY MASS INDEX (BMI) DOCUMENTED: ICD-10-PCS | Mod: CPTII,S$GLB,, | Performed by: INTERNAL MEDICINE

## 2021-11-15 PROCEDURE — 3077F SYST BP >= 140 MM HG: CPT | Mod: CPTII,S$GLB,, | Performed by: INTERNAL MEDICINE

## 2021-11-15 PROCEDURE — 3079F PR MOST RECENT DIASTOLIC BLOOD PRESSURE 80-89 MM HG: ICD-10-PCS | Mod: CPTII,S$GLB,, | Performed by: INTERNAL MEDICINE

## 2021-11-15 PROCEDURE — 99999 PR PBB SHADOW E&M-EST. PATIENT-LVL IV: ICD-10-PCS | Mod: PBBFAC,,, | Performed by: INTERNAL MEDICINE

## 2021-11-15 PROCEDURE — 99999 PR PBB SHADOW E&M-EST. PATIENT-LVL IV: CPT | Mod: PBBFAC,,, | Performed by: INTERNAL MEDICINE

## 2021-11-15 PROCEDURE — 99213 PR OFFICE/OUTPT VISIT, EST, LEVL III, 20-29 MIN: ICD-10-PCS | Mod: S$GLB,,, | Performed by: INTERNAL MEDICINE

## 2021-11-15 PROCEDURE — 1160F RVW MEDS BY RX/DR IN RCRD: CPT | Mod: CPTII,S$GLB,, | Performed by: INTERNAL MEDICINE

## 2021-11-15 PROCEDURE — 3061F PR NEG MICROALBUMINURIA RESULT DOCUMENTED/REVIEW: ICD-10-PCS | Mod: CPTII,S$GLB,, | Performed by: INTERNAL MEDICINE

## 2021-11-15 PROCEDURE — 3077F PR MOST RECENT SYSTOLIC BLOOD PRESSURE >= 140 MM HG: ICD-10-PCS | Mod: CPTII,S$GLB,, | Performed by: INTERNAL MEDICINE

## 2021-11-15 PROCEDURE — 1159F MED LIST DOCD IN RCRD: CPT | Mod: CPTII,S$GLB,, | Performed by: INTERNAL MEDICINE

## 2021-11-15 PROCEDURE — 3052F PR MOST RECENT HEMOGLOBIN A1C LEVEL 8.0 - < 9.0%: ICD-10-PCS | Mod: CPTII,S$GLB,, | Performed by: INTERNAL MEDICINE

## 2021-11-15 PROCEDURE — 1160F PR REVIEW ALL MEDS BY PRESCRIBER/CLIN PHARMACIST DOCUMENTED: ICD-10-PCS | Mod: CPTII,S$GLB,, | Performed by: INTERNAL MEDICINE

## 2021-11-15 PROCEDURE — 3066F NEPHROPATHY DOC TX: CPT | Mod: CPTII,S$GLB,, | Performed by: INTERNAL MEDICINE

## 2021-11-15 PROCEDURE — 3079F DIAST BP 80-89 MM HG: CPT | Mod: CPTII,S$GLB,, | Performed by: INTERNAL MEDICINE

## 2021-11-15 PROCEDURE — 1159F PR MEDICATION LIST DOCUMENTED IN MEDICAL RECORD: ICD-10-PCS | Mod: CPTII,S$GLB,, | Performed by: INTERNAL MEDICINE

## 2021-11-15 RX ORDER — METFORMIN HYDROCHLORIDE 500 MG/1
1000 TABLET, EXTENDED RELEASE ORAL 2 TIMES DAILY WITH MEALS
Qty: 360 TABLET | Refills: 3 | Status: SHIPPED | OUTPATIENT
Start: 2021-11-15 | End: 2021-12-29

## 2021-12-28 ENCOUNTER — PATIENT MESSAGE (OUTPATIENT)
Dept: INTERNAL MEDICINE | Facility: CLINIC | Age: 56
End: 2021-12-28
Payer: COMMERCIAL

## 2022-01-05 ENCOUNTER — PATIENT MESSAGE (OUTPATIENT)
Dept: INTERNAL MEDICINE | Facility: CLINIC | Age: 57
End: 2022-01-05
Payer: COMMERCIAL

## 2022-01-05 ENCOUNTER — TELEPHONE (OUTPATIENT)
Dept: INTERNAL MEDICINE | Facility: CLINIC | Age: 57
End: 2022-01-05
Payer: COMMERCIAL

## 2022-01-05 DIAGNOSIS — U07.1 COVID-19: Primary | ICD-10-CM

## 2022-01-05 NOTE — TELEPHONE ENCOUNTER
Pt's wife Em sent this message on her [employer]. Please see below and advise.     Hi  Saran,       Our daughter, Shannon- age 20- fully vaccinated tested + for Covid last Thursday 12/30/21.   Oddly enough she had just had her booster on Wednesday.  Dav began feeling poorly Monday 1/3/22- fatigue, headache, chills, today he tested positive (binax now @ home test). His temp is 101- pulse ox 96, coughing (dry-non productive), lungs clear to auscultation . Im treating him  symptomatically- I.e. advil 2 -200mg liquigels q6hr. , mucinex dm, Benadryl.   Our son,  Antonio, who is a physician assistant suggested I contact you about the infusion.  What do you think about getting him an infusion?   He has gotten both Covid immunizations but no booster as yet.       I appreciate your guidance,  Em

## 2022-01-05 NOTE — TELEPHONE ENCOUNTER
Please advise on email from patient. Thanks.   Good morning  The wife called yesterday  I was exposed to Covid and have tested positive.  My symptoms are mild I am a cancer patient stage 4 cll currently in remission.  Please advise if infusion therapy is needed  Thank you  Dav kirby  [phone]

## 2022-01-06 ENCOUNTER — INFUSION (OUTPATIENT)
Dept: INFECTIOUS DISEASES | Facility: HOSPITAL | Age: 57
End: 2022-01-06
Attending: INTERNAL MEDICINE
Payer: COMMERCIAL

## 2022-01-06 VITALS
TEMPERATURE: 98 F | SYSTOLIC BLOOD PRESSURE: 163 MMHG | DIASTOLIC BLOOD PRESSURE: 83 MMHG | RESPIRATION RATE: 20 BRPM | OXYGEN SATURATION: 98 % | HEART RATE: 60 BPM

## 2022-01-06 DIAGNOSIS — U07.1 COVID-19: ICD-10-CM

## 2022-01-06 PROCEDURE — 63600175 PHARM REV CODE 636 W HCPCS: Performed by: FAMILY MEDICINE

## 2022-01-06 PROCEDURE — 25000003 PHARM REV CODE 250: Performed by: FAMILY MEDICINE

## 2022-01-06 PROCEDURE — M0243 CASIRIVI AND IMDEVI INFUSION: HCPCS | Performed by: FAMILY MEDICINE

## 2022-01-06 RX ORDER — EPINEPHRINE 0.3 MG/.3ML
0.3 INJECTION SUBCUTANEOUS
Status: DISCONTINUED | OUTPATIENT
Start: 2022-01-06 | End: 2022-10-31 | Stop reason: CLARIF

## 2022-01-06 RX ORDER — SODIUM CHLORIDE 0.9 % (FLUSH) 0.9 %
10 SYRINGE (ML) INJECTION
Status: DISCONTINUED | OUTPATIENT
Start: 2022-01-06 | End: 2022-10-31 | Stop reason: CLARIF

## 2022-01-06 RX ORDER — DIPHENHYDRAMINE HYDROCHLORIDE 50 MG/ML
25 INJECTION INTRAMUSCULAR; INTRAVENOUS ONCE AS NEEDED
Status: DISCONTINUED | OUTPATIENT
Start: 2022-01-06 | End: 2022-11-11 | Stop reason: CLARIF

## 2022-01-06 RX ORDER — ACETAMINOPHEN 325 MG/1
650 TABLET ORAL ONCE AS NEEDED
Status: ACTIVE | OUTPATIENT
Start: 2022-01-06 | End: 2033-06-04

## 2022-01-06 RX ORDER — ONDANSETRON 4 MG/1
4 TABLET, ORALLY DISINTEGRATING ORAL ONCE AS NEEDED
Status: DISCONTINUED | OUTPATIENT
Start: 2022-01-06 | End: 2022-10-31 | Stop reason: CLARIF

## 2022-01-06 RX ORDER — ALBUTEROL SULFATE 90 UG/1
2 AEROSOL, METERED RESPIRATORY (INHALATION)
Status: DISCONTINUED | OUTPATIENT
Start: 2022-01-06 | End: 2022-10-31 | Stop reason: CLARIF

## 2022-01-06 RX ADMIN — CASIRIVIMAB AND IMDEVIMAB 600 MG: 600; 600 INJECTION, SOLUTION, CONCENTRATE INTRAVENOUS at 11:01

## 2022-01-06 NOTE — PROGRESS NOTES
1300  Tolerated infusion well  Dc to home in stable condition.  Instructed to return to ER for worsening.

## 2022-01-06 NOTE — PATIENT INSTRUCTIONS
FACT SHEET FOR PATIENTS, PARENTS AND CAREGIVERS   EMERGENCY USE AUTHORIZATION (EUA) OF REGEN-COVTM   (casirivimab and imdevimab) FOR CORONAVIRUS DISEASE 2019 (COVID-19)     You are being given a medicine called REGEN-COV (casirivimab and imdevimab) for the treatment or post-exposure prevention of coronavirus disease 2019 (COVID-19). SARS-CoV-2 is the virus that causes COVID-19. This Fact Sheet contains information to help you understand the potential risks and potential benefits of taking REGEN-COV.   Receiving REGEN-COV may benefit certain people with COVID-19 and may help prevent certain people who have been exposed to someone who is infected with SARS-CoV-2 from getting SARS-CoV-2 infection, or may prevent certain people who are at high risk of exposure to someone who is infected with SARS-CoV-2 from getting SARS-CoV-2 infection.   Read this Fact Sheet for information about REGEN-COV. Talk to your healthcare provider if you have questions. It is your choice to receive REGEN-COV or stop at any time.     WHAT IS COVID-19?   COVID-19 is caused by a virus called a coronavirus, SARS-CoV-2. People can get COVID-19 through contact with another person who has the virus.   COVID-19 illnesses have ranged from very mild (including some with no reported symptoms) to severe, including illness resulting in death. While information so far suggests that most COVID-19 illness is mild, serious illness can happen and may cause some of your other medical conditions to become worse. People of all ages with severe, long-lasting (chronic) medical conditions like heart disease, lung disease, and diabetes, for example, and other conditions including obesity, seem to be at higher risk of being hospitalized for COVID-19. Older age, with or without other conditions, also places people at higher risk of being hospitalized for COVID-19.     WHAT ARE THE SYMPTOMS OF COVID-19?   The symptoms of COVID-19 include fever, cough, and shortness of  breath, which may appear 2 to 14 days after exposure. Serious illness including breathing problems can occur and may cause your other medical conditions to become worse.     WHAT IS REGEN-COV (casirivimab and imdevimab)?   REGEN-COV is an investigational medicine used in adults and adolescents (12 years of age and older who weigh at least 88 pounds (40 kg)) who are at high risk for severe COVID-19, including hospitalization or death for:    treatment of mild to moderate symptoms of COVID-19    post-exposure prevention of COVID-19 in persons who are: o not fully vaccinated against COVID-19 (Individuals are considered to be fully vaccinated 2 weeks after their second vaccine dose in a 2-dose series [such as the Pfizer or Moderna vaccines], or 2 weeks after a single-dose vaccine [such as [employer]'s Rachel vaccine]), or,   o are not expected to build up enough of an immune response to the complete COVID-19 vaccination (for example, someone with immunocompromising conditions, including someone who is taking immunosuppressive medications), and- have been exposed to someone who is infected with SARS-CoV-2. Close contact with someone who is infected with SARS-CoV-2 is defined as being within 6 feet for a total of 15 minutes or more, providing care at home to someone who is sick, having direct physical contact with the person (hugging or kissing, for example), sharing eating or drinking utensils, or being exposed to respiratory droplets from an infected person (sneezing or coughing, for example). For additional details, go to https://www.cdc.gov/coronavirus/2019-ncov/if-you-are-sick/quarantine.html, or   - someone who is at high risk of being exposed to someone who is infected with SARS-CoV-2 because of occurrence of SARS-CoV-2 infection in other individuals in the same institutional setting (for example, as nursing homes, prisons,). REGEN-COV is investigational because it is still being studied. There is limited  "information known about the safety and effectiveness of using REGEN-COV to treat people with COVID-19 or to prevent COVID-19 in people who are at high risk of being exposed to someone who is infected with SARS-CoV-2. REGEN-COV is not authorized for pre-exposure prophylaxis for prevention of COVID-19.   The FDA has authorized the emergency use of REGEN-COV for the treatment of COVID-19 and the post-exposure prevention of COVID-19 under an Emergency Use Authorization (EUA). For more information on EUA, see the "What is an Emergency Use Authorization (EUA)?" section at the end of this Fact Sheet.     WHO SHOULD NOT TAKE REGEN-COV?   Do not take REGEN-COV if you have had a severe allergic reaction to REGEN-COV.     WHAT SHOULD I TELL MY HEALTH CARE PROVIDER BEFORE I RECEIVE REGEN-COV?   Tell your healthcare provider about all of your medical conditions, including if you:    Have any allergies    Have had a severe allergic reaction including anaphylaxis to REGEN-COV previously    Have received a COVID-19 vaccine.    Have any serious illnesses    Are pregnant or plan to become pregnant    Are breastfeeding or plan to breastfeed    Are taking any medications (prescription, over-the-counter, vitamins, and herbal products)     HOW WILL I RECEIVE REGEN-COV (casirivimab and imdevimab)?    REGEN-COV consists of two investigational medicines, casirivimab and imdevimab, given together at the same time through a vein (intravenous or IV) or injected in the tissue just under the skin (subcutaneous injections). Your healthcare provider will determine the most appropriate way for you to be given REGEN-COV.    Treatment: If you are receiving an intravenous infusion, the infusion will take 20 to 50 minutes or longer. Your healthcare provider will determine the duration of your infusion. o If your healthcare provider determines that you are unable to receive REGEN-COV as an intravenous infusion which would lead to a delay in " treatment, then as an alternative, REGEN-COV can be given in the form of subcutaneous injections. If you are receiving subcutaneous injections, your dose will be provided as multiple injections given in separate locations around the same time.    Post-exposure prevention: If you are receiving subcutaneous injections, your dose will be provided as multiple injections given in separate locations around the same time. If you are receiving an intravenous infusion, the infusion will take 20 to 50 minutes or longer. o After the initial dose, if your healthcare provider determines that you need to receive additional doses of REGEN-COV for ongoing protection, the additional intravenous or subcutaneous doses would be administered monthly.     WHAT ARE THE IMPORTANT POSSIBLE SIDE EFFECTS OF REGEN-COV (casirivimab and imdevimab)?   Possible side effects of REGEN-COV are:    Allergic reactions. Allergic reactions can happen during and after infusion or injection of REGEN-COV. Tell your healthcare provider right away or seek immediate medical attention if you get any of the following signs and symptoms of allergic reactions: fever, chills, nausea, headache, shortness of breath, low or high blood pressure, rapid or slow heart rate, chest discomfort or pain, weakness, confusion, feeling tired, wheezing, swelling of your lips, face, or throat, rash including hives, itching, muscle aches, feeling faint, dizziness and sweating. These reactions may be severe or life threatening.    Worsening symptoms after treatment: You may experience new or worsening symptoms after infusion or injection, including fever, difficulty breathing, rapid or slow heart rate, tiredness, weakness or confusion. If these symptoms occur, contact your healthcare provider or seek immediate medical attention as some of these symptoms have required hospitalization. It is unknown if these symptoms are related to treatment or are due to the progression of  COVID-19. The side effects of getting any medicine by vein may include brief pain, bleeding, bruising of the skin, soreness, swelling, and possible infection at the infusion site. The side effects of getting any medicine by subcutaneous injection may include pain, bruising of the skin, soreness, swelling, and possible infection at the injection site.   These are not all the possible side effects of REGEN-COV. Not a lot of people have been given REGEN-COV. Serious and unexpected side effects may happen. REGEN-COV is still being studied so it is possible that all of the risks are not known at this time.   It is possible that REGEN-COV could interfere with your body's own ability to fight off a future infection of SARS-CoV-2. Similarly, REGEN-COV may reduce your body's immune response to a vaccine for SARS-CoV-2. Specific studies have not been conducted to address these possible risks. Talk to your healthcare provider if you have any questions.    WHAT OTHER TREATMENT CHOICES ARE THERE?   Like REGEN-COV (casirivimab and imdevimab), FDA may allow for the emergency use of other medicines to treat people with COVID-19. Go to https://www.fda.gov/emergency-preparedness-and-response/mcm-legal-regulatory-and-policy-framework/emergency-use-authorization for information on the emergency use of other medicines that are not approved by FDA that are used to treat people with COVID-19. Your healthcare provider may talk with you about clinical trials you may be eligible for.   It is your choice to be treated or not to be treated with REGEN-COV. Should you decide not to receive REGEN-COV or stop it at any time, it will not change your standard medical care.     WHAT OTHER PREVENTION CHOICES ARE THERE?   Vaccines to prevent COVID-19 are also available under Emergency Use Authorization. Use of REGEN-COV does not replace vaccination against COVID-19. REGEN-COV is not authorized for pre-exposure prophylaxis for prevention of COVID-19.      WHAT IF I AM PREGNANT OR BREASTFEEDING?   There is limited experience using REGEN-COV (casirivimab and imdevimab) in pregnant women or breastfeeding mothers. For a mother and unborn baby, the benefit of receiving REGEN-COV may be greater than the risk of using the product. If you are pregnant or breastfeeding, discuss your options and specific situation with your healthcare provider.     HOW DO I REPORT SIDE EFFECTSWITH REGEN-COV (casirivimab and imdevimab)?   Tell your healthcare provider right away if you have any side effect that bothers you or does not go away.   Report side effects to FDA MedWatch at www.fda.gov/medwatch or call 5-793-CBM-3718 or call 1-222.928.7478.     HOW CAN I LEARN MORE?    Ask your health care provider.    Visit [email or web address]    Visit [email or web address].ustzt35emlumrjrlzotfjapyri.nih.gov/    Contact your local or state public health department.     WHAT IS AN EMERGENCY USE AUTHORIZATION (EUA)?   The [place] FDA has made REGEN-COV (casirivimab and imdevimab) available under an emergency access mechanism called an EUA. The EUA is supported by a [place] of Health and Human Service (HHS) declaration that circumstances exist to justify the emergency use of drugs and biological products during the COVID-19 pandemic. REGEN-COV has not undergone the same type of review as an FDA-approved product. In issuing an EUA under the COVID-19 public health emergency, the FDA must determine, among other things, that based on the totality of scientific evidence available, it is reasonable to believe that the product may be effective for diagnosing, treating, or preventing COVID-19, or a serious or life-threatening disease or condition caused by COVID-19; that the known and potential benefits of the product, when used to diagnose, treat, or prevent such disease or condition, outweigh the known and potential risks of such product; and that there are no adequate, approved and available alternatives.  All of these criteria must be met to allow for the medicine to be used in the treatment of COVID-19 or prevention of COVID-19 during the COVID-19 pandemic.   The EUA for REGEN-COV is in effect for the duration of the COVID-19 declaration justifying emergency use of these products, unless terminated or revoked (after which the products may no longer be used).     Manufactured by:   [employer]   [address] [place] [id number]   ©2021 [employer] All rights reserved.   Revised: 07/2021

## 2022-01-08 NOTE — BRIEF OP NOTE
[facility]  Brief Operative Note     SUMMARY     Surgery Date: 10/20/2017     Surgeon(s) and Role:     * Trey Johnson MD - Primary     * Guille Saeed MD - Assisting     Pre-op Diagnosis:  Small B-cell lymphoma, unspecified body region [C83.00]    Post-op Diagnosis:  Post-Op Diagnosis Codes:     * Small B-cell lymphoma, unspecified body region [C83.00]    Procedure(s) (LRB):  REMOVAL-PORT-A-CATH (Left)    Anesthesia: General/MAC    Description of the findings of the procedure: Left subclavian port-a-cath removed without issue.    Findings/Key Components:     Estimated Blood Loss: 5cc         Specimens:   Specimen (12h ago through future)    Start     Ordered    10/20/17 0746  Specimen to Pathology - Surgery  Once     Comments:  Specimen to Pathology: 1)  Port a cath - GROSS , no preservatives , to refrigerator      10/20/17 0745          Discharge Note    SUMMARY     Admit Date: 10/20/2017    Discharge Date and Time:  10/20/2017 8:09 AM    Hospital Course Patient was admitted for an outpatient procedure, tolerated it well, and is to be discharged home.    Final Diagnosis: Post-Op Diagnosis Codes:     * Small B-cell lymphoma, unspecified body region [C83.00]    Disposition: Home or Self Care    Follow Up/Patient Instructions:     Medications:  Reconciled Home Medications:   Current Discharge Medication List      CONTINUE these medications which have NOT CHANGED    Details   fenofibrate 160 MG Tab Take 1 tablet (160 mg total) by mouth once daily.  Qty: 90 tablet, Refills: 3    Associated Diagnoses: Hypertriglyceridemia      fish oil-omega-3 fatty acids 300-1,000 mg capsule Take 2 g by mouth once daily.      metformin (GLUCOPHAGE-XR) 500 MG 24 hr tablet TK 2 T PO  In am  Qty: 180 tablet, Refills: 1    Associated Diagnoses: Controlled type 2 diabetes mellitus without complication, without long-term current use of insulin      multivitamin (ONE DAILY MULTIVITAMIN) per tablet Take 1 tablet by mouth once  daily.      pantoprazole (PROTONIX) 40 MG tablet Take 1 tablet (40 mg total) by mouth once daily.  Qty: 30 tablet, Refills: 11    Associated Diagnoses: Gastroesophageal reflux disease without esophagitis      atorvastatin (LIPITOR) 40 MG tablet Take 0.5 tablets (20 mg total) by mouth once daily.  Qty: 90 tablet, Refills: 3    Associated Diagnoses: ASCVD (arteriosclerotic cardiovascular disease)      CONTOUR NEXT STRIPS Strp 1 strip by Misc.(Non-Drug; Combo Route) route once daily at 6am.       lidocaine-prilocaine (EMLA) cream Apply topically as needed.  Qty: 1 g, Refills: 3    Associated Diagnoses: Nodular lymphoma of intra-abdominal lymph nodes             Discharge Procedure Orders  Diet general     Call MD for:  redness, tenderness, or signs of infection (pain, swelling, redness, odor or green/yellow discharge around incision site)     Call MD for:  severe uncontrolled pain     Call MD for:  persistent nausea and vomiting or diarrhea     Call MD for:  temperature >100.4     Activity as tolerated     Shower on day dressing removed (No bath)   Order Comments: Can remove dressings in 48 hours then can shower with soap and water. No soaking in bath  Ibuprofen and tylenol as needed for pain     Remove dressing in 48 hours          - - -

## 2022-02-19 ENCOUNTER — PATIENT OUTREACH (OUTPATIENT)
Dept: ADMINISTRATIVE | Facility: HOSPITAL | Age: 57
End: 2022-02-19
Payer: COMMERCIAL

## 2022-02-19 ENCOUNTER — PATIENT MESSAGE (OUTPATIENT)
Dept: ADMINISTRATIVE | Facility: HOSPITAL | Age: 57
End: 2022-02-19
Payer: COMMERCIAL

## 2022-02-20 NOTE — PROGRESS NOTES
Immunizations reviewed. Legacy reviewed. Care Everywhere reviewed. Media tab reviewed.  Eye exam from 02/26/22 located per chart review and hyperlinked to .   Portal message sent to patient re: eye exam coming due.       CRYSTAL

## 2022-03-20 ENCOUNTER — PATIENT MESSAGE (OUTPATIENT)
Dept: HEMATOLOGY/ONCOLOGY | Facility: CLINIC | Age: 57
End: 2022-03-20
Payer: COMMERCIAL

## 2022-04-04 ENCOUNTER — PATIENT MESSAGE (OUTPATIENT)
Dept: ADMINISTRATIVE | Facility: HOSPITAL | Age: 57
End: 2022-04-04
Payer: COMMERCIAL

## 2022-04-19 ENCOUNTER — OFFICE VISIT (OUTPATIENT)
Dept: INTERNAL MEDICINE | Facility: CLINIC | Age: 57
End: 2022-04-19
Payer: COMMERCIAL

## 2022-04-19 VITALS
WEIGHT: 267.63 LBS | DIASTOLIC BLOOD PRESSURE: 89 MMHG | OXYGEN SATURATION: 96 % | RESPIRATION RATE: 19 BRPM | SYSTOLIC BLOOD PRESSURE: 161 MMHG | HEART RATE: 75 BPM | BODY MASS INDEX: 39.64 KG/M2 | HEIGHT: 69 IN

## 2022-04-19 DIAGNOSIS — S13.4XXA WHIPLASH INJURIES, INITIAL ENCOUNTER: Primary | ICD-10-CM

## 2022-04-19 DIAGNOSIS — I10 HYPERTENSION, UNSPECIFIED TYPE: ICD-10-CM

## 2022-04-19 PROCEDURE — 1159F PR MEDICATION LIST DOCUMENTED IN MEDICAL RECORD: ICD-10-PCS | Mod: CPTII,S$GLB,, | Performed by: INTERNAL MEDICINE

## 2022-04-19 PROCEDURE — 1160F PR REVIEW ALL MEDS BY PRESCRIBER/CLIN PHARMACIST DOCUMENTED: ICD-10-PCS | Mod: CPTII,S$GLB,, | Performed by: INTERNAL MEDICINE

## 2022-04-19 PROCEDURE — 3079F PR MOST RECENT DIASTOLIC BLOOD PRESSURE 80-89 MM HG: ICD-10-PCS | Mod: CPTII,S$GLB,, | Performed by: INTERNAL MEDICINE

## 2022-04-19 PROCEDURE — 1159F MED LIST DOCD IN RCRD: CPT | Mod: CPTII,S$GLB,, | Performed by: INTERNAL MEDICINE

## 2022-04-19 PROCEDURE — 3008F PR BODY MASS INDEX (BMI) DOCUMENTED: ICD-10-PCS | Mod: CPTII,S$GLB,, | Performed by: INTERNAL MEDICINE

## 2022-04-19 PROCEDURE — 3072F LOW RISK FOR RETINOPATHY: CPT | Mod: CPTII,S$GLB,, | Performed by: INTERNAL MEDICINE

## 2022-04-19 PROCEDURE — 99999 PR PBB SHADOW E&M-EST. PATIENT-LVL V: CPT | Mod: PBBFAC,,, | Performed by: INTERNAL MEDICINE

## 2022-04-19 PROCEDURE — 3008F BODY MASS INDEX DOCD: CPT | Mod: CPTII,S$GLB,, | Performed by: INTERNAL MEDICINE

## 2022-04-19 PROCEDURE — 3072F PR LOW RISK FOR RETINOPATHY: ICD-10-PCS | Mod: CPTII,S$GLB,, | Performed by: INTERNAL MEDICINE

## 2022-04-19 PROCEDURE — 99213 OFFICE O/P EST LOW 20 MIN: CPT | Mod: S$GLB,,, | Performed by: INTERNAL MEDICINE

## 2022-04-19 PROCEDURE — 3077F PR MOST RECENT SYSTOLIC BLOOD PRESSURE >= 140 MM HG: ICD-10-PCS | Mod: CPTII,S$GLB,, | Performed by: INTERNAL MEDICINE

## 2022-04-19 PROCEDURE — 3077F SYST BP >= 140 MM HG: CPT | Mod: CPTII,S$GLB,, | Performed by: INTERNAL MEDICINE

## 2022-04-19 PROCEDURE — 1160F RVW MEDS BY RX/DR IN RCRD: CPT | Mod: CPTII,S$GLB,, | Performed by: INTERNAL MEDICINE

## 2022-04-19 PROCEDURE — 99213 PR OFFICE/OUTPT VISIT, EST, LEVL III, 20-29 MIN: ICD-10-PCS | Mod: S$GLB,,, | Performed by: INTERNAL MEDICINE

## 2022-04-19 PROCEDURE — 3079F DIAST BP 80-89 MM HG: CPT | Mod: CPTII,S$GLB,, | Performed by: INTERNAL MEDICINE

## 2022-04-19 PROCEDURE — 99999 PR PBB SHADOW E&M-EST. PATIENT-LVL V: ICD-10-PCS | Mod: PBBFAC,,, | Performed by: INTERNAL MEDICINE

## 2022-04-19 RX ORDER — OXYCODONE AND ACETAMINOPHEN 5; 325 MG/1; MG/1
1 TABLET ORAL 3 TIMES DAILY PRN
COMMUNITY
Start: 2022-04-18 | End: 2022-05-16
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
## 2022-04-20 DIAGNOSIS — E11.9 TYPE 2 DIABETES MELLITUS WITHOUT COMPLICATION, UNSPECIFIED WHETHER LONG TERM INSULIN USE: ICD-10-CM

## 2022-04-26 ENCOUNTER — TELEPHONE (OUTPATIENT)
Dept: INTERNAL MEDICINE | Facility: CLINIC | Age: 57
End: 2022-04-26

## 2022-04-26 ENCOUNTER — CLINICAL SUPPORT (OUTPATIENT)
Dept: INTERNAL MEDICINE | Facility: CLINIC | Age: 57
End: 2022-04-26
Payer: COMMERCIAL

## 2022-04-26 VITALS
SYSTOLIC BLOOD PRESSURE: 166 MMHG | DIASTOLIC BLOOD PRESSURE: 90 MMHG | OXYGEN SATURATION: 98 % | HEART RATE: 67 BPM | RESPIRATION RATE: 18 BRPM

## 2022-04-26 PROCEDURE — 99999 PR PBB SHADOW E&M-EST. PATIENT-LVL II: ICD-10-PCS | Mod: PBBFAC,,,

## 2022-04-26 PROCEDURE — 99999 PR PBB SHADOW E&M-EST. PATIENT-LVL II: CPT | Mod: PBBFAC,,,

## 2022-04-26 RX ORDER — VALSARTAN AND HYDROCHLOROTHIAZIDE 160; 25 MG/1; MG/1
1 TABLET ORAL DAILY
Qty: 90 TABLET | Refills: 3 | Status: SHIPPED | OUTPATIENT
Start: 2022-04-26 | End: 2023-01-30

## 2022-04-26 NOTE — PROGRESS NOTES
Has at home BP log   4/19: 167/104  4/20:136/67   4/21:175/94  4/22:163/93  4/23:157/87  4/25:183/97    Today at office 166/90

## 2022-04-26 NOTE — TELEPHONE ENCOUNTER
Patient notified. Verbalized understanding. Instructed patient to call if he has issues with medication

## 2022-05-09 ENCOUNTER — LAB VISIT (OUTPATIENT)
Dept: LAB | Facility: HOSPITAL | Age: 57
End: 2022-05-09
Attending: INTERNAL MEDICINE
Payer: COMMERCIAL

## 2022-05-09 DIAGNOSIS — C91.10 CLL (CHRONIC LYMPHOCYTIC LEUKEMIA): ICD-10-CM

## 2022-05-09 DIAGNOSIS — E11.65 UNCONTROLLED TYPE 2 DIABETES MELLITUS WITH HYPERGLYCEMIA: ICD-10-CM

## 2022-05-09 LAB
ALBUMIN SERPL BCP-MCNC: 3.9 G/DL (ref 3.5–5.2)
ALBUMIN SERPL BCP-MCNC: 3.9 G/DL (ref 3.5–5.2)
ALBUMIN/CREAT UR: 8 UG/MG (ref 0–30)
ALP SERPL-CCNC: 42 U/L (ref 55–135)
ALP SERPL-CCNC: 42 U/L (ref 55–135)
ALT SERPL W/O P-5'-P-CCNC: 32 U/L (ref 10–44)
ALT SERPL W/O P-5'-P-CCNC: 32 U/L (ref 10–44)
ANION GAP SERPL CALC-SCNC: 10 MMOL/L (ref 8–16)
ANION GAP SERPL CALC-SCNC: 10 MMOL/L (ref 8–16)
AST SERPL-CCNC: 19 U/L (ref 10–40)
AST SERPL-CCNC: 19 U/L (ref 10–40)
BASOPHILS # BLD AUTO: 0.04 K/UL (ref 0–0.2)
BASOPHILS # BLD AUTO: 0.04 K/UL (ref 0–0.2)
BASOPHILS NFR BLD: 0.6 % (ref 0–1.9)
BASOPHILS NFR BLD: 0.6 % (ref 0–1.9)
BILIRUB SERPL-MCNC: 0.4 MG/DL (ref 0.1–1)
BILIRUB SERPL-MCNC: 0.4 MG/DL (ref 0.1–1)
BUN SERPL-MCNC: 15 MG/DL (ref 6–20)
BUN SERPL-MCNC: 15 MG/DL (ref 6–20)
CALCIUM SERPL-MCNC: 9.7 MG/DL (ref 8.7–10.5)
CALCIUM SERPL-MCNC: 9.7 MG/DL (ref 8.7–10.5)
CHLORIDE SERPL-SCNC: 102 MMOL/L (ref 95–110)
CHLORIDE SERPL-SCNC: 102 MMOL/L (ref 95–110)
CHOLEST SERPL-MCNC: 166 MG/DL (ref 120–199)
CHOLEST/HDLC SERPL: 7.9 {RATIO} (ref 2–5)
CO2 SERPL-SCNC: 26 MMOL/L (ref 23–29)
CO2 SERPL-SCNC: 26 MMOL/L (ref 23–29)
CREAT SERPL-MCNC: 1 MG/DL (ref 0.5–1.4)
CREAT SERPL-MCNC: 1 MG/DL (ref 0.5–1.4)
CREAT UR-MCNC: 87.6 MG/DL (ref 23–375)
DIFFERENTIAL METHOD: ABNORMAL
DIFFERENTIAL METHOD: ABNORMAL
EOSINOPHIL # BLD AUTO: 0.2 K/UL (ref 0–0.5)
EOSINOPHIL # BLD AUTO: 0.2 K/UL (ref 0–0.5)
EOSINOPHIL NFR BLD: 2.1 % (ref 0–8)
EOSINOPHIL NFR BLD: 2.1 % (ref 0–8)
ERYTHROCYTE [DISTWIDTH] IN BLOOD BY AUTOMATED COUNT: 12.6 % (ref 11.5–14.5)
ERYTHROCYTE [DISTWIDTH] IN BLOOD BY AUTOMATED COUNT: 12.6 % (ref 11.5–14.5)
EST. GFR  (AFRICAN AMERICAN): >60 ML/MIN/1.73 M^2
EST. GFR  (AFRICAN AMERICAN): >60 ML/MIN/1.73 M^2
EST. GFR  (NON AFRICAN AMERICAN): >60 ML/MIN/1.73 M^2
EST. GFR  (NON AFRICAN AMERICAN): >60 ML/MIN/1.73 M^2
ESTIMATED AVG GLUCOSE: 232 MG/DL (ref 68–131)
GLUCOSE SERPL-MCNC: 242 MG/DL (ref 70–110)
GLUCOSE SERPL-MCNC: 242 MG/DL (ref 70–110)
HBA1C MFR BLD: 9.7 % (ref 4–5.6)
HCT VFR BLD AUTO: 45.3 % (ref 40–54)
HCT VFR BLD AUTO: 45.3 % (ref 40–54)
HDLC SERPL-MCNC: 21 MG/DL (ref 40–75)
HDLC SERPL: 12.7 % (ref 20–50)
HGB BLD-MCNC: 15.4 G/DL (ref 14–18)
HGB BLD-MCNC: 15.4 G/DL (ref 14–18)
IMM GRANULOCYTES # BLD AUTO: 0.04 K/UL (ref 0–0.04)
IMM GRANULOCYTES # BLD AUTO: 0.04 K/UL (ref 0–0.04)
IMM GRANULOCYTES NFR BLD AUTO: 0.6 % (ref 0–0.5)
IMM GRANULOCYTES NFR BLD AUTO: 0.6 % (ref 0–0.5)
LDH SERPL L TO P-CCNC: 122 U/L (ref 110–260)
LDLC SERPL CALC-MCNC: ABNORMAL MG/DL (ref 63–159)
LYMPHOCYTES # BLD AUTO: 1.8 K/UL (ref 1–4.8)
LYMPHOCYTES # BLD AUTO: 1.8 K/UL (ref 1–4.8)
LYMPHOCYTES NFR BLD: 25.1 % (ref 18–48)
LYMPHOCYTES NFR BLD: 25.1 % (ref 18–48)
MCH RBC QN AUTO: 28.9 PG (ref 27–31)
MCH RBC QN AUTO: 28.9 PG (ref 27–31)
MCHC RBC AUTO-ENTMCNC: 34 G/DL (ref 32–36)
MCHC RBC AUTO-ENTMCNC: 34 G/DL (ref 32–36)
MCV RBC AUTO: 85 FL (ref 82–98)
MCV RBC AUTO: 85 FL (ref 82–98)
MICROALBUMIN UR DL<=1MG/L-MCNC: 7 UG/ML
MONOCYTES # BLD AUTO: 0.4 K/UL (ref 0.3–1)
MONOCYTES # BLD AUTO: 0.4 K/UL (ref 0.3–1)
MONOCYTES NFR BLD: 6.1 % (ref 4–15)
MONOCYTES NFR BLD: 6.1 % (ref 4–15)
NEUTROPHILS # BLD AUTO: 4.6 K/UL (ref 1.8–7.7)
NEUTROPHILS # BLD AUTO: 4.6 K/UL (ref 1.8–7.7)
NEUTROPHILS NFR BLD: 65.5 % (ref 38–73)
NEUTROPHILS NFR BLD: 65.5 % (ref 38–73)
NONHDLC SERPL-MCNC: 145 MG/DL
NRBC BLD-RTO: 0 /100 WBC
NRBC BLD-RTO: 0 /100 WBC
PLATELET # BLD AUTO: 235 K/UL (ref 150–450)
PLATELET # BLD AUTO: 235 K/UL (ref 150–450)
PMV BLD AUTO: 9.2 FL (ref 9.2–12.9)
PMV BLD AUTO: 9.2 FL (ref 9.2–12.9)
POTASSIUM SERPL-SCNC: 4.7 MMOL/L (ref 3.5–5.1)
POTASSIUM SERPL-SCNC: 4.7 MMOL/L (ref 3.5–5.1)
PROT SERPL-MCNC: 6.5 G/DL (ref 6–8.4)
PROT SERPL-MCNC: 6.5 G/DL (ref 6–8.4)
RBC # BLD AUTO: 5.33 M/UL (ref 4.6–6.2)
RBC # BLD AUTO: 5.33 M/UL (ref 4.6–6.2)
SODIUM SERPL-SCNC: 138 MMOL/L (ref 136–145)
SODIUM SERPL-SCNC: 138 MMOL/L (ref 136–145)
TRIGL SERPL-MCNC: 591 MG/DL (ref 30–150)
TSH SERPL DL<=0.005 MIU/L-ACNC: 1.83 UIU/ML (ref 0.4–4)
WBC # BLD AUTO: 7.05 K/UL (ref 3.9–12.7)
WBC # BLD AUTO: 7.05 K/UL (ref 3.9–12.7)

## 2022-05-09 PROCEDURE — 83615 LACTATE (LD) (LDH) ENZYME: CPT | Performed by: INTERNAL MEDICINE

## 2022-05-09 PROCEDURE — 84443 ASSAY THYROID STIM HORMONE: CPT | Performed by: INTERNAL MEDICINE

## 2022-05-09 PROCEDURE — 82570 ASSAY OF URINE CREATININE: CPT | Performed by: INTERNAL MEDICINE

## 2022-05-09 PROCEDURE — 82043 UR ALBUMIN QUANTITATIVE: CPT | Performed by: INTERNAL MEDICINE

## 2022-05-09 PROCEDURE — 80061 LIPID PANEL: CPT | Performed by: INTERNAL MEDICINE

## 2022-05-09 PROCEDURE — 80053 COMPREHEN METABOLIC PANEL: CPT | Performed by: INTERNAL MEDICINE

## 2022-05-09 PROCEDURE — 36415 COLL VENOUS BLD VENIPUNCTURE: CPT | Performed by: INTERNAL MEDICINE

## 2022-05-09 PROCEDURE — 83036 HEMOGLOBIN GLYCOSYLATED A1C: CPT | Performed by: INTERNAL MEDICINE

## 2022-05-09 PROCEDURE — 85025 COMPLETE CBC W/AUTO DIFF WBC: CPT | Performed by: INTERNAL MEDICINE

## 2022-05-16 ENCOUNTER — OFFICE VISIT (OUTPATIENT)
Dept: INTERNAL MEDICINE | Facility: CLINIC | Age: 57
End: 2022-05-16
Payer: COMMERCIAL

## 2022-05-16 VITALS
HEART RATE: 70 BPM | WEIGHT: 266.75 LBS | OXYGEN SATURATION: 98 % | SYSTOLIC BLOOD PRESSURE: 128 MMHG | DIASTOLIC BLOOD PRESSURE: 78 MMHG | BODY MASS INDEX: 39.51 KG/M2 | HEIGHT: 69 IN | RESPIRATION RATE: 16 BRPM

## 2022-05-16 DIAGNOSIS — E66.01 SEVERE OBESITY (BMI 35.0-39.9) WITH COMORBIDITY: ICD-10-CM

## 2022-05-16 DIAGNOSIS — E11.65 UNCONTROLLED TYPE 2 DIABETES MELLITUS WITH HYPERGLYCEMIA: Primary | ICD-10-CM

## 2022-05-16 DIAGNOSIS — M54.40 BACK PAIN OF LUMBAR REGION WITH SCIATICA: ICD-10-CM

## 2022-05-16 DIAGNOSIS — I10 BENIGN ESSENTIAL HTN: ICD-10-CM

## 2022-05-16 DIAGNOSIS — C91.10 CLL (CHRONIC LYMPHOCYTIC LEUKEMIA): ICD-10-CM

## 2022-05-16 PROCEDURE — 1159F MED LIST DOCD IN RCRD: CPT | Mod: CPTII,S$GLB,, | Performed by: INTERNAL MEDICINE

## 2022-05-16 PROCEDURE — 3078F DIAST BP <80 MM HG: CPT | Mod: CPTII,S$GLB,, | Performed by: INTERNAL MEDICINE

## 2022-05-16 PROCEDURE — 3008F PR BODY MASS INDEX (BMI) DOCUMENTED: ICD-10-PCS | Mod: CPTII,S$GLB,, | Performed by: INTERNAL MEDICINE

## 2022-05-16 PROCEDURE — 99999 PR PBB SHADOW E&M-EST. PATIENT-LVL V: CPT | Mod: PBBFAC,,, | Performed by: INTERNAL MEDICINE

## 2022-05-16 PROCEDURE — 3061F PR NEG MICROALBUMINURIA RESULT DOCUMENTED/REVIEW: ICD-10-PCS | Mod: CPTII,S$GLB,, | Performed by: INTERNAL MEDICINE

## 2022-05-16 PROCEDURE — 3046F HEMOGLOBIN A1C LEVEL >9.0%: CPT | Mod: CPTII,S$GLB,, | Performed by: INTERNAL MEDICINE

## 2022-05-16 PROCEDURE — 3066F NEPHROPATHY DOC TX: CPT | Mod: CPTII,S$GLB,, | Performed by: INTERNAL MEDICINE

## 2022-05-16 PROCEDURE — 99214 OFFICE O/P EST MOD 30 MIN: CPT | Mod: S$GLB,,, | Performed by: INTERNAL MEDICINE

## 2022-05-16 PROCEDURE — 3074F SYST BP LT 130 MM HG: CPT | Mod: CPTII,S$GLB,, | Performed by: INTERNAL MEDICINE

## 2022-05-16 PROCEDURE — 99999 PR PBB SHADOW E&M-EST. PATIENT-LVL V: ICD-10-PCS | Mod: PBBFAC,,, | Performed by: INTERNAL MEDICINE

## 2022-05-16 PROCEDURE — 1160F RVW MEDS BY RX/DR IN RCRD: CPT | Mod: CPTII,S$GLB,, | Performed by: INTERNAL MEDICINE

## 2022-05-16 PROCEDURE — 3072F PR LOW RISK FOR RETINOPATHY: ICD-10-PCS | Mod: CPTII,S$GLB,, | Performed by: INTERNAL MEDICINE

## 2022-05-16 PROCEDURE — 3066F PR DOCUMENTATION OF TREATMENT FOR NEPHROPATHY: ICD-10-PCS | Mod: CPTII,S$GLB,, | Performed by: INTERNAL MEDICINE

## 2022-05-16 PROCEDURE — 1159F PR MEDICATION LIST DOCUMENTED IN MEDICAL RECORD: ICD-10-PCS | Mod: CPTII,S$GLB,, | Performed by: INTERNAL MEDICINE

## 2022-05-16 PROCEDURE — 3046F PR MOST RECENT HEMOGLOBIN A1C LEVEL > 9.0%: ICD-10-PCS | Mod: CPTII,S$GLB,, | Performed by: INTERNAL MEDICINE

## 2022-05-16 PROCEDURE — 3074F PR MOST RECENT SYSTOLIC BLOOD PRESSURE < 130 MM HG: ICD-10-PCS | Mod: CPTII,S$GLB,, | Performed by: INTERNAL MEDICINE

## 2022-05-16 PROCEDURE — 1160F PR REVIEW ALL MEDS BY PRESCRIBER/CLIN PHARMACIST DOCUMENTED: ICD-10-PCS | Mod: CPTII,S$GLB,, | Performed by: INTERNAL MEDICINE

## 2022-05-16 PROCEDURE — 3072F LOW RISK FOR RETINOPATHY: CPT | Mod: CPTII,S$GLB,, | Performed by: INTERNAL MEDICINE

## 2022-05-16 PROCEDURE — 3008F BODY MASS INDEX DOCD: CPT | Mod: CPTII,S$GLB,, | Performed by: INTERNAL MEDICINE

## 2022-05-16 PROCEDURE — 3078F PR MOST RECENT DIASTOLIC BLOOD PRESSURE < 80 MM HG: ICD-10-PCS | Mod: CPTII,S$GLB,, | Performed by: INTERNAL MEDICINE

## 2022-05-16 PROCEDURE — 99214 PR OFFICE/OUTPT VISIT, EST, LEVL IV, 30-39 MIN: ICD-10-PCS | Mod: S$GLB,,, | Performed by: INTERNAL MEDICINE

## 2022-05-16 PROCEDURE — 3061F NEG MICROALBUMINURIA REV: CPT | Mod: CPTII,S$GLB,, | Performed by: INTERNAL MEDICINE

## 2022-05-16 RX ORDER — GLIMEPIRIDE 2 MG/1
2 TABLET ORAL
Qty: 30 TABLET | Refills: 3 | Status: SHIPPED | OUTPATIENT
Start: 2022-05-16 | End: 2022-09-12

## 2022-05-16 RX ORDER — GABAPENTIN 300 MG/1
300 CAPSULE ORAL NIGHTLY
Qty: 30 CAPSULE | Refills: 3 | Status: SHIPPED | OUTPATIENT
Start: 2022-05-16 | End: 2022-09-07 | Stop reason: SDUPTHER

## 2022-05-16 NOTE — PROGRESS NOTES
Subjective:       Patient ID: Wild Holliday is a 56 y.o. male.    Chief Complaint: Follow-up    Wild Holliday is a 56 y.o. male  Here with follow up for HTN .  I placed him on diovan /HCTz . 160/25  Daily ;  Doing well .      Follow-up  Associated symptoms include headaches. Pertinent negatives include no abdominal pain, chest pain, chills, congestion, fever, numbness, sore throat or vomiting.     Review of Systems   Constitutional: Negative for chills and fever.   HENT: Negative for congestion, postnasal drip and sore throat.    Eyes: Negative for photophobia.   Respiratory: Negative for chest tightness and shortness of breath.    Cardiovascular: Negative for chest pain.   Gastrointestinal: Negative for abdominal distention, abdominal pain, blood in stool and vomiting.   Genitourinary: Negative for dysuria, flank pain and hematuria.   Musculoskeletal: Positive for back pain.   Skin: Negative for pallor.   Neurological: Positive for headaches. Negative for dizziness, seizures, facial asymmetry, speech difficulty and numbness.   Hematological: Does not bruise/bleed easily.   Psychiatric/Behavioral: Negative for agitation and suicidal ideas. The patient is not nervous/anxious.        Objective:      Physical Exam  Vitals and nursing note reviewed.   Constitutional:       Appearance: He is well-developed.   HENT:      Head: Normocephalic and atraumatic.   Neck:      Vascular: No JVD.   Cardiovascular:      Rate and Rhythm: Normal rate and regular rhythm.      Heart sounds: Normal heart sounds.   Pulmonary:      Effort: Pulmonary effort is normal.      Breath sounds: Normal breath sounds.   Abdominal:      General: Bowel sounds are normal.      Palpations: Abdomen is soft.      Tenderness: There is no abdominal tenderness.   Skin:     General: Skin is warm and dry.   Neurological:      Mental Status: He is alert and oriented to person, place, and time.   Psychiatric:         Behavior: Behavior normal.         Thought  Content: Thought content normal.         Judgment: Judgment normal.         Assessment:       1. Uncontrolled type 2 diabetes mellitus with hyperglycemia    2. Benign essential HTN    3. Back pain of lumbar region with sciatica    4. Severe obesity (BMI 35.0-39.9) with comorbidity    5. CLL (chronic lymphocytic leukemia)        Plan:   Wild was seen today for follow-up.    Diagnoses and all orders for this visit:    Uncontrolled type 2 diabetes mellitus with hyperglycemia  -     glimepiride (AMARYL) 2 MG tablet; Take 1 tablet (2 mg total) by mouth before breakfast.  -     Hemoglobin A1C; Future    Benign essential HTN    Well controlled.  Continue same medication and dose.  1. Keep weight close to ideal body weight.   2.   Avoid high salt foods (olives, pickles, smoked meats, salted potato chips, etc.).   Do not add salt to your food at the table.   Use only small amounts of salt when cooking.   3. Begin an exercise program. Discuss with your doctor what type of exercise program would be best for you. It doesn't have to be difficult. Even brisk walking for 20 minutes three times a week is a good form of exercise.   4. Avoid medicines which contain heart stimulants. This includes many cold and sinus decongestant pills and sprays as well as diet pills. Check the warnings about hypertension on the label. Stimulants such as amphetamine or cocaine could be lethal for someone with hypertension. Never take these.    Back pain of lumbar region with sciatica  -     gabapentin (NEURONTIN) 300 MG capsule; Take 1 capsule (300 mg total) by mouth every evening.  Continue chiropractor .    Severe obesity (BMI 35.0-39.9) with comorbidity    # The patient is asked to make an attempt to improve diet and exercise patterns to aid in medical management of this problem.     # Eat  5 small meals a day.     # Cut out high carbohydrate  foods : bread, rice, pasta, potatoes.     # Exercise/walk 5x/week for at least 30-45   minutes.        CLL (chronic lymphocytic leukemia)  Seeing oncology       Problem List Items Addressed This Visit    None

## 2022-05-19 ENCOUNTER — OFFICE VISIT (OUTPATIENT)
Dept: HEMATOLOGY/ONCOLOGY | Facility: CLINIC | Age: 57
End: 2022-05-19
Payer: COMMERCIAL

## 2022-05-19 ENCOUNTER — PATIENT MESSAGE (OUTPATIENT)
Dept: INTERNAL MEDICINE | Facility: CLINIC | Age: 57
End: 2022-05-19
Payer: COMMERCIAL

## 2022-05-19 VITALS
RESPIRATION RATE: 16 BRPM | BODY MASS INDEX: 32 KG/M2 | HEART RATE: 67 BPM | SYSTOLIC BLOOD PRESSURE: 152 MMHG | HEIGHT: 69 IN | TEMPERATURE: 98 F | OXYGEN SATURATION: 97 % | DIASTOLIC BLOOD PRESSURE: 81 MMHG | WEIGHT: 216.06 LBS

## 2022-05-19 DIAGNOSIS — I10 BENIGN ESSENTIAL HTN: ICD-10-CM

## 2022-05-19 DIAGNOSIS — E11.65 UNCONTROLLED TYPE 2 DIABETES MELLITUS WITH HYPERGLYCEMIA: Primary | ICD-10-CM

## 2022-05-19 DIAGNOSIS — M54.40 BACK PAIN OF LUMBAR REGION WITH SCIATICA: ICD-10-CM

## 2022-05-19 DIAGNOSIS — C91.10 CLL (CHRONIC LYMPHOCYTIC LEUKEMIA): Primary | ICD-10-CM

## 2022-05-19 PROCEDURE — 3066F NEPHROPATHY DOC TX: CPT | Mod: CPTII,S$GLB,, | Performed by: NURSE PRACTITIONER

## 2022-05-19 PROCEDURE — 3061F PR NEG MICROALBUMINURIA RESULT DOCUMENTED/REVIEW: ICD-10-PCS | Mod: CPTII,S$GLB,, | Performed by: NURSE PRACTITIONER

## 2022-05-19 PROCEDURE — 99999 PR PBB SHADOW E&M-EST. PATIENT-LVL IV: ICD-10-PCS | Mod: PBBFAC,,, | Performed by: NURSE PRACTITIONER

## 2022-05-19 PROCEDURE — 3077F PR MOST RECENT SYSTOLIC BLOOD PRESSURE >= 140 MM HG: ICD-10-PCS | Mod: CPTII,S$GLB,, | Performed by: NURSE PRACTITIONER

## 2022-05-19 PROCEDURE — 3046F PR MOST RECENT HEMOGLOBIN A1C LEVEL > 9.0%: ICD-10-PCS | Mod: CPTII,S$GLB,, | Performed by: NURSE PRACTITIONER

## 2022-05-19 PROCEDURE — 1159F PR MEDICATION LIST DOCUMENTED IN MEDICAL RECORD: ICD-10-PCS | Mod: CPTII,S$GLB,, | Performed by: NURSE PRACTITIONER

## 2022-05-19 PROCEDURE — 3079F PR MOST RECENT DIASTOLIC BLOOD PRESSURE 80-89 MM HG: ICD-10-PCS | Mod: CPTII,S$GLB,, | Performed by: NURSE PRACTITIONER

## 2022-05-19 PROCEDURE — 3046F HEMOGLOBIN A1C LEVEL >9.0%: CPT | Mod: CPTII,S$GLB,, | Performed by: NURSE PRACTITIONER

## 2022-05-19 PROCEDURE — 1160F PR REVIEW ALL MEDS BY PRESCRIBER/CLIN PHARMACIST DOCUMENTED: ICD-10-PCS | Mod: CPTII,S$GLB,, | Performed by: NURSE PRACTITIONER

## 2022-05-19 PROCEDURE — 3072F LOW RISK FOR RETINOPATHY: CPT | Mod: CPTII,S$GLB,, | Performed by: NURSE PRACTITIONER

## 2022-05-19 PROCEDURE — 99214 OFFICE O/P EST MOD 30 MIN: CPT | Mod: S$GLB,,, | Performed by: NURSE PRACTITIONER

## 2022-05-19 PROCEDURE — 3008F BODY MASS INDEX DOCD: CPT | Mod: CPTII,S$GLB,, | Performed by: NURSE PRACTITIONER

## 2022-05-19 PROCEDURE — 3008F PR BODY MASS INDEX (BMI) DOCUMENTED: ICD-10-PCS | Mod: CPTII,S$GLB,, | Performed by: NURSE PRACTITIONER

## 2022-05-19 PROCEDURE — 3061F NEG MICROALBUMINURIA REV: CPT | Mod: CPTII,S$GLB,, | Performed by: NURSE PRACTITIONER

## 2022-05-19 PROCEDURE — 3079F DIAST BP 80-89 MM HG: CPT | Mod: CPTII,S$GLB,, | Performed by: NURSE PRACTITIONER

## 2022-05-19 PROCEDURE — 99214 PR OFFICE/OUTPT VISIT, EST, LEVL IV, 30-39 MIN: ICD-10-PCS | Mod: S$GLB,,, | Performed by: NURSE PRACTITIONER

## 2022-05-19 PROCEDURE — 1160F RVW MEDS BY RX/DR IN RCRD: CPT | Mod: CPTII,S$GLB,, | Performed by: NURSE PRACTITIONER

## 2022-05-19 PROCEDURE — 3066F PR DOCUMENTATION OF TREATMENT FOR NEPHROPATHY: ICD-10-PCS | Mod: CPTII,S$GLB,, | Performed by: NURSE PRACTITIONER

## 2022-05-19 PROCEDURE — 1159F MED LIST DOCD IN RCRD: CPT | Mod: CPTII,S$GLB,, | Performed by: NURSE PRACTITIONER

## 2022-05-19 PROCEDURE — 99999 PR PBB SHADOW E&M-EST. PATIENT-LVL IV: CPT | Mod: PBBFAC,,, | Performed by: NURSE PRACTITIONER

## 2022-05-19 PROCEDURE — 3072F PR LOW RISK FOR RETINOPATHY: ICD-10-PCS | Mod: CPTII,S$GLB,, | Performed by: NURSE PRACTITIONER

## 2022-05-19 PROCEDURE — 3077F SYST BP >= 140 MM HG: CPT | Mod: CPTII,S$GLB,, | Performed by: NURSE PRACTITIONER
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
## 2022-05-25 ENCOUNTER — PATIENT MESSAGE (OUTPATIENT)
Dept: INTERNAL MEDICINE | Facility: CLINIC | Age: 57
End: 2022-05-25
Payer: COMMERCIAL

## 2022-05-25 DIAGNOSIS — E11.65 UNCONTROLLED TYPE 2 DIABETES MELLITUS WITH HYPERGLYCEMIA: Primary | ICD-10-CM

## 2022-05-27 ENCOUNTER — TELEPHONE (OUTPATIENT)
Dept: INTERNAL MEDICINE | Facility: CLINIC | Age: 57
End: 2022-05-27
Payer: COMMERCIAL

## 2022-05-27 NOTE — TELEPHONE ENCOUNTER
----- Message from Lara Johnson sent at 2022 12:56 PM CDT -----  Regarding: Request to speak to a nurse  Contact: self / Em (spouse)  Wild Holliday  MRN: [id number]  : 1965  PCP: Joshua Noonan  Home Phone      [phone]  Work Phone      Not on file.  Mobile          [phone]      MESSAGE:   Request to speak to a nurse regarding Rx for a new glucometer, test strips, and lancets.   Patient stated his glucometer has broken.   Pharmacy instructed patient no Rx received - contact PCP      Phone # [phone]    [facility] - [id number] - [place], [place] - [address] AT [facility] [address] [place]) &

## 2022-05-31 ENCOUNTER — TELEPHONE (OUTPATIENT)
Dept: INTERNAL MEDICINE | Facility: CLINIC | Age: 57
End: 2022-05-31
Payer: COMMERCIAL

## 2022-05-31 RX ORDER — INSULIN PUMP SYRINGE, 3 ML
1 EACH MISCELLANEOUS DAILY
COMMUNITY
End: 2022-05-31 | Stop reason: SDUPTHER

## 2022-05-31 NOTE — TELEPHONE ENCOUNTER
----- Message from Niru Hernandez sent at 2022 11:39 AM CDT -----  Contact: kari/manuela  Wild Holliday  MRN: [id number]  : 1965  PCP: Joshua Noonan  Home Phone      [phone]  Work Phone      Not on file.  Mobile          [phone]      MESSAGE:   [facility] in [place] still stating that they have not received the orders for lancets, meter, and test strips.    Gave me a fax of:    [phone]      Please advise    [phone]

## 2022-05-31 NOTE — TELEPHONE ENCOUNTER
No new care gaps identified.  [facility] Embedded Care Gaps. Reference number: [id number]. 5/31/2022   11:49:20 AM CDT

## 2022-06-01 ENCOUNTER — PATIENT MESSAGE (OUTPATIENT)
Dept: INTERNAL MEDICINE | Facility: CLINIC | Age: 57
End: 2022-06-01
Payer: COMMERCIAL

## 2022-06-01 RX ORDER — INSULIN PUMP SYRINGE, 3 ML
1 EACH MISCELLANEOUS DAILY
Qty: 1 EACH | Refills: 0 | Status: SHIPPED | OUTPATIENT
Start: 2022-06-01

## 2022-06-01 RX ORDER — BLOOD SUGAR DIAGNOSTIC
STRIP MISCELLANEOUS
Qty: 100 STRIP | Refills: 1 | Status: SHIPPED | OUTPATIENT
Start: 2022-06-01

## 2022-07-11 ENCOUNTER — PATIENT MESSAGE (OUTPATIENT)
Dept: ADMINISTRATIVE | Facility: HOSPITAL | Age: 57
End: 2022-07-11
Payer: COMMERCIAL

## 2022-07-14 ENCOUNTER — PATIENT OUTREACH (OUTPATIENT)
Dept: ADMINISTRATIVE | Facility: HOSPITAL | Age: 57
End: 2022-07-14
Payer: COMMERCIAL

## 2022-07-14 NOTE — PROGRESS NOTES
Spoke with pt will be scheduling eye exam  If A1c is still out of range scheduling with endocrinologist

## 2022-08-08 ENCOUNTER — LAB VISIT (OUTPATIENT)
Dept: LAB | Facility: HOSPITAL | Age: 57
End: 2022-08-08
Attending: INTERNAL MEDICINE
Payer: COMMERCIAL

## 2022-08-08 DIAGNOSIS — E11.65 UNCONTROLLED TYPE 2 DIABETES MELLITUS WITH HYPERGLYCEMIA: ICD-10-CM

## 2022-08-08 LAB
ESTIMATED AVG GLUCOSE: 148 MG/DL (ref 68–131)
HBA1C MFR BLD: 6.8 % (ref 4–5.6)

## 2022-08-08 PROCEDURE — 36415 COLL VENOUS BLD VENIPUNCTURE: CPT | Performed by: INTERNAL MEDICINE

## 2022-08-08 PROCEDURE — 83036 HEMOGLOBIN GLYCOSYLATED A1C: CPT | Performed by: INTERNAL MEDICINE

## 2022-09-07 ENCOUNTER — OFFICE VISIT (OUTPATIENT)
Dept: INTERNAL MEDICINE | Facility: CLINIC | Age: 57
End: 2022-09-07
Payer: COMMERCIAL

## 2022-09-07 VITALS
BODY MASS INDEX: 39.55 KG/M2 | RESPIRATION RATE: 18 BRPM | WEIGHT: 267 LBS | OXYGEN SATURATION: 98 % | HEART RATE: 66 BPM | DIASTOLIC BLOOD PRESSURE: 76 MMHG | HEIGHT: 69 IN | SYSTOLIC BLOOD PRESSURE: 126 MMHG

## 2022-09-07 DIAGNOSIS — M54.40 BACK PAIN OF LUMBAR REGION WITH SCIATICA: ICD-10-CM

## 2022-09-07 DIAGNOSIS — E11.65 UNCONTROLLED TYPE 2 DIABETES MELLITUS WITH HYPERGLYCEMIA: Primary | ICD-10-CM

## 2022-09-07 PROCEDURE — 1159F PR MEDICATION LIST DOCUMENTED IN MEDICAL RECORD: ICD-10-PCS | Mod: CPTII,S$GLB,, | Performed by: INTERNAL MEDICINE

## 2022-09-07 PROCEDURE — 3008F PR BODY MASS INDEX (BMI) DOCUMENTED: ICD-10-PCS | Mod: CPTII,S$GLB,, | Performed by: INTERNAL MEDICINE

## 2022-09-07 PROCEDURE — 99213 OFFICE O/P EST LOW 20 MIN: CPT | Mod: S$GLB,,, | Performed by: INTERNAL MEDICINE

## 2022-09-07 PROCEDURE — 3074F SYST BP LT 130 MM HG: CPT | Mod: CPTII,S$GLB,, | Performed by: INTERNAL MEDICINE

## 2022-09-07 PROCEDURE — 3066F PR DOCUMENTATION OF TREATMENT FOR NEPHROPATHY: ICD-10-PCS | Mod: CPTII,S$GLB,, | Performed by: INTERNAL MEDICINE

## 2022-09-07 PROCEDURE — 3008F BODY MASS INDEX DOCD: CPT | Mod: CPTII,S$GLB,, | Performed by: INTERNAL MEDICINE

## 2022-09-07 PROCEDURE — 99999 PR PBB SHADOW E&M-EST. PATIENT-LVL V: ICD-10-PCS | Mod: PBBFAC,,, | Performed by: INTERNAL MEDICINE

## 2022-09-07 PROCEDURE — 3078F DIAST BP <80 MM HG: CPT | Mod: CPTII,S$GLB,, | Performed by: INTERNAL MEDICINE

## 2022-09-07 PROCEDURE — 3044F PR MOST RECENT HEMOGLOBIN A1C LEVEL <7.0%: ICD-10-PCS | Mod: CPTII,S$GLB,, | Performed by: INTERNAL MEDICINE

## 2022-09-07 PROCEDURE — 99213 PR OFFICE/OUTPT VISIT, EST, LEVL III, 20-29 MIN: ICD-10-PCS | Mod: S$GLB,,, | Performed by: INTERNAL MEDICINE

## 2022-09-07 PROCEDURE — 3074F PR MOST RECENT SYSTOLIC BLOOD PRESSURE < 130 MM HG: ICD-10-PCS | Mod: CPTII,S$GLB,, | Performed by: INTERNAL MEDICINE

## 2022-09-07 PROCEDURE — 1159F MED LIST DOCD IN RCRD: CPT | Mod: CPTII,S$GLB,, | Performed by: INTERNAL MEDICINE

## 2022-09-07 PROCEDURE — 3044F HG A1C LEVEL LT 7.0%: CPT | Mod: CPTII,S$GLB,, | Performed by: INTERNAL MEDICINE

## 2022-09-07 PROCEDURE — 3078F PR MOST RECENT DIASTOLIC BLOOD PRESSURE < 80 MM HG: ICD-10-PCS | Mod: CPTII,S$GLB,, | Performed by: INTERNAL MEDICINE

## 2022-09-07 PROCEDURE — 3061F PR NEG MICROALBUMINURIA RESULT DOCUMENTED/REVIEW: ICD-10-PCS | Mod: CPTII,S$GLB,, | Performed by: INTERNAL MEDICINE

## 2022-09-07 PROCEDURE — 99999 PR PBB SHADOW E&M-EST. PATIENT-LVL V: CPT | Mod: PBBFAC,,, | Performed by: INTERNAL MEDICINE

## 2022-09-07 PROCEDURE — 1160F RVW MEDS BY RX/DR IN RCRD: CPT | Mod: CPTII,S$GLB,, | Performed by: INTERNAL MEDICINE

## 2022-09-07 PROCEDURE — 3072F LOW RISK FOR RETINOPATHY: CPT | Mod: CPTII,S$GLB,, | Performed by: INTERNAL MEDICINE

## 2022-09-07 PROCEDURE — 3072F PR LOW RISK FOR RETINOPATHY: ICD-10-PCS | Mod: CPTII,S$GLB,, | Performed by: INTERNAL MEDICINE

## 2022-09-07 PROCEDURE — 1160F PR REVIEW ALL MEDS BY PRESCRIBER/CLIN PHARMACIST DOCUMENTED: ICD-10-PCS | Mod: CPTII,S$GLB,, | Performed by: INTERNAL MEDICINE

## 2022-09-07 PROCEDURE — 3061F NEG MICROALBUMINURIA REV: CPT | Mod: CPTII,S$GLB,, | Performed by: INTERNAL MEDICINE

## 2022-09-07 PROCEDURE — 3066F NEPHROPATHY DOC TX: CPT | Mod: CPTII,S$GLB,, | Performed by: INTERNAL MEDICINE

## 2022-09-07 RX ORDER — GABAPENTIN 300 MG/1
300 CAPSULE ORAL NIGHTLY
Qty: 30 CAPSULE | Refills: 3 | Status: SHIPPED | OUTPATIENT
Start: 2022-09-07 | End: 2022-10-31 | Stop reason: SDUPTHER

## 2022-09-07 NOTE — PROGRESS NOTES
Subjective:       Patient ID: Wild Holliday is a 56 y.o. male.    Chief Complaint: Follow-up and Diabetes    Wild Holliday is a 56 y.o. male who presents for Type II DM, . Labs were reviewed with patient today.     Follow-up  Associated symptoms include headaches. Pertinent negatives include no abdominal pain, chest pain, chills, congestion, fever, numbness, sore throat or vomiting.   Diabetes  He presents for his follow-up diabetic visit. He has type 2 diabetes mellitus. His disease course has been improving. Hypoglycemia symptoms include headaches. Pertinent negatives for hypoglycemia include no dizziness, nervousness/anxiousness, pallor, seizures or speech difficulty. Pertinent negatives for diabetes include no chest pain.   Review of Systems   Constitutional:  Negative for chills and fever.   HENT:  Negative for congestion, postnasal drip and sore throat.    Eyes:  Negative for photophobia.   Respiratory:  Negative for chest tightness and shortness of breath.    Cardiovascular:  Negative for chest pain.   Gastrointestinal:  Negative for abdominal distention, abdominal pain, blood in stool and vomiting.   Genitourinary:  Negative for dysuria, flank pain and hematuria.   Musculoskeletal:  Positive for back pain.   Skin:  Negative for pallor.   Neurological:  Positive for headaches. Negative for dizziness, seizures, facial asymmetry, speech difficulty and numbness.   Hematological:  Does not bruise/bleed easily.   Psychiatric/Behavioral:  Negative for agitation and suicidal ideas. The patient is not nervous/anxious.      Objective:      Physical Exam  Vitals and nursing note reviewed.   Constitutional:       Appearance: He is well-developed.   HENT:      Head: Normocephalic and atraumatic.   Neck:      Vascular: No JVD.   Cardiovascular:      Rate and Rhythm: Normal rate and regular rhythm.      Heart sounds: Normal heart sounds.   Pulmonary:      Effort: Pulmonary effort is normal.      Breath sounds: Normal  breath sounds.   Abdominal:      General: Bowel sounds are normal.      Palpations: Abdomen is soft.      Tenderness: There is no abdominal tenderness.   Skin:     General: Skin is warm and dry.   Neurological:      Mental Status: He is alert and oriented to person, place, and time.   Psychiatric:         Behavior: Behavior normal.         Thought Content: Thought content normal.         Judgment: Judgment normal.       Assessment:       1. Uncontrolled type 2 diabetes mellitus with hyperglycemia    2. Back pain of lumbar region with sciatica          Plan:   Wild was seen today for follow-up and diabetes.    Diagnoses and all orders for this visit:    Uncontrolled type 2 diabetes mellitus with hyperglycemia  Patient has uncontrolled Diabetes .  We discussed about diet ;low in calories. Avoid sweats, sodas.  Also increasing activity;walking 2-3 miles a day.  I also adjusted medications and gave patient  instructions about adherence to plan.  Goal of  A1c  less than 7 % stressed.  Also goal of LDL less than 70 highlighted to patient.   Continue tripple therapy : metformin ;januvia nad amryl    -     CBC Auto Differential; Future  -     Comprehensive Metabolic Panel; Future  -     Lipid Panel; Future  -     TSH; Future  -     Hemoglobin A1C; Future  -     Microalbumin/Creatinine Ratio, Urine; Future  Back pain of lumbar region with sciatica  -     gabapentin (NEURONTIN) 300 MG capsule; Take 1 capsule (300 mg total) by mouth every evening.    Problem List Items Addressed This Visit       Back pain of lumbar region with sciatica    Relevant Medications    gabapentin (NEURONTIN) 300 MG capsule

## 2022-09-12 ENCOUNTER — TELEPHONE (OUTPATIENT)
Dept: NEUROSURGERY | Facility: CLINIC | Age: 57
End: 2022-09-12
Payer: COMMERCIAL

## 2022-09-12 NOTE — TELEPHONE ENCOUNTER
Left message on pt vm stating that in order to see Dr. Feldman for workers comp, he will need approval from his worker's comp  to see Dr. Feldman and a MRI completed within the last 6 months.
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
## 2022-09-14 ENCOUNTER — PATIENT MESSAGE (OUTPATIENT)
Dept: HEMATOLOGY/ONCOLOGY | Facility: CLINIC | Age: 57
End: 2022-09-14
Payer: COMMERCIAL

## 2022-09-26 ENCOUNTER — TELEPHONE (OUTPATIENT)
Dept: NEUROSURGERY | Facility: CLINIC | Age: 57
End: 2022-09-26
Payer: COMMERCIAL

## 2022-09-26 ENCOUNTER — OFFICE VISIT (OUTPATIENT)
Dept: NEUROSURGERY | Facility: CLINIC | Age: 57
End: 2022-09-26
Payer: COMMERCIAL

## 2022-09-26 VITALS
TEMPERATURE: 98 F | DIASTOLIC BLOOD PRESSURE: 105 MMHG | BODY MASS INDEX: 39.55 KG/M2 | WEIGHT: 267 LBS | HEART RATE: 64 BPM | SYSTOLIC BLOOD PRESSURE: 196 MMHG | HEIGHT: 69 IN

## 2022-09-26 DIAGNOSIS — M43.16 SPONDYLOLISTHESIS OF LUMBAR REGION: ICD-10-CM

## 2022-09-26 DIAGNOSIS — M47.26 OSTEOARTHRITIS OF SPINE WITH RADICULOPATHY, LUMBAR REGION: ICD-10-CM

## 2022-09-26 DIAGNOSIS — M54.9 DORSALGIA, UNSPECIFIED: Primary | ICD-10-CM

## 2022-09-26 PROCEDURE — 1160F PR REVIEW ALL MEDS BY PRESCRIBER/CLIN PHARMACIST DOCUMENTED: ICD-10-PCS | Mod: CPTII,S$GLB,, | Performed by: NEUROLOGICAL SURGERY

## 2022-09-26 PROCEDURE — 3080F DIAST BP >= 90 MM HG: CPT | Mod: CPTII,S$GLB,, | Performed by: NEUROLOGICAL SURGERY

## 2022-09-26 PROCEDURE — 3077F PR MOST RECENT SYSTOLIC BLOOD PRESSURE >= 140 MM HG: ICD-10-PCS | Mod: CPTII,S$GLB,, | Performed by: NEUROLOGICAL SURGERY

## 2022-09-26 PROCEDURE — 3044F PR MOST RECENT HEMOGLOBIN A1C LEVEL <7.0%: ICD-10-PCS | Mod: CPTII,S$GLB,, | Performed by: NEUROLOGICAL SURGERY

## 2022-09-26 PROCEDURE — 3061F NEG MICROALBUMINURIA REV: CPT | Mod: CPTII,S$GLB,, | Performed by: NEUROLOGICAL SURGERY

## 2022-09-26 PROCEDURE — 1159F MED LIST DOCD IN RCRD: CPT | Mod: CPTII,S$GLB,, | Performed by: NEUROLOGICAL SURGERY

## 2022-09-26 PROCEDURE — 3066F NEPHROPATHY DOC TX: CPT | Mod: CPTII,S$GLB,, | Performed by: NEUROLOGICAL SURGERY

## 2022-09-26 PROCEDURE — 1160F RVW MEDS BY RX/DR IN RCRD: CPT | Mod: CPTII,S$GLB,, | Performed by: NEUROLOGICAL SURGERY

## 2022-09-26 PROCEDURE — 99204 PR OFFICE/OUTPT VISIT, NEW, LEVL IV, 45-59 MIN: ICD-10-PCS | Mod: S$GLB,,, | Performed by: NEUROLOGICAL SURGERY

## 2022-09-26 PROCEDURE — 99204 OFFICE O/P NEW MOD 45 MIN: CPT | Mod: S$GLB,,, | Performed by: NEUROLOGICAL SURGERY

## 2022-09-26 PROCEDURE — 3080F PR MOST RECENT DIASTOLIC BLOOD PRESSURE >= 90 MM HG: ICD-10-PCS | Mod: CPTII,S$GLB,, | Performed by: NEUROLOGICAL SURGERY

## 2022-09-26 PROCEDURE — 3044F HG A1C LEVEL LT 7.0%: CPT | Mod: CPTII,S$GLB,, | Performed by: NEUROLOGICAL SURGERY

## 2022-09-26 PROCEDURE — 1159F PR MEDICATION LIST DOCUMENTED IN MEDICAL RECORD: ICD-10-PCS | Mod: CPTII,S$GLB,, | Performed by: NEUROLOGICAL SURGERY

## 2022-09-26 PROCEDURE — 3077F SYST BP >= 140 MM HG: CPT | Mod: CPTII,S$GLB,, | Performed by: NEUROLOGICAL SURGERY

## 2022-09-26 PROCEDURE — 3008F PR BODY MASS INDEX (BMI) DOCUMENTED: ICD-10-PCS | Mod: CPTII,S$GLB,, | Performed by: NEUROLOGICAL SURGERY

## 2022-09-26 PROCEDURE — 3066F PR DOCUMENTATION OF TREATMENT FOR NEPHROPATHY: ICD-10-PCS | Mod: CPTII,S$GLB,, | Performed by: NEUROLOGICAL SURGERY

## 2022-09-26 PROCEDURE — 3072F PR LOW RISK FOR RETINOPATHY: ICD-10-PCS | Mod: CPTII,S$GLB,, | Performed by: NEUROLOGICAL SURGERY

## 2022-09-26 PROCEDURE — 99999 PR PBB SHADOW E&M-EST. PATIENT-LVL V: CPT | Mod: PBBFAC,,, | Performed by: NEUROLOGICAL SURGERY

## 2022-09-26 PROCEDURE — 3008F BODY MASS INDEX DOCD: CPT | Mod: CPTII,S$GLB,, | Performed by: NEUROLOGICAL SURGERY

## 2022-09-26 PROCEDURE — 99999 PR PBB SHADOW E&M-EST. PATIENT-LVL V: ICD-10-PCS | Mod: PBBFAC,,, | Performed by: NEUROLOGICAL SURGERY

## 2022-09-26 PROCEDURE — 3061F PR NEG MICROALBUMINURIA RESULT DOCUMENTED/REVIEW: ICD-10-PCS | Mod: CPTII,S$GLB,, | Performed by: NEUROLOGICAL SURGERY

## 2022-09-26 PROCEDURE — 3072F LOW RISK FOR RETINOPATHY: CPT | Mod: CPTII,S$GLB,, | Performed by: NEUROLOGICAL SURGERY

## 2022-09-26 RX ORDER — CYCLOBENZAPRINE HCL 10 MG
10 TABLET ORAL 2 TIMES DAILY PRN
COMMUNITY
End: 2022-10-31 | Stop reason: CLARIF

## 2022-09-26 RX ORDER — DIAZEPAM 5 MG/1
5 TABLET ORAL EVERY 6 HOURS PRN
Qty: 30 TABLET | Refills: 0 | Status: SHIPPED | OUTPATIENT
Start: 2022-09-26 | End: 2022-10-31 | Stop reason: CLARIF

## 2022-09-26 RX ORDER — METHYLPREDNISOLONE 4 MG/1
TABLET ORAL
Qty: 21 EACH | Refills: 0 | Status: SHIPPED | OUTPATIENT
Start: 2022-09-26 | End: 2022-10-17

## 2022-09-26 NOTE — TELEPHONE ENCOUNTER
Left message on pt vm to call office back to verify if he's completed an MRI of his lower back within the last year, if not he'll need to be rescheduled with one of our mid-levels.

## 2022-09-26 NOTE — TELEPHONE ENCOUNTER
----- Message from Shikha Salazar sent at 9/26/2022  8:57 AM CDT -----  Pt called back stated he rec'd a VM from [facility].  He had a MRI done May.  He have a copy of the disk and the report.  He stated he could bring it to the office early if need be.  Call back [phone]
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
## 2022-09-30 ENCOUNTER — PATIENT MESSAGE (OUTPATIENT)
Dept: NEUROSURGERY | Facility: CLINIC | Age: 57
End: 2022-09-30
Payer: COMMERCIAL

## 2022-10-07 ENCOUNTER — PATIENT MESSAGE (OUTPATIENT)
Dept: INTERNAL MEDICINE | Facility: CLINIC | Age: 57
End: 2022-10-07
Payer: COMMERCIAL

## 2022-10-09 ENCOUNTER — PATIENT MESSAGE (OUTPATIENT)
Dept: NEUROSURGERY | Facility: CLINIC | Age: 57
End: 2022-10-09
Payer: COMMERCIAL

## 2022-10-20 ENCOUNTER — PATIENT MESSAGE (OUTPATIENT)
Dept: NEUROSURGERY | Facility: CLINIC | Age: 57
End: 2022-10-20
Payer: COMMERCIAL

## 2022-10-31 ENCOUNTER — PATIENT MESSAGE (OUTPATIENT)
Dept: ADMINISTRATIVE | Facility: OTHER | Age: 57
End: 2022-10-31
Payer: COMMERCIAL

## 2022-10-31 ENCOUNTER — OFFICE VISIT (OUTPATIENT)
Dept: NEUROSURGERY | Facility: CLINIC | Age: 57
End: 2022-10-31
Payer: COMMERCIAL

## 2022-10-31 ENCOUNTER — PATIENT MESSAGE (OUTPATIENT)
Dept: INTERNAL MEDICINE | Facility: CLINIC | Age: 57
End: 2022-10-31
Payer: COMMERCIAL

## 2022-10-31 ENCOUNTER — TELEPHONE (OUTPATIENT)
Dept: NEUROSURGERY | Facility: CLINIC | Age: 57
End: 2022-10-31
Payer: COMMERCIAL

## 2022-10-31 VITALS
BODY MASS INDEX: 39.4 KG/M2 | DIASTOLIC BLOOD PRESSURE: 91 MMHG | HEART RATE: 73 BPM | HEIGHT: 69 IN | SYSTOLIC BLOOD PRESSURE: 164 MMHG | TEMPERATURE: 98 F | WEIGHT: 266 LBS

## 2022-10-31 DIAGNOSIS — M54.40 BACK PAIN OF LUMBAR REGION WITH SCIATICA: ICD-10-CM

## 2022-10-31 DIAGNOSIS — Z01.818 PRE-OP EVALUATION: Primary | ICD-10-CM

## 2022-10-31 DIAGNOSIS — M47.26 OSTEOARTHRITIS OF SPINE WITH RADICULOPATHY, LUMBAR REGION: Primary | ICD-10-CM

## 2022-10-31 DIAGNOSIS — M43.16 SPONDYLOLISTHESIS OF LUMBAR REGION: ICD-10-CM

## 2022-10-31 DIAGNOSIS — Z01.818 PREOPERATIVE TESTING: Primary | ICD-10-CM

## 2022-10-31 PROCEDURE — 3072F PR LOW RISK FOR RETINOPATHY: ICD-10-PCS | Mod: CPTII,S$GLB,, | Performed by: NEUROLOGICAL SURGERY

## 2022-10-31 PROCEDURE — 99999 PR PBB SHADOW E&M-EST. PATIENT-LVL V: ICD-10-PCS | Mod: PBBFAC,,, | Performed by: NEUROLOGICAL SURGERY

## 2022-10-31 PROCEDURE — 99214 OFFICE O/P EST MOD 30 MIN: CPT | Mod: S$GLB,,, | Performed by: NEUROLOGICAL SURGERY

## 2022-10-31 PROCEDURE — 99999 PR PBB SHADOW E&M-EST. PATIENT-LVL V: CPT | Mod: PBBFAC,,, | Performed by: NEUROLOGICAL SURGERY

## 2022-10-31 PROCEDURE — 3044F HG A1C LEVEL LT 7.0%: CPT | Mod: CPTII,S$GLB,, | Performed by: NEUROLOGICAL SURGERY

## 2022-10-31 PROCEDURE — 3077F SYST BP >= 140 MM HG: CPT | Mod: CPTII,S$GLB,, | Performed by: NEUROLOGICAL SURGERY

## 2022-10-31 PROCEDURE — 3061F NEG MICROALBUMINURIA REV: CPT | Mod: CPTII,S$GLB,, | Performed by: NEUROLOGICAL SURGERY

## 2022-10-31 PROCEDURE — 1159F PR MEDICATION LIST DOCUMENTED IN MEDICAL RECORD: ICD-10-PCS | Mod: CPTII,S$GLB,, | Performed by: NEUROLOGICAL SURGERY

## 2022-10-31 PROCEDURE — 3072F LOW RISK FOR RETINOPATHY: CPT | Mod: CPTII,S$GLB,, | Performed by: NEUROLOGICAL SURGERY

## 2022-10-31 PROCEDURE — 3077F PR MOST RECENT SYSTOLIC BLOOD PRESSURE >= 140 MM HG: ICD-10-PCS | Mod: CPTII,S$GLB,, | Performed by: NEUROLOGICAL SURGERY

## 2022-10-31 PROCEDURE — 1159F MED LIST DOCD IN RCRD: CPT | Mod: CPTII,S$GLB,, | Performed by: NEUROLOGICAL SURGERY

## 2022-10-31 PROCEDURE — 3061F PR NEG MICROALBUMINURIA RESULT DOCUMENTED/REVIEW: ICD-10-PCS | Mod: CPTII,S$GLB,, | Performed by: NEUROLOGICAL SURGERY

## 2022-10-31 PROCEDURE — 99214 PR OFFICE/OUTPT VISIT, EST, LEVL IV, 30-39 MIN: ICD-10-PCS | Mod: S$GLB,,, | Performed by: NEUROLOGICAL SURGERY

## 2022-10-31 PROCEDURE — 3066F PR DOCUMENTATION OF TREATMENT FOR NEPHROPATHY: ICD-10-PCS | Mod: CPTII,S$GLB,, | Performed by: NEUROLOGICAL SURGERY

## 2022-10-31 PROCEDURE — 3066F NEPHROPATHY DOC TX: CPT | Mod: CPTII,S$GLB,, | Performed by: NEUROLOGICAL SURGERY

## 2022-10-31 PROCEDURE — 3080F DIAST BP >= 90 MM HG: CPT | Mod: CPTII,S$GLB,, | Performed by: NEUROLOGICAL SURGERY

## 2022-10-31 PROCEDURE — 3080F PR MOST RECENT DIASTOLIC BLOOD PRESSURE >= 90 MM HG: ICD-10-PCS | Mod: CPTII,S$GLB,, | Performed by: NEUROLOGICAL SURGERY

## 2022-10-31 PROCEDURE — 3044F PR MOST RECENT HEMOGLOBIN A1C LEVEL <7.0%: ICD-10-PCS | Mod: CPTII,S$GLB,, | Performed by: NEUROLOGICAL SURGERY

## 2022-10-31 PROCEDURE — 1160F RVW MEDS BY RX/DR IN RCRD: CPT | Mod: CPTII,S$GLB,, | Performed by: NEUROLOGICAL SURGERY

## 2022-10-31 PROCEDURE — 1160F PR REVIEW ALL MEDS BY PRESCRIBER/CLIN PHARMACIST DOCUMENTED: ICD-10-PCS | Mod: CPTII,S$GLB,, | Performed by: NEUROLOGICAL SURGERY

## 2022-10-31 RX ORDER — GABAPENTIN 300 MG/1
600 CAPSULE ORAL NIGHTLY
Qty: 30 CAPSULE | Refills: 3 | Status: SHIPPED | OUTPATIENT
Start: 2022-10-31 | End: 2022-11-11 | Stop reason: ALTCHOICE

## 2022-10-31 NOTE — PRE-PROCEDURE INSTRUCTIONS
Patient stated has not had any problem with anesthesia in the past. Will need medical clearance from your PCP per Dr. Feldman. She gave him a preop testing form to bring to his PCP, Dr. Joshua Noonan. He will make an appt.Will need T&S . Our  jasper call to set up this appt.  Preop instructions given. Hold aspirin, aspirin containing products, nsaids(aleve, advil, motrin, ibuprofen, naprosyn, naproxen, voltaren, diclofenac), vitamins( Multivitamin) and supplements( Fish oil) one week prior to surgery.     May take Tylenol.  Medication instructions given:  blood-glucose meter kit 1 each 0 2022    Si each by Other route once daily at 6am. Use to check blood sugar once daily   Route: Other   CONTOUR NEXT TEST STRIPS Strp 100 strip 1 2022    Sig: Once daily   fenofibrate 160 MG Tab 90 tablet 2 2022    Sig: TAKE 1 TABLET(160 MG) BY MOUTH EVERY DAY       Asya Herman RN 10/31/2022  3:09 PM   TAKE THE NIGHT BEFORE SURGERY.              finasteride (PROSCAR) 5 mg tablet   2020    Class: Historical Med       Asya Herman RN 10/31/2022  3:10 PM   TAKE IN AM OF SURGERY.              fish oil-omega-3 fatty acids 300-1,000 mg capsule       Sig: Take 2 g by mouth once daily.   Class: Historical Med   Route: Oral       Asya Herman RN 10/31/2022  3:08 PM   HOLD ONE WEEK PRIOR TO SURGERY.     Gail Barreto MA 2021  3:06 PM                glimepiride (AMARYL) 2 MG tablet 90 tablet 1 2022    Sig: Take 1 tablet (2 mg total) by mouth before breakfast.   Route: Oral       Asya Herman RN 10/31/2022  3:11 PM   HOLD IN AM OF SURGERY.             lancets 25 gauge Misc 100 each 2 2022    Si lancets by Misc.(Non-Drug; Combo Route) route once daily at 6am. Use to check blood sugar once daily   Route: Misc.(Non-Drug; Combo Route)   metFORMIN (GLUCOPHAGE) 1000 MG tablet 180 tablet 3 10/10/2022 10/10/2023   Sig: Take 1 tablet (1,000 mg total) by mouth 2 (two) times daily with meals.   Route: Oral        Asya Herman RN 10/31/2022  3:11 PM   HOLD IN PM OF SURGERY. HOLD IN AM OF SURGERY.             multivitamin (THERAGRAN) per tablet       Sig: Take 1 tablet by mouth once daily.   Class: Historical Med   Route: Oral       Asya Herman RN 10/31/2022  3:08 PM   HOLD ONE WEEK PRIOR TO SURGERY.     Gail Barreto MA 4/13/2021  3:06 PM                SITagliptin (JANUVIA) 50 MG Tab 90 tablet 0 9/26/2022    Sig: Take 1 tablet (50 mg total) by mouth once daily.   Route: Oral   Prior authorization: Closed - Prior Authorization not required for patient/medication       Asya Herman RN 10/31/2022  3:12 PM   HOLD 3 DAYS PRIOR TO SURGERY. LAST DOSE 11/10)            tamsulosin (FLOMAX) 0.4 mg Cap   7/22/2020    Class: Historical Med       Asya Herman RN 10/31/2022  3:13 PM   TAKE THE NIGHT BEFORE SURGERY.              valsartan-hydrochlorothiazide (DIOVAN-HCT) 160-25 mg per tablet 90 tablet 3 4/26/2022 4/26/2023   Sig: Take 1 tablet by mouth once daily.   Route: Luca Herman RN 10/31/2022  3:13 PM   TAKE THE NIGHT BEFORE SURGERY.              gabapentin (NEURONTIN) 300 MG capsule 30 capsule 3 10/31/2022 10/31/2023   Sig: Take 2 capsules (600 mg total) by mouth every evening.   Route: Luca Herman RN 10/31/2022  3:10 PM   TAKE IN AM OF SURGERY.              Your surgery has been scheduled for:_Monday 11/14/2022_________________________________________     You should report to:  ____[facility], located on the [place] side of the first floor of the           [facility] ([phone])  __x__The Second Floor Surgery Center, located on the [facility] side of the            Second floor of the [facility] ([phone])  ____3rd Floor [facility] located on the [facility] side of the [facility] [phone]  Please Note   Tell your doctor if you take Aspirin, products containing Aspirin, herbal medications  or blood thinners, such as  Coumadin, Ticlid, or Plavix.  (Consult your provider regarding holding or stopping before surgery).  Arrange for someone to drive you home following surgery.  You will not be allowed to leave the surgical facility alone or drive yourself home following sedation and anesthesia.  Before Surgery  Stop taking all vitamins/ herbal medications 14days prior to surgery  No Motrin/Advil (Ibuprofen) 7 days before surgery  No Aleve (Naproxen) 7 days before surgery  Stop Taking Asprin, products containing Asprin __7___days before surgery  Stop taking blood thinners_______days before surgery  No Goody's/BC  Powder 7 days before surgery  Refrain from drinking alcoholic beverages for 24hours before and after surgery  Stop or limit smoking _________days before surgery( nonsmoker)  You may take Tylenol for pain  Night before Surgery  Nothing to eat or drink after midnight.  Take a shower or bath (shower is recommended).  Bathe with Hibiclens soap or an antibacterial soap from the neck down.  If not supplied by your surgeon, hibiclens soap will need to be purchased over the counter in pharmacy.  Rinse soap off thoroughly.  Shampoo your hair with your regular shampoo  The Day of Surgery  NOTHING TO  DRINK 2 hours before arrival time. If you are told to take medication on the morning of surgery, it may be taken with a sip of water.   Take another bath or shower with hibiclens or any antibacterial soap, to reduce the chance of infection.  Take heart and blood pressure medications with a small sip of water, as advised by the perioperative team.  Do not take fluid pills  You may brush your teeth and rinse your mouth, but do not swall any additional water.   Do not apply perfumes, powder, body lotions or deodorant on the day of surgery.  Nail polish should be removed.  Do not wear makeup or moisturizer  Wear comfortable clothes, such as a button front shirt and loose fitting pants.  Leave all jewelry, including body piercings, and  valuables at home.    Bring any devices you will neeed after surgery such as crutches or canes.  If you have sleep apnea, please bring your CPAP machine  In the event that your physical condition changes including the onset of a cold or respiratory illness, or if you have to delay or cancel your surgery, please notify your surgeon.   Stated knows where the surgery center is locate. Verbalizes understanding. Sent preop and med instructions to [facility] portal.

## 2022-10-31 NOTE — ANESTHESIA PAT ROS NOTE
10/31/2022  Wild Holliday is a 57 y.o., male.      Pre-op Assessment          Review of Systems  Anesthesia Hx:  No problems with previous Anesthesia             Denies Family Hx of Anesthesia complications.    Denies Personal Hx of Anesthesia complications.                    Social:  Non-Smoker, Alcohol Use RARE, ONCE PER MONTH      Hematology/Oncology:  Hematology Normal                       --  Cancer in past history (H/O CLL IN REMISSION 5 YEARS PER PATIENT):                     EENT/Dental:  EENT/Dental Normal           Cardiovascular:            Denies Angina.          Functional Capacity 3.5 METS, CAN CLIMB 2 FLIGHTS OF STAIRS SLOWLY                   Hypertension  , Recent typical clinic B/P of 164/91       Pulmonary:  Pulmonary Normal      Denies Shortness of breath.  Denies Recent URI.                 Renal/:  Renal/ Normal                 Hepatic/GI:    Esophageal / Stomach Disorders Gerd (OCCASIONAL)          Musculoskeletal:   Musculoskeletal General/Symptoms:  Functional capacity is ambulatory without assistance.   Joint Disease:  Arthritis, Osteoarthritis ARTHRITIS OF KNEES PER PATIENT       Lumbar Spine Disorders, Lumbar Disc Disease, Spondylolisthesis, Radiculopathy   Neurological:   Neuro Symptoms of pain OF LOWER BACK AND R LEG   Osteoarthritis                         Neuromuscular Disease LUMBAR RADICULOPATHY  Endocrine:   Diabetes, Type 2 Diabetes   , controlled by oral hypoglycemics. Typical AM glucose range: 170 , most recent HgA1c value was 6.8 on 8/8/2022.              Metabolic Disorders, Obesity / BMI > 30  Psych:     Sleep Disorder and Insomnia. Anxiety Disorder.   Depression.     Sleep Disorder and Insomnia.           Anesthesia Assessment: Preoperative EQUATION    Planned Procedure: Procedure(s) (LRB):  DISCECTOMY, SPINE, MINIMALLY INVASIVE, USING MICROSCOPE w Laminotomy  (N/A)  Requested Anesthesia Type:General  Surgeon: Gail Feldman MD  Service: Neurosurgery  Known or anticipated Date of Surgery:11/14/2022    Surgeon notes: reviewed    Electronic QUestionnaire Assessment completed via nurse interview with patient.        Triage considerations:     The patient has no apparent active cardiac condition (No unstable coronary Syndrome such as severe unstable angina or recent [<1 month] myocardial infarction, decompensated CHF, severe valvular   disease or significant arrhythmia)    Previous anesthesia records:MAC and No problems  10/3/2019   COLONOSCOPY   Airway/Jaw/Neck:  Airway Findings: Mouth Opening: Normal Tongue: Normal  General Airway Assessment: Adult, Average  Mallampati: III  Improves to II with phonation.  TM Distance: Normal, at least 6 cm       Last PCP note: within 3 months , within [facility]   Subspecialty notes: Hematology/Oncology, Neurosurgery    Other important co-morbidities: DM2, GERD, HTN, Obesity, and LUMBAR SPONDYLOLISTHESIS, OSTEOARTHRITIS , H/O CLL       Tests already available:  Available tests,  within 1 month , within 3 months , within [facility] .   10/27/2022 CT LUMBAR SPINE W/O CONTRAST, XRAY LUMBAR COMPLETE INCLUDING F&E, 10/6/2022 XRAY -MRI LUMBAR SPINE CORRELATION, MRI LUMBAR SPINE W/O CONTRAST          Instructions given. (See in Nurse's note)    Optimization:  Anesthesia Preop Clinic Assessment -Not  Indicated for this surgery    Medical Opinion Indicated      Plan:    Testing:  CMP, EKG, Hematology Profile, PT/INR, PTT, T&C, T&S, and UA     Consultation:Patient's PCP for re-evaluation( per Dr. Feldman )     Patient  has previously scheduled Medical Appointment:NONE    Navigation: Tests Scheduled. TBD             Consults scheduled.TBD             Results will be tracked by Preop Clinic.  Asya Herman RN BSN
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
## 2022-10-31 NOTE — TELEPHONE ENCOUNTER
Spoke with pt, I notified him that Dr. Feldman will still see him if he can be here before 10:15am, anything later he will have to reschedule, pt stated understanding.

## 2022-10-31 NOTE — TELEPHONE ENCOUNTER
----- Message from Aminta Esposito sent at 10/31/2022  9:40 AM CDT -----  Regarding: Late  Contact: pt @ [phone]  Pt is running late, will be there about 10:15 or sooner. Asking for a call back
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
## 2022-11-01 ENCOUNTER — TELEPHONE (OUTPATIENT)
Dept: PREADMISSION TESTING | Facility: HOSPITAL | Age: 57
End: 2022-11-01
Payer: COMMERCIAL

## 2022-11-01 ENCOUNTER — HOSPITAL ENCOUNTER (OUTPATIENT)
Dept: RADIOLOGY | Facility: HOSPITAL | Age: 57
Discharge: HOME | End: 2022-11-01
Attending: INTERNAL MEDICINE
Payer: COMMERCIAL

## 2022-11-01 ENCOUNTER — TELEPHONE (OUTPATIENT)
Dept: INTERNAL MEDICINE | Facility: CLINIC | Age: 57
End: 2022-11-01
Payer: COMMERCIAL

## 2022-11-01 ENCOUNTER — PATIENT MESSAGE (OUTPATIENT)
Dept: SURGERY | Facility: HOSPITAL | Age: 57
End: 2022-11-01
Payer: COMMERCIAL

## 2022-11-01 ENCOUNTER — HOSPITAL ENCOUNTER (OUTPATIENT)
Dept: PULMONOLOGY | Facility: HOSPITAL | Age: 57
Discharge: HOME | End: 2022-11-01
Attending: INTERNAL MEDICINE
Payer: COMMERCIAL

## 2022-11-01 ENCOUNTER — PATIENT MESSAGE (OUTPATIENT)
Dept: INTERNAL MEDICINE | Facility: CLINIC | Age: 57
End: 2022-11-01
Payer: COMMERCIAL

## 2022-11-01 DIAGNOSIS — Z01.818 PRE-OP EVALUATION: Primary | ICD-10-CM

## 2022-11-01 DIAGNOSIS — Z01.818 PRE-OP TESTING: ICD-10-CM

## 2022-11-01 DIAGNOSIS — Z01.818 PRE-OP TESTING: Primary | ICD-10-CM

## 2022-11-01 DIAGNOSIS — Z01.818 PRE-OP EVALUATION: ICD-10-CM

## 2022-11-01 PROCEDURE — 93010 EKG 12-LEAD: ICD-10-PCS | Mod: ,,, | Performed by: INTERNAL MEDICINE

## 2022-11-01 PROCEDURE — 93010 ELECTROCARDIOGRAM REPORT: CPT | Mod: ,,, | Performed by: INTERNAL MEDICINE

## 2022-11-01 PROCEDURE — 71045 X-RAY EXAM CHEST 1 VIEW: CPT | Mod: TC

## 2022-11-01 PROCEDURE — 71045 X-RAY EXAM CHEST 1 VIEW: CPT | Mod: 26,,, | Performed by: RADIOLOGY

## 2022-11-01 PROCEDURE — 71045 XR CHEST 1 VIEW: ICD-10-PCS | Mod: 26,,, | Performed by: RADIOLOGY

## 2022-11-01 PROCEDURE — 93005 ELECTROCARDIOGRAM TRACING: CPT

## 2022-11-01 NOTE — TELEPHONE ENCOUNTER
----- Message from Asya Herman RN sent at 10/31/2022  3:40 PM CDT -----  Surgery 11/14  Please schedule T& S.  Thanks!

## 2022-11-02 ENCOUNTER — PATIENT MESSAGE (OUTPATIENT)
Dept: SURGERY | Facility: OTHER | Age: 57
End: 2022-11-02
Payer: COMMERCIAL

## 2022-11-02 DIAGNOSIS — Z01.818 PREOPERATIVE TESTING: Primary | ICD-10-CM

## 2022-11-03 ENCOUNTER — OFFICE VISIT (OUTPATIENT)
Dept: INTERNAL MEDICINE | Facility: CLINIC | Age: 57
End: 2022-11-03
Payer: COMMERCIAL

## 2022-11-03 ENCOUNTER — PATIENT MESSAGE (OUTPATIENT)
Dept: SURGERY | Facility: OTHER | Age: 57
End: 2022-11-03
Payer: COMMERCIAL

## 2022-11-03 VITALS
RESPIRATION RATE: 20 BRPM | HEART RATE: 81 BPM | HEIGHT: 69 IN | OXYGEN SATURATION: 95 % | SYSTOLIC BLOOD PRESSURE: 150 MMHG | BODY MASS INDEX: 38.27 KG/M2 | WEIGHT: 258.38 LBS | DIASTOLIC BLOOD PRESSURE: 80 MMHG

## 2022-11-03 DIAGNOSIS — I10 BENIGN ESSENTIAL HTN: ICD-10-CM

## 2022-11-03 DIAGNOSIS — M54.40 BACK PAIN OF LUMBAR REGION WITH SCIATICA: ICD-10-CM

## 2022-11-03 DIAGNOSIS — Z01.818 PREOP GENERAL PHYSICAL EXAM: Primary | ICD-10-CM

## 2022-11-03 PROCEDURE — 3072F PR LOW RISK FOR RETINOPATHY: ICD-10-PCS | Mod: CPTII,S$GLB,, | Performed by: INTERNAL MEDICINE

## 2022-11-03 PROCEDURE — 1159F PR MEDICATION LIST DOCUMENTED IN MEDICAL RECORD: ICD-10-PCS | Mod: CPTII,S$GLB,, | Performed by: INTERNAL MEDICINE

## 2022-11-03 PROCEDURE — 3008F PR BODY MASS INDEX (BMI) DOCUMENTED: ICD-10-PCS | Mod: CPTII,S$GLB,, | Performed by: INTERNAL MEDICINE

## 2022-11-03 PROCEDURE — 3044F HG A1C LEVEL LT 7.0%: CPT | Mod: CPTII,S$GLB,, | Performed by: INTERNAL MEDICINE

## 2022-11-03 PROCEDURE — 3079F DIAST BP 80-89 MM HG: CPT | Mod: CPTII,S$GLB,, | Performed by: INTERNAL MEDICINE

## 2022-11-03 PROCEDURE — 3079F PR MOST RECENT DIASTOLIC BLOOD PRESSURE 80-89 MM HG: ICD-10-PCS | Mod: CPTII,S$GLB,, | Performed by: INTERNAL MEDICINE

## 2022-11-03 PROCEDURE — 99214 PR OFFICE/OUTPT VISIT, EST, LEVL IV, 30-39 MIN: ICD-10-PCS | Mod: 25,S$GLB,, | Performed by: INTERNAL MEDICINE

## 2022-11-03 PROCEDURE — 3061F PR NEG MICROALBUMINURIA RESULT DOCUMENTED/REVIEW: ICD-10-PCS | Mod: CPTII,S$GLB,, | Performed by: INTERNAL MEDICINE

## 2022-11-03 PROCEDURE — 1160F PR REVIEW ALL MEDS BY PRESCRIBER/CLIN PHARMACIST DOCUMENTED: ICD-10-PCS | Mod: CPTII,S$GLB,, | Performed by: INTERNAL MEDICINE

## 2022-11-03 PROCEDURE — 96372 PR INJECTION,THERAP/PROPH/DIAG2ST, IM OR SUBCUT: ICD-10-PCS | Mod: S$GLB,,, | Performed by: INTERNAL MEDICINE

## 2022-11-03 PROCEDURE — 3077F PR MOST RECENT SYSTOLIC BLOOD PRESSURE >= 140 MM HG: ICD-10-PCS | Mod: CPTII,S$GLB,, | Performed by: INTERNAL MEDICINE

## 2022-11-03 PROCEDURE — 3008F BODY MASS INDEX DOCD: CPT | Mod: CPTII,S$GLB,, | Performed by: INTERNAL MEDICINE

## 2022-11-03 PROCEDURE — 1159F MED LIST DOCD IN RCRD: CPT | Mod: CPTII,S$GLB,, | Performed by: INTERNAL MEDICINE

## 2022-11-03 PROCEDURE — 3072F LOW RISK FOR RETINOPATHY: CPT | Mod: CPTII,S$GLB,, | Performed by: INTERNAL MEDICINE

## 2022-11-03 PROCEDURE — 99214 OFFICE O/P EST MOD 30 MIN: CPT | Mod: 25,S$GLB,, | Performed by: INTERNAL MEDICINE

## 2022-11-03 PROCEDURE — 3066F NEPHROPATHY DOC TX: CPT | Mod: CPTII,S$GLB,, | Performed by: INTERNAL MEDICINE

## 2022-11-03 PROCEDURE — 96372 THER/PROPH/DIAG INJ SC/IM: CPT | Mod: S$GLB,,, | Performed by: INTERNAL MEDICINE

## 2022-11-03 PROCEDURE — 99999 PR PBB SHADOW E&M-EST. PATIENT-LVL IV: CPT | Mod: PBBFAC,,, | Performed by: INTERNAL MEDICINE

## 2022-11-03 PROCEDURE — 99999 PR PBB SHADOW E&M-EST. PATIENT-LVL IV: ICD-10-PCS | Mod: PBBFAC,,, | Performed by: INTERNAL MEDICINE

## 2022-11-03 PROCEDURE — 3077F SYST BP >= 140 MM HG: CPT | Mod: CPTII,S$GLB,, | Performed by: INTERNAL MEDICINE

## 2022-11-03 PROCEDURE — 3061F NEG MICROALBUMINURIA REV: CPT | Mod: CPTII,S$GLB,, | Performed by: INTERNAL MEDICINE

## 2022-11-03 PROCEDURE — 3044F PR MOST RECENT HEMOGLOBIN A1C LEVEL <7.0%: ICD-10-PCS | Mod: CPTII,S$GLB,, | Performed by: INTERNAL MEDICINE

## 2022-11-03 PROCEDURE — 3066F PR DOCUMENTATION OF TREATMENT FOR NEPHROPATHY: ICD-10-PCS | Mod: CPTII,S$GLB,, | Performed by: INTERNAL MEDICINE

## 2022-11-03 PROCEDURE — 1160F RVW MEDS BY RX/DR IN RCRD: CPT | Mod: CPTII,S$GLB,, | Performed by: INTERNAL MEDICINE

## 2022-11-03 RX ORDER — KETOROLAC TROMETHAMINE 30 MG/ML
30 INJECTION, SOLUTION INTRAMUSCULAR; INTRAVENOUS
Status: COMPLETED | OUTPATIENT
Start: 2022-11-03 | End: 2022-11-03

## 2022-11-03 RX ORDER — PHENYLPROPANOLAMINE/CLEMASTINE 75-1.34MG
TABLET, EXTENDED RELEASE ORAL
Status: ON HOLD | COMMUNITY
End: 2022-11-16 | Stop reason: HOSPADM

## 2022-11-03 RX ORDER — DOXAZOSIN 4 MG/1
4 TABLET ORAL NIGHTLY
Qty: 30 TABLET | Refills: 1 | Status: SHIPPED | OUTPATIENT
Start: 2022-11-03 | End: 2022-11-03

## 2022-11-03 RX ADMIN — KETOROLAC TROMETHAMINE 30 MG: 30 INJECTION, SOLUTION INTRAMUSCULAR; INTRAVENOUS at 02:11

## 2022-11-03 NOTE — PROGRESS NOTES
Subjective:       Patient ID: Wild Holliday is a 57 y.o. male.    Chief Complaint: surgical clearance    Wild Holliday is a 57 y.o. male here for preoperative clearance for back surgery  surgery anesthesia by Dr. Feldman .   Continues to have back pain   Severe pain .  He is running high BP .      Review of Systems   Constitutional:  Negative for chills and fever.   HENT:  Negative for congestion, postnasal drip and sore throat.    Eyes:  Negative for photophobia.   Respiratory:  Negative for chest tightness and shortness of breath.    Cardiovascular:  Negative for chest pain.   Gastrointestinal:  Negative for abdominal distention, abdominal pain, blood in stool and vomiting.   Genitourinary:  Negative for dysuria, flank pain and hematuria.   Musculoskeletal:  Positive for back pain and myalgias.   Skin:  Negative for pallor.   Neurological:  Negative for dizziness, seizures, facial asymmetry, speech difficulty and numbness.   Hematological:  Does not bruise/bleed easily.   Psychiatric/Behavioral:  Negative for agitation and suicidal ideas. The patient is not nervous/anxious.      Objective:      Physical Exam  Vitals and nursing note reviewed.   Constitutional:       Appearance: He is well-developed.   HENT:      Head: Normocephalic and atraumatic.      Nose: Nose normal.   Eyes:      Conjunctiva/sclera: Conjunctivae normal.      Pupils: Pupils are equal, round, and reactive to light.   Neck:      Thyroid: No thyromegaly.      Vascular: No JVD.   Cardiovascular:      Rate and Rhythm: Normal rate and regular rhythm.      Heart sounds: Normal heart sounds.   Pulmonary:      Effort: Pulmonary effort is normal.      Breath sounds: Normal breath sounds.   Abdominal:      General: Bowel sounds are normal. There is no distension.      Palpations: Abdomen is soft. There is no mass.      Tenderness: There is no abdominal tenderness. There is no guarding.   Musculoskeletal:         General: Normal range of motion.      Cervical  back: Normal range of motion and neck supple.      Comments: He is in pain; cant sit ;    Lymphadenopathy:      Cervical: No cervical adenopathy.   Skin:     General: Skin is warm and dry.      Coloration: Skin is not pale.      Findings: No rash.   Neurological:      Mental Status: He is alert and oriented to person, place, and time.      Cranial Nerves: No cranial nerve deficit.      Deep Tendon Reflexes: Reflexes are normal and symmetric.       Assessment:       1. Preop general physical exam    2. Benign essential HTN    3. Back pain of lumbar region with sciatica        Plan:   Wild was seen today for surgical clearance.    Diagnoses and all orders for this visit:    Preop general physical exam    Benign essential HTN  -     doxazosin (CARDURA) 4 MG tablet; Take 1 tablet (4 mg total) by mouth every evening.    Back pain of lumbar region with sciatica  -     ketorolac injection 30 mg      Lab Results   Component Value Date    WBC 8.54 11/01/2022    HGB 14.8 11/01/2022    HCT 44.0 11/01/2022    MCV 85 11/01/2022     11/01/2022       CMP  Sodium   Date Value Ref Range Status   11/01/2022 137 136 - 145 mmol/L Final     Potassium   Date Value Ref Range Status   11/01/2022 4.3 3.5 - 5.1 mmol/L Final     Chloride   Date Value Ref Range Status   11/01/2022 103 95 - 110 mmol/L Final     CO2   Date Value Ref Range Status   11/01/2022 23 23 - 29 mmol/L Final     Glucose   Date Value Ref Range Status   11/01/2022 221 (H) 70 - 110 mg/dL Final   10/19/2016 114 (H) 74 - 106 MG/DL Final     BUN   Date Value Ref Range Status   11/01/2022 20 6 - 20 mg/dL Final     Creatinine   Date Value Ref Range Status   11/01/2022 1.2 0.5 - 1.4 mg/dL Final     Calcium   Date Value Ref Range Status   11/01/2022 10.1 8.7 - 10.5 mg/dL Final     Total Protein   Date Value Ref Range Status   11/01/2022 6.7 6.0 - 8.4 g/dL Final     Albumin   Date Value Ref Range Status   11/01/2022 4.2 3.5 - 5.2 g/dL Final   10/19/2016 4.7 3.5 - 5.0 G/DL  Final     Total Bilirubin   Date Value Ref Range Status   11/01/2022 0.3 0.1 - 1.0 mg/dL Final     Comment:     For infants and newborns, interpretation of results should be based  on gestational age, weight and in agreement with clinical  observations.    Premature Infant recommended reference ranges:  Up to 24 hours.............<8.0 mg/dL  Up to 48 hours............<12.0 mg/dL  3-5 days..................<15.0 mg/dL  6-29 days.................<15.0 mg/dL       Alkaline Phosphatase   Date Value Ref Range Status   11/01/2022 42 (L) 55 - 135 U/L Final     AST   Date Value Ref Range Status   11/01/2022 23 10 - 40 U/L Final     ALT   Date Value Ref Range Status   11/01/2022 42 10 - 44 U/L Final     Anion Gap   Date Value Ref Range Status   11/01/2022 11 8 - 16 mmol/L Final     eGFR if    Date Value Ref Range Status   05/09/2022 >60 >60 mL/min/1.73 m^2 Final   05/09/2022 >60 >60 mL/min/1.73 m^2 Final     eGFR if non    Date Value Ref Range Status   05/09/2022 >60 >60 mL/min/1.73 m^2 Final     Comment:     Calculation used to obtain the estimated glomerular filtration  rate (eGFR) is the CKD-EPI equation.      05/09/2022 >60 >60 mL/min/1.73 m^2 Final     Comment:     Calculation used to obtain the estimated glomerular filtration  rate (eGFR) is the CKD-EPI equation.        UA ;  Clear     CXR :    A single view of the chest was performed without the benefit of previous comparison. The heart size and pulmonary vascularity are within the range of normal. There is no significant hilar nor mediastinal process. The aerated lungs are well expanded and clear. The right and left CP angles are rather sharp. The osseous structures show nothing unusual.     Impression:     Negative chest.        Electronically signed by: Mario Esqueda MD  Date:                                            11/01/2022  Time:                                           14:26       EKG :    A single view of the chest was  performed without the benefit of previous comparison. The heart size and pulmonary vascularity are within the range of normal. There is no significant hilar nor mediastinal process. The aerated lungs are well expanded and clear. The right and left CP angles are rather sharp. The osseous structures show nothing unusual.     Impression:     Negative chest.        Electronically signed by: Mario Esqueda MD  Date:                                            11/01/2022  Time:                                           14:26   EKG  Normal sinus rhythm Nonspecific T wave abnormality Abnormal ECG When compared with ECG of 20-JUL-2017 07:21, N    Dear Dr. Francia JOHNSON reviewed his history/labs/EKG/ CXR.    Please proceed with surgery.    Patient appears in stable Cardiovascular  status    Patient needs to stop taking aspirin or any NSAIDS 7 days before the surgery.     Other recommendations include:  Telemetry     Patient to hold the oral hypoglycemic dose on evening prior to the surgery and in am of surgery: metformin , amaryl , januvia       Please allow the patient to take BP pills on the am of surgery ; valsartan HCTZ     Thank you for allowing me to participate in this patient's care. Please consult me if post-operative medical care assistance is needed.        Problem List Items Addressed This Visit    None

## 2022-11-10 ENCOUNTER — PATIENT MESSAGE (OUTPATIENT)
Dept: INTERNAL MEDICINE | Facility: CLINIC | Age: 57
End: 2022-11-10
Payer: COMMERCIAL

## 2022-11-10 ENCOUNTER — PATIENT MESSAGE (OUTPATIENT)
Dept: SURGERY | Facility: OTHER | Age: 57
End: 2022-11-10
Payer: COMMERCIAL

## 2022-11-10 RX ORDER — PREGABALIN 75 MG/1
75 CAPSULE ORAL 2 TIMES DAILY
Qty: 60 CAPSULE | Refills: 0 | Status: CANCELLED | OUTPATIENT
Start: 2022-11-10 | End: 2022-12-10

## 2022-11-10 NOTE — TELEPHONE ENCOUNTER
Spoke to pt regarding med change request. Ok per Dr. Feldman. Explained to patient he should start with taking 1 cap once a day. After 4 days, can increase dose to BID if its not making him too groggy. Pt verbalized understanding.

## 2022-11-11 ENCOUNTER — HOSPITAL ENCOUNTER (OUTPATIENT)
Dept: PREADMISSION TESTING | Facility: OTHER | Age: 57
Discharge: HOME | End: 2022-11-11
Attending: NEUROLOGICAL SURGERY
Payer: COMMERCIAL

## 2022-11-11 ENCOUNTER — ANESTHESIA EVENT (OUTPATIENT)
Dept: SURGERY | Facility: OTHER | Age: 57
End: 2022-11-11
Payer: COMMERCIAL

## 2022-11-11 ENCOUNTER — PATIENT MESSAGE (OUTPATIENT)
Dept: SURGERY | Facility: OTHER | Age: 57
End: 2022-11-11
Payer: COMMERCIAL

## 2022-11-11 VITALS
OXYGEN SATURATION: 96 % | RESPIRATION RATE: 18 BRPM | DIASTOLIC BLOOD PRESSURE: 77 MMHG | SYSTOLIC BLOOD PRESSURE: 136 MMHG | WEIGHT: 255 LBS | BODY MASS INDEX: 37.77 KG/M2 | HEIGHT: 69 IN | HEART RATE: 99 BPM | TEMPERATURE: 98 F

## 2022-11-11 DIAGNOSIS — Z01.818 PREOPERATIVE TESTING: ICD-10-CM

## 2022-11-11 LAB
ABO + RH BLD: NORMAL
BLD GP AB SCN CELLS X3 SERPL QL: NORMAL

## 2022-11-11 PROCEDURE — 86850 RBC ANTIBODY SCREEN: CPT | Performed by: ANESTHESIOLOGY

## 2022-11-11 PROCEDURE — 86920 COMPATIBILITY TEST SPIN: CPT | Performed by: ANESTHESIOLOGY

## 2022-11-11 PROCEDURE — 36415 COLL VENOUS BLD VENIPUNCTURE: CPT | Performed by: ANESTHESIOLOGY

## 2022-11-11 RX ORDER — FAMOTIDINE 20 MG/1
20 TABLET, FILM COATED ORAL
Status: CANCELLED | OUTPATIENT
Start: 2022-11-11 | End: 2022-11-11

## 2022-11-11 RX ORDER — LIDOCAINE HYDROCHLORIDE 10 MG/ML
0.5 INJECTION, SOLUTION EPIDURAL; INFILTRATION; INTRACAUDAL; PERINEURAL ONCE
Status: CANCELLED | OUTPATIENT
Start: 2022-11-11 | End: 2022-11-11

## 2022-11-11 RX ORDER — SODIUM CHLORIDE, SODIUM LACTATE, POTASSIUM CHLORIDE, CALCIUM CHLORIDE 600; 310; 30; 20 MG/100ML; MG/100ML; MG/100ML; MG/100ML
INJECTION, SOLUTION INTRAVENOUS CONTINUOUS
Status: CANCELLED | OUTPATIENT
Start: 2022-11-11

## 2022-11-11 RX ORDER — PREGABALIN 75 MG/1
75 CAPSULE ORAL 2 TIMES DAILY
Qty: 60 CAPSULE | Refills: 0 | Status: SHIPPED | OUTPATIENT
Start: 2022-11-11 | End: 2023-03-15

## 2022-11-11 RX ORDER — ACETAMINOPHEN 500 MG
1000 TABLET ORAL
Status: CANCELLED | OUTPATIENT
Start: 2022-11-11 | End: 2022-11-11

## 2022-11-11 RX ORDER — PREGABALIN 75 MG/1
75 CAPSULE ORAL 2 TIMES DAILY
Qty: 60 CAPSULE | Refills: 6 | Status: SHIPPED | OUTPATIENT
Start: 2022-11-11 | End: 2022-11-11 | Stop reason: SDUPTHER

## 2022-11-11 NOTE — DISCHARGE INSTRUCTIONS
Information to Prepare you for your Surgery    PRE-ADMIT TESTING -  [phone]    [address] [facility]          Your surgery has been scheduled at [facility]. We are pleased to have the opportunity to serve you. For Further Information please call [phone].    On the day of surgery please report to the Information Desk on the 1st floor.    CONTACT YOUR PHYSICIAN'S OFFICE THE DAY PRIOR TO YOUR SURGERY TO OBTAIN YOUR ARRIVAL TIME.     The evening before surgery do not eat anything after 9 p.m. ( this includes hard candy, chewing gum and mints).  You may only have WATER  from 9 p.m. until you leave your home.   DO NOT DRINK ANY LIQUIDS ON THE WAY TO THE HOSPITAL.      Current Visitor policy(12/27/2021) - Patients may have 2 visitors pre and post procedure. Only 2 visitors will be allowed in the Surgical building with the patient.     SPECIAL MEDICATION INSTRUCTIONS: TAKE medications checked off by the Anesthesiologist on your Medication List.    Angiogram Patients: Take medications as instructed by your physician, including aspirin.     Surgery Patients:    If you take ASPIRIN - Your PHYSICIAN/SURGEON will need to inform you IF/OR when you need to stop taking aspirin prior to your surgery.     The week prior to surgery do not ot take any medications containing IBUPROFEN or NSAIDS ( Advil, Motrin, Goodys, BC, Aleve, Naproxen etc) If you are not sure if you should take a medicine please call your surgeon's office.  Ok to take Tylenol    Do Not Wear any make-up (especially eye make-up) to surgery. Please remove any false eyelashes or eyelash extensions. If you arrive the day of surgery with makeup/eyelashes on you will be required to remove prior to surgery. (There is a risk of corneal abrasions if eye makeup/eyelash extensions are not removed)      Leave all valuables at home.   Do Not wear any jewelry or watches, including any metal in body piercings.  Jewelry must be removed prior to coming to the hospital.  There is a possibility that rings that are unable to be removed may be cut off if they are on the surgical extremity.    Please remove all hair extensions, wigs, clips and any other metal accessories/ ornaments from your hair.  These items may pose a flammable/fire risk in Surgery and must be removed.    Do not shave your surgical area at least 5 days prior to your surgery. The surgical prep will be performed at the hospital according to Infection Control regulations.    Contact Lens must be removed before surgery. Either do not wear the contact lens or bring a case and solution for storage.  Please bring a container for eyeglasses or dentures as required.  Bring any paperwork your physician has provided, such as consent forms,  history and physicals, doctor's orders, etc.   Bring comfortable clothes that are loose fitting to wear upon discharge. Take into consideration the type of surgery being performed.  Maintain your diet as advised per your physician the day prior to surgery.      Adequate rest the night before surgery is advised.   Park in the Parking lot behind the hospital or in the [employer] [facility] across the street from the parking lot. Parking is complimentary.  If you will be discharged the same day as your procedure, please arrange for a responsible adult to drive you home or to accompany you if traveling by taxi.   YOU WILL NOT BE PERMITTED TO DRIVE OR TO LEAVE THE HOSPITAL ALONE AFTER SURGERY.   If you are being discharged the same day, it is strongly recommended that you arrange for someone to remain with you for the first 24 hrs following your surgery.    The Surgeon will speak to your family/visitor after your surgery regarding the outcome of your surgery and post op care.  The Surgeon may speak to you after your surgery, but there is a possibility you may not remember the details.  Please check with your family members regarding the  conversation with the Surgeon.    We strongly recommend whoever is bringing you home be present for discharge instructions.  This will ensure a thorough understanding for your post op home care.    ALL CHILDREN MUST ALWAYS BE ACCOMPANIED BY AN ADULT.    Visitors-Refer to current Visitor policy handouts.    Thank you for your cooperation.  The Staff of [facility].            Bathing Instructions with Hibiclens    Shower the evening before and morning of your procedure with Chlorhexidine (Hibiclens)  do not use Chlorhexidine on your face or genitals. Do not get in your eyes.  Wash your face with water and your regular face wash/soap  Use your regular shampoo  Apply Chlorhexidine (Hibiclens) directly on your skin or on a wet washcloth and wash gently. When showering: Move away from the shower stream when applying Chlorhexidine (Hibiclens) to avoid rinsing off too soon.  Rinse thoroughly with warm water  Do not dilute Chlorhexidine (Hibiclens)   Dry off as usual, do not use any deodorant, powder, body lotions, perfume, after shave or cologne.
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
## 2022-11-15 ENCOUNTER — PATIENT MESSAGE (OUTPATIENT)
Dept: SURGERY | Facility: OTHER | Age: 57
End: 2022-11-15
Payer: COMMERCIAL

## 2022-11-16 ENCOUNTER — HOSPITAL ENCOUNTER (OUTPATIENT)
Facility: OTHER | Age: 57
Discharge: HOME | End: 2022-11-16
Attending: NEUROLOGICAL SURGERY | Admitting: NEUROLOGICAL SURGERY
Payer: COMMERCIAL

## 2022-11-16 ENCOUNTER — ANESTHESIA (OUTPATIENT)
Dept: SURGERY | Facility: OTHER | Age: 57
End: 2022-11-16
Payer: COMMERCIAL

## 2022-11-16 DIAGNOSIS — Z01.818 PREOPERATIVE TESTING: ICD-10-CM

## 2022-11-16 DIAGNOSIS — M54.16 LUMBAR RADICULOPATHY: ICD-10-CM

## 2022-11-16 LAB
BLD PROD TYP BPU: NORMAL
BLD PROD TYP BPU: NORMAL
BLOOD UNIT EXPIRATION DATE: NORMAL
BLOOD UNIT EXPIRATION DATE: NORMAL
BLOOD UNIT TYPE CODE: 6200
BLOOD UNIT TYPE CODE: 6200
BLOOD UNIT TYPE: NORMAL
BLOOD UNIT TYPE: NORMAL
CODING SYSTEM: NORMAL
CODING SYSTEM: NORMAL
DISPENSE STATUS: NORMAL
DISPENSE STATUS: NORMAL
NUM UNITS TRANS PACKED RBC: NORMAL
NUM UNITS TRANS PACKED RBC: NORMAL
POCT GLUCOSE: 137 MG/DL (ref 70–110)
POCT GLUCOSE: 177 MG/DL (ref 70–110)

## 2022-11-16 PROCEDURE — 63600175 PHARM REV CODE 636 W HCPCS: Performed by: PHYSICIAN ASSISTANT

## 2022-11-16 PROCEDURE — 63600175 PHARM REV CODE 636 W HCPCS: Performed by: ANESTHESIOLOGY

## 2022-11-16 PROCEDURE — 71000033 HC RECOVERY, INTIAL HOUR: Performed by: NEUROLOGICAL SURGERY

## 2022-11-16 PROCEDURE — 63600175 PHARM REV CODE 636 W HCPCS: Performed by: NURSE ANESTHETIST, CERTIFIED REGISTERED

## 2022-11-16 PROCEDURE — 25000003 PHARM REV CODE 250: Performed by: ANESTHESIOLOGY

## 2022-11-16 PROCEDURE — 82962 GLUCOSE BLOOD TEST: CPT | Performed by: NEUROLOGICAL SURGERY

## 2022-11-16 PROCEDURE — 36000710: Performed by: NEUROLOGICAL SURGERY

## 2022-11-16 PROCEDURE — 37000009 HC ANESTHESIA EA ADD 15 MINS: Performed by: NEUROLOGICAL SURGERY

## 2022-11-16 PROCEDURE — 25000003 PHARM REV CODE 250: Performed by: NEUROLOGICAL SURGERY

## 2022-11-16 PROCEDURE — 25000003 PHARM REV CODE 250: Performed by: NURSE ANESTHETIST, CERTIFIED REGISTERED

## 2022-11-16 PROCEDURE — 63600175 PHARM REV CODE 636 W HCPCS: Performed by: NEUROLOGICAL SURGERY

## 2022-11-16 PROCEDURE — 63030 PR LAMINOTOMY,LUMBAR DISK,1 INTRSP: ICD-10-PCS | Mod: RT,,, | Performed by: NEUROLOGICAL SURGERY

## 2022-11-16 PROCEDURE — 63030 LAMOT DCMPRN NRV RT 1 LMBR: CPT | Mod: RT,,, | Performed by: NEUROLOGICAL SURGERY

## 2022-11-16 PROCEDURE — 71000016 HC POSTOP RECOV ADDL HR: Performed by: NEUROLOGICAL SURGERY

## 2022-11-16 PROCEDURE — 36000711: Performed by: NEUROLOGICAL SURGERY

## 2022-11-16 PROCEDURE — 27201423 OPTIME MED/SURG SUP & DEVICES STERILE SUPPLY: Performed by: NEUROLOGICAL SURGERY

## 2022-11-16 PROCEDURE — 71000015 HC POSTOP RECOV 1ST HR: Performed by: NEUROLOGICAL SURGERY

## 2022-11-16 PROCEDURE — 37000008 HC ANESTHESIA 1ST 15 MINUTES: Performed by: NEUROLOGICAL SURGERY

## 2022-11-16 RX ORDER — SODIUM CHLORIDE 0.9 % (FLUSH) 0.9 %
3 SYRINGE (ML) INJECTION
Status: DISCONTINUED | OUTPATIENT
Start: 2022-11-16 | End: 2022-11-16 | Stop reason: HOSPADM

## 2022-11-16 RX ORDER — OXYCODONE AND ACETAMINOPHEN 5; 325 MG/1; MG/1
1 TABLET ORAL EVERY 6 HOURS PRN
Qty: 56 TABLET | Refills: 0 | Status: SHIPPED | OUTPATIENT
Start: 2022-11-16 | End: 2023-03-15

## 2022-11-16 RX ORDER — OXYCODONE HYDROCHLORIDE 5 MG/1
5 TABLET ORAL
Status: DISCONTINUED | OUTPATIENT
Start: 2022-11-16 | End: 2022-11-16 | Stop reason: HOSPADM

## 2022-11-16 RX ORDER — DEXAMETHASONE SODIUM PHOSPHATE 4 MG/ML
INJECTION, SOLUTION INTRA-ARTICULAR; INTRALESIONAL; INTRAMUSCULAR; INTRAVENOUS; SOFT TISSUE
Status: DISCONTINUED | OUTPATIENT
Start: 2022-11-16 | End: 2022-11-16

## 2022-11-16 RX ORDER — CEFAZOLIN SODIUM 1 G/3ML
INJECTION, POWDER, FOR SOLUTION INTRAMUSCULAR; INTRAVENOUS
Status: DISCONTINUED | OUTPATIENT
Start: 2022-11-16 | End: 2022-11-16

## 2022-11-16 RX ORDER — CEPHALEXIN 500 MG/1
500 CAPSULE ORAL EVERY 6 HOURS
Qty: 20 CAPSULE | Refills: 0 | Status: SHIPPED | OUTPATIENT
Start: 2022-11-16 | End: 2022-11-21

## 2022-11-16 RX ORDER — LIDOCAINE HCL/PF 100 MG/5ML
SYRINGE (ML) INTRAVENOUS
Status: DISCONTINUED | OUTPATIENT
Start: 2022-11-16 | End: 2022-11-16

## 2022-11-16 RX ORDER — FENTANYL CITRATE 50 UG/ML
INJECTION, SOLUTION INTRAMUSCULAR; INTRAVENOUS
Status: DISCONTINUED | OUTPATIENT
Start: 2022-11-16 | End: 2022-11-16

## 2022-11-16 RX ORDER — LIDOCAINE HYDROCHLORIDE AND EPINEPHRINE 10; 10 MG/ML; UG/ML
INJECTION, SOLUTION INFILTRATION; PERINEURAL
Status: DISCONTINUED | OUTPATIENT
Start: 2022-11-16 | End: 2022-11-16 | Stop reason: HOSPADM

## 2022-11-16 RX ORDER — ONDANSETRON 2 MG/ML
INJECTION INTRAMUSCULAR; INTRAVENOUS
Status: DISCONTINUED | OUTPATIENT
Start: 2022-11-16 | End: 2022-11-16

## 2022-11-16 RX ORDER — HYDROMORPHONE HYDROCHLORIDE 2 MG/ML
0.4 INJECTION, SOLUTION INTRAMUSCULAR; INTRAVENOUS; SUBCUTANEOUS EVERY 5 MIN PRN
Status: DISCONTINUED | OUTPATIENT
Start: 2022-11-16 | End: 2022-11-16 | Stop reason: HOSPADM

## 2022-11-16 RX ORDER — HYDROMORPHONE HYDROCHLORIDE 2 MG/ML
INJECTION, SOLUTION INTRAMUSCULAR; INTRAVENOUS; SUBCUTANEOUS
Status: DISCONTINUED | OUTPATIENT
Start: 2022-11-16 | End: 2022-11-16

## 2022-11-16 RX ORDER — METHOCARBAMOL 750 MG/1
750 TABLET, FILM COATED ORAL 3 TIMES DAILY PRN
Qty: 40 TABLET | Refills: 0 | Status: SHIPPED | OUTPATIENT
Start: 2022-11-16 | End: 2022-11-26

## 2022-11-16 RX ORDER — ACETAMINOPHEN 500 MG
1000 TABLET ORAL
Status: COMPLETED | OUTPATIENT
Start: 2022-11-16 | End: 2022-11-16

## 2022-11-16 RX ORDER — PROCHLORPERAZINE EDISYLATE 5 MG/ML
5 INJECTION INTRAMUSCULAR; INTRAVENOUS EVERY 30 MIN PRN
Status: DISCONTINUED | OUTPATIENT
Start: 2022-11-16 | End: 2022-11-16 | Stop reason: HOSPADM

## 2022-11-16 RX ORDER — EPHEDRINE SULFATE 50 MG/ML
INJECTION, SOLUTION INTRAVENOUS
Status: DISCONTINUED | OUTPATIENT
Start: 2022-11-16 | End: 2022-11-16

## 2022-11-16 RX ORDER — VANCOMYCIN HYDROCHLORIDE 1 G/20ML
INJECTION, POWDER, LYOPHILIZED, FOR SOLUTION INTRAVENOUS
Status: DISCONTINUED | OUTPATIENT
Start: 2022-11-16 | End: 2022-11-16 | Stop reason: HOSPADM

## 2022-11-16 RX ORDER — SODIUM CHLORIDE, SODIUM LACTATE, POTASSIUM CHLORIDE, CALCIUM CHLORIDE 600; 310; 30; 20 MG/100ML; MG/100ML; MG/100ML; MG/100ML
INJECTION, SOLUTION INTRAVENOUS CONTINUOUS
Status: DISCONTINUED | OUTPATIENT
Start: 2022-11-16 | End: 2022-11-16 | Stop reason: HOSPADM

## 2022-11-16 RX ORDER — KETAMINE HCL IN 0.9 % NACL 50 MG/5 ML
SYRINGE (ML) INTRAVENOUS
Status: DISCONTINUED | OUTPATIENT
Start: 2022-11-16 | End: 2022-11-16

## 2022-11-16 RX ORDER — DIPHENHYDRAMINE HYDROCHLORIDE 50 MG/ML
12.5 INJECTION INTRAMUSCULAR; INTRAVENOUS EVERY 30 MIN PRN
Status: DISCONTINUED | OUTPATIENT
Start: 2022-11-16 | End: 2022-11-16 | Stop reason: HOSPADM

## 2022-11-16 RX ORDER — NEOSTIGMINE METHYLSULFATE 0.5 MG/ML
INJECTION, SOLUTION INTRAVENOUS
Status: DISCONTINUED | OUTPATIENT
Start: 2022-11-16 | End: 2022-11-16

## 2022-11-16 RX ORDER — DEXMEDETOMIDINE HYDROCHLORIDE 100 UG/ML
INJECTION, SOLUTION INTRAVENOUS
Status: DISCONTINUED | OUTPATIENT
Start: 2022-11-16 | End: 2022-11-16

## 2022-11-16 RX ORDER — ROCURONIUM BROMIDE 10 MG/ML
INJECTION, SOLUTION INTRAVENOUS
Status: DISCONTINUED | OUTPATIENT
Start: 2022-11-16 | End: 2022-11-16

## 2022-11-16 RX ORDER — PROPOFOL 10 MG/ML
VIAL (ML) INTRAVENOUS
Status: DISCONTINUED | OUTPATIENT
Start: 2022-11-16 | End: 2022-11-16

## 2022-11-16 RX ORDER — METHYLPREDNISOLONE ACETATE 40 MG/ML
INJECTION, SUSPENSION INTRA-ARTICULAR; INTRALESIONAL; INTRAMUSCULAR; SOFT TISSUE
Status: DISCONTINUED | OUTPATIENT
Start: 2022-11-16 | End: 2022-11-16 | Stop reason: HOSPADM

## 2022-11-16 RX ORDER — KETOROLAC TROMETHAMINE 30 MG/ML
15 INJECTION, SOLUTION INTRAMUSCULAR; INTRAVENOUS EVERY 6 HOURS PRN
Status: DISCONTINUED | OUTPATIENT
Start: 2022-11-16 | End: 2022-11-16 | Stop reason: HOSPADM

## 2022-11-16 RX ORDER — MIDAZOLAM HYDROCHLORIDE 1 MG/ML
INJECTION INTRAMUSCULAR; INTRAVENOUS
Status: DISCONTINUED | OUTPATIENT
Start: 2022-11-16 | End: 2022-11-16

## 2022-11-16 RX ORDER — FAMOTIDINE 20 MG/1
20 TABLET, FILM COATED ORAL
Status: COMPLETED | OUTPATIENT
Start: 2022-11-16 | End: 2022-11-16

## 2022-11-16 RX ORDER — LIDOCAINE HYDROCHLORIDE 10 MG/ML
0.5 INJECTION, SOLUTION EPIDURAL; INFILTRATION; INTRACAUDAL; PERINEURAL ONCE
Status: DISCONTINUED | OUTPATIENT
Start: 2022-11-16 | End: 2022-11-16 | Stop reason: HOSPADM

## 2022-11-16 RX ADMIN — HYDROMORPHONE HYDROCHLORIDE 0.2 MG: 2 INJECTION INTRAMUSCULAR; INTRAVENOUS; SUBCUTANEOUS at 08:11

## 2022-11-16 RX ADMIN — PROPOFOL 20 MG: 10 INJECTION, EMULSION INTRAVENOUS at 09:11

## 2022-11-16 RX ADMIN — DEXMEDETOMIDINE HYDROCHLORIDE 4 MCG: 100 INJECTION, SOLUTION, CONCENTRATE INTRAVENOUS at 09:11

## 2022-11-16 RX ADMIN — DEXAMETHASONE SODIUM PHOSPHATE 8 MG: 4 INJECTION INTRA-ARTICULAR; INTRALESIONAL; INTRAMUSCULAR; INTRAVENOUS; SOFT TISSUE at 07:11

## 2022-11-16 RX ADMIN — PROPOFOL 200 MG: 10 INJECTION, EMULSION INTRAVENOUS at 07:11

## 2022-11-16 RX ADMIN — PROPOFOL 30 MG: 10 INJECTION, EMULSION INTRAVENOUS at 08:11

## 2022-11-16 RX ADMIN — LIDOCAINE HYDROCHLORIDE 100 MG: 20 INJECTION, SOLUTION INTRAVENOUS at 07:11

## 2022-11-16 RX ADMIN — GLYCOPYRROLATE 0.6 MG: 0.2 INJECTION, SOLUTION INTRAMUSCULAR; INTRAVITREAL at 09:11

## 2022-11-16 RX ADMIN — DEXMEDETOMIDINE HYDROCHLORIDE 4 MCG: 100 INJECTION, SOLUTION, CONCENTRATE INTRAVENOUS at 08:11

## 2022-11-16 RX ADMIN — Medication 15 MG: at 08:11

## 2022-11-16 RX ADMIN — EPHEDRINE SULFATE 5 MG: 50 INJECTION INTRAVENOUS at 08:11

## 2022-11-16 RX ADMIN — PHENYLEPHRINE HYDROCHLORIDE 0.25 MCG/KG/MIN: 10 INJECTION INTRAVENOUS at 08:11

## 2022-11-16 RX ADMIN — ACETAMINOPHEN 1000 MG: 500 TABLET ORAL at 06:11

## 2022-11-16 RX ADMIN — CEFAZOLIN 2 G: 330 INJECTION, POWDER, FOR SOLUTION INTRAMUSCULAR; INTRAVENOUS at 07:11

## 2022-11-16 RX ADMIN — HYDROMORPHONE HYDROCHLORIDE 0.4 MG: 2 INJECTION INTRAMUSCULAR; INTRAVENOUS; SUBCUTANEOUS at 09:11

## 2022-11-16 RX ADMIN — KETOROLAC TROMETHAMINE 15 MG: 30 INJECTION, SOLUTION INTRAMUSCULAR; INTRAVENOUS at 10:11

## 2022-11-16 RX ADMIN — ROCURONIUM BROMIDE 35 MG: 10 INJECTION, SOLUTION INTRAVENOUS at 07:11

## 2022-11-16 RX ADMIN — NEOSTIGMINE METHYLSULFATE 5 MG: 0.5 INJECTION INTRAVENOUS at 09:11

## 2022-11-16 RX ADMIN — PROPOFOL 70 MG: 10 INJECTION, EMULSION INTRAVENOUS at 08:11

## 2022-11-16 RX ADMIN — GLYCOPYRROLATE 0.2 MG: 0.2 INJECTION, SOLUTION INTRAMUSCULAR; INTRAVITREAL at 07:11

## 2022-11-16 RX ADMIN — FAMOTIDINE 20 MG: 20 TABLET ORAL at 06:11

## 2022-11-16 RX ADMIN — SODIUM CHLORIDE, SODIUM LACTATE, POTASSIUM CHLORIDE, AND CALCIUM CHLORIDE: 600; 310; 30; 20 INJECTION, SOLUTION INTRAVENOUS at 06:11

## 2022-11-16 RX ADMIN — OXYCODONE 5 MG: 5 TABLET ORAL at 10:11

## 2022-11-16 RX ADMIN — EPHEDRINE SULFATE 5 MG: 50 INJECTION INTRAVENOUS at 09:11

## 2022-11-16 RX ADMIN — Medication 35 MG: at 07:11

## 2022-11-16 RX ADMIN — EPHEDRINE SULFATE 5 MG: 50 INJECTION INTRAVENOUS at 07:11

## 2022-11-16 RX ADMIN — CARBOXYMETHYLCELLULOSE SODIUM 2 DROP: 2.5 SOLUTION/ DROPS OPHTHALMIC at 07:11

## 2022-11-16 RX ADMIN — ONDANSETRON 4 MG: 2 INJECTION INTRAMUSCULAR; INTRAVENOUS at 09:11

## 2022-11-16 RX ADMIN — FENTANYL CITRATE 100 MCG: 0.05 INJECTION, SOLUTION INTRAMUSCULAR; INTRAVENOUS at 07:11

## 2022-11-16 RX ADMIN — MIDAZOLAM HYDROCHLORIDE 2 MG: 1 INJECTION, SOLUTION INTRAMUSCULAR; INTRAVENOUS at 07:11

## 2022-11-16 RX ADMIN — HYDROMORPHONE HYDROCHLORIDE 0.2 MG: 2 INJECTION INTRAMUSCULAR; INTRAVENOUS; SUBCUTANEOUS at 09:11

## 2022-11-16 NOTE — INTERVAL H&P NOTE
The patient has been examined and the H&P has been reviewed:    I concur with the findings and no changes have occurred since H&P was written.    Surgery risks, benefits and alternative options discussed and understood by patient/family.      --Patient evaluated prior to surgery  --All diagnostics and imaging reviewed  --Patient NPO since MN  --No anti-coag/plt medication in the last 72h  --Patient consented and all questions answered  --Further reccs to follow surgery           There are no hospital problems to display for this patient.

## 2022-11-16 NOTE — ANESTHESIA POSTPROCEDURE EVALUATION
Anesthesia Post Evaluation    Patient: Wild Holliday    Procedure(s) Performed: Procedure(s) (LRB):  DISCECTOMY, SPINE, MINIMALLY INVASIVE, USING MICROSCOPE w Laminotomy (N/A)    Final Anesthesia Type: general      Patient location during evaluation: PACU  Patient participation: Yes- Able to Participate  Level of consciousness: awake and alert  Post-procedure vital signs: reviewed and stable  Pain management: adequate  Airway patency: patent    PONV status at discharge: No PONV  Anesthetic complications: no      Cardiovascular status: blood pressure returned to baseline  Respiratory status: unassisted, spontaneous ventilation and room air  Hydration status: euvolemic  Follow-up not needed.          Vitals Value Taken Time   /72 11/16/22 1115   Temp 36.7 °C (98 °F) 11/16/22 1045   Pulse 58 11/16/22 1115   Resp 18 11/16/22 1115   SpO2 98 % 11/16/22 1115         Event Time   Out of Recovery 10:42:42         Pain/Roger Score: Pain Rating Prior to Med Admin: 6 (11/16/2022 10:15 AM)  Pain Rating Post Med Admin: 5 (11/16/2022 11:15 AM)  Roger Score: 10 (11/16/2022 11:15 AM)
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
## 2022-11-16 NOTE — PLAN OF CARE
Certification of Assistant at Surgery       Surgery Date: 11/16/2022     Participating Surgeons:  Surgeon(s) and Role:     * Gail Feldman MD - Primary  Trista Guzmán PA-C Assisting     Procedures:  Procedure(s) (LRB):  DISCECTOMY, SPINE, MINIMALLY INVASIVE, USING MICROSCOPE w Laminotomy (N/A)    Assistant Surgeon's Certification of Necessity:  I understand that section 1842 (b) (6) (d) of the Social Security Act generally prohibits Medicare Part B reasonable charge payment for the services of assistants at surgery in teaching hospitals when qualified residents are available to furnish such services. I certify that the services for which payment is claimed were medically necessary, and that no qualified resident was available to perform the services. I further understand that these services are subject to post-payment review by the Medicare carrier.      Trista Guzmán PA-C    11/16/2022  9:53 AM

## 2022-11-16 NOTE — PATIENT INSTRUCTIONS
Neurosurgery Patient Information. Please follow ONLY the instructions that are checked below.    Activity Restrictions:  [x]  Return to work will be determined on an individual basis.  [x]  No lifting greater than 5-10 pounds.  [x]  Avoid bending and twisting the area of your surgery more than 45 degrees from neutral position in any direction.  [x]  No driving or operating machinery:  [x]  until cleared by your surgeon.  [x]  while taking narcotic pain medications or muscle relaxants.  [x]  No brace needed   [x]  Slowly increase your ambulation [walking] over the next 2 weeks as tolerated. The goal is to be walking 1-2 miles by the time of your 2 week post op appointment.   [x]  Walk on paved surfaces only. It is okay to walk up and down stairs while holding onto a side rail.  [x]  No sexual activity for 6 weeks.    Discharge Medication/Follow-up:  [x]  Please refer to discharge medication reconciliation form.  [x]  Do not take ANY Aspirin or Aspirin containing products for 2 weeks after surgery.   [x]  Do not take ANY herbal supplements for 2 weeks after surgery.    [x]  Do not take ANY non-steroidal anti-inflammatory drugs (NSAIDS), including the following: ibuprofen, naprosyn, Aleve, Advil, Indocin, Mobic, or Celebrex for 6 weeks after surgery.   [x]  Prescriptions for appropriate medication will be given upon discharge.  [x]  Take docusate (Colace 100 mg): take one capsule a day as needed for constipation. You can get this over the counter.  [x]  Follow-up appointment:  [x]  10-14 days post-op for wound check by physician assistant/nurse  [x]  4-6 weeks with MD:  [x]  An appointment will be mailed to you.    Wound Care:  [x]  Remove your bandage tomorrow and then Keep your incision open to the air.  [x]  You may shower on the 2nd day after your surgery. Keep the incision clean and dry at all times. Please cover the incision while showering and REMOVE once you have completed taking your shower. Do not allow the  force of water to hit the incision. If the incision gets damp, pat it dry. Do not rub or scrub the incision.  [x]  You cannot take a bath until 8 weeks after surgery.  [x]  The incision does not need to be cleaned with any water, soap, alcohol, peroxide, or other substance.  [x]  Apply bacitracin to incision twice a day for 2 weeks.    Call your doctor or go to the Emergency Room for any signs of infection, including: increased redness, drainage, pain, or fever (temperature ?101.5 for 24 hours). Call your doctor or go to the Emergency Room if there are any localized neurological changes; problems with speech, vision, numbness, tingling, weakness, or severe headache; or for other concerns.    Special Instructions:  [x]  No use of tobacco products.  [x]  Diet: Please eat a regular diet as tolerated.      Physicians need 3 days' notice for pain medicine to be refilled. Pain medicine will only be refilled between 8 AM and 5 PM, Monday through Friday, due to Food and Drug Administration regulation of documentation.        [facility] Neurosurgery Office: [phone]              [facility] Neurosurgery Office: [phone]   [place] Neurosurgery Office: [phone]

## 2022-11-16 NOTE — ANESTHESIA PROCEDURE NOTES
Intubation    Date/Time: 11/16/2022 7:29 AM  Performed by: Kathleen Lucero CRNA  Authorized by: Juan Phelps MD     Intubation:     Induction:  Intravenous    Intubated:  Postinduction    Mask Ventilation:  Easy with oral airway    Attempts:  1    Attempted By:  Student    Method of Intubation:  Video laryngoscopy    Blade:  Ochoa 3    Laryngeal View Grade: Grade I - full view of cords      Difficult Airway Encountered?: No      Complications:  None    Airway Device:  Oral endotracheal tube    Airway Device Size:  8.0    Style/Cuff Inflation:  Cuffed (inflated to minimal occlusive pressure)    Inflation Amount (mL):  5    Tube secured:  22    Secured at:  The lips    Placement Verified By:  Capnometry    Complicating Factors:  None    Findings Post-Intubation:  BS equal bilateral and atraumatic/condition of teeth unchanged

## 2022-11-16 NOTE — TRANSFER OF CARE
"Anesthesia Transfer of Care Note    Patient: Wild Holliday    Procedure(s) Performed: Procedure(s) (LRB):  DISCECTOMY, SPINE, MINIMALLY INVASIVE, USING MICROSCOPE w Laminotomy (N/A)    Patient location: PACU    Anesthesia Type: general    Transport from OR: Transported from OR on 6-10 L/min O2 by face mask with adequate spontaneous ventilation    Post pain: adequate analgesia    Post assessment: no apparent anesthetic complications and tolerated procedure well    Post vital signs: stable    Level of consciousness: responds to stimulation    Nausea/Vomiting: no nausea/vomiting    Complications: none    Transfer of care protocol was followed      Last vitals:   Visit Vitals  BP (!) 149/85 (BP Location: Right arm, Patient Position: Lying)   Pulse 82   Temp 36.2 °C (97.1 °F) (Temporal)   Resp 18   Ht 5' 9" (1.753 m)   Wt 115.7 kg (255 lb)   SpO2 98%   BMI 37.66 kg/m²     "

## 2022-11-16 NOTE — PLAN OF CARE
Wild Mg has met all discharge criteria from Phase II. Vital Signs are stable, ambulating  without difficulty. Discharge instructions given, patient verbalized understanding. Discharged from facility via wheelchair in stable condition.

## 2022-11-17 ENCOUNTER — PATIENT MESSAGE (OUTPATIENT)
Dept: NEUROSURGERY | Facility: CLINIC | Age: 57
End: 2022-11-17
Payer: COMMERCIAL

## 2022-11-17 VITALS
WEIGHT: 255 LBS | HEIGHT: 69 IN | OXYGEN SATURATION: 99 % | DIASTOLIC BLOOD PRESSURE: 71 MMHG | BODY MASS INDEX: 37.77 KG/M2 | SYSTOLIC BLOOD PRESSURE: 157 MMHG | TEMPERATURE: 98 F | RESPIRATION RATE: 18 BRPM | HEART RATE: 65 BPM
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
## 2022-11-28 ENCOUNTER — CLINICAL SUPPORT (OUTPATIENT)
Dept: NEUROSURGERY | Facility: CLINIC | Age: 57
End: 2022-11-28
Payer: COMMERCIAL

## 2022-11-28 VITALS — TEMPERATURE: 98 F

## 2022-11-28 PROCEDURE — 99999 PR PBB SHADOW E&M-EST. PATIENT-LVL I: ICD-10-PCS | Mod: PBBFAC,,,

## 2022-11-28 PROCEDURE — 99999 PR PBB SHADOW E&M-EST. PATIENT-LVL I: CPT | Mod: PBBFAC,,,

## 2022-11-28 NOTE — PROGRESS NOTES
Patient seen in clinic for 2 week post op s/p L3-4 MIS microdiskectomy with Dr Feldman 11/16/2022           Incision on lumbar spine assessed, removed staples, patient tolerated well, no redness, swelling, or drainage, edges well approximated.     Patient was instructed as follows:   Discontinue Bacitracin after tonight.  May shower normally but pat dry after shower.  Do not submerge wound in bath tub or go swimming until released by the physician  Keep incision clean, dry and open to air as much as possible.  Patient encouraged to walk as much as possible but advised to walk with family member or friend and rest as necessary.  No lifting >10lbs.    Avoid bending and twisting the area of your surgery more than 45 degrees from neutral position in any direction.  All questions were answered. Patient will follow up with Dr Feldman 12/29/2022 . Patient was encouraged to call clinic with any future concerns prior to follow up appt. If any worsening symptoms, patient should report to ED.       Lesley Askew RN, BSN  Neurosurgery Nurse Navigator

## 2022-12-28 ENCOUNTER — PATIENT MESSAGE (OUTPATIENT)
Dept: NEUROSURGERY | Facility: CLINIC | Age: 57
End: 2022-12-28
Payer: COMMERCIAL

## 2022-12-29 ENCOUNTER — PATIENT MESSAGE (OUTPATIENT)
Dept: NEUROSURGERY | Facility: CLINIC | Age: 57
End: 2022-12-29

## 2022-12-29 ENCOUNTER — OFFICE VISIT (OUTPATIENT)
Dept: NEUROSURGERY | Facility: CLINIC | Age: 57
End: 2022-12-29
Payer: COMMERCIAL

## 2022-12-29 DIAGNOSIS — M47.26 OSTEOARTHRITIS OF SPINE WITH RADICULOPATHY, LUMBAR REGION: Primary | ICD-10-CM

## 2022-12-29 DIAGNOSIS — M43.16 SPONDYLOLISTHESIS OF LUMBAR REGION: ICD-10-CM

## 2022-12-29 DIAGNOSIS — M54.40 BACK PAIN OF LUMBAR REGION WITH SCIATICA: ICD-10-CM

## 2022-12-29 PROCEDURE — 1160F PR REVIEW ALL MEDS BY PRESCRIBER/CLIN PHARMACIST DOCUMENTED: ICD-10-PCS | Mod: CPTII,95,, | Performed by: NEUROLOGICAL SURGERY

## 2022-12-29 PROCEDURE — 1159F PR MEDICATION LIST DOCUMENTED IN MEDICAL RECORD: ICD-10-PCS | Mod: CPTII,95,, | Performed by: NEUROLOGICAL SURGERY

## 2022-12-29 PROCEDURE — 3072F LOW RISK FOR RETINOPATHY: CPT | Mod: CPTII,95,, | Performed by: NEUROLOGICAL SURGERY

## 2022-12-29 PROCEDURE — 3072F PR LOW RISK FOR RETINOPATHY: ICD-10-PCS | Mod: CPTII,95,, | Performed by: NEUROLOGICAL SURGERY

## 2022-12-29 PROCEDURE — 99024 PR POST-OP FOLLOW-UP VISIT: ICD-10-PCS | Mod: 95,,, | Performed by: NEUROLOGICAL SURGERY

## 2022-12-29 PROCEDURE — 99024 POSTOP FOLLOW-UP VISIT: CPT | Mod: 95,,, | Performed by: NEUROLOGICAL SURGERY

## 2022-12-29 PROCEDURE — 3066F PR DOCUMENTATION OF TREATMENT FOR NEPHROPATHY: ICD-10-PCS | Mod: CPTII,95,, | Performed by: NEUROLOGICAL SURGERY

## 2022-12-29 PROCEDURE — 3061F PR NEG MICROALBUMINURIA RESULT DOCUMENTED/REVIEW: ICD-10-PCS | Mod: CPTII,95,, | Performed by: NEUROLOGICAL SURGERY

## 2022-12-29 PROCEDURE — 1160F RVW MEDS BY RX/DR IN RCRD: CPT | Mod: CPTII,95,, | Performed by: NEUROLOGICAL SURGERY

## 2022-12-29 PROCEDURE — 3044F HG A1C LEVEL LT 7.0%: CPT | Mod: CPTII,95,, | Performed by: NEUROLOGICAL SURGERY

## 2022-12-29 PROCEDURE — 1159F MED LIST DOCD IN RCRD: CPT | Mod: CPTII,95,, | Performed by: NEUROLOGICAL SURGERY

## 2022-12-29 PROCEDURE — 3066F NEPHROPATHY DOC TX: CPT | Mod: CPTII,95,, | Performed by: NEUROLOGICAL SURGERY

## 2022-12-29 PROCEDURE — 3061F NEG MICROALBUMINURIA REV: CPT | Mod: CPTII,95,, | Performed by: NEUROLOGICAL SURGERY

## 2022-12-29 PROCEDURE — 3044F PR MOST RECENT HEMOGLOBIN A1C LEVEL <7.0%: ICD-10-PCS | Mod: CPTII,95,, | Performed by: NEUROLOGICAL SURGERY
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
## 2023-01-09 ENCOUNTER — PATIENT MESSAGE (OUTPATIENT)
Dept: ADMINISTRATIVE | Facility: HOSPITAL | Age: 58
End: 2023-01-09
Payer: COMMERCIAL

## 2023-01-30 ENCOUNTER — PATIENT MESSAGE (OUTPATIENT)
Dept: HEMATOLOGY/ONCOLOGY | Facility: CLINIC | Age: 58
End: 2023-01-30
Payer: COMMERCIAL

## 2023-01-31 DIAGNOSIS — C91.10 CLL (CHRONIC LYMPHOCYTIC LEUKEMIA): Primary | ICD-10-CM

## 2023-03-08 ENCOUNTER — LAB VISIT (OUTPATIENT)
Dept: LAB | Facility: HOSPITAL | Age: 58
End: 2023-03-08
Attending: INTERNAL MEDICINE
Payer: COMMERCIAL

## 2023-03-08 DIAGNOSIS — M54.40 BACK PAIN OF LUMBAR REGION WITH SCIATICA: ICD-10-CM

## 2023-03-08 DIAGNOSIS — C91.10 CLL (CHRONIC LYMPHOCYTIC LEUKEMIA): ICD-10-CM

## 2023-03-08 LAB
ALBUMIN SERPL BCP-MCNC: 3.7 G/DL (ref 3.5–5.2)
ALBUMIN/CREAT UR: 4.4 UG/MG (ref 0–30)
ALP SERPL-CCNC: 41 U/L (ref 55–135)
ALT SERPL W/O P-5'-P-CCNC: 29 U/L (ref 10–44)
ANION GAP SERPL CALC-SCNC: 7 MMOL/L (ref 8–16)
AST SERPL-CCNC: 16 U/L (ref 10–40)
BASOPHILS NFR BLD: 0 % (ref 0–1.9)
BILIRUB SERPL-MCNC: 0.3 MG/DL (ref 0.1–1)
BUN SERPL-MCNC: 14 MG/DL (ref 6–20)
CALCIUM SERPL-MCNC: 9.6 MG/DL (ref 8.7–10.5)
CHLORIDE SERPL-SCNC: 107 MMOL/L (ref 95–110)
CHOLEST SERPL-MCNC: 124 MG/DL (ref 120–199)
CHOLEST/HDLC SERPL: 6.2 {RATIO} (ref 2–5)
CO2 SERPL-SCNC: 25 MMOL/L (ref 23–29)
CREAT SERPL-MCNC: 1 MG/DL (ref 0.5–1.4)
CREAT UR-MCNC: 158.1 MG/DL (ref 23–375)
DIFFERENTIAL METHOD: ABNORMAL
EOSINOPHIL NFR BLD: 2 % (ref 0–8)
ERYTHROCYTE [DISTWIDTH] IN BLOOD BY AUTOMATED COUNT: 12.8 % (ref 11.5–14.5)
EST. GFR  (NO RACE VARIABLE): >60 ML/MIN/1.73 M^2
ESTIMATED AVG GLUCOSE: 163 MG/DL (ref 68–131)
GLUCOSE SERPL-MCNC: 130 MG/DL (ref 70–110)
HBA1C MFR BLD: 7.3 % (ref 4–5.6)
HCT VFR BLD AUTO: 42.1 % (ref 40–54)
HDLC SERPL-MCNC: 20 MG/DL (ref 40–75)
HDLC SERPL: 16.1 % (ref 20–50)
HGB BLD-MCNC: 14 G/DL (ref 14–18)
IMM GRANULOCYTES # BLD AUTO: ABNORMAL K/UL (ref 0–0.04)
IMM GRANULOCYTES NFR BLD AUTO: ABNORMAL % (ref 0–0.5)
LDH SERPL L TO P-CCNC: 170 U/L (ref 110–260)
LDLC SERPL CALC-MCNC: 50.6 MG/DL (ref 63–159)
LYMPHOCYTES NFR BLD: 16 % (ref 18–48)
MCH RBC QN AUTO: 28.6 PG (ref 27–31)
MCHC RBC AUTO-ENTMCNC: 33.3 G/DL (ref 32–36)
MCV RBC AUTO: 86 FL (ref 82–98)
METAMYELOCYTES NFR BLD MANUAL: 1 %
MICROALBUMIN UR DL<=1MG/L-MCNC: 7 UG/ML
MONOCYTES NFR BLD: 2 % (ref 4–15)
MYELOCYTES NFR BLD MANUAL: 8 %
NEUTROPHILS NFR BLD: 63 % (ref 38–73)
NEUTS BAND NFR BLD MANUAL: 7 %
NONHDLC SERPL-MCNC: 104 MG/DL
NRBC BLD-RTO: 0 /100 WBC
PLATELET # BLD AUTO: 340 K/UL (ref 150–450)
PMV BLD AUTO: 8.6 FL (ref 9.2–12.9)
POTASSIUM SERPL-SCNC: 4.6 MMOL/L (ref 3.5–5.1)
PROMYELOCYTES NFR BLD MANUAL: 1 %
PROT SERPL-MCNC: 6.6 G/DL (ref 6–8.4)
RBC # BLD AUTO: 4.89 M/UL (ref 4.6–6.2)
SODIUM SERPL-SCNC: 139 MMOL/L (ref 136–145)
TRIGL SERPL-MCNC: 267 MG/DL (ref 30–150)
TSH SERPL DL<=0.005 MIU/L-ACNC: 1.69 UIU/ML (ref 0.4–4)
WBC # BLD AUTO: 9.53 K/UL (ref 3.9–12.7)

## 2023-03-08 PROCEDURE — 83036 HEMOGLOBIN GLYCOSYLATED A1C: CPT | Performed by: INTERNAL MEDICINE

## 2023-03-08 PROCEDURE — 83615 LACTATE (LD) (LDH) ENZYME: CPT | Performed by: INTERNAL MEDICINE

## 2023-03-08 PROCEDURE — 36415 COLL VENOUS BLD VENIPUNCTURE: CPT | Performed by: INTERNAL MEDICINE

## 2023-03-08 PROCEDURE — 85027 COMPLETE CBC AUTOMATED: CPT | Performed by: INTERNAL MEDICINE

## 2023-03-08 PROCEDURE — 84443 ASSAY THYROID STIM HORMONE: CPT | Performed by: INTERNAL MEDICINE

## 2023-03-08 PROCEDURE — 80061 LIPID PANEL: CPT | Performed by: INTERNAL MEDICINE

## 2023-03-08 PROCEDURE — 82570 ASSAY OF URINE CREATININE: CPT | Performed by: INTERNAL MEDICINE

## 2023-03-08 PROCEDURE — 85007 BL SMEAR W/DIFF WBC COUNT: CPT | Performed by: INTERNAL MEDICINE

## 2023-03-08 PROCEDURE — 80053 COMPREHEN METABOLIC PANEL: CPT | Performed by: INTERNAL MEDICINE

## 2023-03-14 ENCOUNTER — OFFICE VISIT (OUTPATIENT)
Dept: HEMATOLOGY/ONCOLOGY | Facility: CLINIC | Age: 58
End: 2023-03-14
Payer: COMMERCIAL

## 2023-03-14 DIAGNOSIS — C91.10 CLL (CHRONIC LYMPHOCYTIC LEUKEMIA): Primary | ICD-10-CM

## 2023-03-14 PROCEDURE — 3061F NEG MICROALBUMINURIA REV: CPT | Mod: CPTII,95,, | Performed by: INTERNAL MEDICINE

## 2023-03-14 PROCEDURE — 1159F PR MEDICATION LIST DOCUMENTED IN MEDICAL RECORD: ICD-10-PCS | Mod: CPTII,95,, | Performed by: INTERNAL MEDICINE

## 2023-03-14 PROCEDURE — 1159F MED LIST DOCD IN RCRD: CPT | Mod: CPTII,95,, | Performed by: INTERNAL MEDICINE

## 2023-03-14 PROCEDURE — 99214 PR OFFICE/OUTPT VISIT, EST, LEVL IV, 30-39 MIN: ICD-10-PCS | Mod: 95,,, | Performed by: INTERNAL MEDICINE

## 2023-03-14 PROCEDURE — 3051F HG A1C>EQUAL 7.0%<8.0%: CPT | Mod: CPTII,95,, | Performed by: INTERNAL MEDICINE

## 2023-03-14 PROCEDURE — 3051F PR MOST RECENT HEMOGLOBIN A1C LEVEL 7.0 - < 8.0%: ICD-10-PCS | Mod: CPTII,95,, | Performed by: INTERNAL MEDICINE

## 2023-03-14 PROCEDURE — 3061F PR NEG MICROALBUMINURIA RESULT DOCUMENTED/REVIEW: ICD-10-PCS | Mod: CPTII,95,, | Performed by: INTERNAL MEDICINE

## 2023-03-14 PROCEDURE — 1160F PR REVIEW ALL MEDS BY PRESCRIBER/CLIN PHARMACIST DOCUMENTED: ICD-10-PCS | Mod: CPTII,95,, | Performed by: INTERNAL MEDICINE

## 2023-03-14 PROCEDURE — 3066F NEPHROPATHY DOC TX: CPT | Mod: CPTII,95,, | Performed by: INTERNAL MEDICINE

## 2023-03-14 PROCEDURE — 1160F RVW MEDS BY RX/DR IN RCRD: CPT | Mod: CPTII,95,, | Performed by: INTERNAL MEDICINE

## 2023-03-14 PROCEDURE — 99214 OFFICE O/P EST MOD 30 MIN: CPT | Mod: 95,,, | Performed by: INTERNAL MEDICINE

## 2023-03-14 PROCEDURE — 3066F PR DOCUMENTATION OF TREATMENT FOR NEPHROPATHY: ICD-10-PCS | Mod: CPTII,95,, | Performed by: INTERNAL MEDICINE
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
## 2023-03-15 ENCOUNTER — OFFICE VISIT (OUTPATIENT)
Dept: INTERNAL MEDICINE | Facility: CLINIC | Age: 58
End: 2023-03-15
Payer: COMMERCIAL

## 2023-03-15 VITALS
WEIGHT: 266.75 LBS | BODY MASS INDEX: 39.51 KG/M2 | OXYGEN SATURATION: 95 % | SYSTOLIC BLOOD PRESSURE: 136 MMHG | HEART RATE: 75 BPM | HEIGHT: 69 IN | RESPIRATION RATE: 20 BRPM | DIASTOLIC BLOOD PRESSURE: 74 MMHG

## 2023-03-15 DIAGNOSIS — I10 BENIGN ESSENTIAL HTN: Primary | ICD-10-CM

## 2023-03-15 DIAGNOSIS — E66.01 SEVERE OBESITY (BMI 35.0-39.9) WITH COMORBIDITY: ICD-10-CM

## 2023-03-15 DIAGNOSIS — E78.1 HYPERTRIGLYCERIDEMIA: ICD-10-CM

## 2023-03-15 DIAGNOSIS — E11.65 UNCONTROLLED TYPE 2 DIABETES MELLITUS WITH HYPERGLYCEMIA: ICD-10-CM

## 2023-03-15 PROCEDURE — 3078F PR MOST RECENT DIASTOLIC BLOOD PRESSURE < 80 MM HG: ICD-10-PCS | Mod: CPTII,S$GLB,, | Performed by: INTERNAL MEDICINE

## 2023-03-15 PROCEDURE — 1160F RVW MEDS BY RX/DR IN RCRD: CPT | Mod: CPTII,S$GLB,, | Performed by: INTERNAL MEDICINE

## 2023-03-15 PROCEDURE — 3061F PR NEG MICROALBUMINURIA RESULT DOCUMENTED/REVIEW: ICD-10-PCS | Mod: CPTII,S$GLB,, | Performed by: INTERNAL MEDICINE

## 2023-03-15 PROCEDURE — 3061F NEG MICROALBUMINURIA REV: CPT | Mod: CPTII,S$GLB,, | Performed by: INTERNAL MEDICINE

## 2023-03-15 PROCEDURE — 3066F NEPHROPATHY DOC TX: CPT | Mod: CPTII,S$GLB,, | Performed by: INTERNAL MEDICINE

## 2023-03-15 PROCEDURE — 3075F PR MOST RECENT SYSTOLIC BLOOD PRESS GE 130-139MM HG: ICD-10-PCS | Mod: CPTII,S$GLB,, | Performed by: INTERNAL MEDICINE

## 2023-03-15 PROCEDURE — 3008F PR BODY MASS INDEX (BMI) DOCUMENTED: ICD-10-PCS | Mod: CPTII,S$GLB,, | Performed by: INTERNAL MEDICINE

## 2023-03-15 PROCEDURE — 3066F PR DOCUMENTATION OF TREATMENT FOR NEPHROPATHY: ICD-10-PCS | Mod: CPTII,S$GLB,, | Performed by: INTERNAL MEDICINE

## 2023-03-15 PROCEDURE — 3008F BODY MASS INDEX DOCD: CPT | Mod: CPTII,S$GLB,, | Performed by: INTERNAL MEDICINE

## 2023-03-15 PROCEDURE — 3075F SYST BP GE 130 - 139MM HG: CPT | Mod: CPTII,S$GLB,, | Performed by: INTERNAL MEDICINE

## 2023-03-15 PROCEDURE — 99999 PR PBB SHADOW E&M-EST. PATIENT-LVL III: ICD-10-PCS | Mod: PBBFAC,,, | Performed by: INTERNAL MEDICINE

## 2023-03-15 PROCEDURE — 3051F PR MOST RECENT HEMOGLOBIN A1C LEVEL 7.0 - < 8.0%: ICD-10-PCS | Mod: CPTII,S$GLB,, | Performed by: INTERNAL MEDICINE

## 2023-03-15 PROCEDURE — 99999 PR PBB SHADOW E&M-EST. PATIENT-LVL III: CPT | Mod: PBBFAC,,, | Performed by: INTERNAL MEDICINE

## 2023-03-15 PROCEDURE — 1159F MED LIST DOCD IN RCRD: CPT | Mod: CPTII,S$GLB,, | Performed by: INTERNAL MEDICINE

## 2023-03-15 PROCEDURE — 99214 PR OFFICE/OUTPT VISIT, EST, LEVL IV, 30-39 MIN: ICD-10-PCS | Mod: S$GLB,,, | Performed by: INTERNAL MEDICINE

## 2023-03-15 PROCEDURE — 1160F PR REVIEW ALL MEDS BY PRESCRIBER/CLIN PHARMACIST DOCUMENTED: ICD-10-PCS | Mod: CPTII,S$GLB,, | Performed by: INTERNAL MEDICINE

## 2023-03-15 PROCEDURE — 3078F DIAST BP <80 MM HG: CPT | Mod: CPTII,S$GLB,, | Performed by: INTERNAL MEDICINE

## 2023-03-15 PROCEDURE — 3051F HG A1C>EQUAL 7.0%<8.0%: CPT | Mod: CPTII,S$GLB,, | Performed by: INTERNAL MEDICINE

## 2023-03-15 PROCEDURE — 1159F PR MEDICATION LIST DOCUMENTED IN MEDICAL RECORD: ICD-10-PCS | Mod: CPTII,S$GLB,, | Performed by: INTERNAL MEDICINE

## 2023-03-15 PROCEDURE — 99214 OFFICE O/P EST MOD 30 MIN: CPT | Mod: S$GLB,,, | Performed by: INTERNAL MEDICINE

## 2023-03-15 NOTE — PROGRESS NOTES
"HPI:  Wild Holliday is a 57 y.o. male here for Follow-up  .    Review of Systems   A review of systems was performed and is negative except as noted above.    Objective:  Vitals:    03/15/23 1535   BP: 136/74   Pulse: 75   Resp: 20   SpO2: 95%   Weight: 121 kg (266 lb 12.1 oz)   Height: 5' 9" (1.753 m)      Physical Exam  Vitals and nursing note reviewed.   Constitutional:       Appearance: He is well-developed.   HENT:      Head: Normocephalic and atraumatic.      Nose: Nose normal.   Eyes:      Conjunctiva/sclera: Conjunctivae normal.      Pupils: Pupils are equal, round, and reactive to light.   Neck:      Thyroid: No thyromegaly.      Vascular: No JVD.   Cardiovascular:      Rate and Rhythm: Normal rate and regular rhythm.      Heart sounds: Normal heart sounds.   Pulmonary:      Effort: Pulmonary effort is normal.      Breath sounds: Normal breath sounds.   Abdominal:      General: Bowel sounds are normal. There is no distension.      Palpations: Abdomen is soft. There is no mass.      Tenderness: There is no abdominal tenderness. There is no guarding.   Musculoskeletal:         General: Normal range of motion.      Cervical back: Normal range of motion and neck supple.   Lymphadenopathy:      Cervical: No cervical adenopathy.   Skin:     General: Skin is warm and dry.      Coloration: Skin is not pale.      Findings: No rash.   Neurological:      Mental Status: He is alert and oriented to person, place, and time.      Cranial Nerves: No cranial nerve deficit.      Deep Tendon Reflexes: Reflexes are normal and symmetric.        Assessment/Plan:  1. Benign essential HTN  -     CBC Auto Differential; Future; Expected date: 06/13/2023  -     Comprehensive Metabolic Panel; Future; Expected date: 06/13/2023    2. Uncontrolled type 2 diabetes mellitus with hyperglycemia  -     Hemoglobin A1C; Future; Expected date: 06/13/2023  -     Microalbumin/Creatinine Ratio, Urine; Future; Expected date: 06/13/2023  -     " Discontinue: SITagliptin phosphate (JANUVIA) 50 MG Tab; Take 1 tablet (50 mg total) by mouth once daily.  Dispense: 90 tablet; Refill: 0  -     semaglutide (OZEMPIC) 0.25 mg or 0.5 mg(2 mg/1.5 mL) pen injector; Inject 0.5 mg into the skin every 7 days.  Dispense: 12 each; Refill: 1  Watch for pancreatitis   Watch throat for swelling     3. Severe obesity (BMI 35.0-39.9) with comorbidity    # The patient is asked to make an attempt to improve diet and exercise patterns to aid in medical management of this problem.     # Eat  5 small meals a day.     # Cut out high carbohydrate  foods : bread, rice, pasta, potatoes.     # Exercise/walk 5x/week for at least 30-45  minutes.       4. Hypertriglyceridemia  -     Lipid Panel; Future; Expected date: 06/13/2023  -     TSH; Future; Expected date: 06/13/2023  Continue with fenofibrate

## 2023-04-03 ENCOUNTER — PATIENT MESSAGE (OUTPATIENT)
Dept: ADMINISTRATIVE | Facility: HOSPITAL | Age: 58
End: 2023-04-03
Payer: COMMERCIAL

## 2023-04-05 ENCOUNTER — OFFICE VISIT (OUTPATIENT)
Dept: NEUROSURGERY | Facility: CLINIC | Age: 58
End: 2023-04-05
Payer: COMMERCIAL

## 2023-04-05 DIAGNOSIS — Z98.890 S/P LUMBAR MICRODISCECTOMY: Primary | ICD-10-CM

## 2023-04-05 PROCEDURE — 3066F PR DOCUMENTATION OF TREATMENT FOR NEPHROPATHY: ICD-10-PCS | Mod: CPTII,95,, | Performed by: NURSE PRACTITIONER

## 2023-04-05 PROCEDURE — 3061F NEG MICROALBUMINURIA REV: CPT | Mod: CPTII,95,, | Performed by: NURSE PRACTITIONER

## 2023-04-05 PROCEDURE — 3051F HG A1C>EQUAL 7.0%<8.0%: CPT | Mod: CPTII,95,, | Performed by: NURSE PRACTITIONER

## 2023-04-05 PROCEDURE — 99213 OFFICE O/P EST LOW 20 MIN: CPT | Mod: 95,,, | Performed by: NURSE PRACTITIONER

## 2023-04-05 PROCEDURE — 3051F PR MOST RECENT HEMOGLOBIN A1C LEVEL 7.0 - < 8.0%: ICD-10-PCS | Mod: CPTII,95,, | Performed by: NURSE PRACTITIONER

## 2023-04-05 PROCEDURE — 3061F PR NEG MICROALBUMINURIA RESULT DOCUMENTED/REVIEW: ICD-10-PCS | Mod: CPTII,95,, | Performed by: NURSE PRACTITIONER

## 2023-04-05 PROCEDURE — 1159F PR MEDICATION LIST DOCUMENTED IN MEDICAL RECORD: ICD-10-PCS | Mod: CPTII,95,, | Performed by: NURSE PRACTITIONER

## 2023-04-05 PROCEDURE — 1160F RVW MEDS BY RX/DR IN RCRD: CPT | Mod: CPTII,95,, | Performed by: NURSE PRACTITIONER

## 2023-04-05 PROCEDURE — 1159F MED LIST DOCD IN RCRD: CPT | Mod: CPTII,95,, | Performed by: NURSE PRACTITIONER

## 2023-04-05 PROCEDURE — 99213 PR OFFICE/OUTPT VISIT, EST, LEVL III, 20-29 MIN: ICD-10-PCS | Mod: 95,,, | Performed by: NURSE PRACTITIONER

## 2023-04-05 PROCEDURE — 3066F NEPHROPATHY DOC TX: CPT | Mod: CPTII,95,, | Performed by: NURSE PRACTITIONER

## 2023-04-05 PROCEDURE — 1160F PR REVIEW ALL MEDS BY PRESCRIBER/CLIN PHARMACIST DOCUMENTED: ICD-10-PCS | Mod: CPTII,95,, | Performed by: NURSE PRACTITIONER

## 2023-04-16 NOTE — PROGRESS NOTES
Neurosurgery  Established Patient    SUBJECTIVE:   Established Patient - Telehealth Visit     The patient location is: Home  The chief complaint leading to consultation is: Post-op  Visit type: Virtual visit with audio and video  Total time spent with patient: 10 minutes     Each patient to whom I provide medical services by telemedicine is:  (1) informed of the relationship between the physician and patient and the respective role of any other health care provider with respect to management of the patient; and (2) notified that they may decline to receive medical services by telemedicine and may withdraw from such care at any time. Patient verbally consented to receive this service via voice-only telephone call.     This service was not originating from a related E/M service provided within the previous 7 days nor will  to an E/M service or procedure within the next 24 hours or my soonest available appointment.  Prevailing standard of care was able to be met in this audio-only visit.       History of Present Illness: Wild Holliday is a 57 y.o. male s/p minimally invasive right L3-4 microdiskectomy on 11/16/22 with Dr. Feldman. He is being seen virtually to discuss his progress post-operatively. States that he continues to notice improvement in his back and leg pain. He has minimal low back pain when he is active. Reports continued weight loss, which also seems to help the back pain. States that he does have some residual right shin paresthesias but this seems to be improving as well. Overall, he is happy with how he is doing and tries to remain active.   Review of patient's allergies indicates:  No Known Allergies    Current Outpatient Medications   Medication Sig Dispense Refill    blood-glucose meter kit 1 each by Other route once daily at 6am. Use to check blood sugar once daily 1 each 0    CONTOUR NEXT TEST STRIPS Strp Once daily 100 strip 1    fenofibrate 160 MG Tab TAKE 1 TABLET(160 MG) BY MOUTH EVERY DAY  90 tablet 1    finasteride (PROSCAR) 5 mg tablet       glimepiride (AMARYL) 2 MG tablet Take 1 tablet (2 mg total) by mouth before breakfast. 90 tablet 1    lancets 25 gauge Misc 100 lancets by Misc.(Non-Drug; Combo Route) route once daily at 6am. Use to check blood sugar once daily 100 each 2    metFORMIN (GLUCOPHAGE) 1000 MG tablet Take 1 tablet (1,000 mg total) by mouth 2 (two) times daily with meals. 180 tablet 3    semaglutide (OZEMPIC) 0.25 mg or 0.5 mg(2 mg/1.5 mL) pen injector Inject 0.5 mg into the skin every 7 days. 12 each 1    tamsulosin (FLOMAX) 0.4 mg Cap       valsartan-hydrochlorothiazide (DIOVAN-HCT) 160-25 mg per tablet TAKE 1 TABLET BY MOUTH EVERY DAY 90 tablet 3     Current Facility-Administered Medications   Medication Dose Route Frequency Provider Last Rate Last Admin    acetaminophen tablet 650 mg  650 mg Oral Once PRN Darnell Cortes MD           Past Medical History:   Diagnosis Date    Arthritis     Cancer     CLL, non hogkin's    Cataract     had sx in OD still has cataract in OS     (central serous retinopathy)     right eye on cornia    Depression     Diabetes mellitus 2015    Farsightedness     wears glasses    Hypertension     Psychiatric problem     Therapy      Past Surgical History:   Procedure Laterality Date    BACK INJECTIONS      08/09/2022 09/06/2022    CATARACT EXTRACTION      COLONOSCOPY N/A 10/03/2019    Procedure: COLONOSCOPY;  Surgeon: Eliazar Huynh MD;  Location: [facility];  Service: Endoscopy;  Laterality: N/A;    LAMINOTOMY N/A 11/16/2022    Procedure: LAMINOTOMY;  Surgeon: Gail Feldman MD;  Location: [facility];  Service: Neurosurgery;  Laterality: N/A;    lipoma removal      right rib; below right navel    MINIMALLY INVASIVE SURGICAL REMOVAL OF INTERVERTEBRAL DISC OF SPINE USING MICROSCOPE N/A 11/16/2022    Procedure: DISCECTOMY, SPINE, MINIMALLY INVASIVE, USING MICROSCOPE;  Surgeon: Gail Feldman MD;  Location: [facility];  Service: Neurosurgery;  Laterality:  N/A;  Anesthesia: Gen / 1.5   Blood: Type & Hold  Monitoring: EMG, SEP  Bed: [place] Table 4 Poster  Pos: Prone  Rad: C-arm    PORTACATH PLACEMENT      left upper chest    TONSILLECTOMY      tricep tendon repair Right 2014     Family History       Problem Relation (Age of Onset)    ALS Maternal Aunt    Breast cancer Paternal Grandmother    Cancer Father, Maternal Uncle, Maternal Grandfather, Maternal Uncle    Diabetes Father, Paternal Grandfather    Drug abuse Brother    Heart attack Brother    Hypertension Mother, Father    Valvular heart disease Mother          Social History     Socioeconomic History    Marital status:     Number of children: 3   Tobacco Use    Smoking status: Never    Smokeless tobacco: Never   Substance and Sexual Activity    Alcohol use: Yes     Comment: socially    Drug use: No    Sexual activity: Yes   Social History Narrative    Owns electrical company,  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, 3 children, [employer], good social support, loves fishing     Social Determinants of Health     Financial Resource Strain: Low Risk     Difficulty of Paying Living Expenses: Not hard at all   Food Insecurity: No Food Insecurity    Worried About Running Out of Food in the Last Year: Never true    Ran Out of Food in the Last Year: Never true   Transportation Needs: No Transportation Needs    Lack of Transportation (Medical): No    Lack of Transportation (Non-Medical): No   Physical Activity: Insufficiently Active    Days of Exercise per Week: 3 days    Minutes of Exercise per Session: 20 min   Stress: Stress Concern Present    Feeling of Stress : To some extent   Social Connections: Unknown    Frequency of Communication with Friends and Family: More than three times a week    Frequency of Social Gatherings with Friends and Family: Once a week    Active Member of Clubs or Organizations: Yes    Attends Club or Organization Meetings: 1 to 4 times per year    Marital Status:    Housing Stability: Low Risk      Unable to Pay for Housing in the Last Year: No    Number of Places Lived in the Last Year: 1    Unstable Housing in the Last Year: No       Review of Systems  Constitutional: no fever, chills or night sweats. No changes in weight   Eyes: no visual changes   ENT: no nasal congestion or sore throat   Respiratory: no cough or shortness of breath   Cardiovascular: no chest pain or palpitations   Gastrointestinal: no nausea or vomiting   Genitourinary: no hematuria or dysuria   Integument/Breast: no rash or pruritis   Hematologic/Lymphatic: no easy bruising or lymphadenopathy   Musculoskeletal: no arthralgias or myalgias.   Neurological: no seizures or tremors   Behavioral/Psych: no auditory or visual hallucinations   Endocrine: no heat or cold intolerance    OBJECTIVE:     Vital Signs     There is no height or weight on file to calculate BMI.    Neurosurgery Physical Exam  General: well developed, well nourished, no distress.   Head: normocephalic  Neurologic: Alert and oriented. Thought content appropriate.  GCS: Motor: 6/Verbal: 5/Eyes: 4 GCS Total: 15  Mental Status: Awake, Alert, Oriented x 4  Language: No aphasia  Speech: No dysarthria  Cranial nerves: CN II-XII grossly intact.   Eyes: EOMI appear grossly intact  Pulmonary: no signs of respiratory distress  Motor Strength:Moves all extremities spontaneously with good tone. BUE and BLE are at least 3/5. No abnormal movements seen.   Finger-to-nose: intact bilaterally   Pronator drift: absent bilaterally     Diagnostic Results:  There is no new imaging to review for this encounter.      ASSESSMENT/PLAN:   Wild Holliday is a 57 y.o. male s/p minimally invasive right L3-4 microdiskectomy on 11/16/22 with Dr. Feldman. He is being seen virtually to discuss his progress post-operatively. States that he continues to notice improvement in his back and leg pain and is pleased with the surgical outcomes. I would like the patient to follow-up for his 1 year post-op evaluation. I  have encouraged him to contact the clinic with any questions, concerns, or adverse clinical changes. He verbalized understanding.      ABRAM Parada  Neurosurgery  [facility]. [place].      Note dictated with voice recognition software, please excuse any grammatical errors.

## 2023-05-18 DIAGNOSIS — E11.65 UNCONTROLLED TYPE 2 DIABETES MELLITUS WITH HYPERGLYCEMIA: ICD-10-CM

## 2023-05-18 RX ORDER — SEMAGLUTIDE 0.68 MG/ML
INJECTION, SOLUTION SUBCUTANEOUS
Qty: 12 EACH | Refills: 1 | Status: SHIPPED | OUTPATIENT
Start: 2023-05-18 | End: 2023-09-20 | Stop reason: SDUPTHER

## 2023-07-10 ENCOUNTER — PATIENT MESSAGE (OUTPATIENT)
Dept: ADMINISTRATIVE | Facility: HOSPITAL | Age: 58
End: 2023-07-10
Payer: COMMERCIAL

## 2023-08-23 ENCOUNTER — PATIENT OUTREACH (OUTPATIENT)
Dept: ADMINISTRATIVE | Facility: HOSPITAL | Age: 58
End: 2023-08-23
Payer: COMMERCIAL

## 2023-08-23 NOTE — PROGRESS NOTES
Chart reviewed, immunization record updated.  No new results noted on [facility] or [facility] web site.  Care Everywhere updated.   Patient care coordination note updated.   Upcoming PCP visit updated.  Next PCP visit 09/20/2023.  LOV with PCP 03/15/2023.   Contacted patient to discuss Diabetic Eye exam and Statin therapy.    Patient will schedule Eye exam when he can.  States he is not on any Statin medications, will discuss with PCP at next visit.  Appointment reminder placed for review of Statin therapy.

## 2023-08-30 DIAGNOSIS — E11.65 UNCONTROLLED TYPE 2 DIABETES MELLITUS WITH HYPERGLYCEMIA: ICD-10-CM

## 2023-08-30 RX ORDER — GLIMEPIRIDE 2 MG/1
2 TABLET ORAL
Qty: 90 TABLET | Refills: 0 | Status: SHIPPED | OUTPATIENT
Start: 2023-08-30 | End: 2023-10-13 | Stop reason: SDUPTHER

## 2023-08-30 NOTE — TELEPHONE ENCOUNTER
Refill Decision Note   Wild Holliday  is requesting a refill authorization.  Brief Assessment and Rationale for Refill:  Approve     Medication Therapy Plan:  FLOS      Comments:     Note composed:8:24 AM 08/30/2023             Appointments     Last Visit   3/15/2023 Joshua Noonan MD   Next Visit   9/20/2023 Joshua Noonan MD

## 2023-08-30 NOTE — TELEPHONE ENCOUNTER
Care Due:                  Date            Visit Type   Department     Provider  --------------------------------------------------------------------------------                                EP -                              PRIMARY      STA INTERNAL  Last Visit: 03-      CARE ([facility])   MEDICINE II    Joshua Noonan                              EP -                              PRIMARY      STA INTERNAL  Next Visit: 09-      CARE ([facility])   MEDICINE II    Joshua Noonan                                                            Last  Test          Frequency    Reason                     Performed    Due Date  --------------------------------------------------------------------------------    HBA1C.......  6 months...  glimepiride, metFORMIN,    03- 09-                             semaglutide..............    [employer] Embedded Care Due Messages. Reference number: [id number].   8/30/2023 5:14:43 AM CDT

## 2023-09-18 ENCOUNTER — LAB VISIT (OUTPATIENT)
Dept: LAB | Facility: HOSPITAL | Age: 58
End: 2023-09-18
Attending: INTERNAL MEDICINE
Payer: COMMERCIAL

## 2023-09-18 DIAGNOSIS — I10 BENIGN ESSENTIAL HTN: ICD-10-CM

## 2023-09-18 DIAGNOSIS — E11.65 UNCONTROLLED TYPE 2 DIABETES MELLITUS WITH HYPERGLYCEMIA: ICD-10-CM

## 2023-09-18 DIAGNOSIS — E78.1 HYPERTRIGLYCERIDEMIA: ICD-10-CM

## 2023-09-18 LAB
ALBUMIN SERPL BCP-MCNC: 4 G/DL (ref 3.5–5.2)
ALBUMIN/CREAT UR: 7.2 UG/MG (ref 0–30)
ALP SERPL-CCNC: 36 U/L (ref 55–135)
ALT SERPL W/O P-5'-P-CCNC: 27 U/L (ref 10–44)
ANION GAP SERPL CALC-SCNC: 8 MMOL/L (ref 8–16)
AST SERPL-CCNC: 18 U/L (ref 10–40)
BASOPHILS # BLD AUTO: 0.06 K/UL (ref 0–0.2)
BASOPHILS NFR BLD: 0.9 % (ref 0–1.9)
BILIRUB SERPL-MCNC: 0.5 MG/DL (ref 0.1–1)
BUN SERPL-MCNC: 14 MG/DL (ref 6–20)
CALCIUM SERPL-MCNC: 9.3 MG/DL (ref 8.7–10.5)
CHLORIDE SERPL-SCNC: 105 MMOL/L (ref 95–110)
CHOLEST SERPL-MCNC: 137 MG/DL (ref 120–199)
CHOLEST/HDLC SERPL: 6 {RATIO} (ref 2–5)
CO2 SERPL-SCNC: 25 MMOL/L (ref 23–29)
CREAT SERPL-MCNC: 0.9 MG/DL (ref 0.5–1.4)
CREAT UR-MCNC: 153 MG/DL (ref 23–375)
DIFFERENTIAL METHOD: ABNORMAL
EOSINOPHIL # BLD AUTO: 0.2 K/UL (ref 0–0.5)
EOSINOPHIL NFR BLD: 2.7 % (ref 0–8)
ERYTHROCYTE [DISTWIDTH] IN BLOOD BY AUTOMATED COUNT: 13.5 % (ref 11.5–14.5)
EST. GFR  (NO RACE VARIABLE): >60 ML/MIN/1.73 M^2
ESTIMATED AVG GLUCOSE: 143 MG/DL (ref 68–131)
GLUCOSE SERPL-MCNC: 140 MG/DL (ref 70–110)
HBA1C MFR BLD: 6.6 % (ref 4–5.6)
HCT VFR BLD AUTO: 43.4 % (ref 40–54)
HDLC SERPL-MCNC: 23 MG/DL (ref 40–75)
HDLC SERPL: 16.8 % (ref 20–50)
HGB BLD-MCNC: 14.2 G/DL (ref 14–18)
IMM GRANULOCYTES # BLD AUTO: 0.05 K/UL (ref 0–0.04)
IMM GRANULOCYTES NFR BLD AUTO: 0.7 % (ref 0–0.5)
LDLC SERPL CALC-MCNC: 34 MG/DL (ref 63–159)
LYMPHOCYTES # BLD AUTO: 2 K/UL (ref 1–4.8)
LYMPHOCYTES NFR BLD: 29.5 % (ref 18–48)
MCH RBC QN AUTO: 29 PG (ref 27–31)
MCHC RBC AUTO-ENTMCNC: 32.7 G/DL (ref 32–36)
MCV RBC AUTO: 89 FL (ref 82–98)
MICROALBUMIN UR DL<=1MG/L-MCNC: 11 UG/ML
MONOCYTES # BLD AUTO: 0.5 K/UL (ref 0.3–1)
MONOCYTES NFR BLD: 7.5 % (ref 4–15)
NEUTROPHILS # BLD AUTO: 4.1 K/UL (ref 1.8–7.7)
NEUTROPHILS NFR BLD: 58.7 % (ref 38–73)
NONHDLC SERPL-MCNC: 114 MG/DL
NRBC BLD-RTO: 0 /100 WBC
PLATELET # BLD AUTO: 245 K/UL (ref 150–450)
PMV BLD AUTO: 8.7 FL (ref 9.2–12.9)
POTASSIUM SERPL-SCNC: 4.5 MMOL/L (ref 3.5–5.1)
PROT SERPL-MCNC: 6.4 G/DL (ref 6–8.4)
RBC # BLD AUTO: 4.9 M/UL (ref 4.6–6.2)
SODIUM SERPL-SCNC: 138 MMOL/L (ref 136–145)
TRIGL SERPL-MCNC: 400 MG/DL (ref 30–150)
TSH SERPL DL<=0.005 MIU/L-ACNC: 1.57 UIU/ML (ref 0.4–4)
WBC # BLD AUTO: 6.92 K/UL (ref 3.9–12.7)

## 2023-09-18 PROCEDURE — 83036 HEMOGLOBIN GLYCOSYLATED A1C: CPT | Performed by: INTERNAL MEDICINE

## 2023-09-18 PROCEDURE — 82570 ASSAY OF URINE CREATININE: CPT | Performed by: INTERNAL MEDICINE

## 2023-09-18 PROCEDURE — 85025 COMPLETE CBC W/AUTO DIFF WBC: CPT | Performed by: INTERNAL MEDICINE

## 2023-09-18 PROCEDURE — 80061 LIPID PANEL: CPT | Performed by: INTERNAL MEDICINE

## 2023-09-18 PROCEDURE — 80053 COMPREHEN METABOLIC PANEL: CPT | Performed by: INTERNAL MEDICINE

## 2023-09-18 PROCEDURE — 36415 COLL VENOUS BLD VENIPUNCTURE: CPT | Performed by: INTERNAL MEDICINE

## 2023-09-18 PROCEDURE — 84443 ASSAY THYROID STIM HORMONE: CPT | Performed by: INTERNAL MEDICINE

## 2023-09-20 ENCOUNTER — OFFICE VISIT (OUTPATIENT)
Dept: INTERNAL MEDICINE | Facility: CLINIC | Age: 58
End: 2023-09-20
Payer: COMMERCIAL

## 2023-09-20 VITALS
OXYGEN SATURATION: 97 % | SYSTOLIC BLOOD PRESSURE: 116 MMHG | HEIGHT: 69 IN | HEART RATE: 75 BPM | WEIGHT: 262.13 LBS | BODY MASS INDEX: 38.82 KG/M2 | RESPIRATION RATE: 18 BRPM | DIASTOLIC BLOOD PRESSURE: 70 MMHG

## 2023-09-20 DIAGNOSIS — I10 BENIGN ESSENTIAL HTN: Primary | ICD-10-CM

## 2023-09-20 DIAGNOSIS — E11.65 UNCONTROLLED TYPE 2 DIABETES MELLITUS WITH HYPERGLYCEMIA: ICD-10-CM

## 2023-09-20 DIAGNOSIS — E66.01 SEVERE OBESITY (BMI 35.0-39.9) WITH COMORBIDITY: ICD-10-CM

## 2023-09-20 DIAGNOSIS — E78.1 HYPERTRIGLYCERIDEMIA: ICD-10-CM

## 2023-09-20 DIAGNOSIS — E11.9 CONTROLLED TYPE 2 DIABETES MELLITUS WITHOUT COMPLICATION, WITHOUT LONG-TERM CURRENT USE OF INSULIN: ICD-10-CM

## 2023-09-20 PROCEDURE — 3066F NEPHROPATHY DOC TX: CPT | Mod: CPTII,S$GLB,, | Performed by: INTERNAL MEDICINE

## 2023-09-20 PROCEDURE — 99999 PR PBB SHADOW E&M-EST. PATIENT-LVL IV: CPT | Mod: PBBFAC,,, | Performed by: INTERNAL MEDICINE

## 2023-09-20 PROCEDURE — 1159F PR MEDICATION LIST DOCUMENTED IN MEDICAL RECORD: ICD-10-PCS | Mod: CPTII,S$GLB,, | Performed by: INTERNAL MEDICINE

## 2023-09-20 PROCEDURE — 99214 OFFICE O/P EST MOD 30 MIN: CPT | Mod: S$GLB,,, | Performed by: INTERNAL MEDICINE

## 2023-09-20 PROCEDURE — 99214 PR OFFICE/OUTPT VISIT, EST, LEVL IV, 30-39 MIN: ICD-10-PCS | Mod: S$GLB,,, | Performed by: INTERNAL MEDICINE

## 2023-09-20 PROCEDURE — 1160F RVW MEDS BY RX/DR IN RCRD: CPT | Mod: CPTII,S$GLB,, | Performed by: INTERNAL MEDICINE

## 2023-09-20 PROCEDURE — 3044F HG A1C LEVEL LT 7.0%: CPT | Mod: CPTII,S$GLB,, | Performed by: INTERNAL MEDICINE

## 2023-09-20 PROCEDURE — 3061F NEG MICROALBUMINURIA REV: CPT | Mod: CPTII,S$GLB,, | Performed by: INTERNAL MEDICINE

## 2023-09-20 PROCEDURE — 3008F BODY MASS INDEX DOCD: CPT | Mod: CPTII,S$GLB,, | Performed by: INTERNAL MEDICINE

## 2023-09-20 PROCEDURE — 3078F DIAST BP <80 MM HG: CPT | Mod: CPTII,S$GLB,, | Performed by: INTERNAL MEDICINE

## 2023-09-20 PROCEDURE — 1160F PR REVIEW ALL MEDS BY PRESCRIBER/CLIN PHARMACIST DOCUMENTED: ICD-10-PCS | Mod: CPTII,S$GLB,, | Performed by: INTERNAL MEDICINE

## 2023-09-20 PROCEDURE — 3008F PR BODY MASS INDEX (BMI) DOCUMENTED: ICD-10-PCS | Mod: CPTII,S$GLB,, | Performed by: INTERNAL MEDICINE

## 2023-09-20 PROCEDURE — 1159F MED LIST DOCD IN RCRD: CPT | Mod: CPTII,S$GLB,, | Performed by: INTERNAL MEDICINE

## 2023-09-20 PROCEDURE — 99999 PR PBB SHADOW E&M-EST. PATIENT-LVL IV: ICD-10-PCS | Mod: PBBFAC,,, | Performed by: INTERNAL MEDICINE

## 2023-09-20 PROCEDURE — 3044F PR MOST RECENT HEMOGLOBIN A1C LEVEL <7.0%: ICD-10-PCS | Mod: CPTII,S$GLB,, | Performed by: INTERNAL MEDICINE

## 2023-09-20 PROCEDURE — 3061F PR NEG MICROALBUMINURIA RESULT DOCUMENTED/REVIEW: ICD-10-PCS | Mod: CPTII,S$GLB,, | Performed by: INTERNAL MEDICINE

## 2023-09-20 PROCEDURE — 3074F SYST BP LT 130 MM HG: CPT | Mod: CPTII,S$GLB,, | Performed by: INTERNAL MEDICINE

## 2023-09-20 PROCEDURE — 3074F PR MOST RECENT SYSTOLIC BLOOD PRESSURE < 130 MM HG: ICD-10-PCS | Mod: CPTII,S$GLB,, | Performed by: INTERNAL MEDICINE

## 2023-09-20 PROCEDURE — 3066F PR DOCUMENTATION OF TREATMENT FOR NEPHROPATHY: ICD-10-PCS | Mod: CPTII,S$GLB,, | Performed by: INTERNAL MEDICINE

## 2023-09-20 PROCEDURE — 3078F PR MOST RECENT DIASTOLIC BLOOD PRESSURE < 80 MM HG: ICD-10-PCS | Mod: CPTII,S$GLB,, | Performed by: INTERNAL MEDICINE

## 2023-09-20 RX ORDER — SEMAGLUTIDE 0.68 MG/ML
0.5 INJECTION, SOLUTION SUBCUTANEOUS
Qty: 12 EACH | Refills: 1 | Status: SHIPPED | OUTPATIENT
Start: 2023-09-20 | End: 2024-03-20 | Stop reason: SDUPTHER

## 2023-09-20 NOTE — PROGRESS NOTES
"HPI:  Wild Holliday is a 57 y.o. male here for Follow-up  Labs are reviewed .      Review of Systems   Constitutional:  Negative for chills and fever.   HENT:  Negative for congestion, postnasal drip and sore throat.    Eyes:  Negative for photophobia.   Respiratory:  Negative for chest tightness and shortness of breath.    Cardiovascular:  Negative for chest pain.   Gastrointestinal:  Negative for abdominal distention, abdominal pain, blood in stool and vomiting.   Genitourinary:  Negative for dysuria, flank pain and hematuria.   Musculoskeletal:  Negative for back pain.   Skin:  Negative for pallor.   Neurological:  Negative for dizziness, seizures, facial asymmetry, speech difficulty and numbness.   Hematological:  Does not bruise/bleed easily.   Psychiatric/Behavioral:  Negative for agitation and suicidal ideas. The patient is not nervous/anxious.     A review of systems was performed and is negative except as noted above.    Objective:  Vitals:    09/20/23 1513   BP: 116/70   Pulse: 75   Resp: 18   SpO2: 97%   Weight: 118.9 kg (262 lb 2 oz)   Height: 5' 9" (1.753 m)      Physical Exam  Vitals and nursing note reviewed.   Constitutional:       Appearance: He is well-developed.   HENT:      Head: Normocephalic and atraumatic.      Nose: Nose normal.   Eyes:      Conjunctiva/sclera: Conjunctivae normal.      Pupils: Pupils are equal, round, and reactive to light.   Neck:      Thyroid: No thyromegaly.      Vascular: No JVD.   Cardiovascular:      Rate and Rhythm: Normal rate and regular rhythm.      Heart sounds: Normal heart sounds.   Pulmonary:      Effort: Pulmonary effort is normal.      Breath sounds: Normal breath sounds.   Abdominal:      General: Bowel sounds are normal. There is no distension.      Palpations: Abdomen is soft. There is no mass.      Tenderness: There is no abdominal tenderness. There is no guarding.   Musculoskeletal:         General: Normal range of motion.      Cervical back: Normal " range of motion and neck supple.   Lymphadenopathy:      Cervical: No cervical adenopathy.   Skin:     General: Skin is warm and dry.      Coloration: Skin is not pale.      Findings: No rash.   Neurological:      Mental Status: He is alert and oriented to person, place, and time.      Cranial Nerves: No cranial nerve deficit.      Deep Tendon Reflexes: Reflexes are normal and symmetric.        Assessment/Plan:  1. Benign essential HTN  -     CBC Auto Differential; Future; Expected date: 03/18/2024  -     Comprehensive Metabolic Panel; Future; Expected date: 03/18/2024    Well controlled.  Continue same medication and dose.  Keep weight close to ideal body weight.     Avoid high salt foods (olives, pickles, smoked meats, salted potato chips, etc.).   Do not add salt to your food at the table.   Use only small amounts of salt when cooking.   Begin an exercise program. Discuss with your doctor what type of exercise program would be best for you. It doesn't have to be difficult. Even brisk walking for 20 minutes three times a week is a good form of exercise.   Avoid medicines which contain heart stimulants. This includes many cold and sinus decongestant pills and sprays as well as diet pills. Check the warnings about hypertension on the label. Stimulants such as amphetamine or cocaine could be lethal for someone with hypertension. Never take these.    2. Controlled type 2 diabetes mellitus without complication, without long-term current use of insulin  -     Comprehensive Metabolic Panel; Future; Expected date: 03/18/2024  -     Lipid Panel; Future; Expected date: 03/18/2024  -     TSH; Future; Expected date: 03/18/2024  -     Hemoglobin A1C; Future; Expected date: 03/18/2024  -     Microalbumin/Creatinine Ratio, Urine; Future; Expected date: 03/18/2024  -     semaglutide (OZEMPIC) 0.25 mg or 0.5 mg (2 mg/3 mL) pen injector; Inject 0.5 mg into the skin every 7 days.  Dispense: 12 each; Refill: 1  Well  controlled.  Continue same medication and dose.   3. Hypertriglyceridemia  -     Lipid Panel; Future; Expected date: 03/18/2024  Continue fenofibrate   LDL 34 ; NO NEED FOR STATINS     4. Severe obesity (BMI 35.0-39.9) with comorbidity  -     TSH; Future; Expected date: 03/18/2024    # The patient is asked to make an attempt to improve diet and exercise patterns to aid in medical management of this problem.     # Eat  5 small meals a day.     # Cut out high carbohydrate  foods : bread, rice, pasta, potatoes.     # Exercise/walk 5x/week for at least 30-45  minutes.

## 2023-09-25 ENCOUNTER — PATIENT MESSAGE (OUTPATIENT)
Dept: HEMATOLOGY/ONCOLOGY | Facility: CLINIC | Age: 58
End: 2023-09-25
Payer: COMMERCIAL

## 2023-09-28 ENCOUNTER — OFFICE VISIT (OUTPATIENT)
Dept: HEMATOLOGY/ONCOLOGY | Facility: CLINIC | Age: 58
End: 2023-09-28
Payer: COMMERCIAL

## 2023-09-28 DIAGNOSIS — C91.10 CLL (CHRONIC LYMPHOCYTIC LEUKEMIA): Primary | ICD-10-CM

## 2023-09-28 PROCEDURE — 3061F NEG MICROALBUMINURIA REV: CPT | Mod: CPTII,95,, | Performed by: INTERNAL MEDICINE

## 2023-09-28 PROCEDURE — 3066F PR DOCUMENTATION OF TREATMENT FOR NEPHROPATHY: ICD-10-PCS | Mod: CPTII,95,, | Performed by: INTERNAL MEDICINE

## 2023-09-28 PROCEDURE — 3061F PR NEG MICROALBUMINURIA RESULT DOCUMENTED/REVIEW: ICD-10-PCS | Mod: CPTII,95,, | Performed by: INTERNAL MEDICINE

## 2023-09-28 PROCEDURE — 99214 OFFICE O/P EST MOD 30 MIN: CPT | Mod: 95,,, | Performed by: INTERNAL MEDICINE

## 2023-09-28 PROCEDURE — 3044F HG A1C LEVEL LT 7.0%: CPT | Mod: CPTII,95,, | Performed by: INTERNAL MEDICINE

## 2023-09-28 PROCEDURE — 3044F PR MOST RECENT HEMOGLOBIN A1C LEVEL <7.0%: ICD-10-PCS | Mod: CPTII,95,, | Performed by: INTERNAL MEDICINE

## 2023-09-28 PROCEDURE — 3066F NEPHROPATHY DOC TX: CPT | Mod: CPTII,95,, | Performed by: INTERNAL MEDICINE

## 2023-09-28 PROCEDURE — 99214 PR OFFICE/OUTPT VISIT, EST, LEVL IV, 30-39 MIN: ICD-10-PCS | Mod: 95,,, | Performed by: INTERNAL MEDICINE
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
## 2023-10-13 ENCOUNTER — PATIENT OUTREACH (OUTPATIENT)
Dept: ADMINISTRATIVE | Facility: HOSPITAL | Age: 58
End: 2023-10-13
Payer: COMMERCIAL

## 2023-10-13 DIAGNOSIS — E11.65 UNCONTROLLED TYPE 2 DIABETES MELLITUS WITH HYPERGLYCEMIA: ICD-10-CM

## 2023-10-13 NOTE — PROGRESS NOTES
Chart reviewed, immunization record updated.  No new results noted on [facility] or [facility] web site.  Care Everywhere updated.   Patient care coordination note updated.   Upcoming PCP visit updated.  Next PCP visit 03/20/2024.  LOV with PCP 09/20/2023.  Contacted patient to discuss Diabetic Eye exam.    Patient states he will schedule eye exam soon.

## 2023-10-13 NOTE — TELEPHONE ENCOUNTER
No care due was identified.  [facility] Embedded Care Due Messages. Reference number: [id number].   10/13/2023 9:33:33 AM CDT

## 2023-10-13 NOTE — TELEPHONE ENCOUNTER
LOV 9/20/2023  Scheduled visit 3/20/2024    Requested Prescriptions     Pending Prescriptions Disp Refills    metFORMIN (GLUCOPHAGE) 1000 MG tablet 180 tablet 3     Sig: Take 1 tablet (1,000 mg total) by mouth 2 (two) times daily with meals.    glimepiride (AMARYL) 2 MG tablet 90 tablet 0     Sig: Take 1 tablet (2 mg total) by mouth before breakfast.

## 2023-10-16 RX ORDER — GLIMEPIRIDE 2 MG/1
2 TABLET ORAL
Qty: 90 TABLET | Refills: 0 | Status: SHIPPED | OUTPATIENT
Start: 2023-10-16 | End: 2023-11-29

## 2023-10-16 RX ORDER — METFORMIN HYDROCHLORIDE 1000 MG/1
1000 TABLET ORAL 2 TIMES DAILY WITH MEALS
Qty: 180 TABLET | Refills: 3 | Status: SHIPPED | OUTPATIENT
Start: 2023-10-16 | End: 2024-10-15

## 2023-11-29 DIAGNOSIS — E11.65 UNCONTROLLED TYPE 2 DIABETES MELLITUS WITH HYPERGLYCEMIA: ICD-10-CM

## 2023-11-29 RX ORDER — GLIMEPIRIDE 2 MG/1
2 TABLET ORAL
Qty: 90 TABLET | Refills: 1 | Status: SHIPPED | OUTPATIENT
Start: 2023-11-29

## 2023-11-29 NOTE — TELEPHONE ENCOUNTER
Refill Decision Note   Wild Holliday  is requesting a refill authorization.  Brief Assessment and Rationale for Refill:  Approve     Medication Therapy Plan:         Comments:     Note composed:10:55 AM 11/29/2023

## 2023-11-29 NOTE — TELEPHONE ENCOUNTER
No care due was identified.  [facility] Embedded Care Due Messages. Reference number: [id number].   11/29/2023 3:27:13 AM CST

## 2024-01-07 DIAGNOSIS — E78.1 HYPERTRIGLYCERIDEMIA: ICD-10-CM

## 2024-01-07 NOTE — TELEPHONE ENCOUNTER
Care Due:                  Date            Visit Type   Department     Provider  --------------------------------------------------------------------------------                                EP -                              PRIMARY      STA INTERNAL  Last Visit: 09-      CARE ([facility])   MEDICINE II    Joshua Noonan                              EP -                              PRIMARY      STA INTERNAL  Next Visit: 03-      CARE ([facility])   MEDICINE II    Joshua Noonan                                                            Last  Test          Frequency    Reason                     Performed    Due Date  --------------------------------------------------------------------------------    HBA1C.......  6 months...  glimepiride, metFORMIN,    09- 03-                             semaglutide..............    [employer] Embedded Care Due Messages. Reference number: [id number].   1/07/2024 5:17:23 AM CST

## 2024-01-08 RX ORDER — FENOFIBRATE 160 MG/1
TABLET ORAL
Qty: 90 TABLET | Refills: 2 | Status: SHIPPED | OUTPATIENT
Start: 2024-01-08

## 2024-01-08 NOTE — TELEPHONE ENCOUNTER
Refill Decision Note   Wild Holliday  is requesting a refill authorization.  Brief Assessment and Rationale for Refill:  Approve     Medication Therapy Plan:         Comments:     Note composed:1:22 PM 01/08/2024

## 2024-02-19 ENCOUNTER — PATIENT MESSAGE (OUTPATIENT)
Dept: INTERNAL MEDICINE | Facility: CLINIC | Age: 59
End: 2024-02-19
Payer: COMMERCIAL

## 2024-02-19 DIAGNOSIS — M10.9 GOUT, UNSPECIFIED CAUSE, UNSPECIFIED CHRONICITY, UNSPECIFIED SITE: Primary | ICD-10-CM

## 2024-03-11 ENCOUNTER — PATIENT OUTREACH (OUTPATIENT)
Dept: ADMINISTRATIVE | Facility: HOSPITAL | Age: 59
End: 2024-03-11
Payer: COMMERCIAL

## 2024-03-11 NOTE — PROGRESS NOTES
Spoke with  Mg he stated he had an eye exam performed but did not remember the name  He will give the name of the eye dr on his office visit

## 2024-03-15 ENCOUNTER — LAB VISIT (OUTPATIENT)
Dept: LAB | Facility: HOSPITAL | Age: 59
End: 2024-03-15
Attending: INTERNAL MEDICINE
Payer: COMMERCIAL

## 2024-03-15 DIAGNOSIS — M10.9 GOUT, UNSPECIFIED CAUSE, UNSPECIFIED CHRONICITY, UNSPECIFIED SITE: ICD-10-CM

## 2024-03-15 DIAGNOSIS — E66.01 SEVERE OBESITY (BMI 35.0-39.9) WITH COMORBIDITY: ICD-10-CM

## 2024-03-15 DIAGNOSIS — E78.1 HYPERTRIGLYCERIDEMIA: ICD-10-CM

## 2024-03-15 DIAGNOSIS — I10 BENIGN ESSENTIAL HTN: ICD-10-CM

## 2024-03-15 DIAGNOSIS — E11.9 CONTROLLED TYPE 2 DIABETES MELLITUS WITHOUT COMPLICATION, WITHOUT LONG-TERM CURRENT USE OF INSULIN: ICD-10-CM

## 2024-03-15 LAB
ALBUMIN SERPL BCP-MCNC: 4 G/DL (ref 3.5–5.2)
ALBUMIN/CREAT UR: 4 UG/MG (ref 0–30)
ALP SERPL-CCNC: 37 U/L (ref 55–135)
ALT SERPL W/O P-5'-P-CCNC: 28 U/L (ref 10–44)
ANION GAP SERPL CALC-SCNC: 7 MMOL/L (ref 8–16)
AST SERPL-CCNC: 19 U/L (ref 10–40)
BASOPHILS # BLD AUTO: 0.06 K/UL (ref 0–0.2)
BASOPHILS NFR BLD: 0.7 % (ref 0–1.9)
BILIRUB SERPL-MCNC: 0.3 MG/DL (ref 0.1–1)
BUN SERPL-MCNC: 25 MG/DL (ref 6–20)
CALCIUM SERPL-MCNC: 9.5 MG/DL (ref 8.7–10.5)
CHLORIDE SERPL-SCNC: 106 MMOL/L (ref 95–110)
CHOLEST SERPL-MCNC: 147 MG/DL (ref 120–199)
CHOLEST/HDLC SERPL: 5.7 {RATIO} (ref 2–5)
CO2 SERPL-SCNC: 25 MMOL/L (ref 23–29)
CREAT SERPL-MCNC: 1.1 MG/DL (ref 0.5–1.4)
CREAT UR-MCNC: 101 MG/DL (ref 23–375)
DIFFERENTIAL METHOD BLD: ABNORMAL
EOSINOPHIL # BLD AUTO: 0.3 K/UL (ref 0–0.5)
EOSINOPHIL NFR BLD: 3.9 % (ref 0–8)
ERYTHROCYTE [DISTWIDTH] IN BLOOD BY AUTOMATED COUNT: 12.9 % (ref 11.5–14.5)
EST. GFR  (NO RACE VARIABLE): >60 ML/MIN/1.73 M^2
ESTIMATED AVG GLUCOSE: 160 MG/DL (ref 68–131)
GLUCOSE SERPL-MCNC: 170 MG/DL (ref 70–110)
HBA1C MFR BLD: 7.2 % (ref 4–5.6)
HCT VFR BLD AUTO: 42.7 % (ref 40–54)
HDLC SERPL-MCNC: 26 MG/DL (ref 40–75)
HDLC SERPL: 17.7 % (ref 20–50)
HGB BLD-MCNC: 14.2 G/DL (ref 14–18)
IMM GRANULOCYTES # BLD AUTO: 0.06 K/UL (ref 0–0.04)
IMM GRANULOCYTES NFR BLD AUTO: 0.7 % (ref 0–0.5)
LDLC SERPL CALC-MCNC: 49.8 MG/DL (ref 63–159)
LYMPHOCYTES # BLD AUTO: 2.2 K/UL (ref 1–4.8)
LYMPHOCYTES NFR BLD: 26 % (ref 18–48)
MCH RBC QN AUTO: 28.9 PG (ref 27–31)
MCHC RBC AUTO-ENTMCNC: 33.3 G/DL (ref 32–36)
MCV RBC AUTO: 87 FL (ref 82–98)
MICROALBUMIN UR DL<=1MG/L-MCNC: 4 UG/ML
MONOCYTES # BLD AUTO: 0.5 K/UL (ref 0.3–1)
MONOCYTES NFR BLD: 6.4 % (ref 4–15)
NEUTROPHILS # BLD AUTO: 5.2 K/UL (ref 1.8–7.7)
NEUTROPHILS NFR BLD: 62.3 % (ref 38–73)
NONHDLC SERPL-MCNC: 121 MG/DL
NRBC BLD-RTO: 0 /100 WBC
PLATELET # BLD AUTO: 287 K/UL (ref 150–450)
PMV BLD AUTO: 9 FL (ref 9.2–12.9)
POTASSIUM SERPL-SCNC: 5.1 MMOL/L (ref 3.5–5.1)
PROT SERPL-MCNC: 6.5 G/DL (ref 6–8.4)
RBC # BLD AUTO: 4.91 M/UL (ref 4.6–6.2)
SODIUM SERPL-SCNC: 138 MMOL/L (ref 136–145)
TRIGL SERPL-MCNC: 356 MG/DL (ref 30–150)
TSH SERPL DL<=0.005 MIU/L-ACNC: 2.24 UIU/ML (ref 0.4–4)
URATE SERPL-MCNC: 4.8 MG/DL (ref 3.4–7)
WBC # BLD AUTO: 8.42 K/UL (ref 3.9–12.7)

## 2024-03-15 PROCEDURE — 80053 COMPREHEN METABOLIC PANEL: CPT | Performed by: INTERNAL MEDICINE

## 2024-03-15 PROCEDURE — 85025 COMPLETE CBC W/AUTO DIFF WBC: CPT | Performed by: INTERNAL MEDICINE

## 2024-03-15 PROCEDURE — 84443 ASSAY THYROID STIM HORMONE: CPT | Performed by: INTERNAL MEDICINE

## 2024-03-15 PROCEDURE — 83036 HEMOGLOBIN GLYCOSYLATED A1C: CPT | Performed by: INTERNAL MEDICINE

## 2024-03-15 PROCEDURE — 82043 UR ALBUMIN QUANTITATIVE: CPT | Performed by: INTERNAL MEDICINE

## 2024-03-15 PROCEDURE — 80061 LIPID PANEL: CPT | Performed by: INTERNAL MEDICINE

## 2024-03-15 PROCEDURE — 36415 COLL VENOUS BLD VENIPUNCTURE: CPT | Performed by: INTERNAL MEDICINE

## 2024-03-15 PROCEDURE — 84550 ASSAY OF BLOOD/URIC ACID: CPT | Performed by: INTERNAL MEDICINE

## 2024-03-20 ENCOUNTER — OFFICE VISIT (OUTPATIENT)
Dept: INTERNAL MEDICINE | Facility: CLINIC | Age: 59
End: 2024-03-20
Payer: COMMERCIAL

## 2024-03-20 VITALS
RESPIRATION RATE: 18 BRPM | HEART RATE: 64 BPM | HEIGHT: 69 IN | OXYGEN SATURATION: 96 % | SYSTOLIC BLOOD PRESSURE: 138 MMHG | BODY MASS INDEX: 38.89 KG/M2 | DIASTOLIC BLOOD PRESSURE: 80 MMHG | WEIGHT: 262.56 LBS

## 2024-03-20 DIAGNOSIS — C91.10 CLL (CHRONIC LYMPHOCYTIC LEUKEMIA): ICD-10-CM

## 2024-03-20 DIAGNOSIS — E11.9 CONTROLLED TYPE 2 DIABETES MELLITUS WITHOUT COMPLICATION, WITHOUT LONG-TERM CURRENT USE OF INSULIN: ICD-10-CM

## 2024-03-20 DIAGNOSIS — I10 BENIGN ESSENTIAL HTN: Primary | ICD-10-CM

## 2024-03-20 DIAGNOSIS — E11.65 UNCONTROLLED TYPE 2 DIABETES MELLITUS WITH HYPERGLYCEMIA: ICD-10-CM

## 2024-03-20 DIAGNOSIS — E66.01 SEVERE OBESITY (BMI 35.0-39.9) WITH COMORBIDITY: ICD-10-CM

## 2024-03-20 PROCEDURE — 99999 PR PBB SHADOW E&M-EST. PATIENT-LVL IV: CPT | Mod: PBBFAC,,, | Performed by: INTERNAL MEDICINE

## 2024-03-20 PROCEDURE — 3008F BODY MASS INDEX DOCD: CPT | Mod: CPTII,S$GLB,, | Performed by: INTERNAL MEDICINE

## 2024-03-20 PROCEDURE — 3075F SYST BP GE 130 - 139MM HG: CPT | Mod: CPTII,S$GLB,, | Performed by: INTERNAL MEDICINE

## 2024-03-20 PROCEDURE — 1160F RVW MEDS BY RX/DR IN RCRD: CPT | Mod: CPTII,S$GLB,, | Performed by: INTERNAL MEDICINE

## 2024-03-20 PROCEDURE — 1159F MED LIST DOCD IN RCRD: CPT | Mod: CPTII,S$GLB,, | Performed by: INTERNAL MEDICINE

## 2024-03-20 PROCEDURE — 3051F HG A1C>EQUAL 7.0%<8.0%: CPT | Mod: CPTII,S$GLB,, | Performed by: INTERNAL MEDICINE

## 2024-03-20 PROCEDURE — 3079F DIAST BP 80-89 MM HG: CPT | Mod: CPTII,S$GLB,, | Performed by: INTERNAL MEDICINE

## 2024-03-20 PROCEDURE — 3061F NEG MICROALBUMINURIA REV: CPT | Mod: CPTII,S$GLB,, | Performed by: INTERNAL MEDICINE

## 2024-03-20 PROCEDURE — 3066F NEPHROPATHY DOC TX: CPT | Mod: CPTII,S$GLB,, | Performed by: INTERNAL MEDICINE

## 2024-03-20 PROCEDURE — 99214 OFFICE O/P EST MOD 30 MIN: CPT | Mod: S$GLB,,, | Performed by: INTERNAL MEDICINE

## 2024-03-20 RX ORDER — GUAIFENESIN, PSEUDOEPHEDRINE HYDROCHLORIDE 600; 60 MG/1; MG/1
1 TABLET, EXTENDED RELEASE ORAL
COMMUNITY

## 2024-03-20 RX ORDER — SEMAGLUTIDE 0.68 MG/ML
0.5 INJECTION, SOLUTION SUBCUTANEOUS
Qty: 9 ML | Refills: 1 | Status: SHIPPED | OUTPATIENT
Start: 2024-03-20 | End: 2024-05-07
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
## 2024-03-24 ENCOUNTER — PATIENT MESSAGE (OUTPATIENT)
Dept: HEMATOLOGY/ONCOLOGY | Facility: CLINIC | Age: 59
End: 2024-03-24
Payer: COMMERCIAL

## 2024-03-25 NOTE — TELEPHONE ENCOUNTER
No care due was identified.  [facility] Embedded Care Due Messages. Reference number: [id number].   3/25/2024 5:14:55 AM CDT

## 2024-03-26 RX ORDER — VALSARTAN AND HYDROCHLOROTHIAZIDE 160; 25 MG/1; MG/1
TABLET ORAL
Qty: 90 TABLET | Refills: 3 | Status: SHIPPED | OUTPATIENT
Start: 2024-03-26

## 2024-03-26 NOTE — TELEPHONE ENCOUNTER
Refill Routing Note   Medication(s) are not appropriate for processing by [facility] for the following reason(s):        Drug-disease interaction  Drug-Disease: valsartan-hydrochlorothiazide and Gout, unspecified cause, unspecified chronicity, unspecified site    [facility] action(s):  Defer               Appointments  past 12m or future 3m with PCP    Date Provider   Last Visit   3/20/2024 Joshua Noonan MD   Next Visit   Visit date not found Joshua Noonan MD   ED visits in past 90 days: 0        Note composed:5:48 AM 03/26/2024

## 2024-04-25 ENCOUNTER — PATIENT MESSAGE (OUTPATIENT)
Dept: HEMATOLOGY/ONCOLOGY | Facility: CLINIC | Age: 59
End: 2024-04-25
Payer: COMMERCIAL

## 2024-04-25 DIAGNOSIS — C91.10 CLL (CHRONIC LYMPHOCYTIC LEUKEMIA): Primary | ICD-10-CM

## 2024-04-26 ENCOUNTER — LAB VISIT (OUTPATIENT)
Dept: LAB | Facility: HOSPITAL | Age: 59
End: 2024-04-26
Attending: INTERNAL MEDICINE
Payer: COMMERCIAL

## 2024-04-26 DIAGNOSIS — C91.10 CLL (CHRONIC LYMPHOCYTIC LEUKEMIA): ICD-10-CM

## 2024-04-26 LAB
ALBUMIN SERPL BCP-MCNC: 4 G/DL (ref 3.5–5.2)
ALP SERPL-CCNC: 42 U/L (ref 55–135)
ALT SERPL W/O P-5'-P-CCNC: 31 U/L (ref 10–44)
ANION GAP SERPL CALC-SCNC: 7 MMOL/L (ref 8–16)
AST SERPL-CCNC: 17 U/L (ref 10–40)
BASOPHILS # BLD AUTO: 0.06 K/UL (ref 0–0.2)
BASOPHILS NFR BLD: 0.5 % (ref 0–1.9)
BILIRUB SERPL-MCNC: 0.4 MG/DL (ref 0.1–1)
BUN SERPL-MCNC: 17 MG/DL (ref 6–20)
CALCIUM SERPL-MCNC: 9.6 MG/DL (ref 8.7–10.5)
CHLORIDE SERPL-SCNC: 105 MMOL/L (ref 95–110)
CO2 SERPL-SCNC: 26 MMOL/L (ref 23–29)
CREAT SERPL-MCNC: 1 MG/DL (ref 0.5–1.4)
DIFFERENTIAL METHOD BLD: ABNORMAL
EOSINOPHIL # BLD AUTO: 0.3 K/UL (ref 0–0.5)
EOSINOPHIL NFR BLD: 3 % (ref 0–8)
ERYTHROCYTE [DISTWIDTH] IN BLOOD BY AUTOMATED COUNT: 13.2 % (ref 11.5–14.5)
EST. GFR  (NO RACE VARIABLE): >60 ML/MIN/1.73 M^2
GLUCOSE SERPL-MCNC: 185 MG/DL (ref 70–110)
HCT VFR BLD AUTO: 41.8 % (ref 40–54)
HGB BLD-MCNC: 14 G/DL (ref 14–18)
IMM GRANULOCYTES # BLD AUTO: 0.07 K/UL (ref 0–0.04)
IMM GRANULOCYTES NFR BLD AUTO: 0.6 % (ref 0–0.5)
LDH SERPL L TO P-CCNC: 134 U/L (ref 110–260)
LYMPHOCYTES # BLD AUTO: 2.2 K/UL (ref 1–4.8)
LYMPHOCYTES NFR BLD: 20.3 % (ref 18–48)
MCH RBC QN AUTO: 29.5 PG (ref 27–31)
MCHC RBC AUTO-ENTMCNC: 33.5 G/DL (ref 32–36)
MCV RBC AUTO: 88 FL (ref 82–98)
MONOCYTES # BLD AUTO: 0.9 K/UL (ref 0.3–1)
MONOCYTES NFR BLD: 8.2 % (ref 4–15)
NEUTROPHILS # BLD AUTO: 7.4 K/UL (ref 1.8–7.7)
NEUTROPHILS NFR BLD: 67.4 % (ref 38–73)
NRBC BLD-RTO: 0 /100 WBC
PLATELET # BLD AUTO: 258 K/UL (ref 150–450)
PMV BLD AUTO: 8.8 FL (ref 9.2–12.9)
POTASSIUM SERPL-SCNC: 4.9 MMOL/L (ref 3.5–5.1)
PROT SERPL-MCNC: 6.3 G/DL (ref 6–8.4)
RBC # BLD AUTO: 4.75 M/UL (ref 4.6–6.2)
SODIUM SERPL-SCNC: 138 MMOL/L (ref 136–145)
WBC # BLD AUTO: 11.05 K/UL (ref 3.9–12.7)

## 2024-04-26 PROCEDURE — 80053 COMPREHEN METABOLIC PANEL: CPT | Performed by: INTERNAL MEDICINE

## 2024-04-26 PROCEDURE — 36415 COLL VENOUS BLD VENIPUNCTURE: CPT | Performed by: INTERNAL MEDICINE

## 2024-04-26 PROCEDURE — 85025 COMPLETE CBC W/AUTO DIFF WBC: CPT | Performed by: INTERNAL MEDICINE

## 2024-04-26 PROCEDURE — 83615 LACTATE (LD) (LDH) ENZYME: CPT | Performed by: INTERNAL MEDICINE

## 2024-05-02 ENCOUNTER — OFFICE VISIT (OUTPATIENT)
Dept: HEMATOLOGY/ONCOLOGY | Facility: CLINIC | Age: 59
End: 2024-05-02
Payer: COMMERCIAL

## 2024-05-02 DIAGNOSIS — C91.10 CLL (CHRONIC LYMPHOCYTIC LEUKEMIA): Primary | ICD-10-CM

## 2024-05-02 PROCEDURE — 1160F RVW MEDS BY RX/DR IN RCRD: CPT | Mod: CPTII,95,, | Performed by: INTERNAL MEDICINE

## 2024-05-02 PROCEDURE — 3061F NEG MICROALBUMINURIA REV: CPT | Mod: CPTII,95,, | Performed by: INTERNAL MEDICINE

## 2024-05-02 PROCEDURE — 3066F NEPHROPATHY DOC TX: CPT | Mod: CPTII,95,, | Performed by: INTERNAL MEDICINE

## 2024-05-02 PROCEDURE — 3051F HG A1C>EQUAL 7.0%<8.0%: CPT | Mod: CPTII,95,, | Performed by: INTERNAL MEDICINE

## 2024-05-02 PROCEDURE — 99214 OFFICE O/P EST MOD 30 MIN: CPT | Mod: 95,,, | Performed by: INTERNAL MEDICINE

## 2024-05-02 PROCEDURE — 1159F MED LIST DOCD IN RCRD: CPT | Mod: CPTII,95,, | Performed by: INTERNAL MEDICINE
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
## 2024-05-07 DIAGNOSIS — E11.65 UNCONTROLLED TYPE 2 DIABETES MELLITUS WITH HYPERGLYCEMIA: ICD-10-CM

## 2024-05-07 RX ORDER — SEMAGLUTIDE 0.68 MG/ML
INJECTION, SOLUTION SUBCUTANEOUS
Qty: 12 ML | Refills: 1 | Status: SHIPPED | OUTPATIENT
Start: 2024-05-07

## 2024-08-05 ENCOUNTER — OFFICE VISIT (OUTPATIENT)
Dept: OPTOMETRY | Facility: CLINIC | Age: 59
End: 2024-08-05
Payer: COMMERCIAL

## 2024-08-05 DIAGNOSIS — E11.9 TYPE 2 DIABETES MELLITUS WITHOUT OPHTHALMIC MANIFESTATIONS: ICD-10-CM

## 2024-08-05 DIAGNOSIS — H25.12 NS (NUCLEAR SCLEROSIS), LEFT: Primary | ICD-10-CM

## 2024-08-05 DIAGNOSIS — H52.4 PRESBYOPIA: ICD-10-CM

## 2024-08-05 DIAGNOSIS — Z96.1 PSEUDOPHAKIA OF RIGHT EYE: ICD-10-CM

## 2024-08-05 DIAGNOSIS — I10 BENIGN ESSENTIAL HTN: ICD-10-CM

## 2024-08-05 PROCEDURE — 1160F RVW MEDS BY RX/DR IN RCRD: CPT | Mod: CPTII,S$GLB,, | Performed by: OPTOMETRIST

## 2024-08-05 PROCEDURE — 92004 COMPRE OPH EXAM NEW PT 1/>: CPT | Mod: S$GLB,,, | Performed by: OPTOMETRIST

## 2024-08-05 PROCEDURE — 99999 PR PBB SHADOW E&M-EST. PATIENT-LVL III: CPT | Mod: PBBFAC,,, | Performed by: OPTOMETRIST

## 2024-08-05 PROCEDURE — 3051F HG A1C>EQUAL 7.0%<8.0%: CPT | Mod: CPTII,S$GLB,, | Performed by: OPTOMETRIST

## 2024-08-05 PROCEDURE — 1159F MED LIST DOCD IN RCRD: CPT | Mod: CPTII,S$GLB,, | Performed by: OPTOMETRIST

## 2024-08-05 PROCEDURE — 3066F NEPHROPATHY DOC TX: CPT | Mod: CPTII,S$GLB,, | Performed by: OPTOMETRIST

## 2024-08-05 PROCEDURE — 2023F DILAT RTA XM W/O RTNOPTHY: CPT | Mod: CPTII,S$GLB,, | Performed by: OPTOMETRIST

## 2024-08-05 PROCEDURE — 3061F NEG MICROALBUMINURIA REV: CPT | Mod: CPTII,S$GLB,, | Performed by: OPTOMETRIST

## 2024-08-23 ENCOUNTER — TELEPHONE (OUTPATIENT)
Dept: PHARMACY | Facility: CLINIC | Age: 59
End: 2024-08-23
Payer: COMMERCIAL

## 2024-08-23 NOTE — TELEPHONE ENCOUNTER
[facility]/Population Health Chart Review & Patient Outreach Details For Medication Adherence Project    Reason for Outreach Encounter: 3rd Party payor non-compliance report ([employer], [employer], C, etc)    2.  Patient Outreach Method: Reviewed patient chart     3.   Medication in question:   Hypertension Medications               valsartan-hydrochlorothiazide (DIOVAN-HCT) 160-25 mg per tablet TAKE 1 TABLET BY MOUTH EVERY DAY               LAST FILLED: 6/24/24 for 90 day supply    4.  Reviewed and or Updates Made To: Patient Chart    5. Outreach Outcomes and/or actions taken: Patient filled medication and is on track to be adherent    Additional Notes:

## 2024-09-17 DIAGNOSIS — E78.1 HYPERTRIGLYCERIDEMIA: ICD-10-CM

## 2024-09-17 RX ORDER — FENOFIBRATE 160 MG/1
TABLET ORAL
Qty: 90 TABLET | Refills: 1 | Status: SHIPPED | OUTPATIENT
Start: 2024-09-17

## 2024-09-17 NOTE — TELEPHONE ENCOUNTER
Care Due:                  Date            Visit Type   Department     Provider  --------------------------------------------------------------------------------                                EP -                              PRIMARY      STA INTERNAL  Last Visit: 03-      CARE ([facility])   MEDICINE II    Joshua Noonan                              EP -                              PRIMARY      STA INTERNAL  Next Visit: 09-      CARE ([facility])   MEDICINE II    Joshua Noonan                                                            Last  Test          Frequency    Reason                     Performed    Due Date  --------------------------------------------------------------------------------    HBA1C.......  6 months...  OZEMPIC, glimepiride,      03-   09-                             metFORMIN................    [employer] Embedded Care Due Messages. Reference number: [id number].   9/17/2024 5:14:21 AM CDT

## 2024-09-18 NOTE — TELEPHONE ENCOUNTER
Refill Decision Note   Wild Holliday  is requesting a refill authorization.  Brief Assessment and Rationale for Refill:  Approve     Medication Therapy Plan: FLOS      Comments:     Note composed:9:02 PM 09/17/2024

## 2024-09-19 DIAGNOSIS — E11.65 UNCONTROLLED TYPE 2 DIABETES MELLITUS WITH HYPERGLYCEMIA: ICD-10-CM

## 2024-09-19 NOTE — TELEPHONE ENCOUNTER
Refill Routing Note   Medication(s) are not appropriate for processing by [facility] for the following reason(s):        Required labs outdated    [facility] action(s):  Defer             Appointments  past 12m or future 3m with PCP    Date Provider   Last Visit   Visit date not found Joshua Noonan MD   Next Visit   9/25/2024 Joshua Noonan MD   ED visits in past 90 days: 0        Note composed:6:10 PM 09/19/2024

## 2024-09-19 NOTE — TELEPHONE ENCOUNTER
No care due was identified.  [facility] Embedded Care Due Messages. Reference number: [id number].   9/19/2024 5:13:33 AM CDT

## 2024-09-23 ENCOUNTER — LAB VISIT (OUTPATIENT)
Dept: LAB | Facility: HOSPITAL | Age: 59
End: 2024-09-23
Attending: INTERNAL MEDICINE
Payer: COMMERCIAL

## 2024-09-23 DIAGNOSIS — C91.10 CLL (CHRONIC LYMPHOCYTIC LEUKEMIA): ICD-10-CM

## 2024-09-23 DIAGNOSIS — I10 BENIGN ESSENTIAL HTN: ICD-10-CM

## 2024-09-23 DIAGNOSIS — E11.65 UNCONTROLLED TYPE 2 DIABETES MELLITUS WITH HYPERGLYCEMIA: ICD-10-CM

## 2024-09-23 DIAGNOSIS — E66.01 SEVERE OBESITY (BMI 35.0-39.9) WITH COMORBIDITY: ICD-10-CM

## 2024-09-23 LAB
ALBUMIN SERPL BCP-MCNC: 4.1 G/DL (ref 3.5–5.2)
ALBUMIN/CREAT UR: 6.4 UG/MG (ref 0–30)
ALP SERPL-CCNC: 45 U/L (ref 55–135)
ALT SERPL W/O P-5'-P-CCNC: 31 U/L (ref 10–44)
ANION GAP SERPL CALC-SCNC: 10 MMOL/L (ref 8–16)
AST SERPL-CCNC: 19 U/L (ref 10–40)
BASOPHILS # BLD AUTO: 0.07 K/UL (ref 0–0.2)
BASOPHILS NFR BLD: 0.7 % (ref 0–1.9)
BILIRUB SERPL-MCNC: 0.3 MG/DL (ref 0.1–1)
BUN SERPL-MCNC: 25 MG/DL (ref 6–20)
CALCIUM SERPL-MCNC: 9.9 MG/DL (ref 8.7–10.5)
CHLORIDE SERPL-SCNC: 106 MMOL/L (ref 95–110)
CHOLEST SERPL-MCNC: 163 MG/DL (ref 120–199)
CHOLEST/HDLC SERPL: 6.5 {RATIO} (ref 2–5)
CO2 SERPL-SCNC: 24 MMOL/L (ref 23–29)
CREAT SERPL-MCNC: 1.1 MG/DL (ref 0.5–1.4)
CREAT UR-MCNC: 140.9 MG/DL (ref 23–375)
DIFFERENTIAL METHOD BLD: ABNORMAL
EOSINOPHIL # BLD AUTO: 0.3 K/UL (ref 0–0.5)
EOSINOPHIL NFR BLD: 2.7 % (ref 0–8)
ERYTHROCYTE [DISTWIDTH] IN BLOOD BY AUTOMATED COUNT: 13.2 % (ref 11.5–14.5)
EST. GFR  (NO RACE VARIABLE): >60 ML/MIN/1.73 M^2
ESTIMATED AVG GLUCOSE: 174 MG/DL (ref 68–131)
GLUCOSE SERPL-MCNC: 193 MG/DL (ref 70–110)
HBA1C MFR BLD: 7.7 % (ref 4–5.6)
HCT VFR BLD AUTO: 42.6 % (ref 40–54)
HDLC SERPL-MCNC: 25 MG/DL (ref 40–75)
HDLC SERPL: 15.3 % (ref 20–50)
HGB BLD-MCNC: 14.5 G/DL (ref 14–18)
IMM GRANULOCYTES # BLD AUTO: 0.1 K/UL (ref 0–0.04)
IMM GRANULOCYTES NFR BLD AUTO: 1.1 % (ref 0–0.5)
LDLC SERPL CALC-MCNC: ABNORMAL MG/DL (ref 63–159)
LYMPHOCYTES # BLD AUTO: 2.8 K/UL (ref 1–4.8)
LYMPHOCYTES NFR BLD: 29.3 % (ref 18–48)
MCH RBC QN AUTO: 29.3 PG (ref 27–31)
MCHC RBC AUTO-ENTMCNC: 34 G/DL (ref 32–36)
MCV RBC AUTO: 86 FL (ref 82–98)
MICROALBUMIN UR DL<=1MG/L-MCNC: 9 UG/ML
MONOCYTES # BLD AUTO: 0.9 K/UL (ref 0.3–1)
MONOCYTES NFR BLD: 9.1 % (ref 4–15)
NEUTROPHILS # BLD AUTO: 5.4 K/UL (ref 1.8–7.7)
NEUTROPHILS NFR BLD: 57.1 % (ref 38–73)
NONHDLC SERPL-MCNC: 138 MG/DL
NRBC BLD-RTO: 0 /100 WBC
PLATELET # BLD AUTO: 273 K/UL (ref 150–450)
PMV BLD AUTO: 9 FL (ref 9.2–12.9)
POTASSIUM SERPL-SCNC: 4.5 MMOL/L (ref 3.5–5.1)
PROT SERPL-MCNC: 6.6 G/DL (ref 6–8.4)
RBC # BLD AUTO: 4.95 M/UL (ref 4.6–6.2)
SODIUM SERPL-SCNC: 140 MMOL/L (ref 136–145)
TRIGL SERPL-MCNC: 533 MG/DL (ref 30–150)
TSH SERPL DL<=0.005 MIU/L-ACNC: 2.94 UIU/ML (ref 0.4–4)
WBC # BLD AUTO: 9.47 K/UL (ref 3.9–12.7)

## 2024-09-23 PROCEDURE — 36415 COLL VENOUS BLD VENIPUNCTURE: CPT | Performed by: INTERNAL MEDICINE

## 2024-09-23 PROCEDURE — 85025 COMPLETE CBC W/AUTO DIFF WBC: CPT | Performed by: INTERNAL MEDICINE

## 2024-09-23 PROCEDURE — 80053 COMPREHEN METABOLIC PANEL: CPT | Performed by: INTERNAL MEDICINE

## 2024-09-23 PROCEDURE — 82043 UR ALBUMIN QUANTITATIVE: CPT | Performed by: INTERNAL MEDICINE

## 2024-09-23 PROCEDURE — 82570 ASSAY OF URINE CREATININE: CPT | Performed by: INTERNAL MEDICINE

## 2024-09-23 PROCEDURE — 84443 ASSAY THYROID STIM HORMONE: CPT | Performed by: INTERNAL MEDICINE

## 2024-09-23 PROCEDURE — 83036 HEMOGLOBIN GLYCOSYLATED A1C: CPT | Performed by: INTERNAL MEDICINE

## 2024-09-23 PROCEDURE — 80061 LIPID PANEL: CPT | Performed by: INTERNAL MEDICINE

## 2024-09-23 RX ORDER — GLIMEPIRIDE 2 MG/1
2 TABLET ORAL
Qty: 90 TABLET | Refills: 1 | Status: SHIPPED | OUTPATIENT
Start: 2024-09-23

## 2024-09-25 ENCOUNTER — OFFICE VISIT (OUTPATIENT)
Dept: INTERNAL MEDICINE | Facility: CLINIC | Age: 59
End: 2024-09-25
Payer: COMMERCIAL

## 2024-09-25 ENCOUNTER — PATIENT OUTREACH (OUTPATIENT)
Dept: ADMINISTRATIVE | Facility: HOSPITAL | Age: 59
End: 2024-09-25
Payer: COMMERCIAL

## 2024-09-25 VITALS
DIASTOLIC BLOOD PRESSURE: 70 MMHG | RESPIRATION RATE: 19 BRPM | HEIGHT: 69 IN | HEART RATE: 71 BPM | OXYGEN SATURATION: 96 % | BODY MASS INDEX: 37.91 KG/M2 | WEIGHT: 255.94 LBS | SYSTOLIC BLOOD PRESSURE: 120 MMHG

## 2024-09-25 DIAGNOSIS — E11.65 UNCONTROLLED TYPE 2 DIABETES MELLITUS WITH HYPERGLYCEMIA: ICD-10-CM

## 2024-09-25 DIAGNOSIS — I10 BENIGN ESSENTIAL HTN: Primary | ICD-10-CM

## 2024-09-25 DIAGNOSIS — E78.1 HYPERTRIGLYCERIDEMIA: ICD-10-CM

## 2024-09-25 DIAGNOSIS — C83.00 SMALL B-CELL LYMPHOMA, UNSPECIFIED BODY REGION: ICD-10-CM

## 2024-09-25 PROCEDURE — 3074F SYST BP LT 130 MM HG: CPT | Mod: CPTII,S$GLB,, | Performed by: INTERNAL MEDICINE

## 2024-09-25 PROCEDURE — 3051F HG A1C>EQUAL 7.0%<8.0%: CPT | Mod: CPTII,S$GLB,, | Performed by: INTERNAL MEDICINE

## 2024-09-25 PROCEDURE — 1160F RVW MEDS BY RX/DR IN RCRD: CPT | Mod: CPTII,S$GLB,, | Performed by: INTERNAL MEDICINE

## 2024-09-25 PROCEDURE — 99999 PR PBB SHADOW E&M-EST. PATIENT-LVL IV: CPT | Mod: PBBFAC,,, | Performed by: INTERNAL MEDICINE

## 2024-09-25 PROCEDURE — 3061F NEG MICROALBUMINURIA REV: CPT | Mod: CPTII,S$GLB,, | Performed by: INTERNAL MEDICINE

## 2024-09-25 PROCEDURE — G2211 COMPLEX E/M VISIT ADD ON: HCPCS | Mod: S$GLB,,, | Performed by: INTERNAL MEDICINE

## 2024-09-25 PROCEDURE — 3078F DIAST BP <80 MM HG: CPT | Mod: CPTII,S$GLB,, | Performed by: INTERNAL MEDICINE

## 2024-09-25 PROCEDURE — 1159F MED LIST DOCD IN RCRD: CPT | Mod: CPTII,S$GLB,, | Performed by: INTERNAL MEDICINE

## 2024-09-25 PROCEDURE — 3008F BODY MASS INDEX DOCD: CPT | Mod: CPTII,S$GLB,, | Performed by: INTERNAL MEDICINE

## 2024-09-25 PROCEDURE — 99214 OFFICE O/P EST MOD 30 MIN: CPT | Mod: S$GLB,,, | Performed by: INTERNAL MEDICINE

## 2024-09-25 PROCEDURE — 3066F NEPHROPATHY DOC TX: CPT | Mod: CPTII,S$GLB,, | Performed by: INTERNAL MEDICINE

## 2024-09-25 NOTE — PROGRESS NOTES
"HPI:  Wild Holliday is a 58 y.o. male here for Follow-up  .labs are reviewed.      Review of Systems   Constitutional:  Negative for chills and fever.   HENT:  Negative for congestion, postnasal drip and sore throat.    Eyes:  Negative for photophobia.   Respiratory:  Negative for chest tightness and shortness of breath.    Cardiovascular:  Negative for chest pain.   Gastrointestinal:  Negative for abdominal distention, abdominal pain, blood in stool and vomiting.   Genitourinary:  Negative for dysuria, flank pain and hematuria.   Musculoskeletal:  Negative for back pain.   Skin:  Negative for pallor.   Neurological:  Negative for dizziness, seizures, facial asymmetry, speech difficulty and numbness.   Hematological:  Does not bruise/bleed easily.   Psychiatric/Behavioral:  Negative for agitation and suicidal ideas. The patient is not nervous/anxious.     A review of systems was performed and is negative except as noted above.    Objective:  Vitals:    09/25/24 1043   BP: 120/70   Pulse: 71   Resp: 19   SpO2: 96%   Weight: 116.1 kg (255 lb 15.3 oz)   Height: 5' 9" (1.753 m)      Physical Exam  Vitals and nursing note reviewed.   Constitutional:       Appearance: He is well-developed. He is obese.   HENT:      Head: Normocephalic and atraumatic.      Nose: Nose normal.   Eyes:      Conjunctiva/sclera: Conjunctivae normal.      Pupils: Pupils are equal, round, and reactive to light.   Neck:      Thyroid: No thyromegaly.      Vascular: No JVD.   Cardiovascular:      Rate and Rhythm: Normal rate and regular rhythm.      Heart sounds: Normal heart sounds.   Pulmonary:      Effort: Pulmonary effort is normal.      Breath sounds: Normal breath sounds.   Abdominal:      General: Bowel sounds are normal. There is no distension.      Palpations: Abdomen is soft. There is no mass.      Tenderness: There is no abdominal tenderness. There is no guarding.   Musculoskeletal:         General: Normal range of motion.      Cervical " back: Normal range of motion and neck supple.   Lymphadenopathy:      Cervical: No cervical adenopathy.   Skin:     General: Skin is warm and dry.      Coloration: Skin is not pale.      Findings: No rash.   Neurological:      Mental Status: He is alert and oriented to person, place, and time.      Cranial Nerves: No cranial nerve deficit.      Deep Tendon Reflexes: Reflexes are normal and symmetric.        Assessment/Plan:  1. Benign essential HTN  -     CBC Auto Differential; Future; Expected date: 03/24/2025  -     Comprehensive Metabolic Panel; Future; Expected date: 03/24/2025    Well controlled.  Continue same medication and dose.  Keep weight close to ideal body weight.     Avoid high salt foods (olives, pickles, smoked meats, salted potato chips, etc.).   Do not add salt to your food at the table.   Use only small amounts of salt when cooking.   Begin an exercise program. Discuss with your doctor what type of exercise program would be best for you. It doesn't have to be difficult. Even brisk walking for 20 minutes three times a week is a good form of exercise.   Avoid medicines which contain heart stimulants. This includes many cold and sinus decongestant pills and sprays as well as diet pills. Check the warnings about hypertension on the label. Stimulants such as amphetamine or cocaine could be lethal for someone with hypertension. Never take these.    2. Small B-cell lymphoma, unspecified body region  -     CBC Auto Differential; Future; Expected date: 03/24/2025  Stable;  Doing well     3. Hypertriglyceridemia  -     Lipid Panel; Future; Expected date: 03/24/2025  -     TSH; Future; Expected date: 03/24/2025  Worse numbers  Continue fenofibrate     4. Uncontrolled type 2 diabetes mellitus with hyperglycemia  -     Hemoglobin A1C; Future; Expected date: 03/24/2025  -     Microalbumin/Creatinine Ratio, Urine; Future; Expected date: 03/24/2025     Patient has uncontrolled Diabetes .  We discussed about diet  ;low in calories. Avoid sweats, sodas.  Also increasing activity;walking 2-3 miles a day.  I also adjusted medications and gave patient  instructions about adherence to plan.  Goal of  A1c  less than 7 % stressed.  Also goal of LDL less than 70 highlighted to patient.

## 2024-09-25 NOTE — PROGRESS NOTES
Population Health Chart Review & Patient Outreach Details      Additional [facility] Notes:    Per SHIVANI in basket message, pt declined flu vaccine. HM updated.           Updates Requested / Reviewed:      Updated Care Coordination Note, Care Everywhere, and Immunizations Reconciliation Completed or Queried: [place]         Health Maintenance Topics Overdue:      [facility] Score: 1     Foot Exam                       Health Maintenance Topic(s) Outreach Outcomes & Actions Taken:

## 2024-10-04 ENCOUNTER — OFFICE VISIT (OUTPATIENT)
Dept: INTERNAL MEDICINE | Facility: CLINIC | Age: 59
End: 2024-10-04
Payer: COMMERCIAL

## 2024-10-04 VITALS
SYSTOLIC BLOOD PRESSURE: 146 MMHG | RESPIRATION RATE: 16 BRPM | HEART RATE: 62 BPM | WEIGHT: 255.94 LBS | HEIGHT: 69 IN | BODY MASS INDEX: 37.91 KG/M2 | OXYGEN SATURATION: 97 % | DIASTOLIC BLOOD PRESSURE: 74 MMHG

## 2024-10-04 DIAGNOSIS — F41.1 GAD (GENERALIZED ANXIETY DISORDER): Primary | ICD-10-CM

## 2024-10-04 PROCEDURE — 99999 PR PBB SHADOW E&M-EST. PATIENT-LVL IV: CPT | Mod: PBBFAC,,, | Performed by: INTERNAL MEDICINE

## 2024-10-04 RX ORDER — SERTRALINE HYDROCHLORIDE 50 MG/1
50 TABLET, FILM COATED ORAL DAILY
Qty: 30 TABLET | Refills: 1 | Status: SHIPPED | OUTPATIENT
Start: 2024-10-04 | End: 2025-10-04

## 2024-10-04 RX ORDER — ALPRAZOLAM 0.25 MG/1
0.25 TABLET ORAL NIGHTLY PRN
Qty: 30 TABLET | Refills: 0 | Status: SHIPPED | OUTPATIENT
Start: 2024-10-04 | End: 2024-11-03
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
## 2024-10-11 ENCOUNTER — PATIENT MESSAGE (OUTPATIENT)
Dept: ADMINISTRATIVE | Facility: HOSPITAL | Age: 59
End: 2024-10-11
Payer: COMMERCIAL

## 2024-10-18 ENCOUNTER — TELEPHONE (OUTPATIENT)
Dept: PHARMACY | Facility: CLINIC | Age: 59
End: 2024-10-18
Payer: COMMERCIAL

## 2024-10-18 NOTE — TELEPHONE ENCOUNTER
[facility]/Population Health Chart Review & Patient Outreach Details For Medication Adherence Project    Reason for Outreach Encounter: 3rd Party payor non-compliance report ([employer], [employer], C, etc)  2.  Patient Outreach Method: Reviewed patient chart   3.   Medication in question:   Hypertension Medications               valsartan-hydrochlorothiazide (DIOVAN-HCT) 160-25 mg per tablet TAKE 1 TABLET BY MOUTH EVERY DAY                 Valsartan/HCTZ  last filled  9/17/24 for 90 day supply      4.  Reviewed and or Updates Made To: Patient Chart  5. Outreach Outcomes and/or actions taken: Patient filled medication and is on track to be adherent  Additional Notes:

## 2024-10-21 DIAGNOSIS — E11.65 UNCONTROLLED TYPE 2 DIABETES MELLITUS WITH HYPERGLYCEMIA: ICD-10-CM

## 2024-10-21 RX ORDER — SEMAGLUTIDE 0.68 MG/ML
0.5 INJECTION, SOLUTION SUBCUTANEOUS WEEKLY
Qty: 9 ML | Refills: 1 | Status: SHIPPED | OUTPATIENT
Start: 2024-10-21

## 2024-10-21 NOTE — TELEPHONE ENCOUNTER
Refill Decision Note   Wild Holliday  is requesting a refill authorization.  Brief Assessment and Rationale for Refill:  Approve     Medication Therapy Plan:         Comments:     Note composed:5:40 PM 10/21/2024

## 2024-10-21 NOTE — TELEPHONE ENCOUNTER
No care due was identified.  [facility] Embedded Care Due Messages. Reference number: [id number].   10/21/2024 3:29:19 AM CDT

## 2024-10-31 RX ORDER — METFORMIN HYDROCHLORIDE 1000 MG/1
1000 TABLET ORAL 2 TIMES DAILY WITH MEALS
Qty: 180 TABLET | Refills: 1 | Status: SHIPPED | OUTPATIENT
Start: 2024-10-31 | End: 2025-10-31

## 2024-10-31 RX ORDER — METFORMIN HYDROCHLORIDE 1000 MG/1
1000 TABLET ORAL 2 TIMES DAILY WITH MEALS
Qty: 180 TABLET | Refills: 3 | OUTPATIENT
Start: 2024-10-31

## 2024-11-01 ENCOUNTER — PATIENT OUTREACH (OUTPATIENT)
Dept: ADMINISTRATIVE | Facility: HOSPITAL | Age: 59
End: 2024-11-01
Payer: COMMERCIAL

## 2024-11-01 ENCOUNTER — PATIENT MESSAGE (OUTPATIENT)
Dept: HEMATOLOGY/ONCOLOGY | Facility: CLINIC | Age: 59
End: 2024-11-01
Payer: COMMERCIAL

## 2024-11-01 DIAGNOSIS — C91.10 CLL (CHRONIC LYMPHOCYTIC LEUKEMIA): Primary | ICD-10-CM

## 2024-11-04 ENCOUNTER — LAB VISIT (OUTPATIENT)
Dept: LAB | Facility: HOSPITAL | Age: 59
End: 2024-11-04
Payer: COMMERCIAL

## 2024-11-04 ENCOUNTER — OFFICE VISIT (OUTPATIENT)
Dept: HEMATOLOGY/ONCOLOGY | Facility: CLINIC | Age: 59
End: 2024-11-04
Payer: COMMERCIAL

## 2024-11-04 VITALS
RESPIRATION RATE: 18 BRPM | WEIGHT: 258.5 LBS | HEART RATE: 66 BPM | OXYGEN SATURATION: 96 % | HEIGHT: 69 IN | SYSTOLIC BLOOD PRESSURE: 136 MMHG | DIASTOLIC BLOOD PRESSURE: 79 MMHG | BODY MASS INDEX: 38.29 KG/M2

## 2024-11-04 DIAGNOSIS — C91.10 CLL (CHRONIC LYMPHOCYTIC LEUKEMIA): ICD-10-CM

## 2024-11-04 DIAGNOSIS — C91.10 CLL (CHRONIC LYMPHOCYTIC LEUKEMIA): Primary | ICD-10-CM

## 2024-11-04 LAB
ALBUMIN SERPL BCP-MCNC: 4.1 G/DL (ref 3.5–5.2)
ALP SERPL-CCNC: 40 U/L (ref 40–150)
ALT SERPL W/O P-5'-P-CCNC: 32 U/L (ref 10–44)
ANION GAP SERPL CALC-SCNC: 9 MMOL/L (ref 8–16)
AST SERPL-CCNC: 19 U/L (ref 10–40)
BASOPHILS # BLD AUTO: 0.06 K/UL (ref 0–0.2)
BASOPHILS NFR BLD: 0.6 % (ref 0–1.9)
BILIRUB SERPL-MCNC: 0.4 MG/DL (ref 0.1–1)
BUN SERPL-MCNC: 15 MG/DL (ref 6–20)
CALCIUM SERPL-MCNC: 10.3 MG/DL (ref 8.7–10.5)
CHLORIDE SERPL-SCNC: 102 MMOL/L (ref 95–110)
CO2 SERPL-SCNC: 25 MMOL/L (ref 23–29)
CREAT SERPL-MCNC: 1 MG/DL (ref 0.5–1.4)
DIFFERENTIAL METHOD BLD: ABNORMAL
EOSINOPHIL # BLD AUTO: 0.3 K/UL (ref 0–0.5)
EOSINOPHIL NFR BLD: 2.9 % (ref 0–8)
ERYTHROCYTE [DISTWIDTH] IN BLOOD BY AUTOMATED COUNT: 12.6 % (ref 11.5–14.5)
EST. GFR  (NO RACE VARIABLE): >60 ML/MIN/1.73 M^2
GLUCOSE SERPL-MCNC: 98 MG/DL (ref 70–110)
HCT VFR BLD AUTO: 46.1 % (ref 40–54)
HGB BLD-MCNC: 15.4 G/DL (ref 14–18)
IMM GRANULOCYTES # BLD AUTO: 0.08 K/UL (ref 0–0.04)
IMM GRANULOCYTES NFR BLD AUTO: 0.8 % (ref 0–0.5)
LDH SERPL L TO P-CCNC: 157 U/L (ref 110–260)
LYMPHOCYTES # BLD AUTO: 2.6 K/UL (ref 1–4.8)
LYMPHOCYTES NFR BLD: 27 % (ref 18–48)
MCH RBC QN AUTO: 29.1 PG (ref 27–31)
MCHC RBC AUTO-ENTMCNC: 33.4 G/DL (ref 32–36)
MCV RBC AUTO: 87 FL (ref 82–98)
MONOCYTES # BLD AUTO: 0.7 K/UL (ref 0.3–1)
MONOCYTES NFR BLD: 7.7 % (ref 4–15)
NEUTROPHILS # BLD AUTO: 5.9 K/UL (ref 1.8–7.7)
NEUTROPHILS NFR BLD: 61 % (ref 38–73)
NRBC BLD-RTO: 0 /100 WBC
PLATELET # BLD AUTO: 268 K/UL (ref 150–450)
PMV BLD AUTO: 9.7 FL (ref 9.2–12.9)
POTASSIUM SERPL-SCNC: 4.2 MMOL/L (ref 3.5–5.1)
PROT SERPL-MCNC: 6.6 G/DL (ref 6–8.4)
RBC # BLD AUTO: 5.3 M/UL (ref 4.6–6.2)
SODIUM SERPL-SCNC: 136 MMOL/L (ref 136–145)
WBC # BLD AUTO: 9.63 K/UL (ref 3.9–12.7)

## 2024-11-04 PROCEDURE — 36415 COLL VENOUS BLD VENIPUNCTURE: CPT | Performed by: INTERNAL MEDICINE

## 2024-11-04 PROCEDURE — 80053 COMPREHEN METABOLIC PANEL: CPT | Performed by: INTERNAL MEDICINE

## 2024-11-04 PROCEDURE — 83615 LACTATE (LD) (LDH) ENZYME: CPT | Performed by: INTERNAL MEDICINE

## 2024-11-04 PROCEDURE — 99999 PR PBB SHADOW E&M-EST. PATIENT-LVL IV: CPT | Mod: PBBFAC,,, | Performed by: PHYSICIAN ASSISTANT

## 2024-11-04 PROCEDURE — 85025 COMPLETE CBC W/AUTO DIFF WBC: CPT | Performed by: INTERNAL MEDICINE

## 2024-11-04 RX ORDER — CEFDINIR 300 MG/1
600 CAPSULE ORAL
COMMUNITY
Start: 2024-06-26

## 2024-11-04 RX ORDER — PREDNISONE 10 MG/1
TABLET ORAL
COMMUNITY
Start: 2024-06-24

## 2024-11-04 RX ORDER — FLUTICASONE FUROATE, UMECLIDINIUM BROMIDE AND VILANTEROL TRIFENATATE 100; 62.5; 25 UG/1; UG/1; UG/1
1 POWDER RESPIRATORY (INHALATION)
COMMUNITY
Start: 2024-06-24

## 2024-11-04 RX ORDER — NEBULIZER AND COMPRESSOR
EACH MISCELLANEOUS EVERY 4 HOURS PRN
COMMUNITY
Start: 2024-06-24

## 2024-11-04 RX ORDER — ALBUTEROL SULFATE 0.83 MG/ML
2.5 SOLUTION RESPIRATORY (INHALATION) EVERY 4 HOURS PRN
COMMUNITY
Start: 2024-06-24

## 2024-11-04 RX ORDER — SULFAMETHOXAZOLE AND TRIMETHOPRIM 800; 160 MG/1; MG/1
1 TABLET ORAL 2 TIMES DAILY
COMMUNITY
Start: 2024-10-09

## 2024-11-04 RX ORDER — BENZONATATE 200 MG/1
200 CAPSULE ORAL
COMMUNITY
Start: 2024-06-23

## 2024-11-04 RX ORDER — LEVOFLOXACIN 500 MG/1
500 TABLET, FILM COATED ORAL
COMMUNITY
Start: 2024-06-12
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
## 2024-11-11 DIAGNOSIS — E11.65 UNCONTROLLED TYPE 2 DIABETES MELLITUS WITH HYPERGLYCEMIA: ICD-10-CM

## 2024-11-11 RX ORDER — SEMAGLUTIDE 0.68 MG/ML
INJECTION, SOLUTION SUBCUTANEOUS
Qty: 9 ML | Refills: 1 | Status: SHIPPED | OUTPATIENT
Start: 2024-11-11

## 2024-11-11 RX ORDER — METFORMIN HYDROCHLORIDE 1000 MG/1
1000 TABLET ORAL 2 TIMES DAILY WITH MEALS
Qty: 180 TABLET | Refills: 1 | OUTPATIENT
Start: 2024-11-11

## 2024-11-11 RX ORDER — GLIMEPIRIDE 2 MG/1
2 TABLET ORAL
Qty: 90 TABLET | Refills: 1 | Status: SHIPPED | OUTPATIENT
Start: 2024-11-11

## 2024-11-11 NOTE — TELEPHONE ENCOUNTER
No care due was identified.  [facility] Embedded Care Due Messages. Reference number: [id number].   11/11/2024 3:29:13 AM CST

## 2024-11-11 NOTE — TELEPHONE ENCOUNTER
Refill Decision Note   Wild Holliday  is requesting a refill authorization.  Brief Assessment and Rationale for Refill:  Approve     Medication Therapy Plan:         Comments:     Note composed:5:22 AM 11/11/2024

## 2025-02-03 ENCOUNTER — PATIENT OUTREACH (OUTPATIENT)
Dept: ADMINISTRATIVE | Facility: HOSPITAL | Age: 60
End: 2025-02-03
Payer: COMMERCIAL

## 2025-02-03 NOTE — PROGRESS NOTES
Care Coordination Encounter Details:       MyChart Portal Status:         [x]  Reviewed MyChart Portal Status offered / enrolled if applicable        Additional Notes:     MyChart Outcomes: Pt is enrolled & active          Updates Requested / Reviewed:        Updated Care Coordination Note, Care Everywhere, , External Sources: [facility] and [facility], Care Team Updated, Removed  or Duplicate Orders, and Immunizations Reconciliation Completed or Queried: [place]         Health Maintenance Screening(s) Due:      Health Maintenance Topics Overdue:      VBHM Score: 2     Foot Exam    Pneumonia Vaccine  Shingles/Zoster Vaccine  RSV Vaccine                  Health Maintenance Topic(s) Outreach Outcomes & Actions Taken:    Diabetic Foot Exam - Outreach Outcomes & Actions Taken  : patient has not seen a podiatrist in last year    Primary Care Appt - Outreach Outcomes & Actions Taken  : Primary Care Appt Scheduled    Lab(s) - Outreach Outcomes & Actions Taken  : Overdue Lab(s) Scheduled     Additional Notes:             Chronic Disease Management:     Diabetes Measures        Lab Results   Component Value Date    HGBA1C 7.7 (H) 2024           [x]  Reviewed chart for active Diabetes diagnosis     []  Scheduled necessary follow up appointments if needed         Additional Notes:             Hypertension Measures        BP Readings from Last 1 Encounters:   24 136/79           [x]  Reviewed chart for active Hypertension diagnosis     []  Reviewed & documented Home BP Cuff     []  Documented a Remote BP if needed & applicable     []  Scheduled necessary follow up appointments with Primary Care if needed         Additional Notes:             Provider Team Continuity:     Last PCP Visit Date: 10/4/2024          [x]  Reviewed Primary Care Provider Visits, Annual Wellness Visit, and Future          Appointments to ensure appointments have been scheduled and/or           completed        Additional  Notes:             Social Determinants of Health          [x]  Reviewed, completed, and/or updated the following sections:                  Food Insecurity, Transportation Needs, Financial Resource Strain,                 Tobacco Use        Additional Notes:             Care Management, Digital Medicine, and/or Education Referrals    OPCM Risk Score: 20                 Additional Notes:

## 2025-03-16 DIAGNOSIS — E78.1 HYPERTRIGLYCERIDEMIA: ICD-10-CM

## 2025-03-16 RX ORDER — FENOFIBRATE 160 MG/1
160 TABLET ORAL
Qty: 90 TABLET | Refills: 1 | Status: SHIPPED | OUTPATIENT
Start: 2025-03-16

## 2025-03-16 RX ORDER — VALSARTAN AND HYDROCHLOROTHIAZIDE 160; 25 MG/1; MG/1
1 TABLET ORAL
Qty: 90 TABLET | Refills: 2 | Status: SHIPPED | OUTPATIENT
Start: 2025-03-16

## 2025-03-16 NOTE — TELEPHONE ENCOUNTER
Refill Decision Note   Wild Mg  is requesting a refill authorization.  Brief Assessment and Rationale for Refill:  Approve     Medication Therapy Plan:  flos    Medication Reconciliation Completed: No   Comments:     No Care Gaps recommended.     Note composed:5:57 PM 03/16/2025

## 2025-03-16 NOTE — TELEPHONE ENCOUNTER
Care Due:                  Date            Visit Type   Department     Provider  --------------------------------------------------------------------------------                                MYCHART                              FOLLOWUP/OF  [facility]  Last Visit: 10-      [facility] VISIT   MEDICINE II    Joshua Noonan                              EP -                              PRIMARY      [facility]  Next Visit: 03-      CARE ([facility])   MEDICINE II    Joshua Noonan                                                            Last  Test          Frequency    Reason                     Performed    Due Date  --------------------------------------------------------------------------------    HBA1C.......  6 months...  OZEMPIC, glimepiride,      09- 03-                             metFORMIN................    [employer] Embedded Care Due Messages. Reference number: [id number].   3/16/2025 5:17:37 AM CDT

## 2025-03-18 ENCOUNTER — LAB VISIT (OUTPATIENT)
Dept: LAB | Facility: HOSPITAL | Age: 60
End: 2025-03-18
Attending: INTERNAL MEDICINE
Payer: COMMERCIAL

## 2025-03-18 DIAGNOSIS — I10 BENIGN ESSENTIAL HTN: ICD-10-CM

## 2025-03-18 DIAGNOSIS — E11.65 UNCONTROLLED TYPE 2 DIABETES MELLITUS WITH HYPERGLYCEMIA: ICD-10-CM

## 2025-03-18 DIAGNOSIS — E78.1 HYPERTRIGLYCERIDEMIA: ICD-10-CM

## 2025-03-18 DIAGNOSIS — C83.00 SMALL B-CELL LYMPHOMA, UNSPECIFIED BODY REGION: ICD-10-CM

## 2025-03-18 LAB
ALBUMIN SERPL BCP-MCNC: 4.2 G/DL (ref 3.5–5.2)
ALBUMIN/CREAT UR: 5 UG/MG (ref 0–30)
ALP SERPL-CCNC: 42 U/L (ref 40–150)
ALT SERPL W/O P-5'-P-CCNC: 30 U/L (ref 10–44)
ANION GAP SERPL CALC-SCNC: 9 MMOL/L (ref 8–16)
AST SERPL-CCNC: 19 U/L (ref 10–40)
BASOPHILS # BLD AUTO: 0.09 K/UL (ref 0–0.2)
BASOPHILS NFR BLD: 0.9 % (ref 0–1.9)
BILIRUB SERPL-MCNC: 0.4 MG/DL (ref 0.1–1)
BUN SERPL-MCNC: 21 MG/DL (ref 6–20)
CALCIUM SERPL-MCNC: 9.7 MG/DL (ref 8.7–10.5)
CHLORIDE SERPL-SCNC: 103 MMOL/L (ref 95–110)
CHOLEST SERPL-MCNC: 163 MG/DL (ref 120–199)
CHOLEST/HDLC SERPL: 7.1 {RATIO} (ref 2–5)
CO2 SERPL-SCNC: 24 MMOL/L (ref 23–29)
CREAT SERPL-MCNC: 1.1 MG/DL (ref 0.5–1.4)
CREAT UR-MCNC: 118.9 MG/DL (ref 23–375)
DIFFERENTIAL METHOD BLD: ABNORMAL
EOSINOPHIL # BLD AUTO: 0.2 K/UL (ref 0–0.5)
EOSINOPHIL NFR BLD: 2.2 % (ref 0–8)
ERYTHROCYTE [DISTWIDTH] IN BLOOD BY AUTOMATED COUNT: 13.4 % (ref 11.5–14.5)
EST. GFR  (NO RACE VARIABLE): >60 ML/MIN/1.73 M^2
ESTIMATED AVG GLUCOSE: 171 MG/DL (ref 68–131)
GLUCOSE SERPL-MCNC: 168 MG/DL (ref 70–110)
HBA1C MFR BLD: 7.6 % (ref 4–5.6)
HCT VFR BLD AUTO: 46 % (ref 40–54)
HDLC SERPL-MCNC: 23 MG/DL (ref 40–75)
HDLC SERPL: 14.1 % (ref 20–50)
HGB BLD-MCNC: 15.1 G/DL (ref 14–18)
IMM GRANULOCYTES # BLD AUTO: 0.1 K/UL (ref 0–0.04)
IMM GRANULOCYTES NFR BLD AUTO: 1 % (ref 0–0.5)
LDLC SERPL CALC-MCNC: ABNORMAL MG/DL (ref 63–159)
LYMPHOCYTES # BLD AUTO: 2.7 K/UL (ref 1–4.8)
LYMPHOCYTES NFR BLD: 26.6 % (ref 18–48)
MCH RBC QN AUTO: 28.1 PG (ref 27–31)
MCHC RBC AUTO-ENTMCNC: 32.8 G/DL (ref 32–36)
MCV RBC AUTO: 86 FL (ref 82–98)
MICROALBUMIN UR DL<=1MG/L-MCNC: 6 UG/ML
MONOCYTES # BLD AUTO: 1 K/UL (ref 0.3–1)
MONOCYTES NFR BLD: 9.4 % (ref 4–15)
NEUTROPHILS # BLD AUTO: 6 K/UL (ref 1.8–7.7)
NEUTROPHILS NFR BLD: 59.9 % (ref 38–73)
NONHDLC SERPL-MCNC: 140 MG/DL
NRBC BLD-RTO: 0 /100 WBC
PLATELET # BLD AUTO: 315 K/UL (ref 150–450)
PMV BLD AUTO: 9.3 FL (ref 9.2–12.9)
POTASSIUM SERPL-SCNC: 4.3 MMOL/L (ref 3.5–5.1)
PROT SERPL-MCNC: 7 G/DL (ref 6–8.4)
RBC # BLD AUTO: 5.37 M/UL (ref 4.6–6.2)
SODIUM SERPL-SCNC: 136 MMOL/L (ref 136–145)
TRIGL SERPL-MCNC: 629 MG/DL (ref 30–150)
TSH SERPL DL<=0.005 MIU/L-ACNC: 2.25 UIU/ML (ref 0.4–4)
WBC # BLD AUTO: 10.06 K/UL (ref 3.9–12.7)

## 2025-03-18 PROCEDURE — 85025 COMPLETE CBC W/AUTO DIFF WBC: CPT | Performed by: INTERNAL MEDICINE

## 2025-03-18 PROCEDURE — 82043 UR ALBUMIN QUANTITATIVE: CPT | Performed by: INTERNAL MEDICINE

## 2025-03-18 PROCEDURE — 84443 ASSAY THYROID STIM HORMONE: CPT | Performed by: INTERNAL MEDICINE

## 2025-03-18 PROCEDURE — 80053 COMPREHEN METABOLIC PANEL: CPT | Performed by: INTERNAL MEDICINE

## 2025-03-18 PROCEDURE — 83036 HEMOGLOBIN GLYCOSYLATED A1C: CPT | Performed by: INTERNAL MEDICINE

## 2025-03-18 PROCEDURE — 80061 LIPID PANEL: CPT | Performed by: INTERNAL MEDICINE

## 2025-03-25 ENCOUNTER — OFFICE VISIT (OUTPATIENT)
Dept: INTERNAL MEDICINE | Facility: CLINIC | Age: 60
End: 2025-03-25
Payer: COMMERCIAL

## 2025-03-25 VITALS
DIASTOLIC BLOOD PRESSURE: 70 MMHG | SYSTOLIC BLOOD PRESSURE: 124 MMHG | BODY MASS INDEX: 37.68 KG/M2 | HEIGHT: 69 IN | OXYGEN SATURATION: 95 % | HEART RATE: 72 BPM | WEIGHT: 254.44 LBS | RESPIRATION RATE: 18 BRPM

## 2025-03-25 DIAGNOSIS — E66.01 SEVERE OBESITY (BMI 35.0-39.9) WITH COMORBIDITY: ICD-10-CM

## 2025-03-25 DIAGNOSIS — C83.00 SMALL B-CELL LYMPHOMA, UNSPECIFIED BODY REGION: ICD-10-CM

## 2025-03-25 DIAGNOSIS — E11.65 UNCONTROLLED TYPE 2 DIABETES MELLITUS WITH HYPERGLYCEMIA: Primary | ICD-10-CM

## 2025-03-25 PROCEDURE — 3061F NEG MICROALBUMINURIA REV: CPT | Mod: CPTII,S$GLB,, | Performed by: INTERNAL MEDICINE

## 2025-03-25 PROCEDURE — 3051F HG A1C>EQUAL 7.0%<8.0%: CPT | Mod: CPTII,S$GLB,, | Performed by: INTERNAL MEDICINE

## 2025-03-25 PROCEDURE — 3078F DIAST BP <80 MM HG: CPT | Mod: CPTII,S$GLB,, | Performed by: INTERNAL MEDICINE

## 2025-03-25 PROCEDURE — 99214 OFFICE O/P EST MOD 30 MIN: CPT | Mod: S$GLB,,, | Performed by: INTERNAL MEDICINE

## 2025-03-25 PROCEDURE — 1159F MED LIST DOCD IN RCRD: CPT | Mod: CPTII,S$GLB,, | Performed by: INTERNAL MEDICINE

## 2025-03-25 PROCEDURE — 3072F LOW RISK FOR RETINOPATHY: CPT | Mod: CPTII,S$GLB,, | Performed by: INTERNAL MEDICINE

## 2025-03-25 PROCEDURE — 3074F SYST BP LT 130 MM HG: CPT | Mod: CPTII,S$GLB,, | Performed by: INTERNAL MEDICINE

## 2025-03-25 PROCEDURE — 3066F NEPHROPATHY DOC TX: CPT | Mod: CPTII,S$GLB,, | Performed by: INTERNAL MEDICINE

## 2025-03-25 PROCEDURE — 99999 PR PBB SHADOW E&M-EST. PATIENT-LVL IV: CPT | Mod: PBBFAC,,, | Performed by: INTERNAL MEDICINE

## 2025-03-25 PROCEDURE — G2211 COMPLEX E/M VISIT ADD ON: HCPCS | Mod: S$GLB,,, | Performed by: INTERNAL MEDICINE

## 2025-03-25 PROCEDURE — 3008F BODY MASS INDEX DOCD: CPT | Mod: CPTII,S$GLB,, | Performed by: INTERNAL MEDICINE

## 2025-03-25 PROCEDURE — 1160F RVW MEDS BY RX/DR IN RCRD: CPT | Mod: CPTII,S$GLB,, | Performed by: INTERNAL MEDICINE

## 2025-03-25 RX ORDER — SEMAGLUTIDE 1.34 MG/ML
1 INJECTION, SOLUTION SUBCUTANEOUS
Qty: 3 ML | Refills: 11 | Status: SHIPPED | OUTPATIENT
Start: 2025-03-25 | End: 2026-03-25

## 2025-03-25 RX ORDER — DULOXETIN HYDROCHLORIDE 30 MG/1
30 CAPSULE, DELAYED RELEASE ORAL
COMMUNITY
Start: 2025-03-09

## 2025-03-27 NOTE — PROGRESS NOTES
Subjective:   History of Present Illness    CHIEF COMPLAINT:  Wild presents today for six month follow-up.    HEMATOLOGIC HISTORY:  He has CLL/SLL (Chronic Lymphocytic Leukemia/Small Lymphocytic Lymphoma) and has been in remission for 8 years. He is following with hematology under Dr. Bradley, who has transferred care to a nurse practitioner.    WEIGHT MANAGEMENT:  His current weight is 254 lbs, reflecting an 8-pound reduction from the previous year. He reports taking Ozempic 0.5 mg and experiencing some GI side effects.    LABS:  CBC from 03/18/2025 shows normal white count, platelets, hemoglobin, and lymphocytes. Chemistry panel shows normal sodium, potassium, kidney function, and liver tests. Cholesterol is 163. Triglycerides are significantly elevated at 629. Glucose is elevated at 168 with A1c of 7.6.    ALLERGIES:  He denies any known allergies.       Review of Systems   Constitutional:  Negative for chills and fever.   HENT:  Negative for congestion, postnasal drip and sore throat.    Eyes:  Negative for photophobia.   Respiratory:  Negative for chest tightness and shortness of breath.    Cardiovascular:  Negative for chest pain.   Gastrointestinal:  Negative for abdominal distention, abdominal pain, blood in stool and vomiting.   Genitourinary:  Negative for dysuria, flank pain and hematuria.   Musculoskeletal:  Negative for back pain.   Skin:  Negative for pallor.   Neurological:  Negative for dizziness, seizures, facial asymmetry, speech difficulty and numbness.   Hematological:  Does not bruise/bleed easily.   Psychiatric/Behavioral:  Negative for agitation and suicidal ideas. The patient is not nervous/anxious.       Objective:   Physical Exam  Vitals and nursing note reviewed.   Constitutional:       Appearance: He is well-developed.   HENT:      Head: Normocephalic and atraumatic.      Nose: Nose normal.   Eyes:      Conjunctiva/sclera: Conjunctivae normal.      Pupils: Pupils are equal, round, and  reactive to light.   Neck:      Thyroid: No thyromegaly.      Vascular: No JVD.   Cardiovascular:      Rate and Rhythm: Normal rate and regular rhythm.      Heart sounds: Normal heart sounds.   Pulmonary:      Effort: Pulmonary effort is normal.      Breath sounds: Normal breath sounds.   Abdominal:      General: Bowel sounds are normal. There is no distension.      Palpations: Abdomen is soft. There is no mass.      Tenderness: There is no abdominal tenderness. There is no guarding.   Musculoskeletal:         General: Normal range of motion.      Cervical back: Normal range of motion and neck supple.   Lymphadenopathy:      Cervical: No cervical adenopathy.   Skin:     General: Skin is warm and dry.      Coloration: Skin is not pale.      Findings: No rash.   Neurological:      Mental Status: He is alert and oriented to person, place, and time.      Cranial Nerves: No cranial nerve deficit.      Deep Tendon Reflexes: Reflexes are normal and symmetric.        Assessment/Plan:     1. Uncontrolled type 2 diabetes mellitus with hyperglycemia  semaglutide (OZEMPIC) 1 mg/dose (4 mg/3 mL)    CBC Auto Differential    Comprehensive Metabolic Panel    Hemoglobin A1C    Lipid Panel    Microalbumin/Creatinine Ratio, Urine    TSH      2. Small B-cell lymphoma, unspecified body region  CBC Auto Differential    Comprehensive Metabolic Panel    Hemoglobin A1C    Lipid Panel    Microalbumin/Creatinine Ratio, Urine    TSH      3. Severe obesity (BMI 35.0-39.9) with comorbidity  CBC Auto Differential    Comprehensive Metabolic Panel    Hemoglobin A1C    Lipid Panel    Microalbumin/Creatinine Ratio, Urine    TSH         Assessment & Plan    C83.00 Small B-cell lymphoma, unspecified body region  E11.65 Uncontrolled type 2 diabetes mellitus with hyperglycemia  E66.01 Severe obesity (BMI 35.0-39.9) with comorbidity  C91.11 Chronic lymphocytic leukemia of B-cell type in remission  E78.1 Pure hyperglyceridemia  Z68.39 Body mass index [BMI]  39.0-39.9, adult    IMPRESSION:  - Reviewed vital signs: pulse 72, /70, and oxygen 95% good.  - Analyzed CBC results: White count, platelets, hemoglobin, and lymphocytes all WNL.  - Evaluated chemical profile: Normal sodium, potassium, creatinine, EGFR, and liver tests.  - Uncontrolled diabetes with glucose 168 and A1c 7.6.  - Severely elevated triglycerides at 629.  - Weight loss of 8-12 lbs over past 1-2 years.  - Reviewed oncology history, noting confusion between CLL/SLL diagnosis and B-cell lymphoma.  - WBC count not showing any concerning trends.    SMALL B-CELL LYMPHOMA, UNSPECIFIED BODY REGION:  - Reviewed CBC results, which were normal.  - Noted patient's history of small B cell lymphoma with chemotherapy treatment and current remission status of 8 years.  - Identified confusion in medical records between lymphoma and CLL/SLL diagnosis.  - Wild is following up with hematologist Dr. Bradley, who has transferred care to Dr. Lara.    UNCONTROLLED TYPE 2 DIABETES MELLITUS WITH HYPERGLYCEMIA:  - Monitored glucose level of 168 mg/dL and HbA1c of 7.6%, noting slight improvement from 7.7% but diabetes remains uncontrolled.  - Identified diabetes as the main health challenge for the patient.  - Increased Ozempic dose from 0.5 mg to 1 mg to improve diabetes control and facilitate weight loss.    SEVERE OBESITY (BMI 35.0-39.9) WITH COMORBIDITY:  - Tracked patient's weight over time: 262 lbs 2 years ago, 258 lbs last year, currently 254 lbs.  - Acknowledged severe obesity as a current condition and discussed the need for further weight loss with the patient.  - Increased Ozempic dose to 1 mg to promote weight loss and improve diabetes control, addressing both obesity and diabetes management.    CHRONIC LYMPHOCYTIC LEUKEMIA (CLL):  - Noted confusion in medical records regarding CLL and B-cell lymphoma history; recent oncology notes confirm CLL/SLL diagnosis based on spinal mass biopsy.  - Recommend continued  follow-up with the new specialist for CLL management.    HYPERTRIGLYCERIDEMIA:  - Monitored patient's triglyceride levels, noting uncontrolled and extremely high values.  - Recent levels were 629 and 533, with previous fluctuations between 200 and 500.  - This presents a significant ongoing health challenge for the patient.          This note was generated with the assistance of ambient listening technology. Verbal consent was obtained by the patient and accompanying visitor(s) for the recording of patient appointment to facilitate this note. I attest to having reviewed and edited the generated note for accuracy, though some syntax or spelling errors may persist. Please contact the author of this note for any clarification.

## 2025-04-23 RX ORDER — METFORMIN HYDROCHLORIDE 1000 MG/1
1000 TABLET ORAL 2 TIMES DAILY WITH MEALS
Qty: 180 TABLET | Refills: 1 | Status: SHIPPED | OUTPATIENT
Start: 2025-04-23

## 2025-04-23 NOTE — TELEPHONE ENCOUNTER
Refill Decision Note   Wild Holliday  is requesting a refill authorization.  Brief Assessment and Rationale for Refill:  Approve     Medication Therapy Plan:        Comments:     Note composed:2:58 PM 04/23/2025

## 2025-04-23 NOTE — TELEPHONE ENCOUNTER
No care due was identified.  [facility] Embedded Care Due Messages. Reference number: [id number].   4/23/2025 11:37:26 AM CDT

## 2025-05-13 ENCOUNTER — PATIENT MESSAGE (OUTPATIENT)
Dept: HEMATOLOGY/ONCOLOGY | Facility: CLINIC | Age: 60
End: 2025-05-13
Payer: COMMERCIAL

## 2025-05-13 DIAGNOSIS — C91.10 CLL (CHRONIC LYMPHOCYTIC LEUKEMIA): Primary | ICD-10-CM

## 2025-06-03 ENCOUNTER — PATIENT OUTREACH (OUTPATIENT)
Dept: ADMINISTRATIVE | Facility: HOSPITAL | Age: 60
End: 2025-06-03
Payer: COMMERCIAL

## 2025-07-01 ENCOUNTER — TELEPHONE (OUTPATIENT)
Dept: OPTOMETRY | Facility: CLINIC | Age: 60
End: 2025-07-01
Payer: COMMERCIAL

## 2025-07-01 NOTE — TELEPHONE ENCOUNTER
Copied from CRM #[id number]. Topic: General Inquiry - Patient Advice  >> Jul 1, 2025 11:07 AM Che wrote:  Patient is stating his appointment time was change and is requesting an earlier time with his wife at 9 am coming from [facility]   [phone]

## 2025-07-03 ENCOUNTER — TELEPHONE (OUTPATIENT)
Dept: OPHTHALMOLOGY | Facility: CLINIC | Age: 60
End: 2025-07-03
Payer: COMMERCIAL

## 2025-07-03 ENCOUNTER — OFFICE VISIT (OUTPATIENT)
Dept: OPTOMETRY | Facility: CLINIC | Age: 60
End: 2025-07-03
Payer: COMMERCIAL

## 2025-07-03 DIAGNOSIS — Z96.1 PSEUDOPHAKIA OF RIGHT EYE: ICD-10-CM

## 2025-07-03 DIAGNOSIS — E11.9 TYPE 2 DIABETES MELLITUS WITHOUT OPHTHALMIC MANIFESTATIONS: ICD-10-CM

## 2025-07-03 DIAGNOSIS — I10 BENIGN ESSENTIAL HTN: ICD-10-CM

## 2025-07-03 DIAGNOSIS — H52.4 PRESBYOPIA: ICD-10-CM

## 2025-07-03 DIAGNOSIS — H25.12 NS (NUCLEAR SCLEROSIS), LEFT: Primary | ICD-10-CM

## 2025-07-03 PROCEDURE — 1159F MED LIST DOCD IN RCRD: CPT | Mod: CPTII,S$GLB,, | Performed by: OPTOMETRIST

## 2025-07-03 PROCEDURE — 2023F DILAT RTA XM W/O RTNOPTHY: CPT | Mod: CPTII,S$GLB,, | Performed by: OPTOMETRIST

## 2025-07-03 PROCEDURE — 99999 PR PBB SHADOW E&M-EST. PATIENT-LVL II: CPT | Mod: PBBFAC,,, | Performed by: OPTOMETRIST

## 2025-07-03 PROCEDURE — 3061F NEG MICROALBUMINURIA REV: CPT | Mod: CPTII,S$GLB,, | Performed by: OPTOMETRIST

## 2025-07-03 PROCEDURE — 3051F HG A1C>EQUAL 7.0%<8.0%: CPT | Mod: CPTII,S$GLB,, | Performed by: OPTOMETRIST

## 2025-07-03 PROCEDURE — 99214 OFFICE O/P EST MOD 30 MIN: CPT | Mod: S$GLB,,, | Performed by: OPTOMETRIST

## 2025-07-03 PROCEDURE — 3066F NEPHROPATHY DOC TX: CPT | Mod: CPTII,S$GLB,, | Performed by: OPTOMETRIST

## 2025-07-03 PROCEDURE — 1160F RVW MEDS BY RX/DR IN RCRD: CPT | Mod: CPTII,S$GLB,, | Performed by: OPTOMETRIST

## 2025-07-03 NOTE — TELEPHONE ENCOUNTER
----- Message from Rachele Moore OD sent at 7/3/2025  4:19 PM CDT -----  Please call pt to schedule cat eval OS   - consider monovision

## 2025-07-03 NOTE — PROGRESS NOTES
HPI    ROGELIO: 08/24  Chief complaint (CC): Patient is here for annual eye exam today.  Patient   states his vision stable but he would like new glasses.  Glasses? yes  Contacts? no  H/o eye surgery, injections or laser: PC IOL OD  H/o eye injury: no  Known eye conditions? Cataract OS, PC IOL OD  Family h/o eye conditions? no  Eye gtts? no      (-) Flashes (+)  Floaters (-) Mucous   (-)  Tearing (-) Itching (-) Burning   (-) Headaches (-) Eye Pain/discomfort (-) Irritation   (-)  Redness (-) Double vision (-) Blurry vision    Diabetic? +  A1c? Hemoglobin A1C       Date                     Value               Ref Range             Status                03/18/2025               7.6 (H)             4.0 - 5.6 %           Final                 09/23/2024               7.7 (H)             4.0 - 5.6 %           Final                 03/15/2024               7.2 (H)             4.0 - 5.6 %           Final                  Last edited by Bernadette Ortega on 7/3/2025 10:09 AM.            Assessment /Plan     For exam results, see Encounter Report.        NS (nuclear sclerosis), left               Visually significant, consult for cat evan    Pt is interested in monovision near for OS    Pseudophakia of right eye  PCO - mild               Stable, monitor     Benign essential HTN  Type 2 diabetes mellitus without ophthalmic manifestations               No retinopathy, monitor yearly     Presbyopia               Rx specs     RTC 1 year, sooner pRN

## 2025-08-18 ENCOUNTER — LAB VISIT (OUTPATIENT)
Dept: LAB | Facility: HOSPITAL | Age: 60
End: 2025-08-18
Attending: INTERNAL MEDICINE
Payer: COMMERCIAL

## 2025-08-18 DIAGNOSIS — C91.10 CLL (CHRONIC LYMPHOCYTIC LEUKEMIA): ICD-10-CM

## 2025-08-18 LAB
ABSOLUTE EOSINOPHIL (OHS): 0.2 K/UL
ABSOLUTE MONOCYTE (OHS): 0.8 K/UL (ref 0.3–1)
ABSOLUTE NEUTROPHIL COUNT (OHS): 5.83 K/UL (ref 1.8–7.7)
ALBUMIN SERPL BCP-MCNC: 4.4 G/DL (ref 3.5–5.2)
ALP SERPL-CCNC: 40 UNIT/L (ref 40–150)
ALT SERPL W/O P-5'-P-CCNC: 37 UNIT/L (ref 10–44)
ANION GAP (OHS): 9 MMOL/L (ref 8–16)
AST SERPL-CCNC: 30 UNIT/L (ref 11–45)
BASOPHILS # BLD AUTO: 0.09 K/UL
BASOPHILS NFR BLD AUTO: 0.8 %
BILIRUB SERPL-MCNC: 0.3 MG/DL (ref 0.1–1)
BUN SERPL-MCNC: 19 MG/DL (ref 6–20)
CALCIUM SERPL-MCNC: 10.2 MG/DL (ref 8.7–10.5)
CHLORIDE SERPL-SCNC: 108 MMOL/L (ref 95–110)
CO2 SERPL-SCNC: 22 MMOL/L (ref 23–29)
CREAT SERPL-MCNC: 1 MG/DL (ref 0.5–1.4)
ERYTHROCYTE [DISTWIDTH] IN BLOOD BY AUTOMATED COUNT: 13.3 % (ref 11.5–14.5)
GFR SERPLBLD CREATININE-BSD FMLA CKD-EPI: >60 ML/MIN/1.73/M2
GLUCOSE SERPL-MCNC: 124 MG/DL (ref 70–110)
HCT VFR BLD AUTO: 44.6 % (ref 40–54)
HGB BLD-MCNC: 15.2 GM/DL (ref 14–18)
IGA SERPL-MCNC: 94 MG/DL (ref 40–350)
IGG SERPL-MCNC: 356 MG/DL (ref 650–1600)
IGM SERPL-MCNC: 24 MG/DL (ref 50–300)
IMM GRANULOCYTES # BLD AUTO: 0.08 K/UL (ref 0–0.04)
IMM GRANULOCYTES NFR BLD AUTO: 0.7 % (ref 0–0.5)
LDH SERPL-CCNC: 175 U/L (ref 110–260)
LYMPHOCYTES # BLD AUTO: 3.7 K/UL (ref 1–4.8)
MCH RBC QN AUTO: 29.1 PG (ref 27–31)
MCHC RBC AUTO-ENTMCNC: 34.1 G/DL (ref 32–36)
MCV RBC AUTO: 85 FL (ref 82–98)
NUCLEATED RBC (/100WBC) (OHS): 0 /100 WBC
PLATELET # BLD AUTO: 291 K/UL (ref 150–450)
PMV BLD AUTO: 9.6 FL (ref 9.2–12.9)
POTASSIUM SERPL-SCNC: 4.4 MMOL/L (ref 3.5–5.1)
PROT SERPL-MCNC: 6.4 GM/DL (ref 6–8.4)
RBC # BLD AUTO: 5.23 M/UL (ref 4.6–6.2)
RELATIVE EOSINOPHIL (OHS): 1.9 %
RELATIVE LYMPHOCYTE (OHS): 34.6 % (ref 18–48)
RELATIVE MONOCYTE (OHS): 7.5 % (ref 4–15)
RELATIVE NEUTROPHIL (OHS): 54.5 % (ref 38–73)
SODIUM SERPL-SCNC: 139 MMOL/L (ref 136–145)
WBC # BLD AUTO: 10.7 K/UL (ref 3.9–12.7)

## 2025-08-18 PROCEDURE — 80053 COMPREHEN METABOLIC PANEL: CPT

## 2025-08-18 PROCEDURE — 82784 ASSAY IGA/IGD/IGG/IGM EACH: CPT | Mod: 59

## 2025-08-18 PROCEDURE — 36415 COLL VENOUS BLD VENIPUNCTURE: CPT

## 2025-08-18 PROCEDURE — 85025 COMPLETE CBC W/AUTO DIFF WBC: CPT

## 2025-08-18 PROCEDURE — 83615 LACTATE (LD) (LDH) ENZYME: CPT

## 2025-08-20 ENCOUNTER — TELEPHONE (OUTPATIENT)
Dept: HEMATOLOGY/ONCOLOGY | Facility: CLINIC | Age: 60
End: 2025-08-20
Payer: COMMERCIAL

## 2025-08-21 ENCOUNTER — OFFICE VISIT (OUTPATIENT)
Dept: HEMATOLOGY/ONCOLOGY | Facility: CLINIC | Age: 60
End: 2025-08-21
Payer: COMMERCIAL

## 2025-08-21 VITALS — WEIGHT: 245 LBS | HEIGHT: 69 IN | BODY MASS INDEX: 36.29 KG/M2

## 2025-08-21 DIAGNOSIS — C91.10 CLL (CHRONIC LYMPHOCYTIC LEUKEMIA): Primary | ICD-10-CM

## (undated) DEVICE — GOWN POLY REINF BRTH SLV XL

## (undated) DEVICE — DRAPE STERI-DRAPE 1000 17X11IN

## (undated) DEVICE — SUT BONE WAX 2.5 GRMS 12/BX

## (undated) DEVICE — ADHESIVE DERMABOND ADVANCED

## (undated) DEVICE — SEALANT VISTASEAL FIBRIN 2ML

## (undated) DEVICE — TRAY SKIN SCRUB WET PREMIUM

## (undated) DEVICE — ADHESIVE MASTISOL VIAL 48/BX

## (undated) DEVICE — SEE L#120831

## (undated) DEVICE — GOWN SURGICAL X-LARGE

## (undated) DEVICE — DRAPE XRAY EQUIPMENT UNIV

## (undated) DEVICE — DRESSING TELFA N ADH 3X8

## (undated) DEVICE — BUR BONE CUT MICRO TPS 3X3.8MM

## (undated) DEVICE — SUT VICRYL PLUS 3-0 SH 18IN

## (undated) DEVICE — SYR ONLY LUER LOCK 20CC

## (undated) DEVICE — Device

## (undated) DEVICE — SUT MCRYL PLUS 4-0 PS2 27IN

## (undated) DEVICE — DRAPE THREE-QTR REINF 53X77IN

## (undated) DEVICE — BLADE ELECTRO EDGE INSULATED

## (undated) DEVICE — TRAY MINOR GEN SURG

## (undated) DEVICE — DRESSING TRANS 4X4 TEGADERM

## (undated) DEVICE — GLOVE BIOGEL SKINSENSE PI 6.5

## (undated) DEVICE — NDL HYPO REG 25G X 1 1/2

## (undated) DEVICE — ELECTRODE REM PLYHSV RETURN 9

## (undated) DEVICE — SKINMARKER & RULER REGULAR X-F

## (undated) DEVICE — DRAPE ABDOMINAL TIBURON 14X11

## (undated) DEVICE — DIFFUSER

## (undated) DEVICE — DRAPE OPMI STERILE

## (undated) DEVICE — ELECTRODE BLADE TEFLON 6

## (undated) DEVICE — STAPLER SKIN PROXIMATE WIDE

## (undated) DEVICE — SUT 2/0 30IN SILK BLK BRAI

## (undated) DEVICE — DURAPREP SURG SCRUB 26ML

## (undated) DEVICE — UNDERGLOVES BIOGEL PI SZ 7 LF

## (undated) DEVICE — DRESSING SURGICAL 1X3

## (undated) DEVICE — DRAPE INCISE IOBAN 2 23X17IN

## (undated) DEVICE — SUT VICRYL PLUS 2-0 CT1 18

## (undated) DEVICE — SUT VICRYL+ 27 UR-6 VIOL

## (undated) DEVICE — ELECTRODE BLADE INSULATED 1 IN

## (undated) DEVICE — SUT COATED VICRYL 4/0 27IN

## (undated) DEVICE — SUT VICRYL PLUS 0 CT1 18IN

## (undated) DEVICE — CARTRIDGE OIL

## (undated) DEVICE — DRAPE THYROID WITH ARMBOARD

## (undated) DEVICE — DRAPE STERI INSTRUMENT 1018

## (undated) DEVICE — CLOSURE SKIN STERI STRIP 1/4X3